# Patient Record
Sex: FEMALE | Race: BLACK OR AFRICAN AMERICAN | HISPANIC OR LATINO
[De-identification: names, ages, dates, MRNs, and addresses within clinical notes are randomized per-mention and may not be internally consistent; named-entity substitution may affect disease eponyms.]

---

## 2017-02-28 ENCOUNTER — APPOINTMENT (OUTPATIENT)
Dept: OBGYN | Facility: CLINIC | Age: 37
End: 2017-02-28

## 2017-02-28 ENCOUNTER — OUTPATIENT (OUTPATIENT)
Dept: OUTPATIENT SERVICES | Facility: HOSPITAL | Age: 37
LOS: 1 days | Discharge: HOME | End: 2017-02-28

## 2017-02-28 VITALS
BODY MASS INDEX: 40.44 KG/M2 | SYSTOLIC BLOOD PRESSURE: 120 MMHG | HEIGHT: 60 IN | DIASTOLIC BLOOD PRESSURE: 80 MMHG | WEIGHT: 206 LBS

## 2017-02-28 DIAGNOSIS — Z00.00 ENCOUNTER FOR GENERAL ADULT MEDICAL EXAMINATION W/OUT ABNORMAL FINDINGS: ICD-10-CM

## 2017-03-10 ENCOUNTER — RECORD ABSTRACTING (OUTPATIENT)
Age: 37
End: 2017-03-10

## 2017-06-27 DIAGNOSIS — Z01.419 ENCOUNTER FOR GYNECOLOGICAL EXAMINATION (GENERAL) (ROUTINE) WITHOUT ABNORMAL FINDINGS: ICD-10-CM

## 2017-08-11 ENCOUNTER — OTHER (OUTPATIENT)
Age: 37
End: 2017-08-11

## 2017-08-11 ENCOUNTER — APPOINTMENT (OUTPATIENT)
Dept: OBGYN | Facility: CLINIC | Age: 37
End: 2017-08-11

## 2017-08-25 ENCOUNTER — OUTPATIENT (OUTPATIENT)
Dept: OUTPATIENT SERVICES | Facility: HOSPITAL | Age: 37
LOS: 1 days | Discharge: HOME | End: 2017-08-25

## 2017-08-25 ENCOUNTER — APPOINTMENT (OUTPATIENT)
Dept: OBGYN | Facility: CLINIC | Age: 37
End: 2017-08-25

## 2017-08-25 VITALS — WEIGHT: 199 LBS | BODY MASS INDEX: 38.86 KG/M2 | SYSTOLIC BLOOD PRESSURE: 102 MMHG | DIASTOLIC BLOOD PRESSURE: 80 MMHG

## 2017-08-27 ENCOUNTER — EMERGENCY (EMERGENCY)
Facility: HOSPITAL | Age: 37
LOS: 0 days | Discharge: HOME | End: 2017-08-28

## 2017-08-27 DIAGNOSIS — Z98.890 OTHER SPECIFIED POSTPROCEDURAL STATES: ICD-10-CM

## 2017-08-27 DIAGNOSIS — N83.201 UNSPECIFIED OVARIAN CYST, RIGHT SIDE: ICD-10-CM

## 2017-08-27 DIAGNOSIS — N89.8 OTHER SPECIFIED NONINFLAMMATORY DISORDERS OF VAGINA: ICD-10-CM

## 2017-08-27 DIAGNOSIS — R10.2 PELVIC AND PERINEAL PAIN: ICD-10-CM

## 2017-08-27 DIAGNOSIS — Z91.013 ALLERGY TO SEAFOOD: ICD-10-CM

## 2017-08-28 ENCOUNTER — RX RENEWAL (OUTPATIENT)
Age: 37
End: 2017-08-28

## 2017-08-30 ENCOUNTER — RESULT REVIEW (OUTPATIENT)
Age: 37
End: 2017-08-30

## 2017-09-01 ENCOUNTER — APPOINTMENT (OUTPATIENT)
Dept: OBGYN | Facility: CLINIC | Age: 37
End: 2017-09-01

## 2017-09-01 ENCOUNTER — OUTPATIENT (OUTPATIENT)
Dept: OUTPATIENT SERVICES | Facility: HOSPITAL | Age: 37
LOS: 1 days | Discharge: HOME | End: 2017-09-01

## 2017-09-01 VITALS
SYSTOLIC BLOOD PRESSURE: 114 MMHG | BODY MASS INDEX: 39.32 KG/M2 | DIASTOLIC BLOOD PRESSURE: 74 MMHG | WEIGHT: 200.3 LBS | HEIGHT: 60 IN

## 2017-09-02 LAB — HPV I/H RISK 1 DNA CVX QL PROBE+SIG AMP: NOT DETECTED

## 2017-09-05 DIAGNOSIS — N92.0 EXCESSIVE AND FREQUENT MENSTRUATION WITH REGULAR CYCLE: ICD-10-CM

## 2017-09-07 ENCOUNTER — OUTPATIENT (OUTPATIENT)
Dept: OUTPATIENT SERVICES | Facility: HOSPITAL | Age: 37
LOS: 1 days | Discharge: HOME | End: 2017-09-07

## 2017-09-07 DIAGNOSIS — F41.0 PANIC DISORDER [EPISODIC PAROXYSMAL ANXIETY]: ICD-10-CM

## 2017-09-07 DIAGNOSIS — F41.1 GENERALIZED ANXIETY DISORDER: ICD-10-CM

## 2017-09-07 DIAGNOSIS — F32.1 MAJOR DEPRESSIVE DISORDER, SINGLE EPISODE, MODERATE: ICD-10-CM

## 2018-01-18 ENCOUNTER — TRANSCRIPTION ENCOUNTER (OUTPATIENT)
Age: 38
End: 2018-01-18

## 2018-01-29 ENCOUNTER — EMERGENCY (EMERGENCY)
Facility: HOSPITAL | Age: 38
LOS: 0 days | Discharge: LEFT BEFORE DISCHARGE | End: 2018-01-29

## 2018-01-29 DIAGNOSIS — Z91.013 ALLERGY TO SEAFOOD: ICD-10-CM

## 2018-01-29 DIAGNOSIS — R10.9 UNSPECIFIED ABDOMINAL PAIN: ICD-10-CM

## 2018-01-29 DIAGNOSIS — Z98.890 OTHER SPECIFIED POSTPROCEDURAL STATES: ICD-10-CM

## 2018-02-23 ENCOUNTER — APPOINTMENT (OUTPATIENT)
Dept: OBGYN | Facility: CLINIC | Age: 38
End: 2018-02-23

## 2018-03-08 ENCOUNTER — OTHER (OUTPATIENT)
Age: 38
End: 2018-03-08

## 2018-03-09 ENCOUNTER — RX RENEWAL (OUTPATIENT)
Age: 38
End: 2018-03-09

## 2018-03-30 ENCOUNTER — APPOINTMENT (OUTPATIENT)
Dept: OBGYN | Facility: CLINIC | Age: 38
End: 2018-03-30

## 2018-04-06 ENCOUNTER — OUTPATIENT (OUTPATIENT)
Dept: OUTPATIENT SERVICES | Facility: HOSPITAL | Age: 38
LOS: 1 days | Discharge: HOME | End: 2018-04-06

## 2018-04-06 ENCOUNTER — APPOINTMENT (OUTPATIENT)
Dept: OBGYN | Facility: CLINIC | Age: 38
End: 2018-04-06

## 2018-04-06 VITALS
DIASTOLIC BLOOD PRESSURE: 78 MMHG | HEIGHT: 60 IN | WEIGHT: 195 LBS | BODY MASS INDEX: 38.28 KG/M2 | SYSTOLIC BLOOD PRESSURE: 120 MMHG

## 2018-04-06 DIAGNOSIS — F32.1 MAJOR DEPRESSIVE DISORDER, SINGLE EPISODE, MODERATE: ICD-10-CM

## 2018-04-06 DIAGNOSIS — Z80.0 FAMILY HISTORY OF MALIGNANT NEOPLASM OF DIGESTIVE ORGANS: ICD-10-CM

## 2018-04-06 DIAGNOSIS — Z12.4 ENCOUNTER FOR SCREENING FOR MALIGNANT NEOPLASM OF CERVIX: ICD-10-CM

## 2018-04-06 DIAGNOSIS — B37.3 CANDIDIASIS OF VULVA AND VAGINA: ICD-10-CM

## 2018-04-06 DIAGNOSIS — Z83.49 FAMILY HISTORY OF OTHER ENDOCRINE, NUTRITIONAL AND METABOLIC DISEASES: ICD-10-CM

## 2018-04-06 DIAGNOSIS — N92.6 IRREGULAR MENSTRUATION, UNSPECIFIED: ICD-10-CM

## 2018-04-06 DIAGNOSIS — N76.0 ACUTE VAGINITIS: ICD-10-CM

## 2018-04-06 DIAGNOSIS — Z80.3 FAMILY HISTORY OF MALIGNANT NEOPLASM OF BREAST: ICD-10-CM

## 2018-04-06 DIAGNOSIS — Z86.018 PERSONAL HISTORY OF OTHER BENIGN NEOPLASM: ICD-10-CM

## 2018-04-06 DIAGNOSIS — Z83.2 FAMILY HISTORY OF DISEASES OF THE BLOOD AND BLOOD-FORMING ORGANS AND CERTAIN DISORDERS INVOLVING THE IMMUNE MECHANISM: ICD-10-CM

## 2018-04-06 DIAGNOSIS — B96.89 ACUTE VAGINITIS: ICD-10-CM

## 2018-04-06 RX ORDER — SERTRALINE HYDROCHLORIDE 50 MG/1
50 TABLET, FILM COATED ORAL
Qty: 30 | Refills: 0 | Status: DISCONTINUED | COMMUNITY
Start: 2017-09-07 | End: 2018-04-06

## 2018-04-06 RX ORDER — NORGESTIMATE AND ETHINYL ESTRADIOL 7DAYSX3 LO
0.18/0.215/0.25 KIT ORAL DAILY
Qty: 1 | Refills: 11 | Status: DISCONTINUED | COMMUNITY
Start: 2017-08-25 | End: 2018-04-06

## 2018-04-06 RX ORDER — FLUCONAZOLE 150 MG/1
150 TABLET ORAL DAILY
Qty: 1 | Refills: 0 | Status: COMPLETED | COMMUNITY
Start: 2018-03-09 | End: 2018-03-09

## 2018-04-09 DIAGNOSIS — N76.0 ACUTE VAGINITIS: ICD-10-CM

## 2018-04-13 LAB
A VAGINAE DNA VAG QL NAA+PROBE: ABNORMAL
BVAB2 DNA VAG QL NAA+PROBE: NORMAL
C KRUSEI DNA VAG QL NAA+PROBE: NEGATIVE
C KRUSEI DNA VAG QL NAA+PROBE: POSITIVE
C TRACH RRNA SPEC QL NAA+PROBE: NEGATIVE
MEGA1 DNA VAG QL NAA+PROBE: ABNORMAL
N GONORRHOEA RRNA SPEC QL NAA+PROBE: NEGATIVE
T VAGINALIS RRNA SPEC QL NAA+PROBE: NEGATIVE

## 2018-05-01 ENCOUNTER — APPOINTMENT (OUTPATIENT)
Dept: ANTEPARTUM | Facility: CLINIC | Age: 38
End: 2018-05-01

## 2018-08-22 ENCOUNTER — EMERGENCY (EMERGENCY)
Facility: HOSPITAL | Age: 38
LOS: 0 days | Discharge: HOME | End: 2018-08-22
Admitting: PHYSICIAN ASSISTANT

## 2018-08-22 VITALS
OXYGEN SATURATION: 100 % | HEART RATE: 78 BPM | RESPIRATION RATE: 18 BRPM | SYSTOLIC BLOOD PRESSURE: 126 MMHG | DIASTOLIC BLOOD PRESSURE: 84 MMHG | TEMPERATURE: 98 F

## 2018-08-22 DIAGNOSIS — N76.0 ACUTE VAGINITIS: ICD-10-CM

## 2018-08-22 RX ORDER — CEPHALEXIN 500 MG
1 CAPSULE ORAL
Qty: 10 | Refills: 0 | OUTPATIENT
Start: 2018-08-22 | End: 2018-08-26

## 2018-08-22 NOTE — ED PROVIDER NOTE - PROGRESS NOTE DETAILS
I&D without much success, patient instructed on proper wound care, use of Abx and f/u with PMD, return if symptoms worsen or persist. patient agrees and understands plan of care, comfortable with d/c.

## 2018-08-22 NOTE — ED PROVIDER NOTE - PHYSICAL EXAMINATION
CONST: Well appearing in NAD  CARD: Normal S1 S2; Normal rate and rhythm  RESP: Equal BS B/L, No wheezes, rhonchi or rales. No distress  GI: Soft, non-tender, non-distended.  MS: Normal ROM in all extremities. No midline spinal tenderness.  SKIN: (+) abscess to lower left labia majora, with small fluctuance and overlying erythema  NEURO: A&Ox3, No focal deficits. Strength 5/5 with no sensory deficits. Steady gait

## 2018-08-22 NOTE — ED PROVIDER NOTE - CARE PLAN
Principal Discharge DX:	Abscess, vagina  Assessment and plan of treatment:	warm soaks, Abx, f/u with PMD

## 2018-08-22 NOTE — ED PROVIDER NOTE - NS ED ROS FT
CONST: No fever, chills or bodyaches  CARD: No chest pain, palpitations  RESP: No SOB, cough, hemoptysis  GI: No abdominal pain, N/V/D  MS: No joint pain, back pain or extremity pain/injury  SKIN: vaginal abscess  NEURO: No headache, dizziness, paresthesias or LOC

## 2018-08-22 NOTE — ED PROVIDER NOTE - OBJECTIVE STATEMENT
Patient is a 37 yo female with no significant PM hx presents with c/o abscess to outside of vagina. Patient states she noticed it x days ago, soaked it in warm water last night which made it smaller. Patient denies fever, without any other complaints

## 2018-08-31 ENCOUNTER — OUTPATIENT (OUTPATIENT)
Dept: OUTPATIENT SERVICES | Facility: HOSPITAL | Age: 38
LOS: 1 days | Discharge: HOME | End: 2018-08-31

## 2018-08-31 ENCOUNTER — RESULT CHARGE (OUTPATIENT)
Age: 38
End: 2018-08-31

## 2018-08-31 ENCOUNTER — APPOINTMENT (OUTPATIENT)
Dept: OBGYN | Facility: CLINIC | Age: 38
End: 2018-08-31

## 2018-08-31 VITALS
WEIGHT: 203.5 LBS | HEIGHT: 66 IN | DIASTOLIC BLOOD PRESSURE: 78 MMHG | SYSTOLIC BLOOD PRESSURE: 110 MMHG | BODY MASS INDEX: 32.71 KG/M2

## 2018-08-31 DIAGNOSIS — Z65.8 OTHER SPECIFIED PROBLEMS RELATED TO PSYCHOSOCIAL CIRCUMSTANCES: ICD-10-CM

## 2018-08-31 DIAGNOSIS — Z01.419 ENCOUNTER FOR GYNECOLOGICAL EXAMINATION (GENERAL) (ROUTINE) WITHOUT ABNORMAL FINDINGS: ICD-10-CM

## 2018-08-31 DIAGNOSIS — F41.1 GENERALIZED ANXIETY DISORDER: ICD-10-CM

## 2018-08-31 LAB
HCG UR QL: NEGATIVE
QUALITY CONTROL: YES

## 2018-08-31 RX ORDER — METRONIDAZOLE 500 MG/1
500 TABLET ORAL TWICE DAILY
Qty: 14 | Refills: 0 | Status: COMPLETED | COMMUNITY
Start: 2018-04-06 | End: 2018-08-31

## 2018-08-31 RX ORDER — NORGESTIMATE AND ETHINYL ESTRADIOL 7DAYSX3 LO
0.18/0.215/0.25 KIT ORAL DAILY
Qty: 28 | Refills: 5 | Status: DISCONTINUED | COMMUNITY
Start: 2018-03-09 | End: 2018-08-31

## 2018-08-31 RX ORDER — IBUPROFEN 600 MG/1
600 TABLET, FILM COATED ORAL EVERY 6 HOURS
Qty: 30 | Refills: 0 | Status: COMPLETED | COMMUNITY
Start: 2017-09-01 | End: 2018-08-31

## 2018-08-31 RX ORDER — MICONAZOLE NITRATE 40 MG/G
4 CREAM VAGINAL
Qty: 3 | Refills: 0 | Status: COMPLETED | COMMUNITY
Start: 2018-04-13 | End: 2018-08-31

## 2018-09-14 LAB
A VAGINAE DNA VAG QL NAA+PROBE: NORMAL
BVAB2 DNA VAG QL NAA+PROBE: NORMAL
C KRUSEI DNA VAG QL NAA+PROBE: NEGATIVE
C TRACH RRNA SPEC QL NAA+PROBE: NEGATIVE
MEGA1 DNA VAG QL NAA+PROBE: ABNORMAL
N GONORRHOEA RRNA SPEC QL NAA+PROBE: NEGATIVE
T VAGINALIS RRNA SPEC QL NAA+PROBE: NEGATIVE

## 2018-11-01 ENCOUNTER — OUTPATIENT (OUTPATIENT)
Dept: OUTPATIENT SERVICES | Facility: HOSPITAL | Age: 38
LOS: 1 days | End: 2018-11-01
Payer: MEDICAID

## 2018-11-01 PROCEDURE — G9001: CPT

## 2018-11-12 ENCOUNTER — EMERGENCY (EMERGENCY)
Facility: HOSPITAL | Age: 38
LOS: 0 days | Discharge: HOME | End: 2018-11-12
Attending: EMERGENCY MEDICINE | Admitting: EMERGENCY MEDICINE

## 2018-11-12 VITALS
OXYGEN SATURATION: 98 % | DIASTOLIC BLOOD PRESSURE: 73 MMHG | TEMPERATURE: 99 F | SYSTOLIC BLOOD PRESSURE: 137 MMHG | RESPIRATION RATE: 20 BRPM | HEART RATE: 68 BPM

## 2018-11-12 DIAGNOSIS — Z79.2 LONG TERM (CURRENT) USE OF ANTIBIOTICS: ICD-10-CM

## 2018-11-12 DIAGNOSIS — D25.9 LEIOMYOMA OF UTERUS, UNSPECIFIED: Chronic | ICD-10-CM

## 2018-11-12 DIAGNOSIS — R10.9 UNSPECIFIED ABDOMINAL PAIN: ICD-10-CM

## 2018-11-12 DIAGNOSIS — R10.2 PELVIC AND PERINEAL PAIN: ICD-10-CM

## 2018-11-12 DIAGNOSIS — O34.219 MATERNAL CARE FOR UNSPECIFIED TYPE SCAR FROM PREVIOUS CESAREAN DELIVERY: Chronic | ICD-10-CM

## 2018-11-12 DIAGNOSIS — R11.2 NAUSEA WITH VOMITING, UNSPECIFIED: ICD-10-CM

## 2018-11-12 DIAGNOSIS — Z98.890 OTHER SPECIFIED POSTPROCEDURAL STATES: ICD-10-CM

## 2018-11-12 LAB
ALBUMIN SERPL ELPH-MCNC: 4.3 G/DL — SIGNIFICANT CHANGE UP (ref 3.5–5.2)
ALP SERPL-CCNC: 72 U/L — SIGNIFICANT CHANGE UP (ref 30–115)
ALT FLD-CCNC: 13 U/L — SIGNIFICANT CHANGE UP (ref 0–41)
ANION GAP SERPL CALC-SCNC: 17 MMOL/L — HIGH (ref 7–14)
APPEARANCE UR: ABNORMAL
AST SERPL-CCNC: 13 U/L — SIGNIFICANT CHANGE UP (ref 0–41)
BASOPHILS # BLD AUTO: 0.02 K/UL — SIGNIFICANT CHANGE UP (ref 0–0.2)
BASOPHILS NFR BLD AUTO: 0.3 % — SIGNIFICANT CHANGE UP (ref 0–1)
BILIRUB SERPL-MCNC: 0.3 MG/DL — SIGNIFICANT CHANGE UP (ref 0.2–1.2)
BILIRUB UR-MCNC: NEGATIVE — SIGNIFICANT CHANGE UP
BUN SERPL-MCNC: 7 MG/DL — LOW (ref 10–20)
CALCIUM SERPL-MCNC: 9.6 MG/DL — SIGNIFICANT CHANGE UP (ref 8.5–10.1)
CHLORIDE SERPL-SCNC: 96 MMOL/L — LOW (ref 98–110)
CO2 SERPL-SCNC: 25 MMOL/L — SIGNIFICANT CHANGE UP (ref 17–32)
COLOR SPEC: YELLOW — SIGNIFICANT CHANGE UP
CREAT SERPL-MCNC: 0.7 MG/DL — SIGNIFICANT CHANGE UP (ref 0.7–1.5)
DIFF PNL FLD: NEGATIVE — SIGNIFICANT CHANGE UP
EOSINOPHIL # BLD AUTO: 0.15 K/UL — SIGNIFICANT CHANGE UP (ref 0–0.7)
EOSINOPHIL NFR BLD AUTO: 2 % — SIGNIFICANT CHANGE UP (ref 0–8)
GLUCOSE SERPL-MCNC: 99 MG/DL — SIGNIFICANT CHANGE UP (ref 70–99)
GLUCOSE UR QL: NEGATIVE MG/DL — SIGNIFICANT CHANGE UP
HCT VFR BLD CALC: 39.4 % — SIGNIFICANT CHANGE UP (ref 37–47)
HGB BLD-MCNC: 13.2 G/DL — SIGNIFICANT CHANGE UP (ref 12–16)
IMM GRANULOCYTES NFR BLD AUTO: 0.1 % — SIGNIFICANT CHANGE UP (ref 0.1–0.3)
KETONES UR-MCNC: 15
LEUKOCYTE ESTERASE UR-ACNC: ABNORMAL
LIDOCAIN IGE QN: 67 U/L — HIGH (ref 7–60)
LYMPHOCYTES # BLD AUTO: 2.85 K/UL — SIGNIFICANT CHANGE UP (ref 1.2–3.4)
LYMPHOCYTES # BLD AUTO: 37.5 % — SIGNIFICANT CHANGE UP (ref 20.5–51.1)
MCHC RBC-ENTMCNC: 27.9 PG — SIGNIFICANT CHANGE UP (ref 27–31)
MCHC RBC-ENTMCNC: 33.5 G/DL — SIGNIFICANT CHANGE UP (ref 32–37)
MCV RBC AUTO: 83.3 FL — SIGNIFICANT CHANGE UP (ref 81–99)
MONOCYTES # BLD AUTO: 0.64 K/UL — HIGH (ref 0.1–0.6)
MONOCYTES NFR BLD AUTO: 8.4 % — SIGNIFICANT CHANGE UP (ref 1.7–9.3)
NEUTROPHILS # BLD AUTO: 3.94 K/UL — SIGNIFICANT CHANGE UP (ref 1.4–6.5)
NEUTROPHILS NFR BLD AUTO: 51.7 % — SIGNIFICANT CHANGE UP (ref 42.2–75.2)
NITRITE UR-MCNC: NEGATIVE — SIGNIFICANT CHANGE UP
PH UR: 6.5 — SIGNIFICANT CHANGE UP (ref 5–8)
PLATELET # BLD AUTO: 347 K/UL — SIGNIFICANT CHANGE UP (ref 130–400)
POTASSIUM SERPL-MCNC: 3.7 MMOL/L — SIGNIFICANT CHANGE UP (ref 3.5–5)
POTASSIUM SERPL-SCNC: 3.7 MMOL/L — SIGNIFICANT CHANGE UP (ref 3.5–5)
PROT SERPL-MCNC: 7.5 G/DL — SIGNIFICANT CHANGE UP (ref 6–8)
PROT UR-MCNC: ABNORMAL MG/DL
RBC # BLD: 4.73 M/UL — SIGNIFICANT CHANGE UP (ref 4.2–5.4)
RBC # FLD: 13.2 % — SIGNIFICANT CHANGE UP (ref 11.5–14.5)
SODIUM SERPL-SCNC: 138 MMOL/L — SIGNIFICANT CHANGE UP (ref 135–146)
SP GR SPEC: 1.02 — SIGNIFICANT CHANGE UP (ref 1.01–1.03)
UROBILINOGEN FLD QL: 0.2 MG/DL — SIGNIFICANT CHANGE UP (ref 0.2–0.2)
WBC # BLD: 7.61 K/UL — SIGNIFICANT CHANGE UP (ref 4.8–10.8)
WBC # FLD AUTO: 7.61 K/UL — SIGNIFICANT CHANGE UP (ref 4.8–10.8)

## 2018-11-12 RX ORDER — MORPHINE SULFATE 50 MG/1
4 CAPSULE, EXTENDED RELEASE ORAL ONCE
Qty: 0 | Refills: 0 | Status: DISCONTINUED | OUTPATIENT
Start: 2018-11-12 | End: 2018-11-12

## 2018-11-12 RX ORDER — SUCRALFATE 1 G
1 TABLET ORAL ONCE
Qty: 0 | Refills: 0 | Status: COMPLETED | OUTPATIENT
Start: 2018-11-12 | End: 2018-11-12

## 2018-11-12 RX ORDER — SODIUM CHLORIDE 9 MG/ML
3 INJECTION INTRAMUSCULAR; INTRAVENOUS; SUBCUTANEOUS ONCE
Qty: 0 | Refills: 0 | Status: COMPLETED | OUTPATIENT
Start: 2018-11-12 | End: 2018-11-12

## 2018-11-12 RX ORDER — SODIUM CHLORIDE 9 MG/ML
2000 INJECTION, SOLUTION INTRAVENOUS ONCE
Qty: 0 | Refills: 0 | Status: COMPLETED | OUTPATIENT
Start: 2018-11-12 | End: 2018-11-12

## 2018-11-12 RX ORDER — FAMOTIDINE 10 MG/ML
20 INJECTION INTRAVENOUS ONCE
Qty: 0 | Refills: 0 | Status: COMPLETED | OUTPATIENT
Start: 2018-11-12 | End: 2018-11-12

## 2018-11-12 RX ORDER — ONDANSETRON 8 MG/1
4 TABLET, FILM COATED ORAL ONCE
Qty: 0 | Refills: 0 | Status: COMPLETED | OUTPATIENT
Start: 2018-11-12 | End: 2018-11-12

## 2018-11-12 RX ORDER — IOHEXOL 300 MG/ML
30 INJECTION, SOLUTION INTRAVENOUS ONCE
Qty: 0 | Refills: 0 | Status: COMPLETED | OUTPATIENT
Start: 2018-11-12 | End: 2018-11-12

## 2018-11-12 RX ADMIN — MORPHINE SULFATE 4 MILLIGRAM(S): 50 CAPSULE, EXTENDED RELEASE ORAL at 17:00

## 2018-11-12 RX ADMIN — SODIUM CHLORIDE 3 MILLILITER(S): 9 INJECTION INTRAMUSCULAR; INTRAVENOUS; SUBCUTANEOUS at 12:30

## 2018-11-12 RX ADMIN — Medication 30 MILLILITER(S): at 20:39

## 2018-11-12 RX ADMIN — MORPHINE SULFATE 4 MILLIGRAM(S): 50 CAPSULE, EXTENDED RELEASE ORAL at 12:22

## 2018-11-12 RX ADMIN — IOHEXOL 30 MILLILITER(S): 300 INJECTION, SOLUTION INTRAVENOUS at 16:15

## 2018-11-12 RX ADMIN — ONDANSETRON 4 MILLIGRAM(S): 8 TABLET, FILM COATED ORAL at 12:22

## 2018-11-12 RX ADMIN — Medication 1 GRAM(S): at 20:39

## 2018-11-12 RX ADMIN — SODIUM CHLORIDE 2000 MILLILITER(S): 9 INJECTION, SOLUTION INTRAVENOUS at 12:22

## 2018-11-12 RX ADMIN — FAMOTIDINE 20 MILLIGRAM(S): 10 INJECTION INTRAVENOUS at 13:00

## 2018-11-12 NOTE — CONSULT NOTE ADULT - ATTENDING COMMENTS
37 yo female patient.  epigastric pain and lower abdominal pina for more than a week.  nausea and vomiting and epigastric pain after eating.  CT with thickened bed of the appendix with no stranding and air in the lumen of the appendix.  Normal WBC.  abdomen with more tenderness toward the epigastric area and RUQ.    a/p:  Gastritis / gastric ulcer symptoms.  PPIs. Carafate.  No evidence of acute appendicitis.  Return if symptoms worsen.

## 2018-11-12 NOTE — ED PROVIDER NOTE - PROGRESS NOTE DETAILS
Attending Note: I personally evaluated the patient. I reviewed the Resident’s note (as assigned above), and agree with the findings and plan except as documented in my note.  37 y/o F c/o abdominal pain that started yesterday after she took her Amoxicillin and Ibuprofen medications. Pt states she has had decreased PO intake. PE: pt is a well appearing, non-toxic F, pink conjunctiva, abd soft, ND (+) epigastric tenderness, no parietal signs, no focal neuro deficits, no edema, no respiratory distress. pending results of CT ct results reviewed. will get surgery dw surgery, will eval s/o to dr vieira - f/u surgery and dispo sp surgery eval- per Dr. Reno, unlikely appendicitis, more likely gastritis, recs karafate, PPI, fu outpatient Pt and family aware of all results, given a copy of all results, comfortable with d/c and f/u outpatient, return precautions given, no further questions or concerns at this time ADDENDUM by Mariaa Coto M.D.: I received sign-off from Dr. NEGRITA Vargas. 38yoF with epigastric/suprapubic pain after eating, increased ibuprofen recently, abd exam benign, U/S negative, CT equivocal for appe, I was to f/u surg eval. Surgery states low suspicion and recommend discharge. On my exam, pt NAD. Confirms symptoms including worsening with eating and epigastric and suprapubic similar pain. On exam, diffuse abdominal TTP, no apparent TTP with distraction. Seen by surgery again, discharged. Pt comfortable with discharge at this point as well. History as well is more suspicious for ulcer. Discharged with strict return precautions, GI f/u, further symptomatic care.

## 2018-11-12 NOTE — ED PROVIDER NOTE - OBJECTIVE STATEMENT
37yo F hx vaginal abscess, CSection, fibroid surgery for reduction in Feb at fibroid clinic, pw abdominal pain x1wk- per pt, initially started out upper abdominal, sharp, non radiating, for pat 2d became lower, suprapubic, R pelvic- +nausea, vomiting getting progressively worse- NBNB, hasn't been able to tolerate PO since last night- no f/c/d, chest pain, shortness of breath, other abdominal surgeries, dysuria/hematuria, back pain, vaginal bleeding/discharge. +sexually active, uses barrier contraception

## 2018-11-12 NOTE — ED PROVIDER NOTE - NS ED ROS FT
Constitutional:  See HPI.   Eyes:  No visual changes, eye pain or discharge.  ENMT:  No hearing changes, pain, discharge or infections. No neck pain or stiffness.  Cardiac:  No chest pain, SOB or edema. No chest pain with exertion.  Respiratory:  No cough or respiratory distress. No hemoptysis.  :  No dysuria, frequency, hematuria  MS:  No joint pain or back pain.  Neuro:  No LOC. No headache or weakness.    Skin:  No skin rash.  Except as in HPI, all other review of systems is negative

## 2018-11-12 NOTE — ED PROVIDER NOTE - PHYSICAL EXAMINATION
Con: Well appearing NAD non toxic.   HEENT: NCAT EOMI conjunctiva nml. No nasal discharge. dry MM Neck supple, non tender, full ROM.   CV: RRR no MRG +S1S2.   Pulm: CTA b/l.   Abd: s +suprapubic and R pelvic ttp no rebound/guarding ND +BS.   Pelvic exam performed by myself and chaperoned by DIANE Lubin: No lesions, no discharge, no bleeding, os closed, no CMT, +R adnexal tenderness, no masses  Ext: WWP x4, moving all extremities, no edema. 2+ equal pulses throughout.   Psy: Cooperative, appropriate.   Skin: Warm, dry, no rash

## 2018-11-12 NOTE — ED ADULT TRIAGE NOTE - HEART RATE (BEATS/MIN)
68
Pt axox3m c/o abdominal pain starting x 1 week and states it feels like " burning" pt also c/o n/v, denies any dysuria. Abdomen tender in epigastric area.

## 2018-11-12 NOTE — ED PROVIDER NOTE - CARE PROVIDER_API CALL
Bony Cason), Gastroenterology; Internal Medicine  [address] [place] [address]  Phone: [phone]  Fax: [phone] Bony Cason), Gastroenterology; Internal Medicine  [address] [place] [address]  Phone: [phone]  Fax: [phone]    Ernesto Reno), Surgery  [address] [place] [address]  [address] [place] [address]  Phone: [phone]  Fax: [phone]

## 2018-11-12 NOTE — ED PROVIDER NOTE - CARE PROVIDERS DIRECT ADDRESSES
[email or web address]

## 2018-11-12 NOTE — CONSULT NOTE ADULT - SUBJECTIVE AND OBJECTIVE BOX
MU OLVERA [id number]  38y Female with PMH/PSH below presents with abdominal pain. Patient states she has had a flu like illness for the past 2 weeks. Also had recent onset of dental pain (PMH of TMJ_ went to dentist who prescribed augmentin and motrin 600mg TID. Patient had sudden onset of abdominal pain 2 days ago that has been worsening since onset, it is sharp, epigastric and hypogastric. Associated with nausea and vomiting but no change in bowel habits.     PAST MEDICAL & SURGICAL HISTORY:  Abscess: vaginal  Fibroid - embilization  Delivery with history of   liposuction  TMJ  hyperthyroidism  GERD - gastritis        MEDICATIONS  (STANDING):    MEDICATIONS  (PRN):      Allergies    No Known Allergies    Intolerances        REVIEW OF SYSTEMS    [X] A ten-point review of systems was otherwise negative except as noted.  [ ] Due to altered mental status/intubation, subjective information were not able to be obtained from the patient. History was obtained, to the extent possible, from review of the chart and collateral sources of information.      Vital Signs Last 24 Hrs  T(C): 37.1 (2018 10:34), Max: 37.1 (2018 10:34)  T(F): 98.7 (2018 10:34), Max: 98.7 (2018 10:34)  HR: 68 (2018 10:34) (68 - 68)  BP: 137/73 (2018 10:34) (137/73 - 137/73)  BP(mean): --  RR: 20 (2018 10:34) (20 - 20)  SpO2: 98% (2018 10:34) (98% - 98%)    PHYSICAL EXAM:  GENERAL: NAD, well-appearing  CHEST/LUNG: Clear to auscultation bilaterally  HEART: Regular rate and rhythm  ABDOMEN: Soft, Nondistended epigastric and hypogastric/suprapubic tenderness   EXTREMITIES:  No clubbing, cyanosis, or edema      LABS:  Labs:  CAPILLARY BLOOD GLUCOSE                              13.2   7.61  )-----------( 347      ( 2018 11:54 )             39.4       Auto Neutrophil %: 51.7 % (18 @ 11:54)  Auto Immature Granulocyte %: 0.1 % (18 @ 11:54)        138  |  96<L>  |  7<L>  ----------------------------<  99  3.7   |  25  |  0.7      Calcium, Total Serum: 9.6 mg/dL (18 @ 11:54)      LFTs:             7.5  | 0.3  | 13       ------------------[72      ( 2018 11:54 )  4.3  | x    | 13          Lipase:67     Amylase:x            Urinalysis Basic - ( 2018 11:54 )    Color: Yellow / Appearance: Cloudy / S.020 / pH: x  Gluc: x / Ketone: 15  / Bili: Negative / Urobili: 0.2 mg/dL   Blood: x / Protein: Trace mg/dL / Nitrite: Negative   Leuk Esterase: Trace / RBC: x / WBC 3-5 /HPF   Sq Epi: x / Non Sq Epi: Few /HPF / Bacteria: Few /HPF      RADIOLOGY & ADDITIONAL STUDIES:  < from: CT Abdomen and Pelvis w/ IV Cont (18 @ 14:25) >  1.  No definite evidence of an acute abdominopelvic pathology.    2.  Nonspecific dilated, thick-walled proximal appendix measuring up to   1.4 cm containing fecal material and without surrounding inflammatory   changes. Findings are unlikely to represent acute appendicitis. However,   if there is persistent clinical concern, a short-term follow-up CT can be   obtained (with IV and oral contrast).     < end of copied text >

## 2018-11-13 DIAGNOSIS — Z71.89 OTHER SPECIFIED COUNSELING: ICD-10-CM

## 2018-11-13 LAB
CULTURE RESULTS: SIGNIFICANT CHANGE UP
SPECIMEN SOURCE: SIGNIFICANT CHANGE UP

## 2018-11-30 ENCOUNTER — APPOINTMENT (OUTPATIENT)
Dept: OBGYN | Facility: CLINIC | Age: 38
End: 2018-11-30

## 2018-12-05 ENCOUNTER — APPOINTMENT (OUTPATIENT)
Dept: SURGERY | Facility: CLINIC | Age: 38
End: 2018-12-05

## 2018-12-05 PROBLEM — L02.91 CUTANEOUS ABSCESS, UNSPECIFIED: Chronic | Status: ACTIVE | Noted: 2018-11-12

## 2018-12-10 ENCOUNTER — APPOINTMENT (OUTPATIENT)
Dept: SURGERY | Facility: CLINIC | Age: 38
End: 2018-12-10
Payer: MEDICAID

## 2018-12-10 VITALS — HEART RATE: 109 BPM | SYSTOLIC BLOOD PRESSURE: 132 MMHG | HEIGHT: 60 IN | DIASTOLIC BLOOD PRESSURE: 84 MMHG

## 2018-12-10 PROCEDURE — 99213 OFFICE O/P EST LOW 20 MIN: CPT

## 2018-12-28 ENCOUNTER — APPOINTMENT (OUTPATIENT)
Dept: OBGYN | Facility: CLINIC | Age: 38
End: 2018-12-28

## 2019-01-07 ENCOUNTER — TRANSCRIPTION ENCOUNTER (OUTPATIENT)
Age: 39
End: 2019-01-07

## 2019-01-07 ENCOUNTER — OUTPATIENT (OUTPATIENT)
Dept: OUTPATIENT SERVICES | Facility: HOSPITAL | Age: 39
LOS: 1 days | Discharge: HOME | End: 2019-01-07

## 2019-01-07 ENCOUNTER — RESULT REVIEW (OUTPATIENT)
Age: 39
End: 2019-01-07

## 2019-01-07 VITALS
RESPIRATION RATE: 17 BRPM | OXYGEN SATURATION: 98 % | HEIGHT: 60 IN | WEIGHT: 197.98 LBS | SYSTOLIC BLOOD PRESSURE: 121 MMHG | HEART RATE: 70 BPM | TEMPERATURE: 98 F | DIASTOLIC BLOOD PRESSURE: 59 MMHG

## 2019-01-07 VITALS — RESPIRATION RATE: 17 BRPM | HEART RATE: 62 BPM | DIASTOLIC BLOOD PRESSURE: 68 MMHG | SYSTOLIC BLOOD PRESSURE: 125 MMHG

## 2019-01-07 DIAGNOSIS — D25.9 LEIOMYOMA OF UTERUS, UNSPECIFIED: Chronic | ICD-10-CM

## 2019-01-07 DIAGNOSIS — O34.219 MATERNAL CARE FOR UNSPECIFIED TYPE SCAR FROM PREVIOUS CESAREAN DELIVERY: Chronic | ICD-10-CM

## 2019-01-07 NOTE — ASU DISCHARGE PLAN (ADULT/PEDIATRIC). - NOTIFY
Unable to Urinate/Swelling that continues/Bleeding that does not stop/Numbness, color, or temperature change to extremity/Persistent Nausea and Vomiting

## 2019-01-07 NOTE — PRE-ANESTHESIA EVALUATION ADULT - NSANTHADDINFOFT_GEN_ALL_CORE
39 y/o BF evaluated for EGD / colonoscopy.  ASA 2.  Plan IV sedation / GA backup.  Plan, benefits, foreseeable risks, viable alternatives discussed with patient and all her pertinent questions answered and she understands and elects to proceed.

## 2019-01-09 LAB — SURGICAL PATHOLOGY STUDY: SIGNIFICANT CHANGE UP

## 2019-01-10 DIAGNOSIS — K22.10 ULCER OF ESOPHAGUS WITHOUT BLEEDING: ICD-10-CM

## 2019-01-10 DIAGNOSIS — R15.0 INCOMPLETE DEFECATION: ICD-10-CM

## 2019-01-10 DIAGNOSIS — K29.40 CHRONIC ATROPHIC GASTRITIS WITHOUT BLEEDING: ICD-10-CM

## 2019-01-10 DIAGNOSIS — R11.2 NAUSEA WITH VOMITING, UNSPECIFIED: ICD-10-CM

## 2019-01-10 DIAGNOSIS — Z91.013 ALLERGY TO SEAFOOD: ICD-10-CM

## 2019-01-10 DIAGNOSIS — K20.0 EOSINOPHILIC ESOPHAGITIS: ICD-10-CM

## 2019-01-14 ENCOUNTER — APPOINTMENT (OUTPATIENT)
Dept: SURGERY | Facility: CLINIC | Age: 39
End: 2019-01-14

## 2019-01-23 ENCOUNTER — EMERGENCY (EMERGENCY)
Facility: HOSPITAL | Age: 39
LOS: 0 days | Discharge: HOME | End: 2019-01-23
Attending: EMERGENCY MEDICINE | Admitting: EMERGENCY MEDICINE

## 2019-01-23 VITALS
SYSTOLIC BLOOD PRESSURE: 178 MMHG | OXYGEN SATURATION: 99 % | DIASTOLIC BLOOD PRESSURE: 77 MMHG | TEMPERATURE: 97 F | HEART RATE: 105 BPM | RESPIRATION RATE: 18 BRPM

## 2019-01-23 VITALS
RESPIRATION RATE: 18 BRPM | TEMPERATURE: 98 F | SYSTOLIC BLOOD PRESSURE: 116 MMHG | DIASTOLIC BLOOD PRESSURE: 64 MMHG | HEART RATE: 63 BPM | OXYGEN SATURATION: 99 %

## 2019-01-23 DIAGNOSIS — R19.7 DIARRHEA, UNSPECIFIED: ICD-10-CM

## 2019-01-23 DIAGNOSIS — D25.9 LEIOMYOMA OF UTERUS, UNSPECIFIED: Chronic | ICD-10-CM

## 2019-01-23 DIAGNOSIS — Z91.013 ALLERGY TO SEAFOOD: ICD-10-CM

## 2019-01-23 DIAGNOSIS — Z98.890 OTHER SPECIFIED POSTPROCEDURAL STATES: ICD-10-CM

## 2019-01-23 DIAGNOSIS — R35.0 FREQUENCY OF MICTURITION: ICD-10-CM

## 2019-01-23 DIAGNOSIS — R11.2 NAUSEA WITH VOMITING, UNSPECIFIED: ICD-10-CM

## 2019-01-23 DIAGNOSIS — Z79.899 OTHER LONG TERM (CURRENT) DRUG THERAPY: ICD-10-CM

## 2019-01-23 DIAGNOSIS — R10.31 RIGHT LOWER QUADRANT PAIN: ICD-10-CM

## 2019-01-23 DIAGNOSIS — R10.9 UNSPECIFIED ABDOMINAL PAIN: ICD-10-CM

## 2019-01-23 DIAGNOSIS — O34.219 MATERNAL CARE FOR UNSPECIFIED TYPE SCAR FROM PREVIOUS CESAREAN DELIVERY: Chronic | ICD-10-CM

## 2019-01-23 DIAGNOSIS — R10.32 LEFT LOWER QUADRANT PAIN: ICD-10-CM

## 2019-01-23 LAB
ALBUMIN SERPL ELPH-MCNC: 4.1 G/DL — SIGNIFICANT CHANGE UP (ref 3.5–5.2)
ALP SERPL-CCNC: 69 U/L — SIGNIFICANT CHANGE UP (ref 30–115)
ALT FLD-CCNC: 18 U/L — SIGNIFICANT CHANGE UP (ref 0–41)
ANION GAP SERPL CALC-SCNC: 16 MMOL/L — HIGH (ref 7–14)
APPEARANCE UR: CLEAR — SIGNIFICANT CHANGE UP
AST SERPL-CCNC: 14 U/L — SIGNIFICANT CHANGE UP (ref 0–41)
BASE EXCESS BLDV CALC-SCNC: 1.5 MMOL/L — SIGNIFICANT CHANGE UP (ref -2–2)
BASOPHILS # BLD AUTO: 0.02 K/UL — SIGNIFICANT CHANGE UP (ref 0–0.2)
BASOPHILS NFR BLD AUTO: 0.3 % — SIGNIFICANT CHANGE UP (ref 0–1)
BILIRUB SERPL-MCNC: 0.2 MG/DL — SIGNIFICANT CHANGE UP (ref 0.2–1.2)
BILIRUB UR-MCNC: NEGATIVE — SIGNIFICANT CHANGE UP
BUN SERPL-MCNC: 9 MG/DL — LOW (ref 10–20)
CA-I SERPL-SCNC: 1.24 MMOL/L — SIGNIFICANT CHANGE UP (ref 1.12–1.3)
CALCIUM SERPL-MCNC: 9.5 MG/DL — SIGNIFICANT CHANGE UP (ref 8.5–10.1)
CHLORIDE SERPL-SCNC: 96 MMOL/L — LOW (ref 98–110)
CO2 SERPL-SCNC: 24 MMOL/L — SIGNIFICANT CHANGE UP (ref 17–32)
COLOR SPEC: YELLOW — SIGNIFICANT CHANGE UP
CREAT SERPL-MCNC: 0.6 MG/DL — LOW (ref 0.7–1.5)
DIFF PNL FLD: NEGATIVE — SIGNIFICANT CHANGE UP
EOSINOPHIL # BLD AUTO: 0.17 K/UL — SIGNIFICANT CHANGE UP (ref 0–0.7)
EOSINOPHIL NFR BLD AUTO: 2.3 % — SIGNIFICANT CHANGE UP (ref 0–8)
GAS PNL BLDV: 137 MMOL/L — SIGNIFICANT CHANGE UP (ref 136–145)
GAS PNL BLDV: SIGNIFICANT CHANGE UP
GLUCOSE SERPL-MCNC: 100 MG/DL — HIGH (ref 70–99)
GLUCOSE UR QL: NEGATIVE MG/DL — SIGNIFICANT CHANGE UP
HCG SERPL QL: NEGATIVE — SIGNIFICANT CHANGE UP
HCO3 BLDV-SCNC: 28 MMOL/L — SIGNIFICANT CHANGE UP (ref 22–29)
HCT VFR BLD CALC: 39.3 % — SIGNIFICANT CHANGE UP (ref 37–47)
HCT VFR BLDA CALC: 41.1 % — SIGNIFICANT CHANGE UP (ref 34–44)
HGB BLD CALC-MCNC: 13.4 G/DL — LOW (ref 14–18)
HGB BLD-MCNC: 13.3 G/DL — SIGNIFICANT CHANGE UP (ref 12–16)
IMM GRANULOCYTES NFR BLD AUTO: 0.3 % — SIGNIFICANT CHANGE UP (ref 0.1–0.3)
KETONES UR-MCNC: NEGATIVE — SIGNIFICANT CHANGE UP
LACTATE BLDV-MCNC: 0.7 MMOL/L — SIGNIFICANT CHANGE UP (ref 0.5–1.6)
LEUKOCYTE ESTERASE UR-ACNC: NEGATIVE — SIGNIFICANT CHANGE UP
LIDOCAIN IGE QN: 138 U/L — HIGH (ref 7–60)
LYMPHOCYTES # BLD AUTO: 2.13 K/UL — SIGNIFICANT CHANGE UP (ref 1.2–3.4)
LYMPHOCYTES # BLD AUTO: 28.9 % — SIGNIFICANT CHANGE UP (ref 20.5–51.1)
MCHC RBC-ENTMCNC: 28.6 PG — SIGNIFICANT CHANGE UP (ref 27–31)
MCHC RBC-ENTMCNC: 33.8 G/DL — SIGNIFICANT CHANGE UP (ref 32–37)
MCV RBC AUTO: 84.5 FL — SIGNIFICANT CHANGE UP (ref 81–99)
MONOCYTES # BLD AUTO: 0.71 K/UL — HIGH (ref 0.1–0.6)
MONOCYTES NFR BLD AUTO: 9.6 % — HIGH (ref 1.7–9.3)
NEUTROPHILS # BLD AUTO: 4.33 K/UL — SIGNIFICANT CHANGE UP (ref 1.4–6.5)
NEUTROPHILS NFR BLD AUTO: 58.6 % — SIGNIFICANT CHANGE UP (ref 42.2–75.2)
NITRITE UR-MCNC: NEGATIVE — SIGNIFICANT CHANGE UP
NRBC # BLD: 0 /100 WBCS — SIGNIFICANT CHANGE UP (ref 0–0)
PCO2 BLDV: 50 MMHG — SIGNIFICANT CHANGE UP (ref 41–51)
PH BLDV: 7.36 — SIGNIFICANT CHANGE UP (ref 7.26–7.43)
PH UR: 6.5 — SIGNIFICANT CHANGE UP (ref 5–8)
PLATELET # BLD AUTO: 311 K/UL — SIGNIFICANT CHANGE UP (ref 130–400)
PO2 BLDV: 26 MMHG — SIGNIFICANT CHANGE UP (ref 20–40)
POTASSIUM BLDV-SCNC: 3.8 MMOL/L — SIGNIFICANT CHANGE UP (ref 3.3–5.6)
POTASSIUM SERPL-MCNC: 4.1 MMOL/L — SIGNIFICANT CHANGE UP (ref 3.5–5)
POTASSIUM SERPL-SCNC: 4.1 MMOL/L — SIGNIFICANT CHANGE UP (ref 3.5–5)
PROT SERPL-MCNC: 7.3 G/DL — SIGNIFICANT CHANGE UP (ref 6–8)
PROT UR-MCNC: NEGATIVE MG/DL — SIGNIFICANT CHANGE UP
RBC # BLD: 4.65 M/UL — SIGNIFICANT CHANGE UP (ref 4.2–5.4)
RBC # FLD: 12.8 % — SIGNIFICANT CHANGE UP (ref 11.5–14.5)
SAO2 % BLDV: 45 % — SIGNIFICANT CHANGE UP
SODIUM SERPL-SCNC: 136 MMOL/L — SIGNIFICANT CHANGE UP (ref 135–146)
SP GR SPEC: <=1.005 — SIGNIFICANT CHANGE UP (ref 1.01–1.03)
UROBILINOGEN FLD QL: 0.2 MG/DL — SIGNIFICANT CHANGE UP (ref 0.2–0.2)
WBC # BLD: 7.38 K/UL — SIGNIFICANT CHANGE UP (ref 4.8–10.8)
WBC # FLD AUTO: 7.38 K/UL — SIGNIFICANT CHANGE UP (ref 4.8–10.8)

## 2019-01-23 RX ORDER — DIPHENHYDRAMINE HYDROCHLORIDE AND LIDOCAINE HYDROCHLORIDE AND ALUMINUM HYDROXIDE AND MAGNESIUM HYDRO
30 KIT ONCE
Qty: 0 | Refills: 0 | Status: COMPLETED | OUTPATIENT
Start: 2019-01-23 | End: 2019-01-23

## 2019-01-23 RX ORDER — ONDANSETRON 8 MG/1
4 TABLET, FILM COATED ORAL ONCE
Qty: 0 | Refills: 0 | Status: COMPLETED | OUTPATIENT
Start: 2019-01-23 | End: 2019-01-23

## 2019-01-23 RX ORDER — SODIUM CHLORIDE 9 MG/ML
2000 INJECTION, SOLUTION INTRAVENOUS ONCE
Qty: 0 | Refills: 0 | Status: COMPLETED | OUTPATIENT
Start: 2019-01-23 | End: 2019-01-23

## 2019-01-23 RX ORDER — MORPHINE SULFATE 50 MG/1
4 CAPSULE, EXTENDED RELEASE ORAL ONCE
Qty: 0 | Refills: 0 | Status: DISCONTINUED | OUTPATIENT
Start: 2019-01-23 | End: 2019-01-23

## 2019-01-23 RX ORDER — DIPHENHYDRAMINE HCL 50 MG
50 CAPSULE ORAL ONCE
Qty: 0 | Refills: 0 | Status: COMPLETED | OUTPATIENT
Start: 2019-01-23 | End: 2019-01-23

## 2019-01-23 RX ORDER — FAMOTIDINE 10 MG/ML
20 INJECTION INTRAVENOUS ONCE
Qty: 0 | Refills: 0 | Status: COMPLETED | OUTPATIENT
Start: 2019-01-23 | End: 2019-01-23

## 2019-01-23 RX ORDER — IOHEXOL 300 MG/ML
30 INJECTION, SOLUTION INTRAVENOUS ONCE
Qty: 0 | Refills: 0 | Status: COMPLETED | OUTPATIENT
Start: 2019-01-23 | End: 2019-01-23

## 2019-01-23 RX ORDER — SODIUM CHLORIDE 9 MG/ML
3 INJECTION INTRAMUSCULAR; INTRAVENOUS; SUBCUTANEOUS ONCE
Qty: 0 | Refills: 0 | Status: COMPLETED | OUTPATIENT
Start: 2019-01-23 | End: 2019-01-23

## 2019-01-23 RX ADMIN — SODIUM CHLORIDE 3 MILLILITER(S): 9 INJECTION INTRAMUSCULAR; INTRAVENOUS; SUBCUTANEOUS at 11:08

## 2019-01-23 RX ADMIN — DIPHENHYDRAMINE HYDROCHLORIDE AND LIDOCAINE HYDROCHLORIDE AND ALUMINUM HYDROXIDE AND MAGNESIUM HYDRO 30 MILLILITER(S): KIT at 11:54

## 2019-01-23 RX ADMIN — MORPHINE SULFATE 4 MILLIGRAM(S): 50 CAPSULE, EXTENDED RELEASE ORAL at 11:54

## 2019-01-23 RX ADMIN — FAMOTIDINE 104 MILLIGRAM(S): 10 INJECTION INTRAVENOUS at 10:29

## 2019-01-23 RX ADMIN — ONDANSETRON 4 MILLIGRAM(S): 8 TABLET, FILM COATED ORAL at 10:40

## 2019-01-23 RX ADMIN — Medication 50 MILLIGRAM(S): at 12:33

## 2019-01-23 RX ADMIN — IOHEXOL 30 MILLILITER(S): 300 INJECTION, SOLUTION INTRAVENOUS at 10:29

## 2019-01-23 RX ADMIN — SODIUM CHLORIDE 2000 MILLILITER(S): 9 INJECTION, SOLUTION INTRAVENOUS at 11:30

## 2019-01-23 NOTE — SBIRT NOTE. - NSSBIRTSERVICES_GEN_A_ED_FT
Provided SBIRT services:  Full Screen Positive. Brief Intervention Performed.  Screening results were reviewed with the patient and patient was provided information about healthy guidelines and potential negative consequences associated with level of risk. Motivation and readiness to reduce or stop use was discussed and goals and activities to make changes were suggested/offered.    AUDIT Score: 7  DAST-10 Score: 0  Duration = # 10 Minutes

## 2019-01-23 NOTE — ED PROVIDER NOTE - PROGRESS NOTE DETAILS
pt tolerating PO in the ED.  NO abd pain on re-exam. Asx at this time. Discussed results with pt.  All questions were answered and return precautions discussed.  Pt is asx and comfortable at this time.  Unremarkable re-exam.  No further concerns at this time from pt.  Will follow up with PMD and GI.  Has appt scheduled with GI next week.  Pt understands and agrees with tx plan.

## 2019-01-23 NOTE — ED PROVIDER NOTE - ATTENDING CONTRIBUTION TO CARE
38f w hx of hypothyroid & PUD (on recent EGD) reporting 5 days of lower abdominal pain. Pain is sharp, moderate, constant, no radiating, no exacerbating/alleviating. Pt also reporting vomiting x5 (no blood/bile) and loose stools. No black/bloody stools. Fever a few days ago w Tmax 102.     Review of Systems  Constitutional: +fever  Eyes:  Negative.   ENMT:  No nasal congestion, discharge, or throat pain.   Cardiac:  No chest pain, syncope, or edema.  Respiratory:  No dyspnea, wheezing, or cough. No hemoptysis.  GI:  See HPI  :  No dysuria or hematuria. No vaginal bleeding/discharge.  Musculoskeletal:  No joint swelling, joint pain, or back pain.  Skin:  No skin rash, jaundice, or lesions.  Neuro:  No headache, loss of sensation, or focal weakness.  No change in mental status.     Physical Exam  General: Awake, alert, NAD, WDWN, non-toxic appearing, NCAT  Eyes: PERRL, EOMI, no icterus, lids and conjunctivae are normal  ENT: External inspection normal, pink/moist membranes, pharynx normal  CV: S1S2, regular rate and rhythm, no murmur/gallops/rubs, no JVD, 2+ pulses b/l, no edema/cords/homans, warm/well-perfused  Respiratory: Normal respiratory rate/effort, no respiratory distress, normal voice, speaking full sentences, lungs clear to auscultation b/l, no wheezing/rales/rhonchi, no retractions, no stridor  Abdomen: Soft abdomen, R lower abd tender, no distended/guarding/rebound, no CVA tender  Musculoskeletal: FROM all 4 extremities, N/V intact, stable gait  Neck: FROM neck, supple, no meningismus, trachea midline, no JVD  Integumentary: Color normal for race, warm and dry, no rash  Neuro: Oriented x3, CN 2-12 grossly intact, normal motor, normal sensory  Psych: Oriented x3, mood normal, affect normal     38f w abd pain and RLQ tender. nontoxic appearing, n/v intact. --CBC, CMP, lipase, lactate, HCG, UA, CT abd/pelvis. --IV fluids, analgesia/antiemetics as needed, observe/re-assess 38f w hx of hypothyroid & PUD (on EGD this month) reporting 5 days of lower abdominal pain. Pain is sharp, moderate, constant, no radiating, no exacerbating/alleviating. Pt also reporting vomiting x5 (no blood/bile) and loose stools. No black/bloody stools. Fever a few days ago w Tmax 102.     Review of Systems  Constitutional: +fever  Eyes:  Negative.   ENMT:  No nasal congestion, discharge, or throat pain.   Cardiac:  No chest pain, syncope, or edema.  Respiratory:  No dyspnea, wheezing, or cough. No hemoptysis.  GI:  See HPI  :  No dysuria or hematuria. No vaginal bleeding/discharge.  Musculoskeletal:  No joint swelling, joint pain, or back pain.  Skin:  No skin rash, jaundice, or lesions.  Neuro:  No headache, loss of sensation, or focal weakness.  No change in mental status.     Physical Exam  General: Awake, alert, NAD, WDWN, non-toxic appearing, NCAT  Eyes: PERRL, EOMI, no icterus, lids and conjunctivae are normal  ENT: External inspection normal, pink/moist membranes, pharynx normal  CV: S1S2, regular rate and rhythm, no murmur/gallops/rubs, no JVD, 2+ pulses b/l, no edema/cords/homans, warm/well-perfused  Respiratory: Normal respiratory rate/effort, no respiratory distress, normal voice, speaking full sentences, lungs clear to auscultation b/l, no wheezing/rales/rhonchi, no retractions, no stridor  Abdomen: Soft abdomen, R lower abd tender, no distended/guarding/rebound, no CVA tender  Musculoskeletal: FROM all 4 extremities, N/V intact, stable gait  Neck: FROM neck, supple, no meningismus, trachea midline, no JVD  Integumentary: Color normal for race, warm and dry, no rash  Neuro: Oriented x3, CN 2-12 grossly intact, normal motor, normal sensory  Psych: Oriented x3, mood normal, affect normal     38f w abd pain and RLQ tender. nontoxic appearing, n/v intact. --CBC, CMP, lipase, lactate, HCG, UA, CT abd/pelvis. --IV fluids, analgesia/antiemetics as needed, observe/re-assess

## 2019-01-23 NOTE — ED PROVIDER NOTE - CARE PROVIDER_API CALL
Bony Cason), Gastroenterology; Internal Medicine  [address] [place] [address]  Phone: [phone]  Fax: [phone]

## 2019-01-23 NOTE — ED PROVIDER NOTE - PHYSICAL EXAMINATION
CONST: Well appearing in NAD  EYES: Sclera and conjunctiva clear.  ENT: No nasal discharge. Oropharynx normal appearing, no erythema or exudates. Uvula midline.  NECK: Non-tender, no meningeal signs, supple  CARD: Normal S1 S2; Normal rate and rhythm  RESP: Equal BS B/L, No wheezes, rhonchi or rales. No distress  GI: + diffuse abdominal pain, + TTP of LLQ/RLQ, no rebound or gurading, Soft, non-distended, no CVA tenderness  MS: Normal ROM in all extremities. No edema of lower extremities, no calf pain, radial pulses 2+ bilaterally  SKIN: Warm, dry, no acute rashes. Good turgor

## 2019-01-23 NOTE — ED ADULT NURSE NOTE - OBJECTIVE STATEMENT
Pt presents to ED c/o generalized abdominal pain & cramping for several days, worsening this morning. Pt also reports nausea, vomiting, & diarrhea. Denies blood in urine or stool.

## 2019-01-23 NOTE — ED PROVIDER NOTE - CONDUCTED A DETAILED DISCUSSION WITH PATIENT AND/OR GUARDIAN REGARDING, MDM
lab results need for outpatient follow-up/radiology results/return to ED if symptoms worsen, persist or questions arise/lab results

## 2019-01-23 NOTE — ED ADULT NURSE NOTE - NSIMPLEMENTINTERV_GEN_ALL_ED
Implemented All Universal Safety Interventions:  [place] to call system. Call bell, personal items and telephone within reach. Instruct patient to call for assistance. Room bathroom lighting operational. Non-slip footwear when patient is off stretcher. Physically safe environment: no spills, clutter or unnecessary equipment. Stretcher in lowest position, wheels locked, appropriate side rails in place.

## 2019-01-23 NOTE — ED PROVIDER NOTE - CARE PLAN
Principal Discharge DX:	Abdominal pain Principal Discharge DX:	Abdominal pain  Secondary Diagnosis:	Vomiting and diarrhea

## 2019-01-23 NOTE — ED PROVIDER NOTE - NSFOLLOWUPINSTRUCTIONS_ED_ALL_ED_FT
Abdominal Pain    Many things can cause abdominal pain. Many times, abdominal pain is not caused by a disease and will improve without treatment. Your health care provider will do a physical exam to determine if there is a dangerous cause of your pain; blood tests and imaging may help determine the cause of your pain. However, in many cases, no cause may be found and you may need further testing as an outpatient. Monitor your abdominal pain for any changes.     SEEK IMMEDIATE MEDICAL CARE IF YOU HAVE ANY OF THE FOLLOWING SYMPTOMS: worsening abdominal pain, uncontrollable vomiting, profuse diarrhea, inability to have bowel movements or pass gas, black or bloody stools, fever accompanying chest pain or back pain, or fainting. These symptoms may represent a serious problem that is an emergency. Do not wait to see if the symptoms will go away. Get medical help right away. Call 911 and do not drive yourself to the hospital.    Follow up with your primary medical doctor in 1-2 days  Follow up with GI next week.

## 2019-01-23 NOTE — ED PROVIDER NOTE - NS ED ROS FT
Constitutional: + tactile fever. See HPI.  Eyes: No visual changes, eye pain or discharge.   ENMT: No hearing changes, pain, discharge or infections.   Cardiac: No SOB or edema. No chest pain with exertion.  Respiratory: No cough or respiratory distress.   GI: + nausea, + vomiting, + diarrhea, + abdominal pain.  : + frequency. No dysuria, or burning. No Discharge  MS: No myalgia, muscle weakness, joint pain or back pain.  Neuro: No headache or weakness. No LOC.  Skin: No skin rash.  Except as documented in the HPI, all other systems are negative.

## 2019-01-23 NOTE — ED PROVIDER NOTE - MEDICAL DECISION MAKING DETAILS
38f w abd pain and RLQ tender. nontoxic appearing, n/v intact. Labs & imaging reviewed. Analgesia, antiemetics, & IV fluids given w improvement in symptoms. Pt advised regarding symptomatic/supportive care, importance of PMD/GI f/u, and symptoms to prompt ED return. Copy of results given to patient.

## 2019-01-23 NOTE — ED PROVIDER NOTE - OBJECTIVE STATEMENT
38 y.o male with hx vaginal abscess, , fibroid surgery presents to the ED for evaluation of abdominal pain x 5 days.  Pt states that she developed dull, achy abdominal pain 5 days ago associated with N/V/D.  Fever Sat/SUn/Mon w/ tmax of 102.  This am woke up with lower abdominal pain which is sharp/stabbing associated with 5 episodes of NBNB emesis and multiple episode of loose stool.  Admits to tactile fever.  Had CT in Nov which showed thickening of appendix wall.  Followed w/ Dr. Cason who did EGD  which showed ulceration of esophagus and was started of carafate and PPI.  Was supposed to have colonoscopy but was cancelled because pt did not prep.  Has colonoscopy scheduled for next week. 38 y.o female with hx vaginal abscess, , fibroid surgery presents to the ED for evaluation of abdominal pain x 5 days.  Pt states that she developed dull, achy abdominal pain 5 days ago associated with N/V/D.  Fever Sat/SUn/Mon w/ tmax of 102.  This am woke up with lower abdominal pain which is sharp/stabbing associated with 5 episodes of NBNB emesis and multiple episode of loose stool.  Admits to tactile fever.  Had CT in Nov which showed thickening of appendix wall.  Followed w/ Dr. Cason who did EGD  which showed ulceration of esophagus and was started of carafate and PPI.  Was supposed to have colonoscopy but was cancelled because pt did not prep.  Has colonoscopy scheduled for next week.

## 2019-02-01 ENCOUNTER — APPOINTMENT (OUTPATIENT)
Dept: OBGYN | Facility: CLINIC | Age: 39
End: 2019-02-01

## 2019-02-09 PROBLEM — D25.9 LEIOMYOMA OF UTERUS, UNSPECIFIED: Chronic | Status: ACTIVE | Noted: 2019-01-23

## 2019-02-09 PROBLEM — K27.9 PEPTIC ULCER, SITE UNSPECIFIED, UNSPECIFIED AS ACUTE OR CHRONIC, WITHOUT HEMORRHAGE OR PERFORATION: Chronic | Status: ACTIVE | Noted: 2019-01-23

## 2019-02-22 ENCOUNTER — RESULT CHARGE (OUTPATIENT)
Age: 39
End: 2019-02-22

## 2019-02-22 ENCOUNTER — APPOINTMENT (OUTPATIENT)
Dept: OBGYN | Facility: CLINIC | Age: 39
End: 2019-02-22

## 2019-02-22 ENCOUNTER — OUTPATIENT (OUTPATIENT)
Dept: OUTPATIENT SERVICES | Facility: HOSPITAL | Age: 39
LOS: 1 days | Discharge: HOME | End: 2019-02-22

## 2019-02-22 VITALS — BODY MASS INDEX: 39.65 KG/M2 | DIASTOLIC BLOOD PRESSURE: 86 MMHG | WEIGHT: 203 LBS | SYSTOLIC BLOOD PRESSURE: 128 MMHG

## 2019-02-22 DIAGNOSIS — O34.219 MATERNAL CARE FOR UNSPECIFIED TYPE SCAR FROM PREVIOUS CESAREAN DELIVERY: Chronic | ICD-10-CM

## 2019-02-22 DIAGNOSIS — D25.9 LEIOMYOMA OF UTERUS, UNSPECIFIED: Chronic | ICD-10-CM

## 2019-02-22 RX ORDER — TERCONAZOLE 8 MG/G
0.8 CREAM VAGINAL
Qty: 1 | Refills: 0 | Status: COMPLETED | COMMUNITY
Start: 2018-08-31 | End: 2019-02-22

## 2019-02-22 NOTE — PHYSICAL EXAM
[Normal] : uterus [No Bleeding] : there was no active vaginal bleeding [Discharge] : had a ~M discharge [Heavy] : heavy [White] : white [Thick] : thick [Uterine Adnexae] : were not tender and not enlarged

## 2019-02-23 DIAGNOSIS — N89.8 OTHER SPECIFIED NONINFLAMMATORY DISORDERS OF VAGINA: ICD-10-CM

## 2019-02-25 LAB
HCG UR QL: NEGATIVE
QUALITY CONTROL: YES

## 2019-02-25 NOTE — REVIEW OF SYSTEMS
[Chills] : no chills [Feeling Tired] : not feeling tired [Chest Pain] : no chest pain [Dyspnea] : no shortness of breath [Cough] : no cough [Abdominal Pain] : no abdominal pain [Vomiting] : no vomiting [Constipation] : no constipation [Dysuria] : no dysuria [Pelvic Pain] : no pelvic pain [Frequency] : no frequency [Urgency] : no urgency

## 2019-02-25 NOTE — END OF VISIT
[] : Resident [FreeTextEntry3] : 37 yo P1 for f/u. was seen in aug for annual and switched ocps. now she says she stopped them in dec due to nausea, on condoms since. complains of vaginal pruritus and discharge, will treat for yeast and sent vaginitis panel. eports no menses since dec. upreg neg today.\par ADDENDUM: review of records, h/o AUB in 2014 (irregular, skipped months). rtc in 2 wks, will send for pcos labs and amenorrhea labs, if normal, progesterone chanllenge. also tvus. menstrual calendar

## 2019-02-26 DIAGNOSIS — N93.9 ABNORMAL UTERINE AND VAGINAL BLEEDING, UNSPECIFIED: ICD-10-CM

## 2019-02-26 DIAGNOSIS — N76.0 ACUTE VAGINITIS: ICD-10-CM

## 2019-03-06 LAB
A VAGINAE DNA VAG QL NAA+PROBE: ABNORMAL
BVAB2 DNA VAG QL NAA+PROBE: ABNORMAL
C KRUSEI DNA VAG QL NAA+PROBE: NEGATIVE
C TRACH RRNA SPEC QL NAA+PROBE: NEGATIVE
MEGA1 DNA VAG QL NAA+PROBE: ABNORMAL
N GONORRHOEA RRNA SPEC QL NAA+PROBE: NEGATIVE
T VAGINALIS RRNA SPEC QL NAA+PROBE: NEGATIVE

## 2019-03-08 ENCOUNTER — APPOINTMENT (OUTPATIENT)
Dept: OBGYN | Facility: CLINIC | Age: 39
End: 2019-03-08

## 2019-03-18 ENCOUNTER — OUTPATIENT (OUTPATIENT)
Dept: OUTPATIENT SERVICES | Facility: HOSPITAL | Age: 39
LOS: 1 days | Discharge: HOME | End: 2019-03-18

## 2019-03-18 ENCOUNTER — RESULT REVIEW (OUTPATIENT)
Age: 39
End: 2019-03-18

## 2019-03-18 ENCOUNTER — TRANSCRIPTION ENCOUNTER (OUTPATIENT)
Age: 39
End: 2019-03-18

## 2019-03-18 VITALS — DIASTOLIC BLOOD PRESSURE: 86 MMHG | RESPIRATION RATE: 17 BRPM | SYSTOLIC BLOOD PRESSURE: 129 MMHG | HEART RATE: 83 BPM

## 2019-03-18 VITALS
WEIGHT: 195.11 LBS | HEIGHT: 60 IN | RESPIRATION RATE: 20 BRPM | TEMPERATURE: 98 F | OXYGEN SATURATION: 100 % | SYSTOLIC BLOOD PRESSURE: 114 MMHG | DIASTOLIC BLOOD PRESSURE: 56 MMHG | HEART RATE: 71 BPM

## 2019-03-18 DIAGNOSIS — D25.9 LEIOMYOMA OF UTERUS, UNSPECIFIED: Chronic | ICD-10-CM

## 2019-03-18 DIAGNOSIS — O34.219 MATERNAL CARE FOR UNSPECIFIED TYPE SCAR FROM PREVIOUS CESAREAN DELIVERY: Chronic | ICD-10-CM

## 2019-03-18 RX ORDER — ONDANSETRON 8 MG/1
4 TABLET, FILM COATED ORAL ONCE
Qty: 0 | Refills: 0 | Status: DISCONTINUED | OUTPATIENT
Start: 2019-03-18 | End: 2019-04-02

## 2019-03-18 NOTE — ASU DISCHARGE PLAN (ADULT/PEDIATRIC) - CALL YOUR DOCTOR IF YOU HAVE ANY OF THE FOLLOWING:
Fever greater than (need to indicate Fahrenheit or Celsius)/Bleeding that does not stop/Inability to tolerate liquids or foods/Nausea and vomiting that does not stop/Unable to urinate

## 2019-03-18 NOTE — ASU DISCHARGE PLAN (ADULT/PEDIATRIC) - CARE PROVIDER_API CALL
Bony Cason)  Gastroenterology; Internal Medicine  [address] [place] [address]  Phone: [phone]  Fax: [phone]  Follow Up Time:

## 2019-03-19 LAB — SURGICAL PATHOLOGY STUDY: SIGNIFICANT CHANGE UP

## 2019-03-21 DIAGNOSIS — K62.1 RECTAL POLYP: ICD-10-CM

## 2019-03-21 DIAGNOSIS — K62.89 OTHER SPECIFIED DISEASES OF ANUS AND RECTUM: ICD-10-CM

## 2019-03-21 DIAGNOSIS — R19.7 DIARRHEA, UNSPECIFIED: ICD-10-CM

## 2019-03-21 DIAGNOSIS — K52.9 NONINFECTIVE GASTROENTERITIS AND COLITIS, UNSPECIFIED: ICD-10-CM

## 2019-04-17 ENCOUNTER — APPOINTMENT (OUTPATIENT)
Dept: SURGERY | Facility: CLINIC | Age: 39
End: 2019-04-17

## 2019-05-01 ENCOUNTER — OUTPATIENT (OUTPATIENT)
Dept: OUTPATIENT SERVICES | Facility: HOSPITAL | Age: 39
LOS: 1 days | End: 2019-05-01

## 2019-05-01 DIAGNOSIS — D25.9 LEIOMYOMA OF UTERUS, UNSPECIFIED: Chronic | ICD-10-CM

## 2019-05-01 DIAGNOSIS — O34.219 MATERNAL CARE FOR UNSPECIFIED TYPE SCAR FROM PREVIOUS CESAREAN DELIVERY: Chronic | ICD-10-CM

## 2019-05-04 ENCOUNTER — EMERGENCY (EMERGENCY)
Facility: HOSPITAL | Age: 39
LOS: 0 days | Discharge: HOME | End: 2019-05-04
Attending: EMERGENCY MEDICINE | Admitting: EMERGENCY MEDICINE
Payer: MEDICAID

## 2019-05-04 VITALS
HEART RATE: 94 BPM | OXYGEN SATURATION: 100 % | DIASTOLIC BLOOD PRESSURE: 62 MMHG | WEIGHT: 190.04 LBS | TEMPERATURE: 96 F | RESPIRATION RATE: 20 BRPM | SYSTOLIC BLOOD PRESSURE: 132 MMHG | HEIGHT: 60 IN

## 2019-05-04 VITALS
SYSTOLIC BLOOD PRESSURE: 124 MMHG | HEART RATE: 72 BPM | OXYGEN SATURATION: 98 % | RESPIRATION RATE: 18 BRPM | TEMPERATURE: 98 F | DIASTOLIC BLOOD PRESSURE: 67 MMHG

## 2019-05-04 DIAGNOSIS — R10.84 GENERALIZED ABDOMINAL PAIN: ICD-10-CM

## 2019-05-04 DIAGNOSIS — Z88.8 ALLERGY STATUS TO OTHER DRUGS, MEDICAMENTS AND BIOLOGICAL SUBSTANCES: ICD-10-CM

## 2019-05-04 DIAGNOSIS — N83.202 UNSPECIFIED OVARIAN CYST, LEFT SIDE: ICD-10-CM

## 2019-05-04 DIAGNOSIS — D25.9 LEIOMYOMA OF UTERUS, UNSPECIFIED: Chronic | ICD-10-CM

## 2019-05-04 DIAGNOSIS — R11.2 NAUSEA WITH VOMITING, UNSPECIFIED: ICD-10-CM

## 2019-05-04 DIAGNOSIS — Z91.013 ALLERGY TO SEAFOOD: ICD-10-CM

## 2019-05-04 DIAGNOSIS — Z79.899 OTHER LONG TERM (CURRENT) DRUG THERAPY: ICD-10-CM

## 2019-05-04 DIAGNOSIS — Z98.890 OTHER SPECIFIED POSTPROCEDURAL STATES: ICD-10-CM

## 2019-05-04 DIAGNOSIS — O34.219 MATERNAL CARE FOR UNSPECIFIED TYPE SCAR FROM PREVIOUS CESAREAN DELIVERY: Chronic | ICD-10-CM

## 2019-05-04 LAB
ALBUMIN SERPL ELPH-MCNC: 4.2 G/DL — SIGNIFICANT CHANGE UP (ref 3.5–5.2)
ALP SERPL-CCNC: 79 U/L — SIGNIFICANT CHANGE UP (ref 30–115)
ALT FLD-CCNC: 26 U/L — SIGNIFICANT CHANGE UP (ref 0–41)
ANION GAP SERPL CALC-SCNC: 19 MMOL/L — HIGH (ref 7–14)
APTT BLD: 28.2 SEC — SIGNIFICANT CHANGE UP (ref 27–39.2)
AST SERPL-CCNC: 17 U/L — SIGNIFICANT CHANGE UP (ref 0–41)
BASE EXCESS BLDV CALC-SCNC: 1.6 MMOL/L — SIGNIFICANT CHANGE UP (ref -2–2)
BASOPHILS # BLD AUTO: 0.03 K/UL — SIGNIFICANT CHANGE UP (ref 0–0.2)
BASOPHILS NFR BLD AUTO: 0.3 % — SIGNIFICANT CHANGE UP (ref 0–1)
BILIRUB SERPL-MCNC: 0.2 MG/DL — SIGNIFICANT CHANGE UP (ref 0.2–1.2)
BLD GP AB SCN SERPL QL: SIGNIFICANT CHANGE UP
BUN SERPL-MCNC: 10 MG/DL — SIGNIFICANT CHANGE UP (ref 10–20)
CA-I SERPL-SCNC: 1.21 MMOL/L — SIGNIFICANT CHANGE UP (ref 1.12–1.3)
CALCIUM SERPL-MCNC: 9.5 MG/DL — SIGNIFICANT CHANGE UP (ref 8.5–10.1)
CHLORIDE SERPL-SCNC: 103 MMOL/L — SIGNIFICANT CHANGE UP (ref 98–110)
CO2 SERPL-SCNC: 18 MMOL/L — SIGNIFICANT CHANGE UP (ref 17–32)
CREAT SERPL-MCNC: 0.5 MG/DL — LOW (ref 0.7–1.5)
EOSINOPHIL # BLD AUTO: 0.36 K/UL — SIGNIFICANT CHANGE UP (ref 0–0.7)
EOSINOPHIL NFR BLD AUTO: 3.6 % — SIGNIFICANT CHANGE UP (ref 0–8)
GAS PNL BLDV: 139 MMOL/L — SIGNIFICANT CHANGE UP (ref 136–145)
GAS PNL BLDV: SIGNIFICANT CHANGE UP
GLUCOSE SERPL-MCNC: 107 MG/DL — HIGH (ref 70–99)
HCG SERPL QL: NEGATIVE — SIGNIFICANT CHANGE UP
HCO3 BLDV-SCNC: 27 MMOL/L — SIGNIFICANT CHANGE UP (ref 22–29)
HCT VFR BLD CALC: 37.7 % — SIGNIFICANT CHANGE UP (ref 37–47)
HCT VFR BLDA CALC: 46 % — HIGH (ref 34–44)
HGB BLD CALC-MCNC: 15 G/DL — SIGNIFICANT CHANGE UP (ref 14–18)
HGB BLD-MCNC: 12.5 G/DL — SIGNIFICANT CHANGE UP (ref 12–16)
IMM GRANULOCYTES NFR BLD AUTO: 0.3 % — SIGNIFICANT CHANGE UP (ref 0.1–0.3)
INR BLD: 1.13 RATIO — SIGNIFICANT CHANGE UP (ref 0.65–1.3)
LACTATE BLDV-MCNC: 0.9 MMOL/L — SIGNIFICANT CHANGE UP (ref 0.5–1.6)
LACTATE SERPL-SCNC: 3.7 MMOL/L — HIGH (ref 0.5–2.2)
LIDOCAIN IGE QN: 267 U/L — HIGH (ref 7–60)
LYMPHOCYTES # BLD AUTO: 4.11 K/UL — HIGH (ref 1.2–3.4)
LYMPHOCYTES # BLD AUTO: 40.5 % — SIGNIFICANT CHANGE UP (ref 20.5–51.1)
MCHC RBC-ENTMCNC: 27.3 PG — SIGNIFICANT CHANGE UP (ref 27–31)
MCHC RBC-ENTMCNC: 33.2 G/DL — SIGNIFICANT CHANGE UP (ref 32–37)
MCV RBC AUTO: 82.3 FL — SIGNIFICANT CHANGE UP (ref 81–99)
MONOCYTES # BLD AUTO: 1.1 K/UL — HIGH (ref 0.1–0.6)
MONOCYTES NFR BLD AUTO: 10.8 % — HIGH (ref 1.7–9.3)
NEUTROPHILS # BLD AUTO: 4.51 K/UL — SIGNIFICANT CHANGE UP (ref 1.4–6.5)
NEUTROPHILS NFR BLD AUTO: 44.5 % — SIGNIFICANT CHANGE UP (ref 42.2–75.2)
NRBC # BLD: 0 /100 WBCS — SIGNIFICANT CHANGE UP (ref 0–0)
PCO2 BLDV: 46 MMHG — SIGNIFICANT CHANGE UP (ref 41–51)
PH BLDV: 7.38 — SIGNIFICANT CHANGE UP (ref 7.26–7.43)
PLATELET # BLD AUTO: 384 K/UL — SIGNIFICANT CHANGE UP (ref 130–400)
PO2 BLDV: 43 MMHG — HIGH (ref 20–40)
POTASSIUM BLDV-SCNC: 3.9 MMOL/L — SIGNIFICANT CHANGE UP (ref 3.3–5.6)
POTASSIUM SERPL-MCNC: 3.5 MMOL/L — SIGNIFICANT CHANGE UP (ref 3.5–5)
POTASSIUM SERPL-SCNC: 3.5 MMOL/L — SIGNIFICANT CHANGE UP (ref 3.5–5)
PROT SERPL-MCNC: 6.9 G/DL — SIGNIFICANT CHANGE UP (ref 6–8)
PROTHROM AB SERPL-ACNC: 13 SEC — HIGH (ref 9.95–12.87)
RBC # BLD: 4.58 M/UL — SIGNIFICANT CHANGE UP (ref 4.2–5.4)
RBC # FLD: 12.7 % — SIGNIFICANT CHANGE UP (ref 11.5–14.5)
SAO2 % BLDV: 75 % — SIGNIFICANT CHANGE UP
SODIUM SERPL-SCNC: 140 MMOL/L — SIGNIFICANT CHANGE UP (ref 135–146)
TYPE + AB SCN PNL BLD: SIGNIFICANT CHANGE UP
WBC # BLD: 10.14 K/UL — SIGNIFICANT CHANGE UP (ref 4.8–10.8)
WBC # FLD AUTO: 10.14 K/UL — SIGNIFICANT CHANGE UP (ref 4.8–10.8)

## 2019-05-04 PROCEDURE — 93010 ELECTROCARDIOGRAM REPORT: CPT

## 2019-05-04 PROCEDURE — 74177 CT ABD & PELVIS W/CONTRAST: CPT | Mod: 26

## 2019-05-04 PROCEDURE — 71045 X-RAY EXAM CHEST 1 VIEW: CPT | Mod: 26

## 2019-05-04 PROCEDURE — 76705 ECHO EXAM OF ABDOMEN: CPT | Mod: 26

## 2019-05-04 PROCEDURE — 99285 EMERGENCY DEPT VISIT HI MDM: CPT | Mod: 25

## 2019-05-04 RX ORDER — SODIUM CHLORIDE 9 MG/ML
1000 INJECTION, SOLUTION INTRAVENOUS ONCE
Qty: 0 | Refills: 0 | Status: COMPLETED | OUTPATIENT
Start: 2019-05-04 | End: 2019-05-04

## 2019-05-04 RX ORDER — DIPHENHYDRAMINE HCL 50 MG
50 CAPSULE ORAL ONCE
Qty: 0 | Refills: 0 | Status: COMPLETED | OUTPATIENT
Start: 2019-05-04 | End: 2019-05-04

## 2019-05-04 RX ORDER — ONDANSETRON 8 MG/1
4 TABLET, FILM COATED ORAL ONCE
Qty: 0 | Refills: 0 | Status: COMPLETED | OUTPATIENT
Start: 2019-05-04 | End: 2019-05-04

## 2019-05-04 RX ORDER — MORPHINE SULFATE 50 MG/1
4 CAPSULE, EXTENDED RELEASE ORAL ONCE
Qty: 0 | Refills: 0 | Status: DISCONTINUED | OUTPATIENT
Start: 2019-05-04 | End: 2019-05-04

## 2019-05-04 RX ORDER — SODIUM CHLORIDE 9 MG/ML
1000 INJECTION, SOLUTION INTRAVENOUS
Qty: 0 | Refills: 0 | Status: DISCONTINUED | OUTPATIENT
Start: 2019-05-04 | End: 2019-05-04

## 2019-05-04 RX ORDER — FAMOTIDINE 10 MG/ML
20 INJECTION INTRAVENOUS ONCE
Qty: 0 | Refills: 0 | Status: COMPLETED | OUTPATIENT
Start: 2019-05-04 | End: 2019-05-04

## 2019-05-04 RX ADMIN — ONDANSETRON 4 MILLIGRAM(S): 8 TABLET, FILM COATED ORAL at 12:40

## 2019-05-04 RX ADMIN — SODIUM CHLORIDE 1000 MILLILITER(S): 9 INJECTION, SOLUTION INTRAVENOUS at 05:52

## 2019-05-04 RX ADMIN — Medication 50 MILLIGRAM(S): at 07:15

## 2019-05-04 RX ADMIN — ONDANSETRON 4 MILLIGRAM(S): 8 TABLET, FILM COATED ORAL at 05:51

## 2019-05-04 RX ADMIN — FAMOTIDINE 20 MILLIGRAM(S): 10 INJECTION INTRAVENOUS at 05:51

## 2019-05-04 RX ADMIN — MORPHINE SULFATE 4 MILLIGRAM(S): 50 CAPSULE, EXTENDED RELEASE ORAL at 05:51

## 2019-05-04 RX ADMIN — Medication 125 MILLIGRAM(S): at 07:15

## 2019-05-04 NOTE — ED PROVIDER NOTE - OBJECTIVE STATEMENT
39 yo F pmh of esophageal ulcer and stomach problems in the process of getting worked up by Dr. madison presents with abdominal pain. States that all week she has been having abdominal pain and vomiting that worsened tonight. The pain is sharp, 10/10, diffuse, intermittent, non radiating. States that a few months ago she had an endoscopy and colonoscopy that showed a benign mass that was removed. no diarrhea, no fevers, no cp, no sob. 37 yo F pmh of esophageal ulcer and stomach problems in the process of getting worked up by Dr. Cason presents with abdominal pain. States that all week she has been having abdominal pain and vomiting that worsened tonight. The pain is sharp, 10/10, diffuse, intermittent, non radiating. States that a few months ago she had an endoscopy and colonoscopy that showed a benign mass that was removed. no diarrhea, no fevers, no cp, no sob.

## 2019-05-04 NOTE — ED PROVIDER NOTE - NS ED ROS FT
Eyes:  No visual changes, eye pain or discharge.  ENMT:  no sore throat or runny nose, no difficulty swallowing  Cardiac:  No chest pain, SOB   Respiratory:  No cough or respiratory distress.    GI:  + N/V, no diarrhea, + 10/10 sharp abdominal pain, intermittent, diffuse, non radiating.   :  No dysuria, frequency or burning.  MS:  No myalgia, muscle weakness, joint pain or back pain.  Neuro:  No headache or weakness.  No LOC.  Skin:  No skin rash.   Endocrine: No history of thyroid disease or diabetes.

## 2019-05-04 NOTE — ED ADULT NURSE NOTE - OBJECTIVE STATEMENT
pt aox4 complaining of abdominal pt aox4 complaining of abdominal pain n/v. pt had colonoscopy + removal of benign tumor. abdomen soft nontender, bowel sounds present. pending ct scan. iv in place, labs sent. Will continue to monitor/ assess pt aox4 complaining of abdominal pain n/v x1 day.  pt has hx esophogeal ca.  colonoscopy and endoscopy + removal of benign tumor. abdomen soft nontender, bowel sounds present. pending ct scan. iv in place, labs sent. Will continue to monitor/ assess

## 2019-05-04 NOTE — ED PROVIDER NOTE - PHYSICAL EXAMINATION
CONSTITUTIONAL: Well-developed; well-nourished; visibly uncomfortable   SKIN: warm, dry  HEAD: Normocephalic; atraumatic.  EYES: PERRL, no conjunctival erythema  ENT: No nasal discharge; airway clear.  NECK: Supple; non tender.  CARD: S1, S2 normal;  Regular rate and rhythm.   RESP: No wheezes, rales or rhonchi.  ABD: soft + diffuse tenderness, non distended, no rebound or guarding  EXT: Normal ROM.    LYMPH: No acute cervical adenopathy.  NEURO: Alert, oriented, grossly unremarkable.

## 2019-05-04 NOTE — ED PROVIDER NOTE - CLINICAL SUMMARY MEDICAL DECISION MAKING FREE TEXT BOX
Received pt from Dr Catalan at 0700 awaiting CT scan; initial read was appendicitis however attending overread states no appendicitis, only L ovarian cyst, small, no evidence of torsion (and clinical picture does not suggest torsion). Pt seen by surgery and cleared, felt better after pain meds and IVF; will d/c home with prompt PMD f/u, return to the ED for worsening sx.

## 2019-05-04 NOTE — ED PROVIDER NOTE - PROGRESS NOTE DETAILS
Pt signed out to me by Dr. Li. 37 yo F with hx of esophageal ulcer with diffuse abd pain x 1 wk. Labs remarkable for elevated lipase 267 but no leukocytosis or abnormal LFTs. Pending urine and CT abd. Received pt from Dr Catalan at 0700 awaiting CT scan for abd pain with constipation, vomiting. No documented fever. On my eval, pt is nontoxic but uncomfortable, abd diffusely tender without peritoneal signs. Will f/u CT, reassess. Acute appendicitis on CT. Spoke with Dr. Blood (surgery) who will eval pt. Attempted to contact Wade Edwards (emergency contact) as per pt's request. Called twice, no answer. Will attempt again later. Spoke w Dr Duke of radiology, does not feel CT suggests appendicitis. Given elevated lactate and lipase will go ahead with surgical consult and reassess. Spoke with surgery regarding attending read of CT, aware. Spoke with surgery regarding attending read of CT, aware. Lactate normalized. RUQ US negative. Reassessed pt, states that she feels nauseous after the US manipulation. Will give another zofran. Pt tolerating ice chips and water. Updated on findings including L ovarian cyst. Instructed to f/u with GI. Given printed reports of results. Pt verbalized understanding and was agreeable with plan to d/c home. RUQ US negative. Spoke with surgery regarding US findings, cleared from surgery standpoint. Reassessed pt, states that she feels nauseous after the US manipulation. Will give another zofran. Pt tolerating ice chips and water. Updated on findings including L ovarian cyst. Instructed to f/u with GI. Given printed reports of results. Pt verbalized understanding and was agreeable with plan to d/c home.

## 2019-05-04 NOTE — ED PROVIDER NOTE - ATTENDING CONTRIBUTION TO CARE
39 yo f with pmh of pud, sees dr. alvarez, presents with worsening abd pain.  pt says started a few days ago, made appt with dr. alvarez but not till next week.  mild nausea, no vomiting, no diarrhea.  no urinary sx.  no fever, no chills.  exam: nad, ncat, perrl, eomi, mmm, rrr, ctab, abd soft, ttp ruq and rlq, no rebound no guarding, no cvat imp; pt with right sided abd pain, will check labs, and ct a/p

## 2019-05-04 NOTE — CONSULT NOTE ADULT - SUBJECTIVE AND OBJECTIVE BOX
[facility] Consultation Note  =====================================================  HPI: 38y Female  HPI: 38 year old F pmh of esophageal ulcer and colonic mass s/p colonoscopic rsxn, followed by Dr. Cruz presents with generalized abdominal pain. Patient reports that she has been having abdominal pain worse in the periumbilical region RLQ accompanied by vomiting that worsened tonight. The pain is sharp, 10/10, diffuse, intermittent, non radiating. States that a few months ago she had an endoscopy and colonoscopy that showed a benign mass that was removed. No diarrhea, no fevers, no cp, no sob.    PAST MEDICAL & SURGICAL HISTORY:  Peptic ulcer  Fibroid, uterine  Abscess: vaginal  Fibroid: uterine surgery  Delivery with history of     Home Meds: Home Medications:  Protonix 20 mg oral delayed release tablet: 1 tab(s) orally once a day (2019 12:19)    Allergies: Allergies    iodine containing compounds (Unknown)  shellfish (Anaphylaxis)    Intolerances      Soc:   Advanced Directives: Presumed Full Code     ROS:    REVIEW OF SYSTEMS    [x] A ten-point review of systems was otherwise negative except as noted.  [ ] Due to altered mental status/intubation, subjective information were not able to be obtained from the patient. History was obtained, to the extent possible, from review of the chart and collateral sources of information.      CURRENT MEDICATIONS:   --------------------------------------------------------------------------------------  Neurologic Medications    Respiratory Medications    Cardiovascular Medications    Gastrointestinal Medications  lactated ringers. 1000 milliLiter(s) IV Continuous <Continuous>    Genitourinary Medications    Hematologic/Oncologic Medications    Antimicrobial/Immunologic Medications    Endocrine/Metabolic Medications    Topical/Other Medications    --------------------------------------------------------------------------------------    VITAL SIGNS, INS/OUTS (last 24 hours):  --------------------------------------------------------------------------------------  ICU Vital Signs Last 24 Hrs  T(C): 35.7 (04 May 2019 05:15), Max: 35.7 (04 May 2019 05:15)  T(F): 96.2 (04 May 2019 05:15), Max: 96.2 (04 May 2019 05:15)  HR: 94 (04 May 2019 05:15) (94 - 94)  BP: 132/62 (04 May 2019 05:15) (132/62 - 132/62)  BP(mean): --  ABP: --  ABP(mean): --  RR: 20 (04 May 2019 05:15) (20 - 20)  SpO2: 100% (04 May 2019 05:15) (100% - 100%)    I&O's Summary    --------------------------------------------------------------------------------------    EXAM:  General/Neuro  RASS: 0  GCS: 15  Exam: Normal, NAD, alert, oriented x 3, no focal deficits. PERRLA      Respiratory  Exam: Lungs clear to auscultation, Normal expansion/effort.    [] Tracheostomy   [] Intubated  Mechanical Ventilation:     Cardiovascular  Exam: S1, S2.  Regular rate and rhythm.  Peripheral edema    Cardiac Rhythm: Normal Sinus Rhythm  ECHO:     GI  Exam: Abdomen soft, diffusely tender, worse in RLQ, Non-distended. No guarding or rigidity.   Current Diet:  NPO      Tubes/Lines/Drains   [x] Peripheral IV  [] Central Venous Line     	[] R	[] L	[] IJ	[] Fem	[] SC        Type:	    Date Placed:   [] Arterial Line		[] R	[] L	[] Fem	[] Rad	[] Ax	Date Placed:   [] PICC:         	[] Midline		[] Mediport           [] Urinary Catheter		Date Placed:     Extremities  Exam: Extremities warm, pink, well-perfused.        Derm:  Exam: Good skin turgor, no skin breakdown.        LABS  --------------------------------------------------------------------------------------  Labs:  CAPILLARY BLOOD GLUCOSE                      12.5   10.14 )-----------( 384      ( 04 May 2019 05:39 )             37.7       Auto Neutrophil %: 44.5 % (19 @ 05:39)  Auto Immature Granulocyte %: 0.3 % (19 @ 05:39)        140  |  103  |  10  ----------------------------<  107<H>  3.5   |  18  |  0.5<L>      Calcium, Total Serum: 9.5 mg/dL (19 @ 05:39)      LFTs:             6.9  | 0.2  | 17       ------------------[79      ( 04 May 2019 05:39 )  4.2  | x    | 26          Lipase:267    Amylase:x         Lactate, Blood: 3.7 mmol/L (19 @ 05:39)      Coags:     13.00  ----< 1.13    ( 04 May 2019 08:00 )     28.2     --------------------------------------------------------------------------------------    OTHER LABS    IMAGING RESULTS      ASSESSMENT:  38y Female with acute appendicitis and elevated lipase     PLAN:   - RUQ sonogram  - Type and screen   - PTT/INR  - Zofran  - NPO/ IVF  - Pain control  - PPI  - Added on to OR   - Awaiting sono for admission [facility] Consultation Note  =====================================================  HPI: 38y Female  HPI: 38 year old F pmh of esophageal ulcer and colonic mass s/p colonoscopic rsxn, followed by Dr. Treadwell presents with generalized abdominal pain. Patient reports that she has been having abdominal pain worse in the periumbilical region RLQ accompanied by vomiting that worsened tonight. The pain is sharp, 10/10, diffuse, intermittent, non radiating. States that a few months ago she had an endoscopy and colonoscopy that showed a benign mass that was removed. No diarrhea, no fevers, no cp, no sob.    PAST MEDICAL & SURGICAL HISTORY:  Peptic ulcer  Fibroid, uterine  Abscess: vaginal  Fibroid: uterine surgery  Delivery with history of  10 years ago    Home Meds: Home Medications:  Protonix 20 mg oral delayed release tablet: 1 tab(s) orally once a day (2019 12:19)    Allergies: Allergies    iodine containing compounds (Unknown)  shellfish (Anaphylaxis)    Intolerances      Soc:   Advanced Directives: Presumed Full Code     ROS:    REVIEW OF SYSTEMS    [x] A ten-point review of systems was otherwise negative except as noted.  [ ] Due to altered mental status/intubation, subjective information were not able to be obtained from the patient. History was obtained, to the extent possible, from review of the chart and collateral sources of information.      CURRENT MEDICATIONS:   --------------------------------------------------------------------------------lactated ringers. 1000 milliLiter(s) IV Continuous <Continuous>    VITAL SIGNS, INS/OUTS (last 24 hours):  --------------------------------------------------------------------------------------  Vital Signs Last 24 Hrs  T(C): 35.7 (04 May 2019 05:15), Max: 35.7 (04 May 2019 05:15)  T(F): 96.2 (04 May 2019 05:15), Max: 96.2 (04 May 2019 05:15)  HR: 94 (04 May 2019 05:15) (94 - 94)  BP: 132/62 (04 May 2019 05:15) (132/62 - 132/62)  RR: 20 (04 May 2019 05:15) (20 - 20)  SpO2: 100% (04 May 2019 05:15) (100% - 100%)      EXAM:  General/Neuro  GCS: 15  Exam: Normal, NAD, alert, oriented x 3, no focal deficits. PERRLA      Respiratory  Exam: Lungs clear to auscultation, Normal expansion/effort.      Cardiovascular  Exam: S1, S2.  Regular rate and rhythm.  Peripheral edema    Cardiac Rhythm: Normal Sinus Rhythm  ECHO:     GI  Exam: Abdomen soft, diffusely tender, worse in RLQ and epigastric area, Non-distended. No guarding or rigidity.   Current Diet:  NPO      Tubes/Lines/Drains   [x] Peripheral IV    Extremities  Exam: Extremities warm, pink, well-perfused.        Derm:  Exam: Good skin turgor, no skin breakdown.        LABS  --------------------------------------------------------------------------------------  Labs:             12.5   10.14 )-----------( 384      ( 04 May 2019 05:39 )             37.7       Auto Neutrophil %: 44.5 % (19 @ 05:39)  Auto Immature Granulocyte %: 0.3 % (19 @ 05:39)    04    140  |  103  |  10  ----------------------------<  107<H>  3.5   |  18  |  0.5<L>      Calcium, Total Serum: 9.5 mg/dL (19 @ 05:39)      LFTs:             6.9  | 0.2  | 17       ------------------[79      ( 04 May 2019 05:39 )  4.2  | x    | 26          Lipase:267    Amylase:x         Lactate, Blood: 3.7 mmol/L (19 @ 05:39)      Coags:     13.00  ----< 1.13    ( 04 May 2019 08:00 )     28.2     --------------------------------------------------------------------------------------    OTHER LABS    IMAGING RESULTS      ASSESSMENT:  38y Female with acute appendicitis and elevated lipase     PLAN:   - RUQ sonogram  - PTT/INR  - Zofran  - NPO/ IVF  - Pain control  - PPI  - Added on to OR   - Awaiting sono for admission [facility] Consultation Note  =====================================================  HPI: 38y Female  HPI: 38 year old F pmh of esophageal ulcer and colonic mass s/p colonoscopic rsxn, followed by Dr. Treadwell presents with generalized abdominal pain. Patient reports that she has been having abdominal pain worse in the periumbilical region RLQ accompanied by vomiting that worsened tonight. The pain is sharp, 10/10, diffuse, intermittent, non radiating. States that a few months ago she had an endoscopy and colonoscopy that showed a benign mass that was removed. No diarrhea, no fevers, no cp, no sob.    PAST MEDICAL & SURGICAL HISTORY:  Peptic ulcer  Fibroid, uterine  Abscess: vaginal  Fibroid: uterine surgery  Delivery with history of  10 years ago    Home Meds: Home Medications:  Protonix 20 mg oral delayed release tablet: 1 tab(s) orally once a day (2019 12:19)    Allergies: Allergies    iodine containing compounds (Unknown)  shellfish (Anaphylaxis)    Intolerances      Soc:   Advanced Directives: Presumed Full Code     ROS:    REVIEW OF SYSTEMS    [x] A ten-point review of systems was otherwise negative except as noted.  [ ] Due to altered mental status/intubation, subjective information were not able to be obtained from the patient. History was obtained, to the extent possible, from review of the chart and collateral sources of information.      CURRENT MEDICATIONS:   --------------------------------------------------------------------------------lactated ringers. 1000 milliLiter(s) IV Continuous <Continuous>    VITAL SIGNS, INS/OUTS (last 24 hours):  --------------------------------------------------------------------------------------  Vital Signs Last 24 Hrs  T(C): 35.7 (04 May 2019 05:15), Max: 35.7 (04 May 2019 05:15)  T(F): 96.2 (04 May 2019 05:15), Max: 96.2 (04 May 2019 05:15)  HR: 94 (04 May 2019 05:15) (94 - 94)  BP: 132/62 (04 May 2019 05:15) (132/62 - 132/62)  RR: 20 (04 May 2019 05:15) (20 - 20)  SpO2: 100% (04 May 2019 05:15) (100% - 100%)      EXAM:  General/Neuro  GCS: 15  Exam: Normal, NAD, alert, oriented x 3, no focal deficits. PERRLA      Respiratory  Exam: Lungs clear to auscultation, Normal expansion/effort.      Cardiovascular  Exam: S1, S2.  Regular rate and rhythm.  Peripheral edema    Cardiac Rhythm: Normal Sinus Rhythm  ECHO:     GI  Exam: Abdomen soft, diffusely tender, worse in RLQ and epigastric area, Non-distended. No guarding or rigidity.   Current Diet:  NPO      Tubes/Lines/Drains   [x] Peripheral IV    Extremities  Exam: Extremities warm, pink, well-perfused.        Derm:  Exam: Good skin turgor, no skin breakdown.        LABS  --------------------------------------------------------------------------------------  Labs:             12.5   10.14 )-----------( 384      ( 04 May 2019 05:39 )             37.7       Auto Neutrophil %: 44.5 % (19 @ 05:39)  Auto Immature Granulocyte %: 0.3 % (19 @ 05:39)    04    140  |  103  |  10  ----------------------------<  107<H>  3.5   |  18  |  0.5<L>      Calcium, Total Serum: 9.5 mg/dL (19 @ 05:39)      LFTs:             6.9  | 0.2  | 17       ------------------[79      ( 04 May 2019 05:39 )  4.2  | x    | 26          Lipase:267    Amylase:x         Lactate, Blood: 3.7 mmol/L (19 @ 05:39)      Coags:     13.00  ----< 1.13    ( 04 May 2019 08:00 )     28.2     --------------------------------------------------------------------------------------    OTHER LABS    IMAGING RESULTS      ASSESSMENT:  38y Female with generalized abd pain and elevated lipase     PLAN:   - RUQ sonogram  - PTT/INR  - Zofran  - NPO/ IVF  - Pain control  - PPI  - Added on to OR   - Awaiting sono for admission [facility] Consultation Note  =====================================================  HPI: 38y Female  HPI: 38 year old F pmh of esophageal ulcer and colonic mass s/p colonoscopic rsxn, followed by Dr. Treadwell presents with generalized abdominal pain. Patient reports that she has been having abdominal pain worse in the periumbilical region RLQ accompanied by vomiting that worsened tonight. The pain is sharp, 10/10, diffuse, intermittent, non radiating. States that a few months ago she had an endoscopy and colonoscopy that showed a benign mass that was removed. No diarrhea, no fevers, no cp, no sob.    PAST MEDICAL & SURGICAL HISTORY:  Peptic ulcer  Fibroid, uterine  Abscess: vaginal  Fibroid: uterine surgery  Delivery with history of  10 years ago    Home Meds: Home Medications:  Protonix 20 mg oral delayed release tablet: 1 tab(s) orally once a day (2019 12:19)    Allergies: Allergies    iodine containing compounds (Unknown)  shellfish (Anaphylaxis)    Intolerances      Soc:   Advanced Directives: Presumed Full Code     ROS:    REVIEW OF SYSTEMS    [x] A ten-point review of systems was otherwise negative except as noted.  [ ] Due to altered mental status/intubation, subjective information were not able to be obtained from the patient. History was obtained, to the extent possible, from review of the chart and collateral sources of information.      CURRENT MEDICATIONS:   --------------------------------------------------------------------------------lactated ringers. 1000 milliLiter(s) IV Continuous <Continuous>    VITAL SIGNS, INS/OUTS (last 24 hours):  --------------------------------------------------------------------------------------  Vital Signs Last 24 Hrs  T(C): 35.7 (04 May 2019 05:15), Max: 35.7 (04 May 2019 05:15)  T(F): 96.2 (04 May 2019 05:15), Max: 96.2 (04 May 2019 05:15)  HR: 94 (04 May 2019 05:15) (94 - 94)  BP: 132/62 (04 May 2019 05:15) (132/62 - 132/62)  RR: 20 (04 May 2019 05:15) (20 - 20)  SpO2: 100% (04 May 2019 05:15) (100% - 100%)      EXAM:  General/Neuro  GCS: 15  Exam: Normal, NAD, alert, oriented x 3, no focal deficits. PERRLA      Respiratory  Exam: Lungs clear to auscultation, Normal expansion/effort.      Cardiovascular  Exam: S1, S2.  Regular rate and rhythm.  Peripheral edema    Cardiac Rhythm: Normal Sinus Rhythm  ECHO:     GI  Exam: Abdomen soft, diffusely tender, worse in RLQ and epigastric area, Non-distended. No guarding or rigidity.   Current Diet:  NPO      Tubes/Lines/Drains   [x] Peripheral IV    Extremities  Exam: Extremities warm, pink, well-perfused.        Derm:  Exam: Good skin turgor, no skin breakdown.        LABS  --------------------------------------------------------------------------------------  Labs:             12.5   10.14 )-----------( 384      ( 04 May 2019 05:39 )             37.7       Auto Neutrophil %: 44.5 % (19 @ 05:39)  Auto Immature Granulocyte %: 0.3 % (19 @ 05:39)    04    140  |  103  |  10  ----------------------------<  107<H>  3.5   |  18  |  0.5<L>      Calcium, Total Serum: 9.5 mg/dL (19 @ 05:39)      LFTs:             6.9  | 0.2  | 17       ------------------[79      ( 04 May 2019 05:39 )  4.2  | x    | 26          Lipase:267    Amylase:x         Lactate, Blood: 3.7 mmol/L (19 @ 05:39)      Coags:     13.00  ----< 1.13    ( 04 May 2019 08:00 )     28.2     --------------------------------------------------------------------------------------    OTHER LABS    IMAGING RESULTS      ASSESSMENT:  38y Female with generalized abd pain and elevated lipase     PLAN:   - RUQ sonogram  - PTT/INR  - Zofran  - NPO/ IVF  - Pain control  - PPI  - Added on to OR   - Awaiting sono for admission    12:09: RUQ sono negative  No indication for surgical intervention identified.

## 2019-05-04 NOTE — CONSULT NOTE ADULT - ATTENDING COMMENTS
Patient seen and examined with surgery PA in ED and discussed management plans with patient. Recurring right abdominal pain and in epigastric area mainly associated around her periods as per patient. denies any prior laparoscopy for gyn. CT reviewed no acute appendicitis no inflammation, same findings of dilated appendix on prior ct scans from 01/19 and in 2018. will get RUQ sono and follow up

## 2019-05-04 NOTE — ED PROVIDER NOTE - CARE PROVIDER_API CALL
Bony Cason)  Gastroenterology; Internal Medicine  [address] [place] [address]  Phone: [phone]  Fax: [phone]  Follow Up Time: 1-3 Days

## 2019-05-14 ENCOUNTER — INPATIENT (INPATIENT)
Facility: HOSPITAL | Age: 39
LOS: 4 days | Discharge: HOME | End: 2019-05-19
Attending: INTERNAL MEDICINE | Admitting: INTERNAL MEDICINE
Payer: MEDICAID

## 2019-05-14 VITALS
OXYGEN SATURATION: 98 % | TEMPERATURE: 98 F | HEART RATE: 93 BPM | RESPIRATION RATE: 18 BRPM | SYSTOLIC BLOOD PRESSURE: 141 MMHG | DIASTOLIC BLOOD PRESSURE: 73 MMHG

## 2019-05-14 DIAGNOSIS — O34.219 MATERNAL CARE FOR UNSPECIFIED TYPE SCAR FROM PREVIOUS CESAREAN DELIVERY: Chronic | ICD-10-CM

## 2019-05-14 DIAGNOSIS — D25.9 LEIOMYOMA OF UTERUS, UNSPECIFIED: Chronic | ICD-10-CM

## 2019-05-14 LAB
ALBUMIN SERPL ELPH-MCNC: 4.1 G/DL — SIGNIFICANT CHANGE UP (ref 3.5–5.2)
ALP SERPL-CCNC: 75 U/L — SIGNIFICANT CHANGE UP (ref 30–115)
ALT FLD-CCNC: 19 U/L — SIGNIFICANT CHANGE UP (ref 0–41)
ANION GAP SERPL CALC-SCNC: 13 MMOL/L — SIGNIFICANT CHANGE UP (ref 7–14)
APPEARANCE UR: ABNORMAL
AST SERPL-CCNC: 14 U/L — SIGNIFICANT CHANGE UP (ref 0–41)
BASE EXCESS BLDV CALC-SCNC: -0.4 MMOL/L — SIGNIFICANT CHANGE UP (ref -2–2)
BASOPHILS # BLD AUTO: 0.02 K/UL — SIGNIFICANT CHANGE UP (ref 0–0.2)
BASOPHILS NFR BLD AUTO: 0.3 % — SIGNIFICANT CHANGE UP (ref 0–1)
BILIRUB DIRECT SERPL-MCNC: <0.2 MG/DL — SIGNIFICANT CHANGE UP (ref 0–0.2)
BILIRUB INDIRECT FLD-MCNC: >0.3 MG/DL — SIGNIFICANT CHANGE UP (ref 0.2–1.2)
BILIRUB SERPL-MCNC: 0.5 MG/DL — SIGNIFICANT CHANGE UP (ref 0.2–1.2)
BILIRUB UR-MCNC: NEGATIVE — SIGNIFICANT CHANGE UP
BUN SERPL-MCNC: 7 MG/DL — LOW (ref 10–20)
CA-I SERPL-SCNC: 1.25 MMOL/L — SIGNIFICANT CHANGE UP (ref 1.12–1.3)
CALCIUM SERPL-MCNC: 9 MG/DL — SIGNIFICANT CHANGE UP (ref 8.5–10.1)
CHLORIDE SERPL-SCNC: 103 MMOL/L — SIGNIFICANT CHANGE UP (ref 98–110)
CO2 SERPL-SCNC: 21 MMOL/L — SIGNIFICANT CHANGE UP (ref 17–32)
COLOR SPEC: YELLOW — SIGNIFICANT CHANGE UP
CREAT SERPL-MCNC: 0.5 MG/DL — LOW (ref 0.7–1.5)
DIFF PNL FLD: NEGATIVE — SIGNIFICANT CHANGE UP
EOSINOPHIL # BLD AUTO: 0.19 K/UL — SIGNIFICANT CHANGE UP (ref 0–0.7)
EOSINOPHIL NFR BLD AUTO: 2.7 % — SIGNIFICANT CHANGE UP (ref 0–8)
EPI CELLS # UR: ABNORMAL /HPF
GAS PNL BLDV: 137 MMOL/L — SIGNIFICANT CHANGE UP (ref 136–145)
GAS PNL BLDV: SIGNIFICANT CHANGE UP
GLUCOSE SERPL-MCNC: 90 MG/DL — SIGNIFICANT CHANGE UP (ref 70–99)
GLUCOSE UR QL: NEGATIVE — SIGNIFICANT CHANGE UP
HCO3 BLDV-SCNC: 25 MMOL/L — SIGNIFICANT CHANGE UP (ref 22–29)
HCT VFR BLD CALC: 39.9 % — SIGNIFICANT CHANGE UP (ref 37–47)
HCT VFR BLDA CALC: 41.8 % — SIGNIFICANT CHANGE UP (ref 34–44)
HGB BLD CALC-MCNC: 13.6 G/DL — LOW (ref 14–18)
HGB BLD-MCNC: 12.9 G/DL — SIGNIFICANT CHANGE UP (ref 12–16)
IMM GRANULOCYTES NFR BLD AUTO: 0.1 % — SIGNIFICANT CHANGE UP (ref 0.1–0.3)
KETONES UR-MCNC: >=80
LACTATE BLDV-MCNC: 1 MMOL/L — SIGNIFICANT CHANGE UP (ref 0.5–1.6)
LACTATE SERPL-SCNC: 1.2 MMOL/L — SIGNIFICANT CHANGE UP (ref 0.5–2.2)
LEUKOCYTE ESTERASE UR-ACNC: NEGATIVE — SIGNIFICANT CHANGE UP
LIDOCAIN IGE QN: 483 U/L — HIGH (ref 7–60)
LYMPHOCYTES # BLD AUTO: 2.66 K/UL — SIGNIFICANT CHANGE UP (ref 1.2–3.4)
LYMPHOCYTES # BLD AUTO: 37.4 % — SIGNIFICANT CHANGE UP (ref 20.5–51.1)
MAGNESIUM SERPL-MCNC: 2 MG/DL — SIGNIFICANT CHANGE UP (ref 1.8–2.4)
MCHC RBC-ENTMCNC: 26.7 PG — LOW (ref 27–31)
MCHC RBC-ENTMCNC: 32.3 G/DL — SIGNIFICANT CHANGE UP (ref 32–37)
MCV RBC AUTO: 82.6 FL — SIGNIFICANT CHANGE UP (ref 81–99)
MONOCYTES # BLD AUTO: 0.72 K/UL — HIGH (ref 0.1–0.6)
MONOCYTES NFR BLD AUTO: 10.1 % — HIGH (ref 1.7–9.3)
NEUTROPHILS # BLD AUTO: 3.51 K/UL — SIGNIFICANT CHANGE UP (ref 1.4–6.5)
NEUTROPHILS NFR BLD AUTO: 49.4 % — SIGNIFICANT CHANGE UP (ref 42.2–75.2)
NITRITE UR-MCNC: NEGATIVE — SIGNIFICANT CHANGE UP
NRBC # BLD: 0 /100 WBCS — SIGNIFICANT CHANGE UP (ref 0–0)
PCO2 BLDV: 42 MMHG — SIGNIFICANT CHANGE UP (ref 41–51)
PH BLDV: 7.38 — SIGNIFICANT CHANGE UP (ref 7.26–7.43)
PH UR: 6 — SIGNIFICANT CHANGE UP (ref 5–8)
PLATELET # BLD AUTO: 337 K/UL — SIGNIFICANT CHANGE UP (ref 130–400)
PO2 BLDV: 34 MMHG — SIGNIFICANT CHANGE UP (ref 20–40)
POTASSIUM BLDV-SCNC: 3.8 MMOL/L — SIGNIFICANT CHANGE UP (ref 3.3–5.6)
POTASSIUM SERPL-MCNC: 4.2 MMOL/L — SIGNIFICANT CHANGE UP (ref 3.5–5)
POTASSIUM SERPL-SCNC: 4.2 MMOL/L — SIGNIFICANT CHANGE UP (ref 3.5–5)
PROT SERPL-MCNC: 6.7 G/DL — SIGNIFICANT CHANGE UP (ref 6–8)
PROT UR-MCNC: ABNORMAL
RBC # BLD: 4.83 M/UL — SIGNIFICANT CHANGE UP (ref 4.2–5.4)
RBC # FLD: 13.2 % — SIGNIFICANT CHANGE UP (ref 11.5–14.5)
SAO2 % BLDV: 63 % — SIGNIFICANT CHANGE UP
SODIUM SERPL-SCNC: 137 MMOL/L — SIGNIFICANT CHANGE UP (ref 135–146)
SP GR SPEC: >=1.03 — SIGNIFICANT CHANGE UP (ref 1.01–1.03)
UROBILINOGEN FLD QL: 0.2 — SIGNIFICANT CHANGE UP (ref 0.2–0.2)
WBC # BLD: 7.11 K/UL — SIGNIFICANT CHANGE UP (ref 4.8–10.8)
WBC # FLD AUTO: 7.11 K/UL — SIGNIFICANT CHANGE UP (ref 4.8–10.8)

## 2019-05-14 PROCEDURE — 76830 TRANSVAGINAL US NON-OB: CPT | Mod: 26

## 2019-05-14 PROCEDURE — 99285 EMERGENCY DEPT VISIT HI MDM: CPT

## 2019-05-14 RX ORDER — PANTOPRAZOLE SODIUM 20 MG/1
1 TABLET, DELAYED RELEASE ORAL
Qty: 0 | Refills: 0 | DISCHARGE

## 2019-05-14 RX ORDER — ENOXAPARIN SODIUM 100 MG/ML
40 INJECTION SUBCUTANEOUS AT BEDTIME
Refills: 0 | Status: DISCONTINUED | OUTPATIENT
Start: 2019-05-14 | End: 2019-05-19

## 2019-05-14 RX ORDER — ONDANSETRON 8 MG/1
4 TABLET, FILM COATED ORAL THREE TIMES A DAY
Refills: 0 | Status: DISCONTINUED | OUTPATIENT
Start: 2019-05-14 | End: 2019-05-19

## 2019-05-14 RX ORDER — PANTOPRAZOLE SODIUM 20 MG/1
40 TABLET, DELAYED RELEASE ORAL
Refills: 0 | Status: DISCONTINUED | OUTPATIENT
Start: 2019-05-14 | End: 2019-05-19

## 2019-05-14 RX ORDER — MORPHINE SULFATE 50 MG/1
4 CAPSULE, EXTENDED RELEASE ORAL ONCE
Refills: 0 | Status: DISCONTINUED | OUTPATIENT
Start: 2019-05-14 | End: 2019-05-14

## 2019-05-14 RX ORDER — METOCLOPRAMIDE HCL 10 MG
10 TABLET ORAL ONCE
Refills: 0 | Status: COMPLETED | OUTPATIENT
Start: 2019-05-14 | End: 2019-05-14

## 2019-05-14 RX ORDER — SODIUM CHLORIDE 9 MG/ML
1000 INJECTION, SOLUTION INTRAVENOUS
Refills: 0 | Status: DISCONTINUED | OUTPATIENT
Start: 2019-05-14 | End: 2019-05-19

## 2019-05-14 RX ORDER — SODIUM CHLORIDE 9 MG/ML
1000 INJECTION, SOLUTION INTRAVENOUS ONCE
Refills: 0 | Status: COMPLETED | OUTPATIENT
Start: 2019-05-14 | End: 2019-05-14

## 2019-05-14 RX ORDER — SUCRALFATE 1 G
1 TABLET ORAL
Refills: 0 | Status: DISCONTINUED | OUTPATIENT
Start: 2019-05-14 | End: 2019-05-19

## 2019-05-14 RX ORDER — ONDANSETRON 8 MG/1
4 TABLET, FILM COATED ORAL ONCE
Refills: 0 | Status: COMPLETED | OUTPATIENT
Start: 2019-05-14 | End: 2019-05-14

## 2019-05-14 RX ORDER — CHLORHEXIDINE GLUCONATE 213 G/1000ML
1 SOLUTION TOPICAL
Refills: 0 | Status: DISCONTINUED | OUTPATIENT
Start: 2019-05-14 | End: 2019-05-19

## 2019-05-14 RX ORDER — MORPHINE SULFATE 50 MG/1
2 CAPSULE, EXTENDED RELEASE ORAL EVERY 4 HOURS
Refills: 0 | Status: DISCONTINUED | OUTPATIENT
Start: 2019-05-14 | End: 2019-05-16

## 2019-05-14 RX ADMIN — SODIUM CHLORIDE 1000 MILLILITER(S): 9 INJECTION, SOLUTION INTRAVENOUS at 14:39

## 2019-05-14 RX ADMIN — Medication 1 GRAM(S): at 23:08

## 2019-05-14 RX ADMIN — PANTOPRAZOLE SODIUM 40 MILLIGRAM(S): 20 TABLET, DELAYED RELEASE ORAL at 18:26

## 2019-05-14 RX ADMIN — SODIUM CHLORIDE 150 MILLILITER(S): 9 INJECTION, SOLUTION INTRAVENOUS at 16:21

## 2019-05-14 RX ADMIN — ONDANSETRON 4 MILLIGRAM(S): 8 TABLET, FILM COATED ORAL at 17:51

## 2019-05-14 RX ADMIN — MORPHINE SULFATE 2 MILLIGRAM(S): 50 CAPSULE, EXTENDED RELEASE ORAL at 17:51

## 2019-05-14 RX ADMIN — MORPHINE SULFATE 4 MILLIGRAM(S): 50 CAPSULE, EXTENDED RELEASE ORAL at 14:39

## 2019-05-14 RX ADMIN — ENOXAPARIN SODIUM 40 MILLIGRAM(S): 100 INJECTION SUBCUTANEOUS at 23:08

## 2019-05-14 RX ADMIN — SODIUM CHLORIDE 1000 MILLILITER(S): 9 INJECTION, SOLUTION INTRAVENOUS at 15:34

## 2019-05-14 RX ADMIN — SODIUM CHLORIDE 150 MILLILITER(S): 9 INJECTION, SOLUTION INTRAVENOUS at 17:41

## 2019-05-14 RX ADMIN — SODIUM CHLORIDE 150 MILLILITER(S): 9 INJECTION, SOLUTION INTRAVENOUS at 23:07

## 2019-05-14 RX ADMIN — Medication 1 GRAM(S): at 18:27

## 2019-05-14 RX ADMIN — Medication 10 MILLIGRAM(S): at 23:08

## 2019-05-14 RX ADMIN — ONDANSETRON 4 MILLIGRAM(S): 8 TABLET, FILM COATED ORAL at 14:39

## 2019-05-14 NOTE — H&P ADULT - ATTENDING COMMENTS
38F with PMH: PUD on Sucralfate, Hemorrhagic Ovarian Cyst without torsion s/p GYN Evaluation s/p Transvaginal Sonogram presents with bilateral lower abdominal pain 8/10 associated with N/V/NBDiarrhea. Denies fevers, chills, cp or dyspnea. Overall pt with Gastroenteritis. Clinically not congruent with pancreatitis presentation due to location of pain and suboptimal lipase levels. In addition mild lipase elevation is non specific and likely is due to Gastroenteritis. Doubt PUD Exarcerbation again due to pain location. Doubt Ovarian etiology as pt stated when ovarian cyst pain in the past had extreme pain over 10/10 and current abdominal pain is different.     In addition pt s/p EGD and Colonoscopy s/p polypectomy - benign in March.     Dx: Gastroenteritis  Plan: IVF, Stool studies including C.Diff, Pain control - Tylenol or IV Morphine, Zofran, NPO and if can tolerate give clear liquid diet. Resume all other home meds. Sucralfate / Pepcid.   GI/DVT prophylaxis    Vital Signs Last 24 Hrs  T(C): 36.5 (15 May 2019 13:52), Max: 36.9 (15 May 2019 05:16)  T(F): 97.7 (15 May 2019 13:52), Max: 98.4 (15 May 2019 05:16)  HR: 69 (15 May 2019 13:52) (62 - 72)  BP: 109/66 (15 May 2019 13:52) (109/66 - 129/71)  RR: 18 (15 May 2019 13:52) (18 - 18)  SpO2: 100% (14 May 2019 16:01) (100% - 100%)    PHYSICAL EXAM:  GENERAL: NAD, well-developed  HEAD:  Atraumatic, Normocephalic  EYES: EOMI, PERRLA, conjunctiva and sclera clear  NECK: Supple, No JVD  CHEST/LUNG: Clear to auscultation bilaterally; No wheeze  HEART: Regular rate and rhythm; No murmurs, rubs, or gallops  ABDOMEN: Soft, + lower abdominal tenderness bilateral, no rebound, Nondistended; Bowel sounds present, + diarrhea   EXTREMITIES:  2+ Peripheral Pulses, No clubbing, cyanosis, or edema  PSYCH: AAOx3  NEUROLOGY: non-focal  SKIN: No rashes or lesions                          11.7   7.04  )-----------( 298      ( 15 May 2019 06:25 )             35.9     05-15    138  |  101  |  4<L>  ----------------------------<  74  4.2   |  24  |  0.6<L>    Ca    9.0      15 May 2019 06:25  Mg     2.0     05-14    TPro  6.0  /  Alb  3.6  /  TBili  0.5  /  DBili  x   /  AST  11  /  ALT  16  /  AlkPhos  66  05-15

## 2019-05-14 NOTE — ED PROVIDER NOTE - PHYSICAL EXAMINATION
VITAL SIGNS: I have reviewed nursing notes and confirm.  CONSTITUTIONAL: Well-developed; well-nourished; uncomfortable and tearful on exam due to pain.  SKIN: Skin exam is warm and dry, <2 sec cap refill  HEAD: Normocephalic; atraumatic.  EYES: PERRL, EOM intact; normal conjunctiva.  ENT: MMM; airway clear.   NECK: Supple; non tender.  CARD: RRR, 2+ dp pulses  RESP: No wheezes, rales or rhonchi, speaking in full sentences  ABD: soft, diffusely tender, worse to LLQ. no cva tenderness.   EXT: Normal ROM. No edema.  NEURO: Alert, oriented. Grossly unremarkable. No focal deficits.

## 2019-05-14 NOTE — H&P ADULT - NSHPPHYSICALEXAM_GEN_ALL_CORE
T(F): 98  HR: 72  BP: 114/73  RR: 18  SpO2: 100%      PHYSICAL EXAM:  GENERAL: lying on bed , c/o pain ,   HEAD:  Atraumatic, Normocephalic  EYES: EOMI, PERRLA, conjunctiva and sclera clear  NECK: Supple, No JVD  CHEST/LUNG: Clear to auscultation bilaterally; No wheeze  HEART: Regular rate and rhythm; No murmurs, rubs, or gallops  ABDOMEN: diffuse tenderness to palpation , no guarding or rigidity  EXTREMITIES:  2+ Peripheral Pulses, No clubbing, cyanosis, or edema  PSYCH: AAOx3  NEUROLOGY: non-focal  SKIN: No rashes or lesions T(F): 98  HR: 72  BP: 114/73  RR: 18  SpO2: 100%    PHYSICAL EXAM:  GENERAL: lying on bed , c/o pain ,   HEAD:  Atraumatic, Normocephalic  EYES: EOMI, PERRLA, conjunctiva and sclera clear  NECK: Supple, No JVD  CHEST/LUNG: Clear to auscultation bilaterally; No wheeze  HEART: Regular rate and rhythm; No murmurs, rubs, or gallops  ABDOMEN: diffuse tenderness to palpation , no guarding or rigidity  EXTREMITIES:  2+ Peripheral Pulses, No clubbing, cyanosis, or edema  PSYCH: AAOx3  NEUROLOGY: non-focal  SKIN: No rashes or lesions

## 2019-05-14 NOTE — ED PROVIDER NOTE - OBJECTIVE STATEMENT
39 y/o F, h/o esophageal ulcers (on sucralfate, followed by Dr. Kay), benign mass found on endoscopy/colonoscopy a few months ago, presents with diffuse abd pain that is worse on LLQ associated with n/v onset about a week ago. pt was seen here for similar symptoms and was d/c home with outpt follow up. pt states she has an appointment with Dr. kay next week but is unable to wait till the appointment due to pain and inability to tolerate po. pt states her  symptoms are exacerbated with palpation. no relief with sucralfate and tylenol. denies fever, rectal bleeding, urinary symptoms, headache, vaginal bleeding, vaginal discharge.

## 2019-05-14 NOTE — H&P ADULT - ASSESSMENT
38 y o F with PMH of gastric ulcer on sucralfate and ovarian cysts presented to ER with c/o diffuse abdominal pain for 2 wks duration now. Pt reports diffuse abdominal pain band like , non radiating , sharp , 10/10 , associated with nausea and vomiting. Pt does not report any fevers but does report chills.     # ABDOMINAL PAIN / NAUSEA VOMITING / ELEVATED LIPASE ( though not 10 times the upper limits of normal ) / H/O GASTRIC ULCER / ELIZABETH CRITERIA 0 / MILD PANCREATITIS? VRS  PEPTIC ULCER DISEASE     - admit to medicine.  - iv hydration .  - zofran as needed for nausea   - pain control.  - us abd done did not show any CBD dilatation or cholelithiasis .  - GI eval .  - advance diet as tolerated.  - not suspecting any clear etiology as mesenteric ischemia as no blood in stools , no risk factors , normal lactate.  - protonix bid and sucralfate.    # HEMORRHAGIC OVARIAN CYST:    - out pt f/u with GYN .    # DVT :    - ppx with lovenox.    # DISPO:    - from home ; full code . 38 y o F with PMH of gastric ulcer on sucralfate and ovarian cysts presented to ER with c/o diffuse abdominal pain for 2 wks duration now. Pt reports diffuse abdominal pain band like , non radiating , sharp , 10/10 , associated with nausea and vomiting. Pt does not report any fevers but does report chills.     # ABDOMINAL PAIN / NAUSEA VOMITING / ELEVATED LIPASE ( though not 10 times the upper limits of normal ) / H/O GASTRIC ULCER / ELIZABETH CRITERIA 0 / MILD PANCREATITIS? VRS  PEPTIC ULCER DISEASE     - admit to medicine.  - iv hydration .  - zofran as needed for nausea   - pain control.  - us abd done did not show any CBD dilatation or cholelithiasis .  - GI eval .  - advance diet as tolerated.  - not suspecting any clear etiology as mesenteric ischemia as no blood in stools , no risk factors , normal lactate.  - protonix bid and sucralfate.    # HEMORRHAGIC OVARIAN CYST:    - transvaginal us showed large hemorrhagic ovarian cyst without torsion.  - gyn eval.    # DVT :    - ppx with lovenox.    # DISPO:    - from home ; full code .

## 2019-05-14 NOTE — ED PROVIDER NOTE - CLINICAL SUMMARY MEDICAL DECISION MAKING FREE TEXT BOX
pt presented with diffuse abdominal pain, required IV meds, pelvic US shows hemorrhagic cyst, labs confirm suspected pancreatitis. Pt admitted for further care pancreatitis with inability to tolerate PO and GI evaluation.

## 2019-05-14 NOTE — ED PROVIDER NOTE - NS ED ROS FT
Review of Systems:  CONSTITUTIONAL: no fever  EYES: no photophobia, no blurred vision  ENT: no ear pain, no nasal discharge  RESPIRATORY: no shortness of breath, no cough  CARDIAC: no chest pain, no palpitations  GI: +abd pain, + nausea, +vomiting, no diarrhea, no constipation,   : no dysuria; no hematuria,   MUSCULOSKELETAL: no joint paint  NEUROLOGIC: no headache,   PSYCH: no anxiety, non suicidal, non homicidal, no hallucination, no depression

## 2019-05-14 NOTE — ED ADULT TRIAGE NOTE - CHIEF COMPLAINT QUOTE
"im having pain in the my lower left side of my abdomen. yesterday I couldn't stop vomiting. im having sharp pain that's coming and going"

## 2019-05-14 NOTE — H&P ADULT - NSHPLABSRESULTS_GEN_ALL_CORE
12.9   7.11  )-----------( 337      ( 14 May 2019 13:55 )             39.9       05-14    137  |  103  |  7<L>  ----------------------------<  90  4.2   |  21  |  0.5<L>    Ca    9.0      14 May 2019 13:55  Mg     2.0     05-14    TPro  6.7  /  Alb  4.1  /  TBili  0.5  /  DBili  <0.2  /  AST  14  /  ALT  19  /  AlkPhos  75  05-14              < from: CT Abdomen and Pelvis w/ IV Cont (05.04.19 @ 07:37) >    IMPRESSION:     1.  Dilated appendix without any acute inflammation, nonspecific finding.  2.  No acute bowel findings.  3.  Left ovary cyst, 3 cm.      < end of copied text >            Lactate Trend  05-14 @ 13:55 Lactate:1.2             CAPILLARY BLOOD GLUCOSE 12.9   7.11  )-----------( 337      ( 14 May 2019 13:55 )             39.9       05-14    137  |  103  |  7<L>  ----------------------------<  90  4.2   |  21  |  0.5<L>    Ca    9.0      14 May 2019 13:55  Mg     2.0     05-14    TPro  6.7  /  Alb  4.1  /  TBili  0.5  /  DBili  <0.2  /  AST  14  /  ALT  19  /  AlkPhos  75  05-14              < from: CT Abdomen and Pelvis w/ IV Cont (05.04.19 @ 07:37) >    IMPRESSION:     1.  Dilated appendix without any acute inflammation, nonspecific finding.  2.  No acute bowel findings.  3.  Left ovary cyst, 3 cm.      < end of copied text >            Lactate Trend  05-14 @ 13:55 Lactate:1.2         < from: Colonoscopy (03.18.19 @ 11:45) >    Impressions:    Polyp (5 mm) in the rectum. (Biopsy).    Otherwise normal colon and terminal ileum. Biopsies taken of Ascending,  transverse, sigmoid colon to rule out microscopic colitis. (Biopsy).     < end of copied text >        CAPILLARY BLOOD GLUCOSE 12.9   7.11  )-----------( 337      ( 14 May 2019 13:55 )             39.9       05-14    137  |  103  |  7<L>  ----------------------------<  90  4.2   |  21  |  0.5<L>    Ca    9.0      14 May 2019 13:55  Mg     2.0     05-14    TPro  6.7  /  Alb  4.1  /  TBili  0.5  /  DBili  <0.2  /  AST  14  /  ALT  19  /  AlkPhos  75  05-14    < from: CT Abdomen and Pelvis w/ IV Cont (05.04.19 @ 07:37) >    IMPRESSION:     1.  Dilated appendix without any acute inflammation, nonspecific finding.  2.  No acute bowel findings.  3.  Left ovary cyst, 3 cm.    < end of copied text >    Lactate Trend  05-14 @ 13:55 Lactate:1.2     < from: Colonoscopy (03.18.19 @ 11:45) >    Impressions:    Polyp (5 mm) in the rectum. (Biopsy).    Otherwise normal colon and terminal ileum. Biopsies taken of Ascending,  transverse, sigmoid colon to rule out microscopic colitis. (Biopsy).     < end of copied text >    CAPILLARY BLOOD GLUCOSE

## 2019-05-14 NOTE — ED ADULT NURSE NOTE - NSIMPLEMENTINTERV_GEN_ALL_ED
Implemented All Universal Safety Interventions:  [place] to call system. Call bell, personal items and telephone within reach. Instruct patient to call for assistance. Room bathroom lighting operational. Non-slip footwear when patient is off stretcher. Physically safe environment: no spills, clutter or unnecessary equipment. Stretcher in lowest position, wheels locked, appropriate side rails in place.

## 2019-05-14 NOTE — H&P ADULT - HISTORY OF PRESENT ILLNESS
38 y o F with PMH of gastric ulcer on sucralfate and ovarian cysts presented to ER with c/o diffuse abdominal pain for 2 wks duration now. Pt reports diffuse abdominal pain band like , non radiating , sharp , 10/10 , associated with nausea and vomiting. Pt does not report any fevers but does report chills. Pt had no dirrhea but has h/o constipation , reports that last bowel movement was yesterday , it was hard stools, no blood or mucus in stools. Her appetite is very poor sec to fear of eating as it exacerbates the pain .     Pt reports it all started earlier this month , pt had an ER visit on 5/4 with similar symptoms, had CT abd and us that was unremarkable.  Pt was recommended to f/u with GI as out pt for further recommendations. 38 y o F with PMH of gastric ulcer on sucralfate and ovarian cysts presented to ER with c/o diffuse abdominal pain for 2 wks duration now. Pt reports diffuse abdominal pain band like , non radiating , sharp , 10/10 , associated with nausea and vomiting. Pt does not report any fevers but does report chills. Pt had no dirrhea but has h/o constipation , reports that last bowel movement was yesterday , it was hard stools, no blood or mucus in stools. Her appetite is very poor sec to fear of eating as it exacerbates the pain .     Pt reports it all started earlier this month , pt had an ER visit on 5/4 with similar symptoms, had CT abd and us that was unremarkable.  Pt was recommended to f/u with GI as out pt for further recommendations.  Ct abd done last year showed thickened base of appendix was seen by  surgery and , was recommended to f/u with GI and return to clinic. Pt had colonoscopy in March and was found to have a polyp and biopsies were done. Biopsies did not show any evidence of inflammation.

## 2019-05-14 NOTE — ED PROVIDER NOTE - ATTENDING CONTRIBUTION TO CARE
39 yo female with PMH PUD, fibroids nausea and abdominal pain. Pt seen reported pain was worse in LLQ. Pt was seen here for similar symptoms and was d/c home with outpt follow up. Pt with negative CT AP and abd US at that time.  No urinary complaints, vaginal d/c, fevers or chills. Sx not relieved sith sucralfate. Reported she had not been able to tolerate PO for several days and could not wait until scheduled visit with Dr. Cason as sx severe. Denies any melena or hematochezia.     VITAL SIGNS: noted  CONSTITUTIONAL: Well-developed; well-nourished; in no acute distress  HEAD: Normocephalic; atraumatic  EYES: PERRL, EOM intact; conjunctiva and sclera clear  ENT: No nasal discharge; airway clear. MMM  NECK: Supple; non tender. No anterior cervical lymphadenopathy noted  CARD: S1, S2 normal; no murmurs, gallops, or rubs. Regular rate and rhythm  RESP: CTAB/L, no wheezes, rales or rhonchi  ABD: Normal bowel sounds; soft; non-distended; + diffuse tenderness, worse in LLQ; no hepatosplenomegaly. No CVA tenderness  EXT: Normal ROM. No calf tenderness or edema. Distal pulses intact  NEURO: Alert, oriented. Grossly unremarkable. No focal deficits  SKIN: Skin exam is warm and dry

## 2019-05-14 NOTE — ED ADULT NURSE NOTE - OBJECTIVE STATEMENT
Pt c/o left lower quadrant abdominal pain, nausea, and vomiting since yesterday. Denies fevers, chills, chest pain, shortness of breath, diarrhea, back pain, urinary symptoms, vaginal bleeding.

## 2019-05-15 DIAGNOSIS — Z71.89 OTHER SPECIFIED COUNSELING: ICD-10-CM

## 2019-05-15 LAB
ALBUMIN SERPL ELPH-MCNC: 3.6 G/DL — SIGNIFICANT CHANGE UP (ref 3.5–5.2)
ALP SERPL-CCNC: 66 U/L — SIGNIFICANT CHANGE UP (ref 30–115)
ALT FLD-CCNC: 16 U/L — SIGNIFICANT CHANGE UP (ref 0–41)
ANION GAP SERPL CALC-SCNC: 13 MMOL/L — SIGNIFICANT CHANGE UP (ref 7–14)
AST SERPL-CCNC: 11 U/L — SIGNIFICANT CHANGE UP (ref 0–41)
BASOPHILS # BLD AUTO: 0.02 K/UL — SIGNIFICANT CHANGE UP (ref 0–0.2)
BASOPHILS NFR BLD AUTO: 0.3 % — SIGNIFICANT CHANGE UP (ref 0–1)
BILIRUB SERPL-MCNC: 0.5 MG/DL — SIGNIFICANT CHANGE UP (ref 0.2–1.2)
BUN SERPL-MCNC: 4 MG/DL — LOW (ref 10–20)
C DIFF BY PCR RESULT: NEGATIVE — SIGNIFICANT CHANGE UP
C DIFF TOX GENS STL QL NAA+PROBE: SIGNIFICANT CHANGE UP
CALCIUM SERPL-MCNC: 9 MG/DL — SIGNIFICANT CHANGE UP (ref 8.5–10.1)
CHLORIDE SERPL-SCNC: 101 MMOL/L — SIGNIFICANT CHANGE UP (ref 98–110)
CHOLEST SERPL-MCNC: 135 MG/DL — SIGNIFICANT CHANGE UP (ref 100–200)
CO2 SERPL-SCNC: 24 MMOL/L — SIGNIFICANT CHANGE UP (ref 17–32)
CREAT SERPL-MCNC: 0.6 MG/DL — LOW (ref 0.7–1.5)
CULTURE RESULTS: SIGNIFICANT CHANGE UP
EOSINOPHIL # BLD AUTO: 0.22 K/UL — SIGNIFICANT CHANGE UP (ref 0–0.7)
EOSINOPHIL NFR BLD AUTO: 3.1 % — SIGNIFICANT CHANGE UP (ref 0–8)
GLUCOSE SERPL-MCNC: 74 MG/DL — SIGNIFICANT CHANGE UP (ref 70–99)
HCT VFR BLD CALC: 35.9 % — LOW (ref 37–47)
HDLC SERPL-MCNC: 53 MG/DL — SIGNIFICANT CHANGE UP
HGB BLD-MCNC: 11.7 G/DL — LOW (ref 12–16)
IMM GRANULOCYTES NFR BLD AUTO: 0.3 % — SIGNIFICANT CHANGE UP (ref 0.1–0.3)
LACTATE SERPL-SCNC: 0.8 MMOL/L — SIGNIFICANT CHANGE UP (ref 0.5–2.2)
LIPID PNL WITH DIRECT LDL SERPL: 68 MG/DL — SIGNIFICANT CHANGE UP (ref 4–129)
LYMPHOCYTES # BLD AUTO: 3.4 K/UL — SIGNIFICANT CHANGE UP (ref 1.2–3.4)
LYMPHOCYTES # BLD AUTO: 48.3 % — SIGNIFICANT CHANGE UP (ref 20.5–51.1)
MCHC RBC-ENTMCNC: 27 PG — SIGNIFICANT CHANGE UP (ref 27–31)
MCHC RBC-ENTMCNC: 32.6 G/DL — SIGNIFICANT CHANGE UP (ref 32–37)
MCV RBC AUTO: 82.7 FL — SIGNIFICANT CHANGE UP (ref 81–99)
MONOCYTES # BLD AUTO: 0.75 K/UL — HIGH (ref 0.1–0.6)
MONOCYTES NFR BLD AUTO: 10.7 % — HIGH (ref 1.7–9.3)
NEUTROPHILS # BLD AUTO: 2.63 K/UL — SIGNIFICANT CHANGE UP (ref 1.4–6.5)
NEUTROPHILS NFR BLD AUTO: 37.3 % — LOW (ref 42.2–75.2)
NRBC # BLD: 0 /100 WBCS — SIGNIFICANT CHANGE UP (ref 0–0)
PLATELET # BLD AUTO: 298 K/UL — SIGNIFICANT CHANGE UP (ref 130–400)
POTASSIUM SERPL-MCNC: 4.2 MMOL/L — SIGNIFICANT CHANGE UP (ref 3.5–5)
POTASSIUM SERPL-SCNC: 4.2 MMOL/L — SIGNIFICANT CHANGE UP (ref 3.5–5)
PROT SERPL-MCNC: 6 G/DL — SIGNIFICANT CHANGE UP (ref 6–8)
RBC # BLD: 4.34 M/UL — SIGNIFICANT CHANGE UP (ref 4.2–5.4)
RBC # FLD: 12.8 % — SIGNIFICANT CHANGE UP (ref 11.5–14.5)
SODIUM SERPL-SCNC: 138 MMOL/L — SIGNIFICANT CHANGE UP (ref 135–146)
SPECIMEN SOURCE: SIGNIFICANT CHANGE UP
TOTAL CHOLESTEROL/HDL RATIO MEASUREMENT: 2.5 RATIO — LOW (ref 4–5.5)
TRIGL SERPL-MCNC: 88 MG/DL — SIGNIFICANT CHANGE UP (ref 10–149)
WBC # BLD: 7.04 K/UL — SIGNIFICANT CHANGE UP (ref 4.8–10.8)
WBC # FLD AUTO: 7.04 K/UL — SIGNIFICANT CHANGE UP (ref 4.8–10.8)

## 2019-05-15 PROCEDURE — 93010 ELECTROCARDIOGRAM REPORT: CPT

## 2019-05-15 PROCEDURE — 76705 ECHO EXAM OF ABDOMEN: CPT | Mod: 26

## 2019-05-15 RX ADMIN — Medication 1 GRAM(S): at 11:15

## 2019-05-15 RX ADMIN — SODIUM CHLORIDE 150 MILLILITER(S): 9 INJECTION, SOLUTION INTRAVENOUS at 05:19

## 2019-05-15 RX ADMIN — Medication 1 GRAM(S): at 23:43

## 2019-05-15 RX ADMIN — Medication 1 GRAM(S): at 17:07

## 2019-05-15 RX ADMIN — SODIUM CHLORIDE 150 MILLILITER(S): 9 INJECTION, SOLUTION INTRAVENOUS at 13:56

## 2019-05-15 RX ADMIN — PANTOPRAZOLE SODIUM 40 MILLIGRAM(S): 20 TABLET, DELAYED RELEASE ORAL at 17:07

## 2019-05-15 RX ADMIN — ENOXAPARIN SODIUM 40 MILLIGRAM(S): 100 INJECTION SUBCUTANEOUS at 23:43

## 2019-05-15 RX ADMIN — ONDANSETRON 4 MILLIGRAM(S): 8 TABLET, FILM COATED ORAL at 06:50

## 2019-05-15 RX ADMIN — PANTOPRAZOLE SODIUM 40 MILLIGRAM(S): 20 TABLET, DELAYED RELEASE ORAL at 05:20

## 2019-05-15 RX ADMIN — Medication 1 GRAM(S): at 05:20

## 2019-05-15 NOTE — CONSULT NOTE ADULT - SUBJECTIVE AND OBJECTIVE BOX
Gastroenterology/Hepatology Consultation    38y Female with   Patient is a 38y old  Female who presents with a chief complaint of Abdominal pain , chills (14 May 2019 15:57)    HPI:  38 y o F with PMH of gastric ulcer on sucralfate and ovarian cysts presented to ER with c/o diffuse abdominal pain for 2 wks duration now. Pt reports diffuse abdominal pain band like , non radiating , sharp , 10/10 , associated with nausea and vomiting. Pt does not report any fevers but does report chills. Pt had no dirrhea but has h/o constipation , reports that last bowel movement was yesterday , it was hard stools, no blood or mucus in stools. Her appetite is very poor sec to fear of eating as it exacerbates the pain .     Pt reports it all started earlier this month , pt had an ER visit on  with similar symptoms, had CT abd and us that was unremarkable.  Pt was recommended to f/u with GI as out pt for further recommendations.  Ct abd done last year showed thickened base of appendix was seen by  surgery and , was recommended to f/u with GI and return to clinic. Pt had colonoscopy in March and was found to have a polyp and biopsies were done. Biopsies did not show any evidence of inflammation. (14 May 2019 15:57)      GI Hx:    Previous colonoscopy(ies):     Colonoscopy Report Date: 3/18/2019 11:45 AM   Attending:   Bony Cason MD   Findings:   Protruding lesions A single 5 mm polyp of benign appearance was found in the  rectum. Multiple cold forceps biopsies were performed for histology.   Additional findings Otherwise normal colon and terminal ileum. Biopsies taken of  Ascending, transverse, sigmoid colon to rule out microscopic colitis.. Multiple  cold forceps biopsies were performed ..     Impressions:    Polyp (5 mm) in the rectum. (Biopsy).    Otherwise normal colon and terminal ileum. Biopsies taken of Ascending,  transverse, sigmoid colon to rule out microscopic colitis. (Biopsy).     Plan: Follow-up visit in 2-4 weeks in my private office  Await pathology results  ---------------------------------------------------------------------------------------------  Colonoscopy Report Date: 2019 12:30 PM   Attending:   Bony Cason MD   Limitations/Complications: There were no apparent limitations or complications  Findings:   Contents Stool was seen in the anus, rectum and sigmoid colon. Procedure  terminated due to hard stool, poor visualization, cannot rule out lesions due to  hard stool throughout.     Impressions:    Stool in the anus, rectum and sigmoid colon.     Plan: Follow-up office visit in 2-3 weeks  Colonoscopy in 2 months      Previous EGD(s):  Findings:   Esophagus Excavated lesions A singlenon-bleeding 2 cm ulcer was found in the  gastroesophageal junction. The ulcer had raised edges, and extended from a ring  at the GE junction, which was also biopsied. Multiple cold forceps biopsies were  performed for histology.   Stomach Mucosa Patchy discontinuous erythema of the mucosa with no bleeding was  noted in the stomach. These findings are compatible with non-erosive gastritis.  Multiple cold forceps biopsies were performed for histology.   Duodenum Mucosa Normal mucosa was noted in the duodenal bulb and second part of  the duodenum. Multiple cold forceps biopsies were performed for histology.     Impressions:    Ulcer in the gastroesophageal junction. (Biopsy).    Erythema in the stomach compatible with non-erosive gastritis. (Biopsy).    Normal mucosa in the duodenal bulb and second part of the duodenum. (Biopsy).     Plan: Protonix 40 mg po bid  Carafate Suspension 1g po qid  Follow-up office visit in 2-3 weeks    negative for H pylori     Bony Cason MD     < end of copied text >    FH: colon cancer      Review of system  General:  (-) weight loss, (-) fevers  Eyes:  (-) visual changes  CV:  (-) chest pain  Resp: (-) SOB, (-) wheezing  GI: (-) abdominal pain,  (-) nausea, (-) vomiting, (-) dysphagia, (-) diarrhea, (-) constipation, (-) rectal bleeding, (-) melena, (-) hematemesis.  Neuro: (-) confusion, (-) weakness  Psych:  (-) Hallucinations  Heme:  (-) easy bruisability    Past medical/surgical Hx:  PAST MEDICAL & SURGICAL HISTORY:  Peptic ulcer  Fibroid, uterine  Abscess: vaginal  Fibroid: uterine surgery  Delivery with history of     Home Medications:  sucralfate 1 g oral tablet: 1 tab(s) orally 4 times a day (before meals and at bedtime)      Allergies: iodine containing compounds (Unknown)  shellfish (Anaphylaxis)      Current Medications:   aluminum hydroxide/magnesium hydroxide/simethicone Suspension 30 milliLiter(s) Oral every 4 hours PRN  chlorhexidine 4% Liquid 1 Application(s) Topical <User Schedule>  enoxaparin Injectable 40 milliGRAM(s) SubCutaneous at bedtime  lactated ringers. 1000 milliLiter(s) IV Continuous <Continuous>  morphine  - Injectable 2 milliGRAM(s) IV Push every 4 hours PRN  ondansetron Injectable 4 milliGRAM(s) IV Push three times a day PRN  pantoprazole    Tablet 40 milliGRAM(s) Oral two times a day  sucralfate 1 Gram(s) Oral four times a day      Physical exam:  T(C): 36.9 (05-15-19 @ 05:16), Max: 36.9 (19 @ 13:03)  HR: 62 (05-15-19 @ 05:16) (62 - 93)  BP: 112/67 (05-15-19 @ 05:16) (112/67 - 141/73)  RR: 18 (05-15-19 @ 05:16) (18 - 18)  SpO2: 100% (19 @ 16:01) (98% - 100%)  GENERAL: NAD  HEAD:  Atraumatic, Normocephalic  EYES: Sclera:NL  NECK: Supple, no JVD or thyromegaly  CHEST/LUNG: Good bilateral air entry  HEART: normal S1, S2. Regular  ABDOMEN: (-) distended, (-) tender, (-) rebound, (+) BS, (-)HSM  EXTREMITIES: (-) edema  NEUROLOGY: (-) asterixis  SKIN: (-) jaundice  CHEYENNE: (-) melena (-) brbpr    Data:                        11.7   7.04  )-----------( 298      ( 15 May 2019 06:25 )             35.9     MCV 82.7 (05-15-19)  82.6 (19)    RDW 12.8 (05-15-19)  13.2 (19)    HGB trend:  11.7  05-15-19 @ 06:25  12.9  19 @ 13:55            137  |  103  |  7<L>  ----------------------------<  90  4.2   |  21  |  0.5<L>    Ca    9.0      14 May 2019 13:55  Mg     2.0         TPro  6.7  /  Alb  4.1  /  TBili  0.5  /  DBili  <0.2  /  AST  14  /  ALT  19  /  AlkPhos  75      Liver panel trend:  TBili 0.5   /   AST 14   /   ALT 19   /   AlkP 75   /   Tptn 6.7   /   Alb 4.1    /   DBili <0.2          lipase:Lipase, Serum: 483 U/L (19 @ 13:55)    Lipase, Serum: 267 U/L (19 @ 05:39)      < from: CT Abdomen and Pelvis w/ IV Cont (19 @ 07:37) >  INTERPRETATION:  CLINICAL STATEMENT: Right lower quadrant pain      TECHNIQUE: Contiguous axial CT images were obtained from the lower chest   to the pubic symphysis following administration of 100cc Optiray 320   intravenous contrast.  Oral contrast was not administered.  Reformatted   images in the coronal and sagittal planes were acquired.    COMPARISON CT: CT abdomen and pelvis 2019.      FINDINGS:    LOWER CHEST: Unremarkable.    HEPATOBILIARY: No acute findings.    SPLEEN: Unremarkable.    PANCREAS: Unremarkable.    ADRENAL GLANDS: Unremarkable.    KIDNEYS: Symmetric enhancement without hydronephrosis bilaterally.    ABDOMINOPELVIC NODES: Unremarkable.    PELVIC ORGANS: Left adnexal 3 cm cyst.    PERITONEUM/MESENTERY/BOWEL: Appendix is dilated and demonstrates feces   and air without any evidence of acute appendiceal inflammation or   appendicolith. No evidence of acute bowel findings.    BONES/SOFT TISSUES: Unremarkable.      IMPRESSION:     1.  Dilated appendix without any acute inflammation, nonspecific finding.  2.  No acute bowel findings.  3.  Left ovary cyst, 3 cm.    < end of copied text >    < from: US Abdomen Limited (19 @ 11:55) >  FINDINGS:    LIVER:  Normal in contour and echogenicity measuring 14.8 cm in length.   No focal mass.    GALLBLADDER/BILIARY TREE:  No evidence of cholelithiasis. No wall   thickening or pericholecystic fluid.  Negative sonographic Vidales's sign.   No intrahepatic biliary ductal dilatation. The common bile duct measures   4 mm, which is normal.     PANCREAS:  Visualized head and body are unremarkable. Remainder obscured   by overlying bowel gas.    KIDNEY:  Right kidney measures 10.7 cm in length. No hydronephrosis,   calculi or solid mass.    AORTA/IVC:  Visualized proximal portions unremarkable.    ASCITES:  None.    IMPRESSION:    Unremarkable right upper quadrant ultrasound.    < end of copied text > Gastroenterology/Hepatology Consultation    38y Female with   Patient is a 38y old  Female who presents with a chief complaint of Abdominal pain , chills (14 May 2019 15:57)    HPI:  38 y o F with PMH of gastric ulcer on sucralfate and ovarian cysts presented to ER with c/o diffuse abdominal pain for 2 wks duration now. Pt reports diffuse abdominal pain band like , non radiating , sharp , 10/10 , associated with nausea and vomiting. Pt does not report any fevers but does report chills. Pt had no dirrhea but has h/o constipation , reports that last bowel movement was yesterday , it was hard stools, no blood or mucus in stools. Her appetite is very poor sec to fear of eating as it exacerbates the pain .     Pt reports it all started earlier this month , pt had an ER visit on  with similar symptoms, had CT abd and us that was unremarkable.  Pt was recommended to f/u with GI as out pt for further recommendations.  Ct abd done last year showed thickened base of appendix was seen by  surgery and , was recommended to f/u with GI and return to clinic. Pt had colonoscopy in March and was found to have a polyp and biopsies were done. Biopsies did not show any evidence of inflammation. (14 May 2019 15:57)      GI Hx:    Previous colonoscopy(ies):     Colonoscopy Report Date: 3/18/2019 11:45 AM   Attending:   Bony Cason MD   Findings:   Protruding lesions A single 5 mm polyp of benign appearance was found in the  rectum. Multiple cold forceps biopsies were performed for histology.   Additional findings Otherwise normal colon and terminal ileum. Biopsies taken of  Ascending, transverse, sigmoid colon to rule out microscopic colitis.. Multiple  cold forceps biopsies were performed ..     Impressions:    Polyp (5 mm) in the rectum. (Biopsy).    Otherwise normal colon and terminal ileum. Biopsies taken of Ascending,  transverse, sigmoid colon to rule out microscopic colitis. (Biopsy).     Plan: Follow-up visit in 2-4 weeks in my private office  Await pathology results  ---------------------------------------------------------------------------------------------  Colonoscopy Report Date: 2019 12:30 PM   Attending:   Bony Cason MD   Limitations/Complications: There were no apparent limitations or complications  Findings:   Contents Stool was seen in the anus, rectum and sigmoid colon. Procedure  terminated due to hard stool, poor visualization, cannot rule out lesions due to  hard stool throughout.     Impressions:    Stool in the anus, rectum and sigmoid colon.     Plan: Follow-up office visit in 2-3 weeks  Colonoscopy in 2 months      Previous EGD(s):  Findings:   Esophagus Excavated lesions A singlenon-bleeding 2 cm ulcer was found in the  gastroesophageal junction. The ulcer had raised edges, and extended from a ring  at the GE junction, which was also biopsied. Multiple cold forceps biopsies were  performed for histology.   Stomach Mucosa Patchy discontinuous erythema of the mucosa with no bleeding was  noted in the stomach. These findings are compatible with non-erosive gastritis.  Multiple cold forceps biopsies were performed for histology.   Duodenum Mucosa Normal mucosa was noted in the duodenal bulb and second part of  the duodenum. Multiple cold forceps biopsies were performed for histology.     Impressions:    Ulcer in the gastroesophageal junction. (Biopsy).    Erythema in the stomach compatible with non-erosive gastritis. (Biopsy).    Normal mucosa in the duodenal bulb and second part of the duodenum. (Biopsy).     Plan: Protonix 40 mg po bid  Carafate Suspension 1g po qid  Follow-up office visit in 2-3 weeks    negative for H pylori     Bony Cason MD     < end of copied text >    FH: colon cancer      Review of system  General:  (-) weight loss, (-) fevers  Eyes:  (-) visual changes  CV:  (-) chest pain  Resp: (-) SOB, (-) wheezing  GI: (-) abdominal pain,  (-) nausea, (-) vomiting, (-) dysphagia, (-) diarrhea, (-) constipation, (-) rectal bleeding, (-) melena, (-) hematemesis.  Neuro: (-) confusion, (-) weakness  Psych:  (-) Hallucinations  Heme:  (-) easy bruisability    Past medical/surgical Hx:  PAST MEDICAL & SURGICAL HISTORY:  Peptic ulcer  Fibroid, uterine  Abscess: vaginal  Fibroid: uterine surgery  Delivery with history of     Home Medications:  sucralfate 1 g oral tablet: 1 tab(s) orally 4 times a day (before meals and at bedtime)      Allergies: iodine containing compounds (Unknown)  shellfish (Anaphylaxis)      Current Medications:   aluminum hydroxide/magnesium hydroxide/simethicone Suspension 30 milliLiter(s) Oral every 4 hours PRN  chlorhexidine 4% Liquid 1 Application(s) Topical <User Schedule>  enoxaparin Injectable 40 milliGRAM(s) SubCutaneous at bedtime  lactated ringers. 1000 milliLiter(s) IV Continuous <Continuous>  morphine  - Injectable 2 milliGRAM(s) IV Push every 4 hours PRN  ondansetron Injectable 4 milliGRAM(s) IV Push three times a day PRN  pantoprazole    Tablet 40 milliGRAM(s) Oral two times a day  sucralfate 1 Gram(s) Oral four times a day      Physical exam:  T(C): 36.9 (05-15-19 @ 05:16), Max: 36.9 (19 @ 13:03)  HR: 62 (05-15-19 @ 05:16) (62 - 93)  BP: 112/67 (05-15-19 @ 05:16) (112/67 - 141/73)  RR: 18 (05-15-19 @ 05:16) (18 - 18)  SpO2: 100% (19 @ 16:01) (98% - 100%)  GENERAL: NAD  HEAD:  Atraumatic, Normocephalic  EYES: Sclera:NL  NECK: Supple, no JVD or thyromegaly  CHEST/LUNG: Good bilateral air entry  HEART: normal S1, S2. Regular  ABDOMEN: (-) distended, (-) tender, (-) rebound, (+) BS, (-)HSM  EXTREMITIES: (-) edema  NEUROLOGY: (-) asterixis  SKIN: (-) jaundice  CHEYENNE: (-) melena (-) brbpr    Data:                        11.7   7.04  )-----------( 298      ( 15 May 2019 06:25 )             35.9     MCV 82.7 (05-15-19)  82.6 (19)    RDW 12.8 (05-15-19)  13.2 (19)    HGB trend:  11.7  05-15-19 @ 06:25  12.9  19 @ 13:55            137  |  103  |  7<L>  ----------------------------<  90  4.2   |  21  |  0.5<L>    Ca    9.0      14 May 2019 13:55  Mg     2.0         TPro  6.7  /  Alb  4.1  /  TBili  0.5  /  DBili  <0.2  /  AST  14  /  ALT  19  /  AlkPhos  75      Liver panel trend:  TBili 0.5   /   AST 14   /   ALT 19   /   AlkP 75   /   Tptn 6.7   /   Alb 4.1    /   DBili <0.2          lipase:Lipase, Serum: 483 U/L (19 @ 13:55)    Lipase, Serum: 267 U/L (19 @ 05:39)      < from: CT Abdomen and Pelvis w/ IV Cont (19 @ 07:37) >  INTERPRETATION:  CLINICAL STATEMENT: Right lower quadrant pain      TECHNIQUE: Contiguous axial CT images were obtained from the lower chest   to the pubic symphysis following administration of 100cc Optiray 320   intravenous contrast.  Oral contrast was not administered.  Reformatted   images in the coronal and sagittal planes were acquired.    COMPARISON CT: CT abdomen and pelvis 2019.      FINDINGS:    LOWER CHEST: Unremarkable.    HEPATOBILIARY: No acute findings.    SPLEEN: Unremarkable.    PANCREAS: Unremarkable.    ADRENAL GLANDS: Unremarkable.    KIDNEYS: Symmetric enhancement without hydronephrosis bilaterally.    ABDOMINOPELVIC NODES: Unremarkable.    PELVIC ORGANS: Left adnexal 3 cm cyst.    PERITONEUM/MESENTERY/BOWEL: Appendix is dilated and demonstrates feces   and air without any evidence of acute appendiceal inflammation or   appendicolith. No evidence of acute bowel findings.    BONES/SOFT TISSUES: Unremarkable.      IMPRESSION:     1.  Dilated appendix without any acute inflammation, nonspecific finding.  2.  No acute bowel findings.  3.  Left ovary cyst, 3 cm.    < end of copied text >    < from: US Abdomen Limited (19 @ 11:55) >  FINDINGS:    LIVER:  Normal in contour and echogenicity measuring 14.8 cm in length.   No focal mass.    GALLBLADDER/BILIARY TREE:  No evidence of cholelithiasis. No wall   thickening or pericholecystic fluid.  Negative sonographic Vidales's sign.   No intrahepatic biliary ductal dilatation. The common bile duct measures   4 mm, which is normal.     PANCREAS:  Visualized head and body are unremarkable. Remainder obscured   by overlying bowel gas.    KIDNEY:  Right kidney measures 10.7 cm in length. No hydronephrosis,   calculi or solid mass.    AORTA/IVC:  Visualized proximal portions unremarkable.    ASCITES:  None.    IMPRESSION:    Unremarkable right upper quadrant ultrasound.    < end of copied text >        Triglycerides, Serum: 88 mg/dL (05.15.19 @ 06:25) Gastroenterology/Hepatology Consultation    38y Female with pancreatitis  Patient is a 38y old  Female who presents with a chief complaint of Abdominal pain , chills (14 May 2019 15:57)    HPI:  38 y o F with PMH of gastric ulcer on sucralfate and ovarian cysts presented to ER with c/o diffuse abdominal pain for 2 wks duration now. Pt reports diffuse abdominal pain band like , non radiating , sharp , 10/10 , associated with nausea and vomiting. Pt does not report any fevers but does report chills. Pt had no dirrhea but has h/o constipation , reports that last bowel movement was yesterday , it was hard stools, no blood or mucus in stools. Her appetite is very poor sec to fear of eating as it exacerbates the pain .     Pt reports it all started earlier this month , pt had an ER visit on  with similar symptoms, had CT abd and us that was unremarkable.  Pt was recommended to f/u with GI as out pt for further recommendations.  Ct abd done last year showed thickened base of appendix was seen by  surgery and , was recommended to f/u with GI and return to clinic. Pt had colonoscopy in March and was found to have a polyp and biopsies were done. Biopsies did not show any evidence of inflammation. (14 May 2019 15:57)      GI Hx:  39 yo F Hx of esophageal ulcer    Previous colonoscopy(ies):     Colonoscopy Report Date: 3/18/2019 11:45 AM   Attending:   Bony Cason MD   Findings:   Protruding lesions A single 5 mm polyp of benign appearance was found in the  rectum. Multiple cold forceps biopsies were performed for histology.   Additional findings Otherwise normal colon and terminal ileum. Biopsies taken of  Ascending, transverse, sigmoid colon to rule out microscopic colitis.. Multiple  cold forceps biopsies were performed ..     Impressions:    Polyp (5 mm) in the rectum. (Biopsy).    Otherwise normal colon and terminal ileum. Biopsies taken of Ascending,  transverse, sigmoid colon to rule out microscopic colitis. (Biopsy).     Plan: Follow-up visit in 2-4 weeks in my private office  Await pathology results  ---------------------------------------------------------------------------------------------  Colonoscopy Report Date: 2019 12:30 PM   Attending:   Bony Cason MD   Limitations/Complications: There were no apparent limitations or complications  Findings:   Contents Stool was seen in the anus, rectum and sigmoid colon. Procedure  terminated due to hard stool, poor visualization, cannot rule out lesions due to  hard stool throughout.     Impressions:    Stool in the anus, rectum and sigmoid colon.     Plan: Follow-up office visit in 2-3 weeks  Colonoscopy in 2 months      Previous EGD(s):  Findings:   Esophagus Excavated lesions A singlenon-bleeding 2 cm ulcer was found in the  gastroesophageal junction. The ulcer had raised edges, and extended from a ring  at the GE junction, which was also biopsied. Multiple cold forceps biopsies were  performed for histology.   Stomach Mucosa Patchy discontinuous erythema of the mucosa with no bleeding was  noted in the stomach. These findings are compatible with non-erosive gastritis.  Multiple cold forceps biopsies were performed for histology.   Duodenum Mucosa Normal mucosa was noted in the duodenal bulb and second part of  the duodenum. Multiple cold forceps biopsies were performed for histology.     Impressions:    Ulcer in the gastroesophageal junction. (Biopsy).    Erythema in the stomach compatible with non-erosive gastritis. (Biopsy).    Normal mucosa in the duodenal bulb and second part of the duodenum. (Biopsy).     Plan: Protonix 40 mg po bid  Carafate Suspension 1g po qid  Follow-up office visit in 2-3 weeks    negative for H pylori     Bony Cason MD     < end of copied text >    FH: colon cancer      Review of system  General:  (-) weight loss, (-) fevers  Eyes:  (-) visual changes  CV:  (-) chest pain  Resp: (-) SOB, (-) wheezing  GI: (-) abdominal pain,  (-) nausea, (-) vomiting, (-) dysphagia, (-) diarrhea, (-) constipation, (-) rectal bleeding, (-) melena, (-) hematemesis.  Neuro: (-) confusion, (-) weakness  Psych:  (-) Hallucinations  Heme:  (-) easy bruisability    Past medical/surgical Hx:  PAST MEDICAL & SURGICAL HISTORY:  Peptic ulcer  Fibroid, uterine  Abscess: vaginal  Fibroid: uterine surgery  Delivery with history of     Home Medications:  sucralfate 1 g oral tablet: 1 tab(s) orally 4 times a day (before meals and at bedtime)      Allergies: iodine containing compounds (Unknown)  shellfish (Anaphylaxis)      Current Medications:   aluminum hydroxide/magnesium hydroxide/simethicone Suspension 30 milliLiter(s) Oral every 4 hours PRN  chlorhexidine 4% Liquid 1 Application(s) Topical <User Schedule>  enoxaparin Injectable 40 milliGRAM(s) SubCutaneous at bedtime  lactated ringers. 1000 milliLiter(s) IV Continuous <Continuous>  morphine  - Injectable 2 milliGRAM(s) IV Push every 4 hours PRN  ondansetron Injectable 4 milliGRAM(s) IV Push three times a day PRN  pantoprazole    Tablet 40 milliGRAM(s) Oral two times a day  sucralfate 1 Gram(s) Oral four times a day      Physical exam:  T(C): 36.9 (05-15-19 @ 05:16), Max: 36.9 (19 @ 13:03)  HR: 62 (05-15-19 @ 05:16) (62 - 93)  BP: 112/67 (05-15-19 @ 05:16) (112/67 - 141/73)  RR: 18 (05-15-19 @ 05:16) (18 - 18)  SpO2: 100% (19 @ 16:01) (98% - 100%)  GENERAL: NAD  HEAD:  Atraumatic, Normocephalic  EYES: Sclera:NL  NECK: Supple, no JVD or thyromegaly  CHEST/LUNG: Good bilateral air entry  HEART: normal S1, S2. Regular  ABDOMEN: (-) distended, (-) tender, (-) rebound, (+) BS, (-)HSM  EXTREMITIES: (-) edema  NEUROLOGY: (-) asterixis  SKIN: (-) jaundice  CHEYENNE: (-) melena (-) brbpr    Data:                        11.7   7.04  )-----------( 298      ( 15 May 2019 06:25 )             35.9     MCV 82.7 (05-15-19)  82.6 (19)    RDW 12.8 (05-15-19)  13.2 (19)    HGB trend:  11.7  05-15-19 @ 06:25  12.9  19 @ 13:55            137  |  103  |  7<L>  ----------------------------<  90  4.2   |  21  |  0.5<L>    Ca    9.0      14 May 2019 13:55  Mg     2.0         TPro  6.7  /  Alb  4.1  /  TBili  0.5  /  DBili  <0.2  /  AST  14  /  ALT  19  /  AlkPhos  75      Liver panel trend:  TBili 0.5   /   AST 14   /   ALT 19   /   AlkP 75   /   Tptn 6.7   /   Alb 4.1    /   DBili <0.2          lipase:Lipase, Serum: 483 U/L (19 @ 13:55)    Lipase, Serum: 267 U/L (19 @ 05:39)      < from: CT Abdomen and Pelvis w/ IV Cont (19 @ 07:37) >  INTERPRETATION:  CLINICAL STATEMENT: Right lower quadrant pain      TECHNIQUE: Contiguous axial CT images were obtained from the lower chest   to the pubic symphysis following administration of 100cc Optiray 320   intravenous contrast.  Oral contrast was not administered.  Reformatted   images in the coronal and sagittal planes were acquired.    COMPARISON CT: CT abdomen and pelvis 2019.      FINDINGS:    LOWER CHEST: Unremarkable.    HEPATOBILIARY: No acute findings.    SPLEEN: Unremarkable.    PANCREAS: Unremarkable.    ADRENAL GLANDS: Unremarkable.    KIDNEYS: Symmetric enhancement without hydronephrosis bilaterally.    ABDOMINOPELVIC NODES: Unremarkable.    PELVIC ORGANS: Left adnexal 3 cm cyst.    PERITONEUM/MESENTERY/BOWEL: Appendix is dilated and demonstrates feces   and air without any evidence of acute appendiceal inflammation or   appendicolith. No evidence of acute bowel findings.    BONES/SOFT TISSUES: Unremarkable.      IMPRESSION:     1.  Dilated appendix without any acute inflammation, nonspecific finding.  2.  No acute bowel findings.  3.  Left ovary cyst, 3 cm.    < end of copied text >    < from: US Abdomen Limited (19 @ 11:55) >  FINDINGS:    LIVER:  Normal in contour and echogenicity measuring 14.8 cm in length.   No focal mass.    GALLBLADDER/BILIARY TREE:  No evidence of cholelithiasis. No wall   thickening or pericholecystic fluid.  Negative sonographic Vidales's sign.   No intrahepatic biliary ductal dilatation. The common bile duct measures   4 mm, which is normal.     PANCREAS:  Visualized head and body are unremarkable. Remainder obscured   by overlying bowel gas.    KIDNEY:  Right kidney measures 10.7 cm in length. No hydronephrosis,   calculi or solid mass.    AORTA/IVC:  Visualized proximal portions unremarkable.    ASCITES:  None.    IMPRESSION:    Unremarkable right upper quadrant ultrasound.    < end of copied text >        Triglycerides, Serum: 88 mg/dL (05.15.19 @ 06:25) Gastroenterology/Hepatology Consultation    38y Female with pancreatitis  Patient is a 38y old  Female who presents with a chief complaint of Abdominal pain , chills (14 May 2019 15:57)    HPI:  38 y o F with PMH of gastric ulcer on sucralfate and ovarian cysts presented to ER with c/o diffuse abdominal pain for 2 wks duration now. Pt reports diffuse abdominal pain band like , non radiating , sharp , 10/10 , associated with nausea and vomiting. Pt does not report any fevers but does report chills. Pt had no dirrhea but has h/o constipation , reports that last bowel movement was yesterday , it was hard stools, no blood or mucus in stools. Her appetite is very poor sec to fear of eating as it exacerbates the pain .     Pt reports it all started earlier this month , pt had an ER visit on  with similar symptoms, had CT abd and us that was unremarkable.  Pt was recommended to f/u with GI as out pt for further recommendations.  Ct abd done last year showed thickened base of appendix was seen by  surgery and , was recommended to f/u with GI and return to clinic. Pt had colonoscopy in March and was found to have a polyp and biopsies were done. Biopsies did not show any evidence of inflammation. (14 May 2019 15:57)      GI Hx:  39 yo AA F Hx of esophageal ulcer, obesity who ha been complaining of LUQ pain that radiates to the back, wakes her up from sleep deep stabbing in nature exacerbated with food associated with nausea and NBNB vomiting and ?2lbs weight loss since December, associated with diarrhea. She had an EGD and 2 colonoscopies January and was being worked up for Gb dyskinesia?.  She presented to the ED a few days PTP and was discharged. at the time her lipase was elevated but a CT did not show evidence suggestive of pancreatitis.    Previous colonoscopy(ies):     Colonoscopy Report Date: 3/18/2019 11:45 AM   Attending:   Bony Cason MD   Findings:   Protruding lesions A single 5 mm polyp of benign appearance was found in the  rectum. Multiple cold forceps biopsies were performed for histology.   Additional findings Otherwise normal colon and terminal ileum. Biopsies taken of  Ascending, transverse, sigmoid colon to rule out microscopic colitis.. Multiple  cold forceps biopsies were performed ..     Impressions:    Polyp (5 mm) in the rectum. (Biopsy).    Otherwise normal colon and terminal ileum. Biopsies taken of Ascending,  transverse, sigmoid colon to rule out microscopic colitis. (Biopsy).     Plan: Follow-up visit in 2-4 weeks in my private office  Await pathology results  ---------------------------------------------------------------------------------------------  Colonoscopy Report Date: 2019 12:30 PM   Attending:   Bony Cason MD   Limitations/Complications: There were no apparent limitations or complications  Findings:   Contents Stool was seen in the anus, rectum and sigmoid colon. Procedure  terminated due to hard stool, poor visualization, cannot rule out lesions due to  hard stool throughout.     Impressions:    Stool in the anus, rectum and sigmoid colon.     Plan: Follow-up office visit in 2-3 weeks  Colonoscopy in 2 months      Previous EGD(s):  Findings:   Esophagus Excavated lesions A singlenon-bleeding 2 cm ulcer was found in the  gastroesophageal junction. The ulcer had raised edges, and extended from a ring  at the GE junction, which was also biopsied. Multiple cold forceps biopsies were  performed for histology.   Stomach Mucosa Patchy discontinuous erythema of the mucosa with no bleeding was  noted in the stomach. These findings are compatible with non-erosive gastritis.  Multiple cold forceps biopsies were performed for histology.   Duodenum Mucosa Normal mucosa was noted in the duodenal bulb and second part of  the duodenum. Multiple cold forceps biopsies were performed for histology.     Impressions:    Ulcer in the gastroesophageal junction. (Biopsy).    Erythema in the stomach compatible with non-erosive gastritis. (Biopsy).    Normal mucosa in the duodenal bulb and second part of the duodenum. (Biopsy).     Plan: Protonix 40 mg po bid  Carafate Suspension 1g po qid  Follow-up office visit in 2-3 weeks    negative for H pylori     Bony Cason MD     < end of copied text >    FH: colon cancer      Review of system  General:  (-) weight loss, (-) fevers  Eyes:  (-) visual changes  CV:  (-) chest pain  Resp: (-) SOB, (-) wheezing  GI: (-) abdominal pain,  (-) nausea, (-) vomiting, (-) dysphagia, (-) diarrhea, (-) constipation, (-) rectal bleeding, (-) melena, (-) hematemesis.  Neuro: (-) confusion, (-) weakness  Psych:  (-) Hallucinations  Heme:  (-) easy bruisability    Past medical/surgical Hx:  PAST MEDICAL & SURGICAL HISTORY:  Peptic ulcer  Fibroid, uterine  Abscess: vaginal  Fibroid: uterine surgery  Delivery with history of     Home Medications:  sucralfate 1 g oral tablet: 1 tab(s) orally 4 times a day (before meals and at bedtime)      Allergies: iodine containing compounds (Unknown)  shellfish (Anaphylaxis)      Current Medications:   aluminum hydroxide/magnesium hydroxide/simethicone Suspension 30 milliLiter(s) Oral every 4 hours PRN  chlorhexidine 4% Liquid 1 Application(s) Topical <User Schedule>  enoxaparin Injectable 40 milliGRAM(s) SubCutaneous at bedtime  lactated ringers. 1000 milliLiter(s) IV Continuous <Continuous>  morphine  - Injectable 2 milliGRAM(s) IV Push every 4 hours PRN  ondansetron Injectable 4 milliGRAM(s) IV Push three times a day PRN  pantoprazole    Tablet 40 milliGRAM(s) Oral two times a day  sucralfate 1 Gram(s) Oral four times a day      Physical exam:  T(C): 36.9 (05-15-19 @ 05:16), Max: 36.9 (19 @ 13:03)  HR: 62 (05-15-19 @ 05:16) (62 - 93)  BP: 112/67 (05-15-19 @ 05:16) (112/67 - 141/73)  RR: 18 (05-15-19 @ 05:16) (18 - 18)  SpO2: 100% (19 @ 16:01) (98% - 100%)  GENERAL: NAD  HEAD:  Atraumatic, Normocephalic  EYES: Sclera:NL  NECK: Supple, no JVD or thyromegaly  CHEST/LUNG: Good bilateral air entry  HEART: normal S1, S2. Regular  ABDOMEN: (-) distended, (-) tender, (-) rebound, (+) BS, (-)HSM  EXTREMITIES: (-) edema  NEUROLOGY: (-) asterixis  SKIN: (-) jaundice  CHEYENNE: (-) melena (-) brbpr    Data:                        11.7   7.04  )-----------( 298      ( 15 May 2019 06:25 )             35.9     MCV 82.7 (05-15-19)  82.6 (19)    RDW 12.8 (05-15-19)  13.2 (19)    HGB trend:  11.7  05-15-19 @ 06:25  12.9  19 @ 13:55            137  |  103  |  7<L>  ----------------------------<  90  4.2   |  21  |  0.5<L>    Ca    9.0      14 May 2019 13:55  Mg     2.0         TPro  6.7  /  Alb  4.1  /  TBili  0.5  /  DBili  <0.2  /  AST  14  /  ALT  19  /  AlkPhos  75      Liver panel trend:  TBili 0.5   /   AST 14   /   ALT 19   /   AlkP 75   /   Tptn 6.7   /   Alb 4.1    /   DBili <0.2          lipase:Lipase, Serum: 483 U/L (19 @ 13:55)    Lipase, Serum: 267 U/L (19 @ 05:39)      < from: CT Abdomen and Pelvis w/ IV Cont (19 @ 07:37) >  INTERPRETATION:  CLINICAL STATEMENT: Right lower quadrant pain      TECHNIQUE: Contiguous axial CT images were obtained from the lower chest   to the pubic symphysis following administration of 100cc Optiray 320   intravenous contrast.  Oral contrast was not administered.  Reformatted   images in the coronal and sagittal planes were acquired.    COMPARISON CT: CT abdomen and pelvis 2019.      FINDINGS:    LOWER CHEST: Unremarkable.    HEPATOBILIARY: No acute findings.    SPLEEN: Unremarkable.    PANCREAS: Unremarkable.    ADRENAL GLANDS: Unremarkable.    KIDNEYS: Symmetric enhancement without hydronephrosis bilaterally.    ABDOMINOPELVIC NODES: Unremarkable.    PELVIC ORGANS: Left adnexal 3 cm cyst.    PERITONEUM/MESENTERY/BOWEL: Appendix is dilated and demonstrates feces   and air without any evidence of acute appendiceal inflammation or   appendicolith. No evidence of acute bowel findings.    BONES/SOFT TISSUES: Unremarkable.      IMPRESSION:     1.  Dilated appendix without any acute inflammation, nonspecific finding.  2.  No acute bowel findings.  3.  Left ovary cyst, 3 cm.    < end of copied text >    < from: US Abdomen Limited (19 @ 11:55) >  FINDINGS:    LIVER:  Normal in contour and echogenicity measuring 14.8 cm in length.   No focal mass.    GALLBLADDER/BILIARY TREE:  No evidence of cholelithiasis. No wall   thickening or pericholecystic fluid.  Negative sonographic Vidales's sign.   No intrahepatic biliary ductal dilatation. The common bile duct measures   4 mm, which is normal.     PANCREAS:  Visualized head and body are unremarkable. Remainder obscured   by overlying bowel gas.    KIDNEY:  Right kidney measures 10.7 cm in length. No hydronephrosis,   calculi or solid mass.    AORTA/IVC:  Visualized proximal portions unremarkable.    ASCITES:  None.    IMPRESSION:    Unremarkable right upper quadrant ultrasound.    < end of copied text >        Triglycerides, Serum: 88 mg/dL (05.15.19 @ 06:25)

## 2019-05-15 NOTE — PROGRESS NOTE ADULT - ASSESSMENT
ADMISSION SUMMARY  38 y o F with PMH of gastric ulcer on sucralfate and ovarian cysts presented to ER with c/o diffuse abdominal pain for 2 wks duration now. Pt reports diffuse abdominal pain band like, non radiating , sharp, 10/10, associated with nausea and vomiting. Pt does not report any fevers but does report chills.     ASSESSMENT & PLAN    # ABDOMINAL PAIN / NAUSEA VOMITING secondary to acute pancreatitis vs viral gastroenteritis   - US abdomen previously done without CBD dilation or cholelithiasis  - Diarrhea overnight - C.diff neg, GI PCR panel sent  - GI eval appreciated - likely acute pancreatitis, lipase 483, c/w IVF @ 200cc/hr, NPO, repeat RUQ sono (obese body habitus)  - CT abdomen/pelvis w/ IV contrast - repeat to evaluate for pancreatitis/colitis  - Pain control and Zofran for nausea    # HEMORRHAGIC OVARIAN CYST  - Transvaginal us showed large hemorrhagic ovarian cyst without torsion  - Gyn eval appreciated - repeat sono in 6-8 weeks, outpatient GYN f/u    # GI ppx  - Diet: NPO   - Protonix and Carafate    # DVT  - Lovenox    # Disposition  - Acute, pending CT  - Code status: FULL CODE

## 2019-05-15 NOTE — PROGRESS NOTE ADULT - SUBJECTIVE AND OBJECTIVE BOX
MU OLVERA 38y Female  MRN#: [id number]     SUBJECTIVE  Patient is a 38y old Female who presents with a chief complaint of Abdominal pain , chills (15 May 2019 10:06)  Currently admitted to medicine with the primary diagnosis of Pancreatitis     Today is hospital day 1d, and this morning she is feeling abdominal pain mostly LLQ, radiating to her RLQ, associated with nausea/vomiting (nonbloody, nonbilious) worsening with meals, chills. No fevers, chest pain, SOB. Last menstrual period first week of the month. Pain feels different from her usual ovarian cyst pain.    OBJECTIVE  PAST MEDICAL & SURGICAL HISTORY  Peptic ulcer  Fibroid, uterine  Abscess: vaginal  Fibroid: uterine surgery  Delivery with history of     ALLERGIES:  iodine containing compounds (Unknown)  shellfish (Anaphylaxis)    MEDICATIONS:  STANDING MEDICATIONS  chlorhexidine 4% Liquid 1 Application(s) Topical <User Schedule>  enoxaparin Injectable 40 milliGRAM(s) SubCutaneous at bedtime  lactated ringers. 1000 milliLiter(s) IV Continuous <Continuous>  pantoprazole    Tablet 40 milliGRAM(s) Oral two times a day  sucralfate 1 Gram(s) Oral four times a day    PRN MEDICATIONS  aluminum hydroxide/magnesium hydroxide/simethicone Suspension 30 milliLiter(s) Oral every 4 hours PRN  morphine  - Injectable 2 milliGRAM(s) IV Push every 4 hours PRN  ondansetron Injectable 4 milliGRAM(s) IV Push three times a day PRN      VITAL SIGNS: Last 24 Hours  T(C): 36.5 (15 May 2019 13:52), Max: 36.9 (15 May 2019 05:16)  T(F): 97.7 (15 May 2019 13:52), Max: 98.4 (15 May 2019 05:16)  HR: 69 (15 May 2019 13:52) (62 - 69)  BP: 109/66 (15 May 2019 13:52) (109/66 - 129/71)  BP(mean): --  RR: 18 (15 May 2019 13:52) (18 - 18)  SpO2: --    LABS:                        11.7   7.04  )-----------( 298      ( 15 May 2019 06:25 )             35.9     05-15    138  |  101  |  4<L>  ----------------------------<  74  4.2   |  24  |  0.6<L>    Ca    9.0      15 May 2019 06:25  Mg     2.0         TPro  6.0  /  Alb  3.6  /  TBili  0.5  /  DBili  x   /  AST  11  /  ALT  16  /  AlkPhos  66  -15      Urinalysis Basic - ( 14 May 2019 16:05 )    Color: Yellow / Appearance: Cloudy / SG: >=1.030 / pH: x  Gluc: x / Ketone: >=80  / Bili: Negative / Urobili: 0.2   Blood: x / Protein: Trace / Nitrite: Negative   Leuk Esterase: Negative / RBC: x / WBC x   Sq Epi: x / Non Sq Epi: Few /HPF / Bacteria: x        Lactate, Blood: 0.8 mmol/L (05-15-19 @ 06:25)          RADIOLOGY:      PHYSICAL EXAM:  GENERAL: NAD, obese, AAOx3  HEENT: Atraumatic, Normocephalic. EOMI   PULMONARY: Clear to auscultation bilaterally; decreased breath sounds at bases   CARDIOVASCULAR: Regular rate and rhythm; No murmurs   GASTROINTESTINAL: Soft, TTP LLQ, no rebound, no guarding, obese abdomen; Bowel sounds present  EXTREMITIES: 2+ Peripheral Pulses, No clubbing, cyanosis, or edema  NEUROLOGY: non-focal

## 2019-05-15 NOTE — CONSULT NOTE ADULT - ASSESSMENT
39yo P1 with LMP 5/1, with abdominal pain secondary to peptic ulcer disease vs pancreatitis less likely due to hemorrhagic cyst, clinically and hemodynamically stable, no acute GYN intervention necessary at this time    - 39yo P1 with LMP 5/1, with abdominal pain secondary to idiopathic acute pancreatitis  unlikely to be due to hemorrhagic cyst, clinically and hemodynamically stable, no signs of torsion, no acute GYN intervention necessary at this time      -Management per medicine team  -Follow up with GYN outpatient  -Repeat sonogram outpatient 4-6weeks  -Torsion precautions given    Dr. Rivers aware 37yo P1 with LMP 5/1, with abdominal pain secondary to idiopathic acute pancreatitis  unlikely to be due to hemorrhagic cyst, clinically and hemodynamically stable, no signs of torsion, no acute GYN intervention necessary at this time      -Management per medicine team  -Follow up with GYN outpatient  -Repeat sonogram outpatient 6-8weeks  -Torsion precautions given    Discussed with Dr. Bright and Dr. melendez

## 2019-05-15 NOTE — CONSULT NOTE ADULT - SUBJECTIVE AND OBJECTIVE BOX
Chief Complaint: abdominal pain    HPI: 39yo P1 with LMP 19 presents to the ED for abdominal pain and recorded fever 101F at home, gyn consulted for left hemorrhagic cyst finding on sonogram. Patient reports abdominal pain that started 2 months ago, intermittent in the left periumbilical area, intensity increased to 10/10 with radiation to general abdomen for the past few days, so patient presented to the ED. Reports pain is aggravated by movement, and eating, alleviated by sitting still; Sharp in description. Associated with nausea and intermittent vomiting, daily, around 1-2 times per day, which have lead to decreased appetite and PO intake. Vomitus is usually containing food particles and is billious, non-bloody. Reports isolated fever of 101 on , but has no recorded fevers since presentation. Denies vaginal bleeding, vaginal discharge, dysuria, increased urinary frequency, changes in bowel habits, chest pain, SOB, dizziness, headache.   Patient has history of ovarian cysts in the past and reports this episode of pain is different in nature. Patient was previously on OCPs self-stopped 3 weeks ago due to nausea.  Patient has hx of repeated presentations with abdominal pain, and is being followed by GI for peptic ulcer disease. Colonoscopy done 3/19 showed polyps that were resected and pathology is benign.       Ob/Gyn History:     sectionx1, full term, for arrest of dilation and non-reassuring fetal heart tracing, otherwise uncomplicated  LMP  irregular cycles, varying in length, last to last menstrual period was in February.     History of right ovarian cyst on past sonogram in  and history of fibroid 2cm in 2018  Denies abnormal pap smears or STIs                  Last Pap Smear -   Last Colonoscopy 3/19    Denies the following: constitutional symptoms, visual symptoms, cardiovascular symptoms, respiratory symptoms, GI symptoms, musculoskeletal symptoms, skin symptoms, neurologic symptoms, hematologic symptoms, allergic symptoms, psychiatric symptoms  Except any pertinent positives listed.     Home Medications:  sucralfate 1 g oral tablet: 1 tab(s) orally 4 times a day (before meals and at bedtime) (14 May 2019 16:05)      Allergies  iodine containing compounds (Unknown)  shellfish (Anaphylaxis)    PAST MEDICAL & SURGICAL HISTORY:  Peptic ulcer  Fibroid, uterine  Abscess: vaginal  Fibroid: uterine surgery  Delivery with history of       FAMILY HISTORY:  FH: colon cancer in great grandmother      SOCIAL HISTORY: Social alcohol use. Denies cigarette use, or illicit drug use    Vital Signs Last 24 Hrs  T(F): 98.4 (15 May 2019 05:16), Max: 98.4 (14 May 2019 13:03)  HR: 62 (15 May 2019 05:16) (62 - 93)  BP: 112/67 (15 May 2019 05:16) (112/67 - 141/73)  RR: 18 (15 May 2019 05:16) (18 - 18)    General Appearance - AAOx3, NAD  Abdomen - Soft, mildly tender on palpation in the periumbilical region, nondistended, no rebound, no rigidity, voluntary guarding noted.    GYN/Pelvis:  External genitalia appears normal  Speculum: cervix closed, no blood, no lesions seen.  Bimanual exam: cervix closed. No CMT, uterus 7week, anteverted uterus, non tender, no adnexal masses or tenderness noted      LABS:                        11.7   7.04  )-----------( 298      ( 15 May 2019 06:25 )             35.9         05-15    138  |  101  |  4<L>  ----------------------------<  74  4.2   |  24  |  0.6<L>    Ca    9.0      15 May 2019 06:25  Mg     2.0     05-14    TPro  6.0  /  Alb  3.6  /  TBili  0.5  /  DBili  x   /  AST  11  /  ALT  16  /  AlkPhos  66  05-15      Urinalysis Basic - ( 14 May 2019 16:05 )    Color: Yellow / Appearance: Cloudy / SG: >=1.030 / pH: x  Gluc: x / Ketone: >=80  / Bili: Negative / Urobili: 0.2   Blood: x / Protein: Trace / Nitrite: Negative   Leuk Esterase: Negative / RBC: x / WBC x   Sq Epi: x / Non Sq Epi: Few /HPF / Bacteria: x          RADIOLOGY & ADDITIONAL STUDIES:    EXAM:  US TRANSVAGINAL        PROCEDURE DATE:  2019    INTERPRETATION:  CLINICAL HISTORY: Left lower quadrant pain.  COMPARISON: CT scan of the abdomen dated May 4, 2019. Pelvic ultrasound   dated 2018.  PROCEDURE: Transabdominal and transvaginal ultrasound of the pelvis was   performed, including Doppler.    LMP: May 2, 2019  FINDINGS:  UTERUS: The uterus measures 10.5 cm x 6.2 cm x 4 cm. It contains a 2.8 cm   fibroid, without change. The endometrial echo measures 6 mm which is   normal.   RIGHT OVARY: The right ovary measures 2.5 cm x 2 cm x 2.2 cm and is   normal.   LEFT OVARY: Left ovary measures 6 cm x 6 cm x 5 cm and contains a 5 cm   hemorrhagic cyst. There is no torsion.   OTHER: No freefluid in the pelvis.    IMPRESSION:  Fibroid uterus without change.  Left ovarian hemorrhagic cyst without torsion. Chief Complaint: abdominal pain    HPI: 39yo P1 with LMP 19 presents to the ED on   for abdominal pain and recorded fever 101F at home, admitted to medicine for work up for pancreatitis vs peptic ulcer disease, gyn consulted for left hemorrhagic cyst finding on sonogram. Patient reports abdominal pain that started 2 months ago, intermittent in the periumbilical area, intensity increased to 10/10 with radiation to general abdomen for the past few days, so patient presented to the ED. Reports pain is aggravated by movement, and eating, alleviated by sitting still; Sharp in description. Associated with nausea and intermittent vomiting, daily, around 1-2 times per day, which have lead to decreased appetite and PO intake. Vomitus is usually containing food particles and is billious, non-bloody. Reports isolated fever of 101 on , but has no recorded fevers since presentation. Denies vaginal bleeding, vaginal discharge, dysuria, increased urinary frequency, changes in bowel habits, chest pain, SOB, dizziness, headache.   Patient has history of ovarian cysts in the past and reports this episode of pain is different in nature. Patient was previously on OCPs self-stopped 3 weeks ago due to nausea.  Patient has hx of repeated presentations with abdominal pain, and is being followed by GI for peptic ulcer disease. Colonoscopy done 3/19 showed polyps that were resected and pathology is benign.       Ob/Gyn History:     sectionx1, full term, for arrest of dilation and non-reassuring fetal heart tracing, otherwise uncomplicated  LMP  irregular cycles, varying in length, last to last menstrual period was in February.     History of right ovarian cyst on past sonogram in  and history of fibroid 2cm in 2018  Denies abnormal pap smears or STIs                  Last Pap Smear -   Last Colonoscopy 3/19    Denies the following: constitutional symptoms, visual symptoms, cardiovascular symptoms, respiratory symptoms, GI symptoms, musculoskeletal symptoms, skin symptoms, neurologic symptoms, hematologic symptoms, allergic symptoms, psychiatric symptoms  Except any pertinent positives listed.     Home Medications:  sucralfate 1 g oral tablet: 1 tab(s) orally 4 times a day (before meals and at bedtime) (14 May 2019 16:05)    Current Medications:   aluminum hydroxide/magnesium hydroxide/simethicone Suspension 30 milliLiter(s) Oral every 4 hours PRN  chlorhexidine 4% Liquid 1 Application(s) Topical <User Schedule>  enoxaparin Injectable 40 milliGRAM(s) SubCutaneous at bedtime  lactated ringers. 1000 milliLiter(s) IV Continuous <Continuous>  morphine  - Injectable 2 milliGRAM(s) IV Push every 4 hours PRN  ondansetron Injectable 4 milliGRAM(s) IV Push three times a day PRN  pantoprazole    Tablet 40 milliGRAM(s) Oral two times a day  sucralfate 1 Gram(s) Oral four times a day    Allergies  iodine containing compounds (Unknown)  shellfish (Anaphylaxis)    PAST MEDICAL & SURGICAL HISTORY:  Peptic ulcer  Fibroid, uterine  Abscess: vaginal  Fibroid: uterine surgery  Delivery with history of       FAMILY HISTORY:  FH: colon cancer in great grandmother      SOCIAL HISTORY: Social alcohol use. Denies cigarette use, or illicit drug use    Vital Signs Last 24 Hrs  T(F): 98.4 (15 May 2019 05:16), Max: 98.4 (14 May 2019 13:03)  HR: 62 (15 May 2019 05:16) (62 - 93)  BP: 112/67 (15 May 2019 05:16) (112/67 - 141/73)  RR: 18 (15 May 2019 05:16) (18 - 18)    General Appearance - AAOx3, NAD  Abdomen - Soft, mildly tender on palpation in the periumbilical region, nondistended, no rebound, no rigidity, voluntary guarding noted.    GYN/Pelvis:  External genitalia appears normal  Speculum: cervix closed, no blood, no lesions seen.  Bimanual exam: cervix closed. No CMT, uterus 7week, anteverted uterus, non tender, no adnexal masses or tenderness noted      LABS:                        11.7   7.04  )-----------( 298      ( 15 May 2019 06:25 )             35.9       05-15    138  |  101  |  4<L>  ----------------------------<  74  4.2   |  24  |  0.6<L>    Ca    9.0      15 May 2019 06:25  Mg     2.0     -14    TPro  6.0  /  Alb  3.6  /  TBili  0.5  /  DBili  x   /  AST  11  /  ALT  16  /  AlkPhos  66  05-15      Urinalysis Basic - ( 14 May 2019 16:05 )    Color: Yellow / Appearance: Cloudy / SG: >=1.030 / pH: x  Gluc: x / Ketone: >=80  / Bili: Negative / Urobili: 0.2   Blood: x / Protein: Trace / Nitrite: Negative   Leuk Esterase: Negative / RBC: x / WBC x   Sq Epi: x / Non Sq Epi: Few /HPF / Bacteria: x          RADIOLOGY & ADDITIONAL STUDIES:    EXAM:  US TRANSVAGINAL        PROCEDURE DATE:  2019    INTERPRETATION:  CLINICAL HISTORY: Left lower quadrant pain.  COMPARISON: CT scan of the abdomen dated May 4, 2019. Pelvic ultrasound   dated 2018.  PROCEDURE: Transabdominal and transvaginal ultrasound of the pelvis was   performed, including Doppler.    LMP: May 2, 2019  FINDINGS:  UTERUS: The uterus measures 10.5 cm x 6.2 cm x 4 cm. It contains a 2.8 cm   fibroid, without change. The endometrial echo measures 6 mm which is   normal.   RIGHT OVARY: The right ovary measures 2.5 cm x 2 cm x 2.2 cm and is   normal.   LEFT OVARY: Left ovary measures 6 cm x 6 cm x 5 cm and contains a 5 cm   hemorrhagic cyst. There is no torsion.   OTHER: No freefluid in the pelvis.    IMPRESSION:  Fibroid uterus without change.  Left ovarian hemorrhagic cyst without torsion.

## 2019-05-15 NOTE — CONSULT NOTE ADULT - ASSESSMENT
39 yo F with mild acute ideopathic pancreatitis 37 yo F with mild acute pancreatitis RO gallstone vs ideopathic etiology.    1)Mild acute pancreatitis- etiology unclear  2)L ovarian cyst    Rec:  - LR at 250 cc/Hr  - Repeat US RUQ  - NPO  - Daily LFTs 39 yo F with mild acute pancreatitis RO gallstone vs ideopathic etiology.    1)Mild acute pancreatitis- etiology unclear  2)L ovarian cyst  3)Esophageal ulcer    Rec:  - LR at 250 cc/Hr  - Repeat US RUQ  - NPO  - Daily LFTs  - Continue PPIs 37 yo F with mild acute pancreatitis RO gallstone vs idiopathic etiology.    1)Mild acute pancreatitis- etiology unclear  2)L ovarian cyst  3)Esophageal ulcer    Rec:  - LR at 250 cc/Hr  - Repeat US RUQ  - NPO  - Daily LFTs  - Continue PPIs  will consider EGD to confirm ulcer healing

## 2019-05-16 ENCOUNTER — TRANSCRIPTION ENCOUNTER (OUTPATIENT)
Age: 39
End: 2019-05-16

## 2019-05-16 ENCOUNTER — RESULT REVIEW (OUTPATIENT)
Age: 39
End: 2019-05-16

## 2019-05-16 LAB
ALBUMIN SERPL ELPH-MCNC: 3.6 G/DL — SIGNIFICANT CHANGE UP (ref 3.5–5.2)
ALP SERPL-CCNC: 67 U/L — SIGNIFICANT CHANGE UP (ref 30–115)
ALT FLD-CCNC: 14 U/L — SIGNIFICANT CHANGE UP (ref 0–41)
ANION GAP SERPL CALC-SCNC: 12 MMOL/L — SIGNIFICANT CHANGE UP (ref 7–14)
AST SERPL-CCNC: 10 U/L — SIGNIFICANT CHANGE UP (ref 0–41)
BASOPHILS # BLD AUTO: 0.02 K/UL — SIGNIFICANT CHANGE UP (ref 0–0.2)
BASOPHILS NFR BLD AUTO: 0.3 % — SIGNIFICANT CHANGE UP (ref 0–1)
BILIRUB DIRECT SERPL-MCNC: <0.2 MG/DL — SIGNIFICANT CHANGE UP (ref 0–0.2)
BILIRUB INDIRECT FLD-MCNC: >0.2 MG/DL — SIGNIFICANT CHANGE UP (ref 0.2–1.2)
BILIRUB SERPL-MCNC: 0.4 MG/DL — SIGNIFICANT CHANGE UP (ref 0.2–1.2)
BUN SERPL-MCNC: <3 MG/DL — LOW (ref 10–20)
CALCIUM SERPL-MCNC: 9 MG/DL — SIGNIFICANT CHANGE UP (ref 8.5–10.1)
CHLORIDE SERPL-SCNC: 103 MMOL/L — SIGNIFICANT CHANGE UP (ref 98–110)
CO2 SERPL-SCNC: 24 MMOL/L — SIGNIFICANT CHANGE UP (ref 17–32)
CREAT SERPL-MCNC: 0.6 MG/DL — LOW (ref 0.7–1.5)
EOSINOPHIL # BLD AUTO: 0.21 K/UL — SIGNIFICANT CHANGE UP (ref 0–0.7)
EOSINOPHIL NFR BLD AUTO: 3.1 % — SIGNIFICANT CHANGE UP (ref 0–8)
GLUCOSE SERPL-MCNC: 91 MG/DL — SIGNIFICANT CHANGE UP (ref 70–99)
HCT VFR BLD CALC: 37 % — SIGNIFICANT CHANGE UP (ref 37–47)
HGB BLD-MCNC: 12.1 G/DL — SIGNIFICANT CHANGE UP (ref 12–16)
IMM GRANULOCYTES NFR BLD AUTO: 0.1 % — SIGNIFICANT CHANGE UP (ref 0.1–0.3)
LYMPHOCYTES # BLD AUTO: 3.27 K/UL — SIGNIFICANT CHANGE UP (ref 1.2–3.4)
LYMPHOCYTES # BLD AUTO: 48.3 % — SIGNIFICANT CHANGE UP (ref 20.5–51.1)
MCHC RBC-ENTMCNC: 27 PG — SIGNIFICANT CHANGE UP (ref 27–31)
MCHC RBC-ENTMCNC: 32.7 G/DL — SIGNIFICANT CHANGE UP (ref 32–37)
MCV RBC AUTO: 82.6 FL — SIGNIFICANT CHANGE UP (ref 81–99)
MONOCYTES # BLD AUTO: 0.77 K/UL — HIGH (ref 0.1–0.6)
MONOCYTES NFR BLD AUTO: 11.4 % — HIGH (ref 1.7–9.3)
NEUTROPHILS # BLD AUTO: 2.49 K/UL — SIGNIFICANT CHANGE UP (ref 1.4–6.5)
NEUTROPHILS NFR BLD AUTO: 36.8 % — LOW (ref 42.2–75.2)
NRBC # BLD: 0 /100 WBCS — SIGNIFICANT CHANGE UP (ref 0–0)
PLATELET # BLD AUTO: 313 K/UL — SIGNIFICANT CHANGE UP (ref 130–400)
POTASSIUM SERPL-MCNC: 3.5 MMOL/L — SIGNIFICANT CHANGE UP (ref 3.5–5)
POTASSIUM SERPL-SCNC: 3.5 MMOL/L — SIGNIFICANT CHANGE UP (ref 3.5–5)
PROT SERPL-MCNC: 6.2 G/DL — SIGNIFICANT CHANGE UP (ref 6–8)
RBC # BLD: 4.48 M/UL — SIGNIFICANT CHANGE UP (ref 4.2–5.4)
RBC # FLD: 13 % — SIGNIFICANT CHANGE UP (ref 11.5–14.5)
SODIUM SERPL-SCNC: 139 MMOL/L — SIGNIFICANT CHANGE UP (ref 135–146)
WBC # BLD: 6.77 K/UL — SIGNIFICANT CHANGE UP (ref 4.8–10.8)
WBC # FLD AUTO: 6.77 K/UL — SIGNIFICANT CHANGE UP (ref 4.8–10.8)

## 2019-05-16 PROCEDURE — 88305 TISSUE EXAM BY PATHOLOGIST: CPT | Mod: 26

## 2019-05-16 PROCEDURE — 88312 SPECIAL STAINS GROUP 1: CPT | Mod: 26

## 2019-05-16 RX ORDER — MORPHINE SULFATE 50 MG/1
4 CAPSULE, EXTENDED RELEASE ORAL
Refills: 0 | Status: DISCONTINUED | OUTPATIENT
Start: 2019-05-16 | End: 2019-05-19

## 2019-05-16 RX ORDER — MORPHINE SULFATE 50 MG/1
4 CAPSULE, EXTENDED RELEASE ORAL ONCE
Refills: 0 | Status: DISCONTINUED | OUTPATIENT
Start: 2019-05-16 | End: 2019-05-16

## 2019-05-16 RX ORDER — SODIUM CHLORIDE 9 MG/ML
1000 INJECTION, SOLUTION INTRAVENOUS
Refills: 0 | Status: DISCONTINUED | OUTPATIENT
Start: 2019-05-16 | End: 2019-05-16

## 2019-05-16 RX ADMIN — MORPHINE SULFATE 4 MILLIGRAM(S): 50 CAPSULE, EXTENDED RELEASE ORAL at 21:17

## 2019-05-16 RX ADMIN — SODIUM CHLORIDE 200 MILLILITER(S): 9 INJECTION, SOLUTION INTRAVENOUS at 05:46

## 2019-05-16 RX ADMIN — Medication 1 GRAM(S): at 11:50

## 2019-05-16 RX ADMIN — PANTOPRAZOLE SODIUM 40 MILLIGRAM(S): 20 TABLET, DELAYED RELEASE ORAL at 05:44

## 2019-05-16 RX ADMIN — MORPHINE SULFATE 4 MILLIGRAM(S): 50 CAPSULE, EXTENDED RELEASE ORAL at 11:58

## 2019-05-16 RX ADMIN — ENOXAPARIN SODIUM 40 MILLIGRAM(S): 100 INJECTION SUBCUTANEOUS at 21:18

## 2019-05-16 RX ADMIN — Medication 1 GRAM(S): at 17:37

## 2019-05-16 RX ADMIN — Medication 1 GRAM(S): at 05:44

## 2019-05-16 RX ADMIN — PANTOPRAZOLE SODIUM 40 MILLIGRAM(S): 20 TABLET, DELAYED RELEASE ORAL at 17:37

## 2019-05-16 RX ADMIN — SODIUM CHLORIDE 100 MILLILITER(S): 9 INJECTION, SOLUTION INTRAVENOUS at 14:07

## 2019-05-16 RX ADMIN — SODIUM CHLORIDE 200 MILLILITER(S): 9 INJECTION, SOLUTION INTRAVENOUS at 19:57

## 2019-05-16 RX ADMIN — MORPHINE SULFATE 4 MILLIGRAM(S): 50 CAPSULE, EXTENDED RELEASE ORAL at 11:39

## 2019-05-16 NOTE — PROGRESS NOTE ADULT - SUBJECTIVE AND OBJECTIVE BOX
MU OLVERA 38y Female  MRN#: [id number]     SUBJECTIVE  Patient is a 38y old Female who presents with a chief complaint of Abdominal pain , chills (15 May 2019 17:08)  Currently admitted to medicine with the primary diagnosis of Pancreatitis    Today is hospital day 2d, and this morning she is feeling abdominal pain mostly LLQ, radiating to her RLQ, associated with nausea/vomiting (nonbloody, nonbilious) worsening with meals, chills. No fevers, chest pain, SOB. Last menstrual period first week of the month. Pain feels different from her usual ovarian cyst pain.    OBJECTIVE  PAST MEDICAL & SURGICAL HISTORY  Peptic ulcer  Fibroid, uterine  Abscess: vaginal  Fibroid: uterine surgery  Delivery with history of     ALLERGIES:  iodine containing compounds (Unknown)  shellfish (Anaphylaxis)    MEDICATIONS:  STANDING MEDICATIONS  chlorhexidine 4% Liquid 1 Application(s) Topical <User Schedule>  enoxaparin Injectable 40 milliGRAM(s) SubCutaneous at bedtime  lactated ringers. 1000 milliLiter(s) IV Continuous <Continuous>  pantoprazole    Tablet 40 milliGRAM(s) Oral two times a day  sucralfate 1 Gram(s) Oral four times a day    PRN MEDICATIONS  aluminum hydroxide/magnesium hydroxide/simethicone Suspension 30 milliLiter(s) Oral every 4 hours PRN  morphine  - Injectable 2 milliGRAM(s) IV Push every 4 hours PRN  ondansetron Injectable 4 milliGRAM(s) IV Push three times a day PRN      VITAL SIGNS: Last 24 Hours  T(C): 36.5 (16 May 2019 04:30), Max: 37.3 (15 May 2019 21:43)  T(F): 97.7 (16 May 2019 04:30), Max: 99.2 (15 May 2019 21:43)  HR: 89 (16 May 2019 04:30) (69 - 89)  BP: 132/78 (16 May 2019 04:30) (109/66 - 132/78)  BP(mean): --  RR: 18 (16 May 2019 04:30) (18 - 18)  SpO2: --    LABS:                        12.1   6.77  )-----------( 313      ( 16 May 2019 07:34 )             37.0     05-16    139  |  103  |  <3<L>  ----------------------------<  91  3.5   |  24  |  0.6<L>    Ca    9.0      16 May 2019 07:34  Mg     2.0     05-14    TPro  6.2  /  Alb  3.6  /  TBili  0.4  /  DBili  <0.2  /  AST  10  /  ALT  14  /  AlkPhos  67  05-16      Urinalysis Basic - ( 14 May 2019 16:05 )    Color: Yellow / Appearance: Cloudy / SG: >=1.030 / pH: x  Gluc: x / Ketone: >=80  / Bili: Negative / Urobili: 0.2   Blood: x / Protein: Trace / Nitrite: Negative   Leuk Esterase: Negative / RBC: x / WBC x   Sq Epi: x / Non Sq Epi: Few /HPF / Bacteria: x            GI PCR Panel, Stool (collected 15 May 2019 06:42)  Source: .Stool Feces  Final Report (15 May 2019 21:44):    GI PCR Results: NOT detected    *******Please Note:*******    GI panel PCR evaluates for:    Campylobacter, Plesiomonas shigelloides, Salmonella,    Vibrio, Yersinia enterocolitica, Enteroaggregative    Escherichia coli (EAEC), Enteropathogenic E.coli (EPEC),    Enterotoxigenic E. coli (ETEC) lt/st, Shiga-like    toxin-producing E. coli (STEC) stx1/stx2,    Shigella/ Enteroinvasive E. coli (EIEC), Cryptosporidium,    Cyclospora cayetanensis, Entamoeba histolytica,    Giardia lamblia, Adenovirus F 40/41, Astrovirus,    Norovirus GI/GII, Rotavirus A, Sapovirus          RADIOLOGY:      PHYSICAL EXAM:  GENERAL: NAD, obese, AAOx3  HEENT: Atraumatic, Normocephalic. EOMI   PULMONARY: Clear to auscultation bilaterally; decreased breath sounds at bases   CARDIOVASCULAR: Regular rate and rhythm; No murmurs   GASTROINTESTINAL: Soft, TTP LLQ, no rebound, no guarding, obese abdomen; Bowel sounds present  EXTREMITIES: 2+ Peripheral Pulses, No clubbing, cyanosis, or edema  NEUROLOGY: non-focal MU OLVERA 38y Female  MRN#: [id number]     SUBJECTIVE  Patient is a 38y old Female who presents with a chief complaint of Abdominal pain , chills (15 May 2019 17:08)  Currently admitted to medicine with the primary diagnosis of Pancreatitis    Today is hospital day 2d, and this morning she is feeling abdominal pain mostly LLQ, radiating to her RLQ, associated with nausea/vomiting (nonbloody, nonbilious) worsening with meals, chills. No fevers, chest pain, SOB. Last menstrual period first week of the month. Pain feels different from her usual ovarian cyst pain.    OBJECTIVE  PAST MEDICAL & SURGICAL HISTORY  Peptic ulcer  Fibroid, uterine  Abscess: vaginal  Fibroid: uterine surgery  Delivery with history of     ALLERGIES:  iodine containing compounds (Unknown)  shellfish (Anaphylaxis)    MEDICATIONS:  STANDING MEDICATIONS  chlorhexidine 4% Liquid 1 Application(s) Topical <User Schedule>  enoxaparin Injectable 40 milliGRAM(s) SubCutaneous at bedtime  lactated ringers. 1000 milliLiter(s) IV Continuous <Continuous>  pantoprazole    Tablet 40 milliGRAM(s) Oral two times a day  sucralfate 1 Gram(s) Oral four times a day    PRN MEDICATIONS  aluminum hydroxide/magnesium hydroxide/simethicone Suspension 30 milliLiter(s) Oral every 4 hours PRN  morphine  - Injectable 2 milliGRAM(s) IV Push every 4 hours PRN  ondansetron Injectable 4 milliGRAM(s) IV Push three times a day PRN      VITAL SIGNS: Last 24 Hours  T(C): 36.5 (16 May 2019 04:30), Max: 37.3 (15 May 2019 21:43)  T(F): 97.7 (16 May 2019 04:30), Max: 99.2 (15 May 2019 21:43)  HR: 89 (16 May 2019 04:30) (69 - 89)  BP: 132/78 (16 May 2019 04:30) (109/66 - 132/78)  RR: 18 (16 May 2019 04:30) (18 - 18)    LABS:                        12.1   6.77  )-----------( 313      ( 16 May 2019 07:34 )             37.0     05-16    139  |  103  |  <3<L>  ----------------------------<  91  3.5   |  24  |  0.6<L>    Ca    9.0      16 May 2019 07:34  Mg     2.0     05-14    TPro  6.2  /  Alb  3.6  /  TBili  0.4  /  DBili  <0.2  /  AST  10  /  ALT  14  /  AlkPhos  67  05-16    Urinalysis Basic - ( 14 May 2019 16:05 )    Color: Yellow / Appearance: Cloudy / SG: >=1.030 / pH: x  Gluc: x / Ketone: >=80  / Bili: Negative / Urobili: 0.2   Blood: x / Protein: Trace / Nitrite: Negative   Leuk Esterase: Negative / RBC: x / WBC x   Sq Epi: x / Non Sq Epi: Few /HPF / Bacteria: x    GI PCR Panel, Stool (collected 15 May 2019 06:42)  Source: .Stool Feces  Final Report (15 May 2019 21:44):    GI PCR Results: NOT detected    *******Please Note:*******    GI panel PCR evaluates for:    Campylobacter, Plesiomonas shigelloides, Salmonella,    Vibrio, Yersinia enterocolitica, Enteroaggregative    Escherichia coli (EAEC), Enteropathogenic E.coli (EPEC),    Enterotoxigenic E. coli (ETEC) lt/st, Shiga-like    toxin-producing E. coli (STEC) stx1/stx2,    Shigella/ Enteroinvasive E. coli (EIEC), Cryptosporidium,    Cyclospora cayetanensis, Entamoeba histolytica,    Giardia lamblia, Adenovirus F 40/41, Astrovirus,    Norovirus GI/GII, Rotavirus A, Sapovirus    RADIOLOGY:  IMPRESSION:  Unremarkable right upper quadrant ultrasound.  Pancreas is obscured by overlying bowel gas, and therefore not evaluated.      PHYSICAL EXAM:  GENERAL: NAD, obese, AAOx3  HEENT: Atraumatic, Normocephalic. EOMI   PULMONARY: Clear to auscultation bilaterally; decreased breath sounds at bases   CARDIOVASCULAR: Regular rate and rhythm; No murmurs   GASTROINTESTINAL: Soft, TTP LLQ, no rebound, no guarding, obese abdomen; Bowel sounds present  EXTREMITIES: 2+ Peripheral Pulses, No clubbing, cyanosis, or edema  NEUROLOGY: non-focal

## 2019-05-16 NOTE — PROGRESS NOTE ADULT - ASSESSMENT
ADMISSION SUMMARY  38 y o F with PMH of gastric ulcer on sucralfate and ovarian cysts presented to ER with c/o diffuse abdominal pain for 2 wks duration now. Pt reports diffuse abdominal pain band like, non radiating , sharp, 10/10, associated with nausea and vomiting. Pt does not report any fevers but does report chills.     ASSESSMENT & PLAN    # ABDOMINAL PAIN / NAUSEA VOMITING secondary to acute pancreatitis vs less likely viral gastroenteritis   - US abdomen previously done without CBD dilation or cholelithiasis  - Repeat RUQ sono negative as well, poor pancreas visualization due to bowel gas  - Diarrhea overnight - C.diff neg, GI PCR panel neg as well  - GI following, recs appreciated - likely acute pancreatitis, lipase 483, c/w IVF @ 200cc/hr, NPO, going for EGD today to evaluate previously moderate gastritis/ulcer  - No need to repeat CT abdomen/pelvis  - Pain control and Zofran for nausea    # HEMORRHAGIC OVARIAN CYST  - Transvaginal us showed large hemorrhagic ovarian cyst without torsion  - Gyn eval appreciated - repeat sono in 6-8 weeks, outpatient GYN f/u    # GI ppx  - Diet: NPO   - Protonix and Carafate    # DVT  - Lovenox    # Disposition  - Acute, pending EGD  - Code status: FULL CODE ADMISSION SUMMARY  38 y o F with PMH of gastric ulcer on sucralfate and ovarian cysts presented to ER with c/o diffuse abdominal pain for 2 wks duration now. Pt reports diffuse abdominal pain band like, non radiating , sharp, 10/10, associated with nausea and vomiting. Pt does not report any fevers but does report chills.     ASSESSMENT & PLAN    # ABDOMINAL PAIN / NAUSEA VOMITING secondary to acute pancreatitis vs less likely viral gastroenteritis   - US abdomen previously done without CBD dilation or cholelithiasis  - Repeat RUQ sono negative as well, poor pancreas visualization due to bowel gas  - Diarrhea overnight - C.diff neg, GI PCR panel neg as well  - GI following, recs appreciated - likely acute pancreatitis, lipase 483, c/w IVF @ 200cc/hr, NPO.   - EGD today to evaluate previously moderate PUD healing.   - No need to repeat CT abdomen/pelvis  - Pain control and Zofran for nausea    # HEMORRHAGIC OVARIAN CYST  - Transvaginal us showed large hemorrhagic ovarian cyst without torsion  - Gyn eval appreciated - repeat sono in 6-8 weeks, outpatient GYN f/u    # GI ppx  - Diet: NPO   - Protonix and Carafate    # DVT  - Lovenox    # Disposition  - Acute, pending EGD  - Code status: FULL CODE    Attending Attestation  Pt seen and examined. Case and Plan discussed at rounds, with GI and at bedside with the patient. She still feels 10/10 LUQ Abdominal Pain and has not asked for Morphine today per nurse. Above Resident note reviewed and agree as corrected. Pt with Acute Pancreatitis of unknown etiology and has a h/x of Anaphylaxis to IV dye hence CT A/P will not be performed due to high risk for anaphylaxis. We will treat the pt clinically and can possibly get MRCP with GI follow up in the office to r/o possible anatomical pathologies. Lipids WNL. Denies ETOH Use and No evident of GB Stones or any pathology. No transaminitis.   PUD - GI is planning to do a f/u EGD to re-evaluate healing.   GI Care appreciated.    Plan: NPO, EGD, IVF 200cc/hr, Increase Morphine 4mg q3h/prn. Will follow.

## 2019-05-17 LAB
ALBUMIN SERPL ELPH-MCNC: 3.4 G/DL — LOW (ref 3.5–5.2)
ALP SERPL-CCNC: 58 U/L — SIGNIFICANT CHANGE UP (ref 30–115)
ALT FLD-CCNC: 12 U/L — SIGNIFICANT CHANGE UP (ref 0–41)
ANION GAP SERPL CALC-SCNC: 10 MMOL/L — SIGNIFICANT CHANGE UP (ref 7–14)
AST SERPL-CCNC: 10 U/L — SIGNIFICANT CHANGE UP (ref 0–41)
BASOPHILS # BLD AUTO: 0.01 K/UL — SIGNIFICANT CHANGE UP (ref 0–0.2)
BASOPHILS NFR BLD AUTO: 0.2 % — SIGNIFICANT CHANGE UP (ref 0–1)
BILIRUB DIRECT SERPL-MCNC: <0.2 MG/DL — SIGNIFICANT CHANGE UP (ref 0–0.2)
BILIRUB INDIRECT FLD-MCNC: >0.1 MG/DL — LOW (ref 0.2–1.2)
BILIRUB SERPL-MCNC: 0.3 MG/DL — SIGNIFICANT CHANGE UP (ref 0.2–1.2)
BUN SERPL-MCNC: <3 MG/DL — LOW (ref 10–20)
CALCIUM SERPL-MCNC: 8.6 MG/DL — SIGNIFICANT CHANGE UP (ref 8.5–10.1)
CHLORIDE SERPL-SCNC: 98 MMOL/L — SIGNIFICANT CHANGE UP (ref 98–110)
CO2 SERPL-SCNC: 27 MMOL/L — SIGNIFICANT CHANGE UP (ref 17–32)
CREAT SERPL-MCNC: 0.6 MG/DL — LOW (ref 0.7–1.5)
EOSINOPHIL # BLD AUTO: 0.35 K/UL — SIGNIFICANT CHANGE UP (ref 0–0.7)
EOSINOPHIL NFR BLD AUTO: 6.2 % — SIGNIFICANT CHANGE UP (ref 0–8)
GLUCOSE SERPL-MCNC: 85 MG/DL — SIGNIFICANT CHANGE UP (ref 70–99)
HCT VFR BLD CALC: 32.6 % — LOW (ref 37–47)
HGB BLD-MCNC: 10.8 G/DL — LOW (ref 12–16)
IMM GRANULOCYTES NFR BLD AUTO: 0.2 % — SIGNIFICANT CHANGE UP (ref 0.1–0.3)
LYMPHOCYTES # BLD AUTO: 2.41 K/UL — SIGNIFICANT CHANGE UP (ref 1.2–3.4)
LYMPHOCYTES # BLD AUTO: 42.5 % — SIGNIFICANT CHANGE UP (ref 20.5–51.1)
MCHC RBC-ENTMCNC: 27.3 PG — SIGNIFICANT CHANGE UP (ref 27–31)
MCHC RBC-ENTMCNC: 33.1 G/DL — SIGNIFICANT CHANGE UP (ref 32–37)
MCV RBC AUTO: 82.3 FL — SIGNIFICANT CHANGE UP (ref 81–99)
MONOCYTES # BLD AUTO: 0.74 K/UL — HIGH (ref 0.1–0.6)
MONOCYTES NFR BLD AUTO: 13.1 % — HIGH (ref 1.7–9.3)
NEUTROPHILS # BLD AUTO: 2.15 K/UL — SIGNIFICANT CHANGE UP (ref 1.4–6.5)
NEUTROPHILS NFR BLD AUTO: 37.8 % — LOW (ref 42.2–75.2)
NRBC # BLD: 0 /100 WBCS — SIGNIFICANT CHANGE UP (ref 0–0)
PLATELET # BLD AUTO: 275 K/UL — SIGNIFICANT CHANGE UP (ref 130–400)
POTASSIUM SERPL-MCNC: 3.8 MMOL/L — SIGNIFICANT CHANGE UP (ref 3.5–5)
POTASSIUM SERPL-SCNC: 3.8 MMOL/L — SIGNIFICANT CHANGE UP (ref 3.5–5)
PROT SERPL-MCNC: 5.4 G/DL — LOW (ref 6–8)
RBC # BLD: 3.96 M/UL — LOW (ref 4.2–5.4)
RBC # FLD: 12.9 % — SIGNIFICANT CHANGE UP (ref 11.5–14.5)
SODIUM SERPL-SCNC: 135 MMOL/L — SIGNIFICANT CHANGE UP (ref 135–146)
SURGICAL PATHOLOGY STUDY: SIGNIFICANT CHANGE UP
WBC # BLD: 5.67 K/UL — SIGNIFICANT CHANGE UP (ref 4.8–10.8)
WBC # FLD AUTO: 5.67 K/UL — SIGNIFICANT CHANGE UP (ref 4.8–10.8)

## 2019-05-17 RX ORDER — DIPHENHYDRAMINE HCL 50 MG
50 CAPSULE ORAL ONCE
Refills: 0 | Status: COMPLETED | OUTPATIENT
Start: 2019-05-18 | End: 2019-05-18

## 2019-05-17 RX ORDER — HYDROMORPHONE HYDROCHLORIDE 2 MG/ML
0.5 INJECTION INTRAMUSCULAR; INTRAVENOUS; SUBCUTANEOUS ONCE
Refills: 0 | Status: DISCONTINUED | OUTPATIENT
Start: 2019-05-17 | End: 2019-05-17

## 2019-05-17 RX ADMIN — Medication 50 MILLIGRAM(S): at 16:29

## 2019-05-17 RX ADMIN — ONDANSETRON 4 MILLIGRAM(S): 8 TABLET, FILM COATED ORAL at 08:07

## 2019-05-17 RX ADMIN — Medication 1 GRAM(S): at 06:17

## 2019-05-17 RX ADMIN — MORPHINE SULFATE 4 MILLIGRAM(S): 50 CAPSULE, EXTENDED RELEASE ORAL at 08:07

## 2019-05-17 RX ADMIN — HYDROMORPHONE HYDROCHLORIDE 0.5 MILLIGRAM(S): 2 INJECTION INTRAMUSCULAR; INTRAVENOUS; SUBCUTANEOUS at 00:49

## 2019-05-17 RX ADMIN — SODIUM CHLORIDE 200 MILLILITER(S): 9 INJECTION, SOLUTION INTRAVENOUS at 21:29

## 2019-05-17 RX ADMIN — Medication 1 GRAM(S): at 23:30

## 2019-05-17 RX ADMIN — SODIUM CHLORIDE 200 MILLILITER(S): 9 INJECTION, SOLUTION INTRAVENOUS at 00:51

## 2019-05-17 RX ADMIN — Medication 1 GRAM(S): at 11:16

## 2019-05-17 RX ADMIN — ENOXAPARIN SODIUM 40 MILLIGRAM(S): 100 INJECTION SUBCUTANEOUS at 21:26

## 2019-05-17 RX ADMIN — MORPHINE SULFATE 4 MILLIGRAM(S): 50 CAPSULE, EXTENDED RELEASE ORAL at 16:49

## 2019-05-17 RX ADMIN — SODIUM CHLORIDE 200 MILLILITER(S): 9 INJECTION, SOLUTION INTRAVENOUS at 08:52

## 2019-05-17 RX ADMIN — Medication 1 GRAM(S): at 00:49

## 2019-05-17 RX ADMIN — Medication 50 MILLIGRAM(S): at 21:25

## 2019-05-17 RX ADMIN — MORPHINE SULFATE 4 MILLIGRAM(S): 50 CAPSULE, EXTENDED RELEASE ORAL at 08:20

## 2019-05-17 RX ADMIN — PANTOPRAZOLE SODIUM 40 MILLIGRAM(S): 20 TABLET, DELAYED RELEASE ORAL at 17:13

## 2019-05-17 RX ADMIN — Medication 1 GRAM(S): at 17:13

## 2019-05-17 RX ADMIN — PANTOPRAZOLE SODIUM 40 MILLIGRAM(S): 20 TABLET, DELAYED RELEASE ORAL at 06:17

## 2019-05-17 NOTE — DIETITIAN INITIAL EVALUATION ADULT. - OTHER INFO
Pt admitted for abdominal pain and chills. GI on board, s/p EGD (found healed GEj ulcer), pt could not tolerate clears afterwards (nausea, abd pain and vomiting after sips of broth). As per GI pt has mild acute pancreatitis - etiology unclear (? alcohol induced). Plan for MRCP and CT scan of abdomen with contrast to r/o necrosis. Reason for assessment: NPO/clears days 4.

## 2019-05-17 NOTE — DIETITIAN INITIAL EVALUATION ADULT. - MD RECOMMEND
diet advancement per GI, if pt started on clear liquids please add Ensure CLear to each meal tray (720kcal, 24 gm protein)./other other/diet advancement per GI, if pt started on clear liquids please add Ensure CLear to each meal tray (720kcal, 24 gm protein). ? suspected ETOH abuse - consider MVI, Folic acid and Thiamine daily.

## 2019-05-17 NOTE — PROGRESS NOTE ADULT - SUBJECTIVE AND OBJECTIVE BOX
Hepatology/GI follow up note: Pt seen and examined at bedside.   38y Female seen for: Pancreatitis ?EtoH?    Subjective/Interval events:  Sp EGD: healed GEj ulcer  Could not tolerate clears: Nausea , pain and vomiting after sips of broth    Review of system  General:  (-) weight loss, (-) fevers  Eyes:  (-) visual changes  CV:  (-) chest pain  Resp: (-) SOB, (-) wheezing  GI: (-) abdominal pain,  (-) nausea, (-) vomiting, (-) dysphagia, (-) diarrhea, (-) constipation, (-) rectal bleeding, (-) melena, (-) hematemesis.  Neuro: (-) confusion, (-) weakness  Psych:  (-) Hallucinations  Heme:  (-) easy bruisability    Past medical/surgical Hx:  PAST MEDICAL & SURGICAL HISTORY:  Peptic ulcer  Fibroid, uterine  Abscess: vaginal  Fibroid: uterine surgery  Delivery with history of     Home Medications:  Last Order Reconciliation Date: 19 @ 16:10 (Admission Reconciliation)  sucralfate 1 g oral tablet: 1 tab(s) orally 4 times a day (before meals and at bedtime)      Allergies:  iodine containing compounds (Unknown)  shellfish (Anaphylaxis)      Current Medications:   aluminum hydroxide/magnesium hydroxide/simethicone Suspension 30 milliLiter(s) Oral every 4 hours PRN  chlorhexidine 4% Liquid 1 Application(s) Topical <User Schedule>  enoxaparin Injectable 40 milliGRAM(s) SubCutaneous at bedtime  lactated ringers. 1000 milliLiter(s) IV Continuous <Continuous>  morphine  - Injectable 4 milliGRAM(s) IV Push every 3 hours PRN  ondansetron Injectable 4 milliGRAM(s) IV Push three times a day PRN  pantoprazole    Tablet 40 milliGRAM(s) Oral two times a day  sucralfate 1 Gram(s) Oral four times a day        Physical exam:  T(C): 36.5 (19 @ 04:42), Max: 37.4 (19 @ 12:06)  HR: 70 (19 @ 04:42) (69 - 84)  BP: 109/59 (19 @ 04:42) (88/50 - 123/70)  RR: 18 (19 @ 04:42) (16 - 20)  SpO2: 96% (19 @ 13:04) (96% - 97%)    GENERAL: NAD  HEAD:  Atraumatic, Normocephalic  EYES: Sclera:NL  NECK: Supple, no JVD or thyromegaly  CHEST/LUNG: Good bilateral air entry  HEART: normal S1, S2. Regular  ABDOMEN: (-) distended, (-) tender, (-) rebound, (+) BS, (-)HSM  EXTREMITIES: (-) edema  NEUROLOGY: (-) asterixis  SKIN: (-) jaundice      Data:                        10.8   .  )-----------( 275      ( 17 May 2019 06:12 )             32.6     MCV 82.3 (19)    RDW 12.9 (19)    HGB trend:  10.8  19 @ 06:12  12.1  19 @ 07:34  11.7  05-15-19 @ 06:25  12.9  19 @ 13:55        WBC trend:  5.67  19 @ 06:12  6.77  19 @ 07:34  7.04  05-15-19 @ 06:25  7.11  19 @ 13:55    05-17    135  |  98  |  <3<L>  ----------------------------<  85  3.8   |  27  |  0.6<L>    Ca    8.6      17 May 2019 06:12    TPro  5.4<L>  /  Alb  3.4<L>  /  TBili  0.3  /  DBili  <0.2  /  AST  10  /  ALT  12  /  AlkPhos  58      Liver panel trend:  TBili 0.3   /   AST 10   /   ALT 12   /   AlkP 58   /   Tptn 5.4   /   Alb 3.4    /   DBili <0.2        TBili 0.4   /   AST 10   /   ALT 14   /   AlkP 67   /   Tptn 6.2   /   Alb 3.6    /   DBili <0.2        TBili 0.5   /   AST 11   /   ALT 16   /   AlkP 66   /   Tptn 6.0   /   Alb 3.6    /   DBili --      05-15  TBili 0.5   /   AST 14   /   ALT 19   /   AlkP 75   /   Tptn 6.7   /   Alb 4.1    /   DBili <0.2                  GI PCR Panel, Stool (collected 15 May 2019 06:42)  Source: .Stool Feces  Final Report (15 May 2019 21:44):    GI PCR Results: NOT detected    *******Please Note:*******    GI panel PCR evaluates for:    Campylobacter, Plesiomonas shigelloides, Salmonella,    Vibrio, Yersinia enterocolitica, Enteroaggregative    Escherichia coli (EAEC), Enteropathogenic E.coli (EPEC),    Enterotoxigenic E. coli (ETEC) lt/st, Shiga-like    toxin-producing E. coli (STEC) stx1/stx2,    Shigella/ Enteroinvasive E. coli (EIEC), Cryptosporidium,    Cyclospora cayetanensis, Entamoeba histolytica,    Giardia lamblia, Adenovirus F 40/41, Astrovirus,    Norovirus GI/GII, Rotavirus A, Sapovirus

## 2019-05-17 NOTE — PROGRESS NOTE ADULT - ASSESSMENT
39 yo F with mild acute pancreatitis RO gallstone vs idiopathic etiology.    1)Mild acute pancreatitis- etiology unclear  2)L ovarian cyst  3)Esophageal ulcer    Rec:  - LR at 250 cc/Hr  - deescalate to NPO  - Pain control  - PPI QD  - Awaiting MRCP    For questions and inquiries please page [phone].  For urgent matters or after 5pm and on weekends please page the fellow on call through the GI paging system. 39 yo F with mild acute pancreatitis RO gallstone vs idiopathic etiology.    1)Mild acute pancreatitis- etiology unclear ? alcohol induced  2)L ovarian cyst  3)Esophageal ulcer, healed on repeat EGD    Rec:  - LR at 250 cc/Hr  - deescalate to NPO  - Pain control  - PPI QD  - Awaiting MRCP  Please obtain CT scan of abdomen with contrast as well to r/o necrosis    For questions and inquiries please page [phone].  For urgent matters or after 5pm and on weekends please page the fellow on call through the GI paging system.

## 2019-05-17 NOTE — DIETITIAN INITIAL EVALUATION ADULT. - PHYSICAL APPEARANCE
Alert, oriented. UBW 190lbs. BMI 37.1 (190lbs/5'0"). IBW 100lbs +/- 10%. No chewing/swallowing difficulties reported. Skin intact. Some nausea today, last BM yesterday./obese

## 2019-05-17 NOTE — DIETITIAN INITIAL EVALUATION ADULT. - ORAL INTAKE PTA
good/pt reports good appetite pta until ~6 days ago = 2 days pta). On low fat diet at home, takes MVI daily. Allergic to shellfish.

## 2019-05-17 NOTE — PROGRESS NOTE ADULT - SUBJECTIVE AND OBJECTIVE BOX
MU OLVERA 38y Female  MRN#: [id number]     SUBJECTIVE  Patient is a 38y old Female who presents with a chief complaint of Abdominal pain , chills (17 May 2019 09:13)  Currently admitted to medicine with the primary diagnosis of Pancreatitis    Today is hospital day 3d, and this morning she is still c/o abdominal pain. Yesterday evening tolerated some clears post-EGD however this AM experiencing nausea/vomiting with sips of broth. No overnight events.     OBJECTIVE  PAST MEDICAL & SURGICAL HISTORY  Peptic ulcer  Fibroid, uterine  Abscess: vaginal  Fibroid: uterine surgery  Delivery with history of     ALLERGIES:  iodine containing compounds (Unknown)  shellfish (Anaphylaxis)    MEDICATIONS:  STANDING MEDICATIONS  chlorhexidine 4% Liquid 1 Application(s) Topical <User Schedule>  enoxaparin Injectable 40 milliGRAM(s) SubCutaneous at bedtime  lactated ringers. 1000 milliLiter(s) IV Continuous <Continuous>  pantoprazole    Tablet 40 milliGRAM(s) Oral two times a day  sucralfate 1 Gram(s) Oral four times a day    PRN MEDICATIONS  aluminum hydroxide/magnesium hydroxide/simethicone Suspension 30 milliLiter(s) Oral every 4 hours PRN  morphine  - Injectable 4 milliGRAM(s) IV Push every 3 hours PRN  ondansetron Injectable 4 milliGRAM(s) IV Push three times a day PRN      VITAL SIGNS: Last 24 Hours  T(C): 36.5 (17 May 2019 04:42), Max: 37.4 (16 May 2019 12:06)  T(F): 97.7 (17 May 2019 04:42), Max: 99.3 (16 May 2019 12:06)  HR: 70 (17 May 2019 04:42) (69 - 84)  BP: 109/59 (17 May 2019 04:42) (88/50 - 123/70)  BP(mean): --  RR: 18 (17 May 2019 04:42) (16 - 20)  SpO2: 96% (16 May 2019 13:04) (96% - 97%)    LABS:                        10.8   5.67  )-----------( 275      ( 17 May 2019 06:12 )             32.6     05-17    135  |  98  |  <3<L>  ----------------------------<  85  3.8   |  27  |  0.6<L>    Ca    8.6      17 May 2019 06:12    TPro  5.4<L>  /  Alb  3.4<L>  /  TBili  0.3  /  DBili  <0.2  /  AST  10  /  ALT  12  /  AlkPhos  58  05-17              GI PCR Panel, Stool (collected 15 May 2019 06:42)  Source: .Stool Feces  Final Report (15 May 2019 21:44):    GI PCR Results: NOT detected    *******Please Note:*******    GI panel PCR evaluates for:    Campylobacter, Plesiomonas shigelloides, Salmonella,    Vibrio, Yersinia enterocolitica, Enteroaggregative    Escherichia coli (EAEC), Enteropathogenic E.coli (EPEC),    Enterotoxigenic E. coli (ETEC) lt/st, Shiga-like    toxin-producing E. coli (STEC) stx1/stx2,    Shigella/ Enteroinvasive E. coli (EIEC), Cryptosporidium,    Cyclospora cayetanensis, Entamoeba histolytica,    Giardia lamblia, Adenovirus F 40/41, Astrovirus,    Norovirus GI/GII, Rotavirus A, Sapovirus          RADIOLOGY:      PHYSICAL EXAM:  GENERAL: NAD, obese, AAOx3  HEENT: Atraumatic, Normocephalic. EOMI   PULMONARY: Clear to auscultation bilaterally; decreased breath sounds at bases   CARDIOVASCULAR: Regular rate and rhythm; No murmurs   GASTROINTESTINAL: Soft, TTP LLQ, no rebound, no guarding, obese abdomen; Bowel sounds present  EXTREMITIES: 2+ Peripheral Pulses, No clubbing, cyanosis, or edema  NEUROLOGY: non-focal MU OLVERA 38y Female  MRN#: [id number]     SUBJECTIVE  Patient is a 38y old Female who presents with a chief complaint of Abdominal pain , chills (17 May 2019 09:13)  Currently admitted to medicine with the primary diagnosis of Pancreatitis    Today is hospital day 3d, and this morning she is still c/o abdominal pain. Yesterday evening tolerated some clears post-EGD however this AM experiencing nausea/vomiting with sips of broth. No overnight events.     OBJECTIVE  PAST MEDICAL & SURGICAL HISTORY  Peptic ulcer  Fibroid, uterine  Abscess: vaginal  Fibroid: uterine surgery  Delivery with history of     ALLERGIES:  iodine containing compounds (Unknown)  shellfish (Anaphylaxis)    MEDICATIONS:  STANDING MEDICATIONS  chlorhexidine 4% Liquid 1 Application(s) Topical <User Schedule>  enoxaparin Injectable 40 milliGRAM(s) SubCutaneous at bedtime  lactated ringers. 1000 milliLiter(s) IV Continuous <Continuous>  pantoprazole    Tablet 40 milliGRAM(s) Oral two times a day  sucralfate 1 Gram(s) Oral four times a day    PRN MEDICATIONS  aluminum hydroxide/magnesium hydroxide/simethicone Suspension 30 milliLiter(s) Oral every 4 hours PRN  morphine  - Injectable 4 milliGRAM(s) IV Push every 3 hours PRN  ondansetron Injectable 4 milliGRAM(s) IV Push three times a day PRN      VITAL SIGNS: Last 24 Hours  T(C): 36.5 (17 May 2019 04:42), Max: 37.4 (16 May 2019 12:06)  T(F): 97.7 (17 May 2019 04:42), Max: 99.3 (16 May 2019 12:06)  HR: 70 (17 May 2019 04:42) (69 - 84)  BP: 109/59 (17 May 2019 04:42) (88/50 - 123/70)  RR: 18 (17 May 2019 04:42) (16 - 20)  SpO2: 96% (16 May 2019 13:04) (96% - 97%)    LABS:                        10.8   5.67  )-----------( 275      ( 17 May 2019 06:12 )             32.6     05-    135  |  98  |  <3<L>  ----------------------------<  85  3.8   |  27  |  0.6<L>    Ca    8.6      17 May 2019 06:12    TPro  5.4<L>  /  Alb  3.4<L>  /  TBili  0.3  /  DBili  <0.2  /  AST  10  /  ALT  12  /  AlkPhos  58  05-17    GI PCR Panel, Stool (collected 15 May 2019 06:42)  Source: .Stool Feces  Final Report (15 May 2019 21:44):    GI PCR Results: NOT detected    *******Please Note:*******    GI panel PCR evaluates for:    Campylobacter, Plesiomonas shigelloides, Salmonella,    Vibrio, Yersinia enterocolitica, Enteroaggregative    Escherichia coli (EAEC), Enteropathogenic E.coli (EPEC),    Enterotoxigenic E. coli (ETEC) lt/st, Shiga-like    toxin-producing E. coli (STEC) stx1/stx2,    Shigella/ Enteroinvasive E. coli (EIEC), Cryptosporidium,    Cyclospora cayetanensis, Entamoeba histolytica,    Giardia lamblia, Adenovirus F 40/41, Astrovirus,    Norovirus GI/GII, Rotavirus A, Sapovirus    RADIOLOGY:  none     PHYSICAL EXAM:  GENERAL: NAD, obese, AAOx3  HEENT: Atraumatic, Normocephalic. EOMI   PULMONARY: Clear to auscultation bilaterally; decreased breath sounds at bases   CARDIOVASCULAR: Regular rate and rhythm; No murmurs   GASTROINTESTINAL: Soft, TTP LLQ, no rebound, no guarding, obese abdomen; Bowel sounds present  EXTREMITIES: 2+ Peripheral Pulses, No clubbing, cyanosis, or edema  NEUROLOGY: non-focal

## 2019-05-18 ENCOUNTER — TRANSCRIPTION ENCOUNTER (OUTPATIENT)
Age: 39
End: 2019-05-18

## 2019-05-18 LAB
ALBUMIN SERPL ELPH-MCNC: 3.8 G/DL — SIGNIFICANT CHANGE UP (ref 3.5–5.2)
ALP SERPL-CCNC: 68 U/L — SIGNIFICANT CHANGE UP (ref 30–115)
ALT FLD-CCNC: 15 U/L — SIGNIFICANT CHANGE UP (ref 0–41)
ANION GAP SERPL CALC-SCNC: 15 MMOL/L — HIGH (ref 7–14)
AST SERPL-CCNC: 14 U/L — SIGNIFICANT CHANGE UP (ref 0–41)
BILIRUB DIRECT SERPL-MCNC: <0.2 MG/DL — SIGNIFICANT CHANGE UP (ref 0–0.2)
BILIRUB INDIRECT FLD-MCNC: >0 MG/DL — LOW (ref 0.2–1.2)
BILIRUB SERPL-MCNC: 0.2 MG/DL — SIGNIFICANT CHANGE UP (ref 0.2–1.2)
BUN SERPL-MCNC: 3 MG/DL — LOW (ref 10–20)
CALCIUM SERPL-MCNC: 9.3 MG/DL — SIGNIFICANT CHANGE UP (ref 8.5–10.1)
CHLORIDE SERPL-SCNC: 98 MMOL/L — SIGNIFICANT CHANGE UP (ref 98–110)
CO2 SERPL-SCNC: 22 MMOL/L — SIGNIFICANT CHANGE UP (ref 17–32)
CREAT SERPL-MCNC: 0.5 MG/DL — LOW (ref 0.7–1.5)
GLUCOSE SERPL-MCNC: 112 MG/DL — HIGH (ref 70–99)
HCG UR QL: NEGATIVE — SIGNIFICANT CHANGE UP
POTASSIUM SERPL-MCNC: 4.4 MMOL/L — SIGNIFICANT CHANGE UP (ref 3.5–5)
POTASSIUM SERPL-SCNC: 4.4 MMOL/L — SIGNIFICANT CHANGE UP (ref 3.5–5)
PROT SERPL-MCNC: 6.3 G/DL — SIGNIFICANT CHANGE UP (ref 6–8)
SODIUM SERPL-SCNC: 135 MMOL/L — SIGNIFICANT CHANGE UP (ref 135–146)

## 2019-05-18 PROCEDURE — 74177 CT ABD & PELVIS W/CONTRAST: CPT | Mod: 26

## 2019-05-18 PROCEDURE — 74181 MRI ABDOMEN W/O CONTRAST: CPT | Mod: 26

## 2019-05-18 RX ORDER — PANTOPRAZOLE SODIUM 20 MG/1
1 TABLET, DELAYED RELEASE ORAL
Qty: 0 | Refills: 0 | DISCHARGE
Start: 2019-05-18

## 2019-05-18 RX ADMIN — Medication 50 MILLIGRAM(S): at 03:36

## 2019-05-18 RX ADMIN — SODIUM CHLORIDE 200 MILLILITER(S): 9 INJECTION, SOLUTION INTRAVENOUS at 06:20

## 2019-05-18 RX ADMIN — Medication 1 GRAM(S): at 23:41

## 2019-05-18 RX ADMIN — PANTOPRAZOLE SODIUM 40 MILLIGRAM(S): 20 TABLET, DELAYED RELEASE ORAL at 06:16

## 2019-05-18 RX ADMIN — SODIUM CHLORIDE 200 MILLILITER(S): 9 INJECTION, SOLUTION INTRAVENOUS at 17:01

## 2019-05-18 RX ADMIN — SODIUM CHLORIDE 200 MILLILITER(S): 9 INJECTION, SOLUTION INTRAVENOUS at 23:42

## 2019-05-18 RX ADMIN — SODIUM CHLORIDE 200 MILLILITER(S): 9 INJECTION, SOLUTION INTRAVENOUS at 01:03

## 2019-05-18 RX ADMIN — Medication 1 GRAM(S): at 17:01

## 2019-05-18 RX ADMIN — SODIUM CHLORIDE 200 MILLILITER(S): 9 INJECTION, SOLUTION INTRAVENOUS at 11:51

## 2019-05-18 RX ADMIN — Medication 1 GRAM(S): at 11:50

## 2019-05-18 RX ADMIN — PANTOPRAZOLE SODIUM 40 MILLIGRAM(S): 20 TABLET, DELAYED RELEASE ORAL at 17:01

## 2019-05-18 RX ADMIN — ENOXAPARIN SODIUM 40 MILLIGRAM(S): 100 INJECTION SUBCUTANEOUS at 21:00

## 2019-05-18 RX ADMIN — Medication 1 GRAM(S): at 06:16

## 2019-05-18 RX ADMIN — MORPHINE SULFATE 4 MILLIGRAM(S): 50 CAPSULE, EXTENDED RELEASE ORAL at 02:20

## 2019-05-18 NOTE — DISCHARGE NOTE PROVIDER - HOSPITAL COURSE
38 y o F with PMH of gastric ulcer on sucralfate and ovarian cysts presented to ER with c/o diffuse abdominal pain for 2 wks duration now. Pt reports diffuse abdominal pain band like , non radiating , sharp , 10/10 , associated with nausea and vomiting. Pt does not report any fevers but does report chills. Pt had no diarrhea but has h/o constipation , reports that last bowel movement was , it was hard stools, no blood or mucus in stools. Her appetite is very poor sec to fear of eating as it exacerbates the pain. Pt reports it all started earlier this month, pt had an ER visit on 5/4 with similar symptoms, had CT abd and us that was unremarkable.  Pt was recommended to f/u with GI as out pt for further recommendations. Ct abd done last year showed thickened base of appendix was seen by  surgery and , was recommended to f/u with GI and return to clinic. Pt had colonoscopy in March and was found to have a polyp and biopsies were done. Biopsies did not show any evidence of inflammation. Previous RUQ sono neg for stones or sludge.        In the ED patient had an elevated lipase to 483, admitted for acute pancreatitis, started on IVF. Subsequently began to have diarrhea, continued nausea and abdominal pain. Cdiff and GI PCR neg, remained afebrile, no leukocytosis, vitals stable, LFT's wnl. Repeat RUQ sono showed no evidence of stones or sludge. GI eval rec NPO, EGD performed to evaluate previous gastric ulcer which showed resolution of ulcer and no other significant findings. CT abdomen w/ IV contrast done for persistent abdominal pain to r/o necrotizing pancreatitis which was neg, MRCP done also neg for structural abnormalities. Following imaging, nausea/vomiting and abdominal pain improved, tolerating diet. Cleared from GI for outpatient f/u. C/w PPI qd, low fat diet.         TVUS done in the ED for abdominal pain and hx of ovarian cyst showed hemorrhagic ovarian cyst, large without torsion. GYN eval done, repeat sono in 6-8 weeks, outpatient GYN f/u. Torsion precautions given to patient.         Patient clinically improved, labs reviewed, clinically stable for d/c with outpatient GI and GYN f/u. 38 y o F with PMH of gastric ulcer on sucralfate and ovarian cysts presented to ER with c/o diffuse abdominal pain for 2 wks duration now. Pt reports diffuse abdominal pain band like , non radiating , sharp , 10/10 , associated with nausea and vomiting. Pt does not report any fevers but does report chills. Pt had no diarrhea but has h/o constipation , reports that last bowel movement was , it was hard stools, no blood or mucus in stools. Her appetite is very poor sec to fear of eating as it exacerbates the pain. Pt reports it all started earlier this month, pt had an ER visit on 5/4 with similar symptoms, had CT abd and us that was unremarkable.  Pt was recommended to f/u with GI as out pt for further recommendations. Ct abd done last year showed thickened base of appendix was seen by  surgery and , was recommended to f/u with GI and return to clinic. Pt had colonoscopy in March and was found to have a polyp and biopsies were done. Biopsies did not show any evidence of inflammation. Previous RUQ sono neg for stones or sludge.        In the ED patient had an elevated lipase to 483, admitted for acute pancreatitis, started on IVF. Subsequently began to have diarrhea, continued nausea and abdominal pain. Cdiff and GI PCR neg, remained afebrile, no leukocytosis, vitals stable, LFT's wnl. Repeat RUQ sono showed no evidence of stones or sludge. GI eval rec NPO, EGD performed to evaluate previous gastric ulcer which showed resolution of ulcer and no other significant findings. CT abdomen w/ IV contrast done for persistent abdominal pain to r/o necrotizing pancreatitis which was neg, MRCP done also neg for structural abnormalities. Following imaging, nausea/vomiting and abdominal pain improved, tolerating diet. Cleared from GI for outpatient f/u. C/w PPI qd, low fat diet.         TVUS done in the ED for abdominal pain and hx of ovarian cyst showed hemorrhagic ovarian cyst, large without torsion. GYN eval done, repeat sono in 6-8 weeks, outpatient GYN f/u. Torsion precautions given to patient.         Patient clinically improved, labs reviewed, clinically stable for d/c with outpatient GI and GYN f/u.        Vital Signs Last 24 Hrs    T(C): 35.9 (19 May 2019 05:11), Max: 36.7 (18 May 2019 21:03)    T(F): 96.7 (19 May 2019 05:11), Max: 98 (18 May 2019 21:03)    HR: 74 (19 May 2019 05:11) (74 - 99)    BP: 111/64 (19 May 2019 05:11) (111/64 - 124/59)    RR: 18 (19 May 2019 05:11) (18 - 18)    SpO2: 99% (18 May 2019 23:49) (99% - 99%)        PHYSICAL EXAM:    GENERAL: NAD, well-developed    HEAD:  Atraumatic, Normocephalic    EYES: EOMI, PERRLA, conjunctiva and sclera clear    NECK: Supple, No JVD    CHEST/LUNG: Clear to auscultation bilaterally; No wheeze    HEART: Regular rate and rhythm; No murmurs, rubs, or gallops    ABDOMEN: Soft, Nontender, Nondistended; Bowel sounds present    EXTREMITIES:  2+ Peripheral Pulses, No clubbing, cyanosis, or edema    PSYCH: AAOx3    NEUROLOGY: non-focal    SKIN: No rashes or lesions        05-18        135  |  98  |  3<L>    ----------------------------<  112<H>    4.4   |  22  |  0.5<L>        Ca    9.3      18 May 2019 05:54        TPro  6.3  /  Alb  3.8  /  TBili  0.2  /  DBili  <0.2  /  AST  14  /  ALT  15  /  AlkPhos  68  05-18            MRI Abd    Impression:    1. No biliary distention, filling defect, or stricture.    2. Left ovarian 4.7 cm cyst.        Assessment:    Possible Pancreatitis    Hemorrhagic Ovarian Cyst        Plan: d/c home f/u [facility] and GI clinic if need and GYN clinic next week        Meds: per medrec / no narcotics 38 y o F with PMH of gastric ulcer on sucralfate and ovarian cysts presented to ER with c/o diffuse abdominal pain for 2 wks duration now. Pt reports diffuse abdominal pain band like , non radiating , sharp , 10/10 , associated with nausea and vomiting. Pt does not report any fevers but does report chills. Pt had no diarrhea but has h/o constipation , reports that last bowel movement was , it was hard stools, no blood or mucus in stools. Her appetite is very poor sec to fear of eating as it exacerbates the pain. Pt reports it all started earlier this month, pt had an ER visit on 5/4 with similar symptoms, had CT abd and us that was unremarkable.  Pt was recommended to f/u with GI as out pt for further recommendations. Ct abd done last year showed thickened base of appendix was seen by  surgery and , was recommended to f/u with GI and return to clinic. Pt had colonoscopy in March and was found to have a polyp and biopsies were done. Biopsies did not show any evidence of inflammation. Previous RUQ sono neg for stones or sludge.        In the ED patient had an elevated lipase to 483, admitted for acute pancreatitis, started on IVF. Subsequently began to have diarrhea, continued nausea and abdominal pain. Cdiff and GI PCR neg, remained afebrile, no leukocytosis, vitals stable, LFT's wnl. Repeat RUQ sono showed no evidence of stones or sludge. GI eval rec NPO, EGD performed to evaluate previous gastric ulcer which showed resolution of ulcer and no other significant findings. CT abdomen w/ IV contrast done for persistent abdominal pain to r/o necrotizing pancreatitis which was neg, MRCP done also neg for structural abnormalities. Following imaging, nausea/vomiting and abdominal pain improved, tolerating diet. Cleared from GI for outpatient f/u. C/w PPI qd, low fat diet.         TVUS done in the ED for abdominal pain and hx of ovarian cyst showed hemorrhagic ovarian cyst, large without torsion. GYN eval done, repeat sono in 6-8 weeks, outpatient GYN f/u. Torsion precautions given to patient.         Patient clinically improved, labs reviewed, clinically stable for d/c with outpatient GI and GYN f/u.        Vital Signs Last 24 Hrs    T(C): 35.9 (19 May 2019 05:11), Max: 36.7 (18 May 2019 21:03)    T(F): 96.7 (19 May 2019 05:11), Max: 98 (18 May 2019 21:03)    HR: 74 (19 May 2019 05:11) (74 - 99)    BP: 111/64 (19 May 2019 05:11) (111/64 - 124/59)    RR: 18 (19 May 2019 05:11) (18 - 18)    SpO2: 99% (18 May 2019 23:49) (99% - 99%)        PHYSICAL EXAM:    GENERAL: NAD, well-developed    HEAD:  Atraumatic, Normocephalic    EYES: EOMI, PERRLA, conjunctiva and sclera clear    NECK: Supple, No JVD    CHEST/LUNG: Clear to auscultation bilaterally; No wheeze    HEART: Regular rate and rhythm; No murmurs, rubs, or gallops    ABDOMEN: Soft, Nontender, Nondistended; Bowel sounds present    EXTREMITIES:  2+ Peripheral Pulses, No clubbing, cyanosis, or edema    PSYCH: AAOx3    NEUROLOGY: non-focal    SKIN: No rashes or lesions        05-18        135  |  98  |  3<L>    ----------------------------<  112<H>    4.4   |  22  |  0.5<L>        Ca    9.3      18 May 2019 05:54        TPro  6.3  /  Alb  3.8  /  TBili  0.2  /  DBili  <0.2  /  AST  14  /  ALT  15  /  AlkPhos  68  05-18            MRI Abd    Impression:    1. No biliary distention, filling defect, or stricture.    2. Left ovarian 4.7 cm cyst.        Assessment:    Possible Pancreatitis    Hemorrhagic Ovarian Cyst    Healing Gastric Ulcer         Plan: d/c home f/u [facility] and GI clinic if need and GYN clinic next week. PPI/sSucralfate         Meds: per medrec / no narcotics

## 2019-05-18 NOTE — DISCHARGE NOTE PROVIDER - NSFOLLOWUPCLINICS_GEN_ALL_ED_FT
[facility]  Medicine  [address], [place]   Phone: [phone]  Fax:   Follow Up Time: 7-10 Days    [facility]  OB/GYN  [address] [place] [address]  Phone: [phone]  Fax:   Follow Up Time: Routine

## 2019-05-18 NOTE — PROGRESS NOTE ADULT - ASSESSMENT
39 yo F with mild acute pancreatitis RO gallstone vs idiopathic etiology.    1)Mild acute pancreatitis-  Resolved   elevated lipase and based on symptoms, ON CT abd - no pancreatitis    etiology unclear ? alcohol induced  No gallstones , no billary pathology on MRCP     2)L ovarian cyst  3)Esophageal ulcer, healed on repeat EGD    Rec:  - Advance diet to low fat   - DC IV fluids   - DC pain meds   - PPI QD  Outpatient Follow up in [facility]

## 2019-05-18 NOTE — DISCHARGE NOTE PROVIDER - CARE PROVIDER_API CALL
09-Mar-2019 03:12 Rita Maldonado)  Internal Medicine  [address] [place] [address]  Phone: [phone]  Fax: [phone]  Follow Up Time: 2 weeks

## 2019-05-18 NOTE — PROGRESS NOTE ADULT - SUBJECTIVE AND OBJECTIVE BOX
GI HPI Today:  Patient is a 38y old  Female who presents with a chief complaint of Abdominal pain , chills (18 May 2019 15:04)  GI following the patient for mild pancreatitis   Interval Events  Abd pain resolved   no nausea, no vomiting , no fever, no chills , no diarrhea         PAST MEDICAL & SURGICAL HISTORY  Peptic ulcer  Fibroid, uterine  Abscess: vaginal  Fibroid: uterine surgery  Delivery with history of       ALLERGIES:  iodine containing compounds (Unknown)  shellfish (Anaphylaxis)      MEDICATIONS:  MEDICATIONS  (STANDING):  chlorhexidine 4% Liquid 1 Application(s) Topical <User Schedule>  enoxaparin Injectable 40 milliGRAM(s) SubCutaneous at bedtime  lactated ringers. 1000 milliLiter(s) (200 mL/Hr) IV Continuous <Continuous>  pantoprazole    Tablet 40 milliGRAM(s) Oral two times a day  sucralfate 1 Gram(s) Oral four times a day    MEDICATIONS  (PRN):  aluminum hydroxide/magnesium hydroxide/simethicone Suspension 30 milliLiter(s) Oral every 4 hours PRN Dyspepsia  morphine  - Injectable 4 milliGRAM(s) IV Push every 3 hours PRN Severe Pain (7 - 10)  ondansetron Injectable 4 milliGRAM(s) IV Push three times a day PRN Nausea and/or Vomiting      REVIEW OF SYSTEMS  General:  No fevers  Eyes:  No reported pain   ENT:  No sore throat   NECK: No stiffness   CV:  No chest pain   Resp:  No shortness of breath  GI:  See HPI  :  No dysuria  Muscle:  No weakness  Neuro:  No tingling  Endocrine:  No polyuria  Heme:  No ecchymosis        VITALS:   T(F): 97.5 ( @ 05:11), Max: 99.3 ( @ 12:06)  HR: 93 ( @ 05:11) (69 - 93)  BP: 124/66 ( @ 05:11) (88/50 - 151/69)  BP(mean): --  RR: 18 ( @ 05:11) (16 - 20)  SpO2: 96% ( @ 13:04) (96% - 97%)        PHYSICAL EXAM:  Gen: Comfortable   EYES: No scleral icterus   LUNG: Clear to auscultation bilaterally;  HEART: RRR; s1 and s2 heard   ABDOMEN: Soft, +BS, no abd distension , no Abdominal Tenderness, No guarding, No Vidales Sign   Neuro: AAO x 3  Ext: no edema      Blood Work :                        10.8   5.67  )-----------( 275      ( 17 May 2019 06:12 )             32.6       05-18    135  |  98  |  3<L>  ----------------------------<  112<H>  4.4   |  22  |  0.5<L>    Ca    9.3      18 May 2019 05:54      CBC -  ( 17 May 2019 06:12 )  Hemoglobin : 10.8    CBC -  ( 16 May 2019 07:34 )  Hemoglobin : 12.1    CBC -  ( 15 May 2019 06:25 )  Hemoglobin : 11.7      LIVER FUNCTIONS - ( 18 May 2019 05:54 )  Alb: 3.8 [3.5 - 5.2] / Pro: 6.3 [6.0 - 8.0] / ALK PHOS: 68 [30 - 115] / ALT: 15 [0 - 41] / AST: 14 [0 - 41] / GGT: x     LIVER FUNCTIONS - ( 17 May 2019 06:12 )  Alb: 3.4 [3.5 - 5.2] / Pro: 5.4 [6.0 - 8.0] / ALK PHOS: 58 [30 - 115] / ALT: 12 [0 - 41] / AST: 10 [0 - 41] / GGT: x     LIVER FUNCTIONS - ( 16 May 2019 07:34 )  Alb: 3.6 [3.5 - 5.2] / Pro: 6.2 [6.0 - 8.0] / ALK PHOS: 67 [30 - 115] / ALT: 14 [0 - 41] / AST: 10 [0 - 41] / GGT: x     LIVER FUNCTIONS - ( 15 May 2019 06:25 )  Alb: 3.6 [3.5 - 5.2] / Pro: 6.0 [6.0 - 8.0] / ALK PHOS: 66 [30 - 115] / ALT: 16 [0 - 41] / AST: 11 [0 - 41] / GGT: x     LIVER FUNCTIONS - ( 14 May 2019 13:55 )  Alb: 4.1 [3.5 - 5.2] / Pro: 6.7 [6.0 - 8.0] / ALK PHOS: 75 [30 - 115] / ALT: 19 [0 - 41] / AST: 14 [0 - 41] / GGT: x         RADIOLOGY:  < from: MR Abdomen No Cont (19 @ 11:39) >    Impression:  1. No biliary distention, filling defect, or stricture.  2. Left ovarian 4.7 cm cyst.    < end of copied text >    < from: CT Abdomen and Pelvis w/ IV Cont (19 @ 05:36) >  IMPRESSION:   1. Unremarkable CT evaluation of pancreas.  2. Left ovarian 5.1 cm cyst, previously 3.1 cm.    < end of copied text > GI HPI Today:  Patient is a 38y old  Female who presents with a chief complaint of Abdominal pain , chills (18 May 2019 15:04)  GI following the patient for mild pancreatitis   Interval Events  Abd pain resolved   no nausea, no vomiting , no fever, no chills , no diarrhea   + appetite        PAST MEDICAL & SURGICAL HISTORY  Peptic ulcer  Fibroid, uterine  Abscess: vaginal  Fibroid: uterine surgery  Delivery with history of       ALLERGIES:  iodine containing compounds (Unknown)  shellfish (Anaphylaxis)      MEDICATIONS:  MEDICATIONS  (STANDING):  chlorhexidine 4% Liquid 1 Application(s) Topical <User Schedule>  enoxaparin Injectable 40 milliGRAM(s) SubCutaneous at bedtime  lactated ringers. 1000 milliLiter(s) (200 mL/Hr) IV Continuous <Continuous>  pantoprazole    Tablet 40 milliGRAM(s) Oral two times a day  sucralfate 1 Gram(s) Oral four times a day    MEDICATIONS  (PRN):  aluminum hydroxide/magnesium hydroxide/simethicone Suspension 30 milliLiter(s) Oral every 4 hours PRN Dyspepsia  morphine  - Injectable 4 milliGRAM(s) IV Push every 3 hours PRN Severe Pain (7 - 10)  ondansetron Injectable 4 milliGRAM(s) IV Push three times a day PRN Nausea and/or Vomiting      REVIEW OF SYSTEMS  General:  No fevers  Eyes:  No reported pain   ENT:  No sore throat   NECK: No stiffness   CV:  No chest pain   Resp:  No shortness of breath  GI:  See HPI  :  No dysuria  Muscle:  No weakness  Neuro:  No tingling  Endocrine:  No polyuria  Heme:  No ecchymosis        VITALS:   T(F): 97.5 ( @ 05:11), Max: 99.3 ( @ 12:06)  HR: 93 ( @ 05:11) (69 - 93)  BP: 124/66 ( @ 05:11) (88/50 - 151/69)  BP(mean): --  RR: 18 ( @ 05:11) (16 - 20)  SpO2: 96% ( @ 13:04) (96% - 97%)        PHYSICAL EXAM:  Gen: Comfortable   EYES: No scleral icterus   LUNG: Clear to auscultation bilaterally;  HEART: RRR; s1 and s2 heard   ABDOMEN: Soft, +BS, no abd distension , no Abdominal Tenderness, No guarding, No Vidales Sign   Neuro: AAO x 3  Ext: no edema      Blood Work :                        10.8   5.67  )-----------( 275      ( 17 May 2019 06:12 )             32.6       05-18    135  |  98  |  3<L>  ----------------------------<  112<H>  4.4   |  22  |  0.5<L>    Ca    9.3      18 May 2019 05:54      CBC -  ( 17 May 2019 06:12 )  Hemoglobin : 10.8    CBC -  ( 16 May 2019 07:34 )  Hemoglobin : 12.1    CBC -  ( 15 May 2019 06:25 )  Hemoglobin : 11.7      LIVER FUNCTIONS - ( 18 May 2019 05:54 )  Alb: 3.8 [3.5 - 5.2] / Pro: 6.3 [6.0 - 8.0] / ALK PHOS: 68 [30 - 115] / ALT: 15 [0 - 41] / AST: 14 [0 - 41] / GGT: x     LIVER FUNCTIONS - ( 17 May 2019 06:12 )  Alb: 3.4 [3.5 - 5.2] / Pro: 5.4 [6.0 - 8.0] / ALK PHOS: 58 [30 - 115] / ALT: 12 [0 - 41] / AST: 10 [0 - 41] / GGT: x     LIVER FUNCTIONS - ( 16 May 2019 07:34 )  Alb: 3.6 [3.5 - 5.2] / Pro: 6.2 [6.0 - 8.0] / ALK PHOS: 67 [30 - 115] / ALT: 14 [0 - 41] / AST: 10 [0 - 41] / GGT: x     LIVER FUNCTIONS - ( 15 May 2019 06:25 )  Alb: 3.6 [3.5 - 5.2] / Pro: 6.0 [6.0 - 8.0] / ALK PHOS: 66 [30 - 115] / ALT: 16 [0 - 41] / AST: 11 [0 - 41] / GGT: x     LIVER FUNCTIONS - ( 14 May 2019 13:55 )  Alb: 4.1 [3.5 - 5.2] / Pro: 6.7 [6.0 - 8.0] / ALK PHOS: 75 [30 - 115] / ALT: 19 [0 - 41] / AST: 14 [0 - 41] / GGT: x         RADIOLOGY:  < from: MR Abdomen No Cont (19 @ 11:39) >    Impression:  1. No biliary distention, filling defect, or stricture.  2. Left ovarian 4.7 cm cyst.    < end of copied text >    < from: CT Abdomen and Pelvis w/ IV Cont (19 @ 05:36) >  IMPRESSION:   1. Unremarkable CT evaluation of pancreas.  2. Left ovarian 5.1 cm cyst, previously 3.1 cm.    < end of copied text >

## 2019-05-18 NOTE — DISCHARGE NOTE PROVIDER - NSDCCPCAREPLAN_GEN_ALL_CORE_FT
PRINCIPAL DISCHARGE DIAGNOSIS  Diagnosis: Pancreatitis  Assessment and Plan of Treatment: Your abdominal pain was likely due to pancreatitis and improved with IV hydration and supportive care. Please follow a low fat diet to prevent recurrence, continue taking Protonix and follow up with the GI doctors at the [facility] clinic in 1-2 weeks.      SECONDARY DISCHARGE DIAGNOSES  Diagnosis: Hemorrhagic cyst of left ovary  Assessment and Plan of Treatment: Please follow up with your gynecologist in 6-8 weeks to repeat imaging and evaluate cyst found on imaging. If you notice any signs/symptoms of torsion as explained by the GYN doctors, return to the ED immediately.

## 2019-05-18 NOTE — PROGRESS NOTE ADULT - SUBJECTIVE AND OBJECTIVE BOX
MU OLVERA 38y Female  MRN#: [id number]     SUBJECTIVE  Patient is a 38y old Female who presents with a chief complaint of Abdominal pain , chills (17 May 2019 09:13)  Currently admitted to medicine with the primary diagnosis of Pancreatitis    Today is hospital day 4d. Today pt feels a little better and slept well last night and per the nurse did not ask for pain meds.    OBJECTIVE  PAST MEDICAL & SURGICAL HISTORY  Peptic ulcer  Fibroid, uterine  Abscess: vaginal  Fibroid: uterine surgery  Delivery with history of     ALLERGIES:  iodine containing compounds (Unknown)  shellfish (Anaphylaxis)    MEDICATIONS:    MEDICATIONS  (STANDING):  chlorhexidine 4% Liquid 1 Application(s) Topical <User Schedule>  enoxaparin Injectable 40 milliGRAM(s) SubCutaneous at bedtime  lactated ringers. 1000 milliLiter(s) (200 mL/Hr) IV Continuous <Continuous>  pantoprazole    Tablet 40 milliGRAM(s) Oral two times a day  sucralfate 1 Gram(s) Oral four times a day    MEDICATIONS  (PRN):  aluminum hydroxide/magnesium hydroxide/simethicone Suspension 30 milliLiter(s) Oral every 4 hours PRN Dyspepsia  morphine  - Injectable 4 milliGRAM(s) IV Push every 3 hours PRN Severe Pain (7 - 10)  ondansetron Injectable 4 milliGRAM(s) IV Push three times a day PRN Nausea and/or Vomiting      VITAL SIGNS: Last 24 Hours    T(C): 36.4 (18 May 2019 05:11), Max: 36.4 (18 May 2019 05:11)  T(F): 97.5 (18 May 2019 05:11), Max: 97.5 (18 May 2019 05:11)  HR: 93 (18 May 2019 05:11) (93 - 93)  BP: 124/66 (18 May 2019 05:11) (124/66 - 124/66)  RR: 18 (18 May 2019 05:11) (18 - 18)      LABS:                          10.8   5.67  )-----------( 275      ( 17 May 2019 06:12 )             32.6           135  |  98  |  3<L>  ----------------------------<  112<H>  4.4   |  22  |  0.5<L>    Ca    9.3      18 May 2019 05:54    TPro  6.3  /  Alb  3.8  /  TBili  0.2  /  DBili  <0.2  /  AST  14  /  ALT  15  /  AlkPhos  68  05-18    GI PCR Panel, Stool (collected 15 May 2019 06:42)  Source: .Stool Feces  Final Report (15 May 2019 21:44):    GI PCR Results: NOT detected    *******Please Note:*******    GI panel PCR evaluates for:    Campylobacter, Plesiomonas shigelloides, Salmonella,    Vibrio, Yersinia enterocolitica, Enteroaggregative    Escherichia coli (EAEC), Enteropathogenic E.coli (EPEC),    Enterotoxigenic E. coli (ETEC) lt/st, Shiga-like    toxin-producing E. coli (STEC) stx1/stx2,    Shigella/ Enteroinvasive E. coli (EIEC), Cryptosporidium,    Cyclospora cayetanensis, Entamoeba histolytica,    Giardia lamblia, Adenovirus F 40/41, Astrovirus,    Norovirus GI/GII, Rotavirus A, Sapovirus    RADIOLOGY:  none     PHYSICAL EXAM:  GENERAL: NAD, obese, AAOx3  HEENT: Atraumatic, Normocephalic. EOMI   PULMONARY: Clear to auscultation bilaterally; decreased breath sounds at bases   CARDIOVASCULAR: Regular rate and rhythm; No murmurs   GASTROINTESTINAL: Soft, TTP LLQ, no rebound, no guarding, obese abdomen; Bowel sounds present  EXTREMITIES: 2+ Peripheral Pulses, No clubbing, cyanosis, or edema  NEUROLOGY: non-focal

## 2019-05-18 NOTE — PROGRESS NOTE ADULT - ASSESSMENT
ADMISSION SUMMARY  38 y o F with PMH of gastric ulcer on sucralfate and ovarian cysts presented to ER with c/o diffuse abdominal pain for 2 wks duration now. Pt reports diffuse abdominal pain band like, non radiating , sharp, 10/10, associated with nausea and vomiting. Pt does not report any fevers but does report chills.     ASSESSMENT & PLAN    # ABDOMINAL PAIN / NAUSEA VOMITING secondary to acute pancreatitis  - US abdomen previously done without CBD dilation or cholelithiasis  - Repeat RUQ sono negative as well, poor pancreas visualization due to bowel gas  - Diarrhea now improved - C.diff neg, GI PCR panel neg as well  - GI following, recs appreciated - likely acute pancreatitis, lipase 483, c/w IVF @ 200cc/hr, de-escalate to NPO.   - S/p EGD (5/16): previous GEJ ulcer completely healed, erythema in stomach (biopsy taken), mild pancreatitis   - Possible due to EtOH use? though patient only admits to occasional use, LFT's wnl  - Pain control morphine 4mg q3h PRN and Zofran for nausea    # HEMORRHAGIC OVARIAN CYST  - Transvaginal us showed large hemorrhagic ovarian cyst without torsion  - Gyn eval appreciated - repeat sono in 6-8 weeks, outpatient GYN f/u    # GI ppx  - Diet: NPO   - Protonix and Carafate    # DVT  - Lovenox    # Disposition  - Acute, pending EGD  - Code status: FULL CODE    Summary   Pt has been seen and examined. Case and plan discussed at rounds and at bedside.  Pt is here for Pre-summed Acute Pancreatitis.  PUD / Esophageal Ulcer - healing s/p EGD 5/16/19  Try to advance diet to clear liquids today   Morphine 4mg IV q4h/prn  -250cc/hr   BMP / CBC in am  CT A/P IV contrast is Unremarkable   Awaiting MRCP Abdomen    Dispo: Acute

## 2019-05-19 ENCOUNTER — TRANSCRIPTION ENCOUNTER (OUTPATIENT)
Age: 39
End: 2019-05-19

## 2019-05-19 VITALS
SYSTOLIC BLOOD PRESSURE: 108 MMHG | TEMPERATURE: 98 F | DIASTOLIC BLOOD PRESSURE: 59 MMHG | RESPIRATION RATE: 18 BRPM | HEART RATE: 87 BPM

## 2019-05-19 RX ADMIN — MORPHINE SULFATE 4 MILLIGRAM(S): 50 CAPSULE, EXTENDED RELEASE ORAL at 00:15

## 2019-05-19 RX ADMIN — PANTOPRAZOLE SODIUM 40 MILLIGRAM(S): 20 TABLET, DELAYED RELEASE ORAL at 05:49

## 2019-05-19 RX ADMIN — MORPHINE SULFATE 4 MILLIGRAM(S): 50 CAPSULE, EXTENDED RELEASE ORAL at 08:10

## 2019-05-19 RX ADMIN — SODIUM CHLORIDE 200 MILLILITER(S): 9 INJECTION, SOLUTION INTRAVENOUS at 04:20

## 2019-05-19 RX ADMIN — Medication 1 GRAM(S): at 11:11

## 2019-05-19 RX ADMIN — MORPHINE SULFATE 4 MILLIGRAM(S): 50 CAPSULE, EXTENDED RELEASE ORAL at 08:25

## 2019-05-19 RX ADMIN — Medication 1 GRAM(S): at 05:49

## 2019-05-19 RX ADMIN — SODIUM CHLORIDE 200 MILLILITER(S): 9 INJECTION, SOLUTION INTRAVENOUS at 09:38

## 2019-05-19 NOTE — DISCHARGE NOTE NURSING/CASE MANAGEMENT/SOCIAL WORK - NSDCDPATPORTLINK_GEN_ALL_CORE
You can access the [facility] Patient Portal, offered by [facility], by registering with the following website: [email or web address]

## 2019-05-22 DIAGNOSIS — Z86.010 PERSONAL HISTORY OF COLONIC POLYPS: ICD-10-CM

## 2019-05-22 DIAGNOSIS — K31.89 OTHER DISEASES OF STOMACH AND DUODENUM: ICD-10-CM

## 2019-05-22 DIAGNOSIS — Z87.11 PERSONAL HISTORY OF PEPTIC ULCER DISEASE: ICD-10-CM

## 2019-05-22 DIAGNOSIS — K52.9 NONINFECTIVE GASTROENTERITIS AND COLITIS, UNSPECIFIED: ICD-10-CM

## 2019-05-22 DIAGNOSIS — Z91.041 RADIOGRAPHIC DYE ALLERGY STATUS: ICD-10-CM

## 2019-05-22 DIAGNOSIS — Z80.0 FAMILY HISTORY OF MALIGNANT NEOPLASM OF DIGESTIVE ORGANS: ICD-10-CM

## 2019-05-22 DIAGNOSIS — K85.20 ALCOHOL INDUCED ACUTE PANCREATITIS WITHOUT NECROSIS OR INFECTION: ICD-10-CM

## 2019-05-22 DIAGNOSIS — Z91.013 ALLERGY TO SEAFOOD: ICD-10-CM

## 2019-05-22 DIAGNOSIS — K85.90 ACUTE PANCREATITIS WITHOUT NECROSIS OR INFECTION, UNSPECIFIED: ICD-10-CM

## 2019-05-22 DIAGNOSIS — N83.202 UNSPECIFIED OVARIAN CYST, LEFT SIDE: ICD-10-CM

## 2019-05-22 DIAGNOSIS — E66.9 OBESITY, UNSPECIFIED: ICD-10-CM

## 2019-05-24 ENCOUNTER — APPOINTMENT (OUTPATIENT)
Dept: OBGYN | Facility: CLINIC | Age: 39
End: 2019-05-24

## 2019-06-28 ENCOUNTER — RECORD ABSTRACTING (OUTPATIENT)
Age: 39
End: 2019-06-28

## 2019-06-28 DIAGNOSIS — Z78.9 OTHER SPECIFIED HEALTH STATUS: ICD-10-CM

## 2019-06-28 DIAGNOSIS — K64.9 UNSPECIFIED HEMORRHOIDS: ICD-10-CM

## 2019-07-02 ENCOUNTER — APPOINTMENT (OUTPATIENT)
Dept: GASTROENTEROLOGY | Facility: CLINIC | Age: 39
End: 2019-07-02

## 2019-08-01 ENCOUNTER — OUTPATIENT (OUTPATIENT)
Dept: OUTPATIENT SERVICES | Facility: HOSPITAL | Age: 39
LOS: 1 days | End: 2019-08-01
Payer: MEDICAID

## 2019-08-01 DIAGNOSIS — O34.219 MATERNAL CARE FOR UNSPECIFIED TYPE SCAR FROM PREVIOUS CESAREAN DELIVERY: Chronic | ICD-10-CM

## 2019-08-01 DIAGNOSIS — D25.9 LEIOMYOMA OF UTERUS, UNSPECIFIED: Chronic | ICD-10-CM

## 2019-08-01 PROCEDURE — G9001: CPT

## 2019-08-06 ENCOUNTER — APPOINTMENT (OUTPATIENT)
Dept: GASTROENTEROLOGY | Facility: CLINIC | Age: 39
End: 2019-08-06
Payer: MEDICAID

## 2019-08-06 VITALS
HEIGHT: 60 IN | DIASTOLIC BLOOD PRESSURE: 80 MMHG | HEART RATE: 70 BPM | SYSTOLIC BLOOD PRESSURE: 130 MMHG | BODY MASS INDEX: 38.09 KG/M2 | WEIGHT: 194 LBS

## 2019-08-06 PROCEDURE — 99214 OFFICE O/P EST MOD 30 MIN: CPT

## 2019-08-06 RX ORDER — PANTOPRAZOLE SODIUM 20 MG/1
TABLET, DELAYED RELEASE ORAL
Refills: 0 | Status: DISCONTINUED | COMMUNITY
End: 2019-08-06

## 2019-08-06 RX ORDER — NORGESTIMATE AND ETHINYL ESTRADIOL 7DAYSX3 28
0.18/0.215/0.25 KIT ORAL DAILY
Qty: 1 | Refills: 3 | Status: DISCONTINUED | COMMUNITY
Start: 2018-08-31 | End: 2019-08-06

## 2019-08-06 NOTE — REASON FOR VISIT
[Post Hospitalization] : a post hospitalization visit [FreeTextEntry1] : POst hospitalization for Pancreatitis 05/16/19

## 2019-08-06 NOTE — PHYSICAL EXAM
[General Appearance - Alert] : alert [General Appearance - In No Acute Distress] : in no acute distress [Sclera] : the sclera and conjunctiva were normal [Extraocular Movements] : extraocular movements were intact [Outer Ear] : the ears and nose were normal in appearance [Oropharynx] : the oropharynx was normal [] : no respiratory distress [Auscultation Breath Sounds / Voice Sounds] : lungs were clear to auscultation bilaterally [Edema] : there was no peripheral edema [Epigastric] : in the epigastric area [Periumbilical] : in the periumbilical area [LUQ] : in the left upper quadrant [Cervical Lymph Nodes Enlarged Posterior Bilaterally] : posterior cervical [Supraclavicular Lymph Nodes Enlarged Bilaterally] : supraclavicular [Cervical Lymph Nodes Enlarged Anterior Bilaterally] : anterior cervical [Axillary Lymph Nodes Enlarged Bilaterally] : axillary [Oriented To Time, Place, And Person] : oriented to person, place, and time [Impaired Insight] : insight and judgment were intact [Affect] : the affect was normal

## 2019-08-16 ENCOUNTER — LABORATORY RESULT (OUTPATIENT)
Age: 39
End: 2019-08-16

## 2019-08-16 ENCOUNTER — APPOINTMENT (OUTPATIENT)
Dept: OBGYN | Facility: CLINIC | Age: 39
End: 2019-08-16
Payer: MEDICAID

## 2019-08-16 ENCOUNTER — OUTPATIENT (OUTPATIENT)
Dept: OUTPATIENT SERVICES | Facility: HOSPITAL | Age: 39
LOS: 1 days | Discharge: HOME | End: 2019-08-16

## 2019-08-16 ENCOUNTER — RESULT CHARGE (OUTPATIENT)
Age: 39
End: 2019-08-16

## 2019-08-16 VITALS
SYSTOLIC BLOOD PRESSURE: 120 MMHG | WEIGHT: 192 LBS | DIASTOLIC BLOOD PRESSURE: 88 MMHG | BODY MASS INDEX: 37.69 KG/M2 | HEIGHT: 60 IN

## 2019-08-16 DIAGNOSIS — Z00.00 ENCOUNTER FOR GENERAL ADULT MEDICAL EXAMINATION WITHOUT ABNORMAL FINDINGS: ICD-10-CM

## 2019-08-16 DIAGNOSIS — O34.219 MATERNAL CARE FOR UNSPECIFIED TYPE SCAR FROM PREVIOUS CESAREAN DELIVERY: Chronic | ICD-10-CM

## 2019-08-16 DIAGNOSIS — D25.9 LEIOMYOMA OF UTERUS, UNSPECIFIED: Chronic | ICD-10-CM

## 2019-08-16 PROCEDURE — 99395 PREV VISIT EST AGE 18-39: CPT

## 2019-08-16 PROCEDURE — 99213 OFFICE O/P EST LOW 20 MIN: CPT | Mod: 25

## 2019-08-16 NOTE — PHYSICAL EXAM
[Awake] : awake [Alert] : alert [Soft] : soft [Oriented x3] : oriented to person, place, and time [Normal] : uterus [Pap Obtained] : a Pap smear was performed [No Bleeding] : there was no active vaginal bleeding [Adnexa Tenderness On The Left] : was tender to palpation [CTAB] : CTAB [RRR, No Murmurs] : RRR, no murmurs [Acute Distress] : no acute distress [Mass] : no breast mass [Tender] : no tenderness [Nipple Discharge] : no nipple discharge [Axillary LAD] : no axillary lymphadenopathy [Distended] : not distended [Discharge] : had no discharge [Motion Tenderness] : there was no cervical motion tenderness [Adnexa Tenderness On The Right] : was not tender [FreeTextEntry7] : minimal tenderness to palpation of cul-de-sac, difficulty to assess uterus due to patient discomfort and body habitus

## 2019-08-20 DIAGNOSIS — N93.9 ABNORMAL UTERINE AND VAGINAL BLEEDING, UNSPECIFIED: ICD-10-CM

## 2019-08-20 DIAGNOSIS — R10.2 PELVIC AND PERINEAL PAIN: ICD-10-CM

## 2019-08-20 DIAGNOSIS — Z01.419 ENCOUNTER FOR GYNECOLOGICAL EXAMINATION (GENERAL) (ROUTINE) WITHOUT ABNORMAL FINDINGS: ICD-10-CM

## 2019-08-21 ENCOUNTER — EMERGENCY (EMERGENCY)
Facility: HOSPITAL | Age: 39
LOS: 0 days | Discharge: HOME | End: 2019-08-21
Attending: EMERGENCY MEDICINE | Admitting: EMERGENCY MEDICINE
Payer: MEDICAID

## 2019-08-21 VITALS
SYSTOLIC BLOOD PRESSURE: 123 MMHG | DIASTOLIC BLOOD PRESSURE: 67 MMHG | HEART RATE: 67 BPM | OXYGEN SATURATION: 99 % | RESPIRATION RATE: 16 BRPM | TEMPERATURE: 98 F

## 2019-08-21 VITALS
DIASTOLIC BLOOD PRESSURE: 80 MMHG | OXYGEN SATURATION: 99 % | WEIGHT: 194.01 LBS | HEIGHT: 60 IN | HEART RATE: 82 BPM | RESPIRATION RATE: 18 BRPM | TEMPERATURE: 98 F | SYSTOLIC BLOOD PRESSURE: 132 MMHG

## 2019-08-21 DIAGNOSIS — D25.9 LEIOMYOMA OF UTERUS, UNSPECIFIED: Chronic | ICD-10-CM

## 2019-08-21 DIAGNOSIS — K85.90 ACUTE PANCREATITIS WITHOUT NECROSIS OR INFECTION, UNSPECIFIED: ICD-10-CM

## 2019-08-21 DIAGNOSIS — R10.2 PELVIC AND PERINEAL PAIN: ICD-10-CM

## 2019-08-21 DIAGNOSIS — O34.219 MATERNAL CARE FOR UNSPECIFIED TYPE SCAR FROM PREVIOUS CESAREAN DELIVERY: Chronic | ICD-10-CM

## 2019-08-21 LAB
A VAGINAE DNA VAG QL NAA+PROBE: ABNORMAL
ALBUMIN SERPL ELPH-MCNC: 4.2 G/DL — SIGNIFICANT CHANGE UP (ref 3.5–5.2)
ALP SERPL-CCNC: 76 U/L — SIGNIFICANT CHANGE UP (ref 30–115)
ALT FLD-CCNC: 14 U/L — SIGNIFICANT CHANGE UP (ref 0–41)
ANION GAP SERPL CALC-SCNC: 8 MMOL/L — SIGNIFICANT CHANGE UP (ref 7–14)
APPEARANCE UR: CLEAR — SIGNIFICANT CHANGE UP
AST SERPL-CCNC: 12 U/L — SIGNIFICANT CHANGE UP (ref 0–41)
BACTERIA # UR AUTO: NEGATIVE — SIGNIFICANT CHANGE UP
BASOPHILS # BLD AUTO: 0.04 K/UL — SIGNIFICANT CHANGE UP (ref 0–0.2)
BASOPHILS NFR BLD AUTO: 0.5 % — SIGNIFICANT CHANGE UP (ref 0–1)
BILIRUB SERPL-MCNC: <0.2 MG/DL — SIGNIFICANT CHANGE UP (ref 0.2–1.2)
BILIRUB UR-MCNC: NEGATIVE — SIGNIFICANT CHANGE UP
BUN SERPL-MCNC: 13 MG/DL — SIGNIFICANT CHANGE UP (ref 10–20)
BVAB2 DNA VAG QL NAA+PROBE: ABNORMAL
C KRUSEI DNA VAG QL NAA+PROBE: NEGATIVE
C TRACH RRNA SPEC QL NAA+PROBE: NEGATIVE
CALCIUM SERPL-MCNC: 9.1 MG/DL — SIGNIFICANT CHANGE UP (ref 8.5–10.1)
CHLORIDE SERPL-SCNC: 104 MMOL/L — SIGNIFICANT CHANGE UP (ref 98–110)
CHOLEST SERPL-MCNC: 150 MG/DL — SIGNIFICANT CHANGE UP (ref 100–200)
CO2 SERPL-SCNC: 27 MMOL/L — SIGNIFICANT CHANGE UP (ref 17–32)
COLOR SPEC: SIGNIFICANT CHANGE UP
CREAT SERPL-MCNC: 0.8 MG/DL — SIGNIFICANT CHANGE UP (ref 0.7–1.5)
DIFF PNL FLD: NEGATIVE — SIGNIFICANT CHANGE UP
EOSINOPHIL # BLD AUTO: 0.46 K/UL — SIGNIFICANT CHANGE UP (ref 0–0.7)
EOSINOPHIL NFR BLD AUTO: 5.5 % — SIGNIFICANT CHANGE UP (ref 0–8)
EPI CELLS # UR: 2 /HPF — SIGNIFICANT CHANGE UP (ref 0–5)
GLUCOSE SERPL-MCNC: 91 MG/DL — SIGNIFICANT CHANGE UP (ref 70–99)
GLUCOSE UR QL: NEGATIVE — SIGNIFICANT CHANGE UP
HCG SERPL-ACNC: <0.6 MIU/ML — SIGNIFICANT CHANGE UP
HCG UR QL: NEGATIVE
HCT VFR BLD CALC: 39.6 % — SIGNIFICANT CHANGE UP (ref 37–47)
HDLC SERPL-MCNC: 68 MG/DL — SIGNIFICANT CHANGE UP
HGB BLD-MCNC: 12.8 G/DL — SIGNIFICANT CHANGE UP (ref 12–16)
HYALINE CASTS # UR AUTO: 1 /LPF — SIGNIFICANT CHANGE UP (ref 0–7)
IMM GRANULOCYTES NFR BLD AUTO: 0.1 % — SIGNIFICANT CHANGE UP (ref 0.1–0.3)
KETONES UR-MCNC: NEGATIVE — SIGNIFICANT CHANGE UP
LEUKOCYTE ESTERASE UR-ACNC: NEGATIVE — SIGNIFICANT CHANGE UP
LIDOCAIN IGE QN: >300 U/L — HIGH (ref 7–60)
LIPID PNL WITH DIRECT LDL SERPL: 69 MG/DL — SIGNIFICANT CHANGE UP (ref 4–129)
LYMPHOCYTES # BLD AUTO: 3.45 K/UL — HIGH (ref 1.2–3.4)
LYMPHOCYTES # BLD AUTO: 41.4 % — SIGNIFICANT CHANGE UP (ref 20.5–51.1)
MCHC RBC-ENTMCNC: 27.9 PG — SIGNIFICANT CHANGE UP (ref 27–31)
MCHC RBC-ENTMCNC: 32.3 G/DL — SIGNIFICANT CHANGE UP (ref 32–37)
MCV RBC AUTO: 86.3 FL — SIGNIFICANT CHANGE UP (ref 81–99)
MEGA1 DNA VAG QL NAA+PROBE: ABNORMAL
MONOCYTES # BLD AUTO: 0.82 K/UL — HIGH (ref 0.1–0.6)
MONOCYTES NFR BLD AUTO: 9.8 % — HIGH (ref 1.7–9.3)
N GONORRHOEA RRNA SPEC QL NAA+PROBE: NEGATIVE
NEUTROPHILS # BLD AUTO: 3.56 K/UL — SIGNIFICANT CHANGE UP (ref 1.4–6.5)
NEUTROPHILS NFR BLD AUTO: 42.7 % — SIGNIFICANT CHANGE UP (ref 42.2–75.2)
NITRITE UR-MCNC: NEGATIVE — SIGNIFICANT CHANGE UP
NRBC # BLD: 0 /100 WBCS — SIGNIFICANT CHANGE UP (ref 0–0)
PH UR: 7 — SIGNIFICANT CHANGE UP (ref 5–8)
PLATELET # BLD AUTO: 339 K/UL — SIGNIFICANT CHANGE UP (ref 130–400)
POTASSIUM SERPL-MCNC: 4.2 MMOL/L — SIGNIFICANT CHANGE UP (ref 3.5–5)
POTASSIUM SERPL-SCNC: 4.2 MMOL/L — SIGNIFICANT CHANGE UP (ref 3.5–5)
PROT SERPL-MCNC: 7.2 G/DL — SIGNIFICANT CHANGE UP (ref 6–8)
PROT UR-MCNC: ABNORMAL
RBC # BLD: 4.59 M/UL — SIGNIFICANT CHANGE UP (ref 4.2–5.4)
RBC # FLD: 14.4 % — SIGNIFICANT CHANGE UP (ref 11.5–14.5)
RBC CASTS # UR COMP ASSIST: 2 /HPF — SIGNIFICANT CHANGE UP (ref 0–4)
SODIUM SERPL-SCNC: 139 MMOL/L — SIGNIFICANT CHANGE UP (ref 135–146)
SP GR SPEC: 1.03 — HIGH (ref 1.01–1.02)
T VAGINALIS RRNA SPEC QL NAA+PROBE: NEGATIVE
TOTAL CHOLESTEROL/HDL RATIO MEASUREMENT: 2.2 RATIO — LOW (ref 4–5.5)
TRIGL SERPL-MCNC: 110 MG/DL — SIGNIFICANT CHANGE UP (ref 10–149)
UROBILINOGEN FLD QL: SIGNIFICANT CHANGE UP
WBC # BLD: 8.34 K/UL — SIGNIFICANT CHANGE UP (ref 4.8–10.8)
WBC # FLD AUTO: 8.34 K/UL — SIGNIFICANT CHANGE UP (ref 4.8–10.8)
WBC UR QL: 5 /HPF — SIGNIFICANT CHANGE UP (ref 0–5)

## 2019-08-21 PROCEDURE — 76830 TRANSVAGINAL US NON-OB: CPT | Mod: 26

## 2019-08-21 PROCEDURE — 76856 US EXAM PELVIC COMPLETE: CPT | Mod: 26,59

## 2019-08-21 PROCEDURE — 74176 CT ABD & PELVIS W/O CONTRAST: CPT | Mod: 26

## 2019-08-21 PROCEDURE — 99285 EMERGENCY DEPT VISIT HI MDM: CPT

## 2019-08-21 RX ORDER — PANTOPRAZOLE SODIUM 20 MG/1
40 TABLET, DELAYED RELEASE ORAL ONCE
Refills: 0 | Status: COMPLETED | OUTPATIENT
Start: 2019-08-21 | End: 2019-08-21

## 2019-08-21 RX ORDER — SODIUM CHLORIDE 9 MG/ML
3 INJECTION INTRAMUSCULAR; INTRAVENOUS; SUBCUTANEOUS EVERY 8 HOURS
Refills: 0 | Status: DISCONTINUED | OUTPATIENT
Start: 2019-08-21 | End: 2019-08-21

## 2019-08-21 RX ORDER — SODIUM CHLORIDE 9 MG/ML
2000 INJECTION INTRAMUSCULAR; INTRAVENOUS; SUBCUTANEOUS ONCE
Refills: 0 | Status: COMPLETED | OUTPATIENT
Start: 2019-08-21 | End: 2019-08-21

## 2019-08-21 RX ORDER — MORPHINE SULFATE 50 MG/1
4 CAPSULE, EXTENDED RELEASE ORAL ONCE
Refills: 0 | Status: DISCONTINUED | OUTPATIENT
Start: 2019-08-21 | End: 2019-08-21

## 2019-08-21 RX ADMIN — SODIUM CHLORIDE 2000 MILLILITER(S): 9 INJECTION INTRAMUSCULAR; INTRAVENOUS; SUBCUTANEOUS at 05:47

## 2019-08-21 RX ADMIN — MORPHINE SULFATE 4 MILLIGRAM(S): 50 CAPSULE, EXTENDED RELEASE ORAL at 03:52

## 2019-08-21 RX ADMIN — SODIUM CHLORIDE 3 MILLILITER(S): 9 INJECTION INTRAMUSCULAR; INTRAVENOUS; SUBCUTANEOUS at 05:28

## 2019-08-21 RX ADMIN — PANTOPRAZOLE SODIUM 40 MILLIGRAM(S): 20 TABLET, DELAYED RELEASE ORAL at 04:52

## 2019-08-21 RX ADMIN — MORPHINE SULFATE 4 MILLIGRAM(S): 50 CAPSULE, EXTENDED RELEASE ORAL at 08:29

## 2019-08-21 NOTE — ED PROVIDER NOTE - CLINICAL SUMMARY MEDICAL DECISION MAKING FREE TEXT BOX
evaluated for abd pain, previous ovarian cyst resovled on todays us, lipase elevated, ct scan nml. discussed results she is tolerating po and willing to go home with modified diet and fu with pmd. she understands inditications to return to ed.

## 2019-08-21 NOTE — ED PROVIDER NOTE - PHYSICAL EXAMINATION
PHYSICAL EXAM:    GENERAL: Alert, appears stated age, well appearing, non-toxic  SKIN: Warm, pink and dry. MMM.   EYE: Normal lids/conjunctiva  ENT: Normal hearing, patent oropharynx  NECK: +supple. No meningismus, or JVD  Pulm: Bilateral BS, normal resp effort, no wheezes, stridor, or retractions  CV: RRR, no M/R/G, 2+and = radial pulses  Abd: soft, +epigastric TTP. +L pelvic TTP. non-distended. no CVA tenderness. no rebound/guarding. no RUQ/RLQ TTP.   : external genitalia WNL, without lesions. mucosa pink and moist. No lesions in vaginal canal/on cervix. cervical os closed without active bleeding. +mild blood in vault. no CMT, adnexal fullness, adnexal TTP. Chaperoned by PCA  Mskel: no erythema, cyanosis, edema. no calf tenderness  Neuro: AAOx3, 5/5 strength throughout. normal gait.

## 2019-08-21 NOTE — ED PROVIDER NOTE - OBJECTIVE STATEMENT
40 y/o F with PMH gastric ulcer, pancreatitis, ovarian cyst presents with L moderate throbbing constant non-radiating pelvic pain x wks, worsened after "rough" pelvic exam done to evaluate this pain 6 days ago. +worse with palpation, better with laying in fetal position. +vaginal bleeding x today. no urinary symptoms/n/v/d/hx abdominal surgeries/cp/sob/back pain/fever/other symptoms. denies alcohol use/heavy NSAID use/fatty diet/hx HLD.   LMP in july, irregular, currently bleeding.   PMD- does not have one  GI - [facility]- our clinic

## 2019-08-21 NOTE — ED PROVIDER NOTE - NS ED ROS FT
Review of Systems    Constitutional: (-) fever   Eyes/ENT: (-) vision changes  Cardiovascular: (-) chest pain, (-) syncope (-) palpitations  Respiratory: (-) cough, (-) shortness of breath  Gastrointestinal: (-) vomiting, (-) diarrhea (-)black/bloody stools (+) abdominal pain  Genitourinary:  (-) dysuria   Musculoskeletal: (-) neck pain, (-) back pain, (-) leg pain/swelling  Integumentary: (-) rash, (-) edema  Neurological: (-) headache  Hematologic: (-) easy bruising   Allergic/Immunologic: (-) pruritus

## 2019-08-21 NOTE — ED PROVIDER NOTE - NSFOLLOWUPINSTRUCTIONS_ED_ALL_ED_FT
Pancreatitis    Pancreatitis is a condition in which the pancreas becomes irritated and swollen (inflammation). The pancreas is a gland that is located behind the stomach. It produces enzymes that help to digest food. Most acute attacks last a couple of days and can cause serious problems and even be life threatening. The most common causes are alcohol abuse and gallstones. Symptoms include pain in the upper abdomen that may radiate to the back, nausea, and vomiting. Diagnosis is made with a medical history and physical exam as well as additional diagnostic testing. At home, eat smaller, more frequent meals and avoid alcohol and fatty foods. Drink enough fluid to keep your urine clear or pale yellow.     SEEK IMMEDIATE MEDICAL CARE IF YOU HAVE ANY OF THE FOLLOWING SYMPTOMS: inability to keep fluids down, increasing pain, yellowing of your skin or eyes, or lightheadedness/dizziness

## 2019-08-21 NOTE — ED PROVIDER NOTE - ATTENDING CONTRIBUTION TO CARE
40 yo female with pmh of pancreatitis, stomach ulcer, left ovarian cyst presents to the ER fo pain to the left pelvic area radiating up to LUQ today. Pt states on Friday (4 days ago) had a pelvic exam per GYN and states since then has had even more pain to her chronic left pelvic pain. THe entire weekend tried tylenol and rest with no relief. Pt had some N/V/D/F during the weekend with Tmax of 101 F. No dysuria. NO BRBPR. Got her menstrual period today and has regular periods. Denies flank pain/rash/trauma. On exam +LLQ tenderness and LUQ tenderness, no masses +BS, no rebound or guarding. Pelvic exam done by ASHLEY Pettit. Will check labs, US, UA and pain meds. TO reassess.

## 2019-08-21 NOTE — ED PROVIDER NOTE - CARE PROVIDER_API CALL
Bony Cason)  Gastroenterology; Internal Medicine  [address] [place] [address]  Phone: [phone]  Fax: [phone]  Follow Up Time: 7-10 Days

## 2019-08-21 NOTE — ED ADULT NURSE NOTE - OBJECTIVE STATEMENT
Pt reports lower abd/pelvic pain aggravated by palpation. Denies nausea, vomiting, diarrhea. Report vaginal bleeding.

## 2019-08-21 NOTE — ED ADULT NURSE NOTE - NSIMPLEMENTINTERV_GEN_ALL_ED
Implemented All Universal Safety Interventions:  [place] to call system. Call bell, personal items and telephone within reach. Instruct patient to call for assistance. Room bathroom lighting operational. Non-slip footwear when patient is off stretcher. Physically safe environment: no spills, clutter or unnecessary equipment. Stretcher in lowest position, wheels locked, appropriate side rails in place.

## 2019-08-21 NOTE — ED ADULT TRIAGE NOTE - CHIEF COMPLAINT QUOTE
I have pain on my left side, I went to my GYN Thursday and she pressed on a cyst and since then its been hurting - patient

## 2019-08-21 NOTE — ED PROVIDER NOTE - PROGRESS NOTE DETAILS
Still having pain. Elevated lipase, but pt states hx of chronic pancreatitis. With N/V/D and fever will check CT abdom/pelvis and evaluate fo diverticulitis as well. endorsed to dr armando pending ct scan, and dispo. tolerated juice, results reviewd, imaging reviewed, previous cyst no longer present, lipase is elevated but pain has resolved and tolerating po. patient revevaluated pain has resolved, tolerating juice, lab work significant for elevated pancreas, imaging negative. discussed result she has fu with gyn on thurs.

## 2019-08-21 NOTE — ED PROVIDER NOTE - NSFOLLOWUPCLINICS_GEN_ALL_ED_FT
[facility]  OB/GYN  [address] [place] [address]  Phone: [phone]  Fax:   Follow Up Time: 7-10 Days

## 2019-08-22 ENCOUNTER — APPOINTMENT (OUTPATIENT)
Dept: ANTEPARTUM | Facility: CLINIC | Age: 39
End: 2019-08-22

## 2019-08-22 DIAGNOSIS — Z71.89 OTHER SPECIFIED COUNSELING: ICD-10-CM

## 2019-08-30 ENCOUNTER — OUTPATIENT (OUTPATIENT)
Dept: OUTPATIENT SERVICES | Facility: HOSPITAL | Age: 39
LOS: 1 days | Discharge: HOME | End: 2019-08-30

## 2019-08-30 ENCOUNTER — APPOINTMENT (OUTPATIENT)
Dept: OBGYN | Facility: CLINIC | Age: 39
End: 2019-08-30
Payer: MEDICAID

## 2019-08-30 VITALS
SYSTOLIC BLOOD PRESSURE: 110 MMHG | WEIGHT: 193 LBS | HEIGHT: 60 IN | DIASTOLIC BLOOD PRESSURE: 70 MMHG | BODY MASS INDEX: 37.89 KG/M2

## 2019-08-30 DIAGNOSIS — O34.219 MATERNAL CARE FOR UNSPECIFIED TYPE SCAR FROM PREVIOUS CESAREAN DELIVERY: Chronic | ICD-10-CM

## 2019-08-30 DIAGNOSIS — D25.9 LEIOMYOMA OF UTERUS, UNSPECIFIED: Chronic | ICD-10-CM

## 2019-08-30 PROBLEM — N83.209 UNSPECIFIED OVARIAN CYST, UNSPECIFIED SIDE: Chronic | Status: ACTIVE | Noted: 2019-08-21

## 2019-08-30 PROCEDURE — 99213 OFFICE O/P EST LOW 20 MIN: CPT

## 2019-08-30 RX ORDER — TERCONAZOLE 4 MG/G
0.4 CREAM VAGINAL
Qty: 1 | Refills: 1 | Status: DISCONTINUED | COMMUNITY
Start: 2019-02-22 | End: 2019-08-30

## 2019-08-30 NOTE — HISTORY OF PRESENT ILLNESS
[Definite:  ___ (Date)] : the last menstrual period was [unfilled] [Current] : cancer screening reviewed and current [Sexually Active] : is sexually active [Monogamous] : is monogamous [Regular Cycle Intervals] : periods have been irregular [Currently In Menopause] : not currently in menopause [Experiencing Menopausal Sxs] : not experiencing menopausal symptoms

## 2019-08-30 NOTE — COUNSELING
[Breast Self Exam] : breast self exam [Nutrition] : nutrition [Exercise] : exercise [STD (testing, results, tx)] : STD (testing, results, tx) [Vitamins/Supplements] : vitamins/supplements [Contraception] : contraception [Lab Results] : lab results

## 2019-09-05 DIAGNOSIS — Z30.011 ENCOUNTER FOR INITIAL PRESCRIPTION OF CONTRACEPTIVE PILLS: ICD-10-CM

## 2019-09-05 DIAGNOSIS — N93.9 ABNORMAL UTERINE AND VAGINAL BLEEDING, UNSPECIFIED: ICD-10-CM

## 2019-09-05 DIAGNOSIS — Z30.09 ENCOUNTER FOR OTHER GENERAL COUNSELING AND ADVICE ON CONTRACEPTION: ICD-10-CM

## 2019-09-24 ENCOUNTER — APPOINTMENT (OUTPATIENT)
Dept: GASTROENTEROLOGY | Facility: CLINIC | Age: 39
End: 2019-09-24

## 2019-10-01 ENCOUNTER — APPOINTMENT (OUTPATIENT)
Dept: GASTROENTEROLOGY | Facility: CLINIC | Age: 39
End: 2019-10-01

## 2019-10-01 ENCOUNTER — OUTPATIENT (OUTPATIENT)
Dept: OUTPATIENT SERVICES | Facility: HOSPITAL | Age: 39
LOS: 1 days | End: 2019-10-01

## 2019-10-01 DIAGNOSIS — D25.9 LEIOMYOMA OF UTERUS, UNSPECIFIED: Chronic | ICD-10-CM

## 2019-10-01 DIAGNOSIS — O34.219 MATERNAL CARE FOR UNSPECIFIED TYPE SCAR FROM PREVIOUS CESAREAN DELIVERY: Chronic | ICD-10-CM

## 2019-10-22 ENCOUNTER — EMERGENCY (EMERGENCY)
Facility: HOSPITAL | Age: 39
LOS: 0 days | Discharge: HOME | End: 2019-10-22
Attending: EMERGENCY MEDICINE | Admitting: EMERGENCY MEDICINE
Payer: MEDICAID

## 2019-10-22 VITALS
HEART RATE: 77 BPM | SYSTOLIC BLOOD PRESSURE: 138 MMHG | DIASTOLIC BLOOD PRESSURE: 65 MMHG | TEMPERATURE: 98 F | RESPIRATION RATE: 20 BRPM | OXYGEN SATURATION: 100 %

## 2019-10-22 DIAGNOSIS — R07.89 OTHER CHEST PAIN: ICD-10-CM

## 2019-10-22 DIAGNOSIS — Z91.013 ALLERGY TO SEAFOOD: ICD-10-CM

## 2019-10-22 DIAGNOSIS — Z91.040 LATEX ALLERGY STATUS: ICD-10-CM

## 2019-10-22 DIAGNOSIS — D25.9 LEIOMYOMA OF UTERUS, UNSPECIFIED: Chronic | ICD-10-CM

## 2019-10-22 DIAGNOSIS — O34.219 MATERNAL CARE FOR UNSPECIFIED TYPE SCAR FROM PREVIOUS CESAREAN DELIVERY: Chronic | ICD-10-CM

## 2019-10-22 DIAGNOSIS — R07.9 CHEST PAIN, UNSPECIFIED: ICD-10-CM

## 2019-10-22 LAB
ALBUMIN SERPL ELPH-MCNC: 4.4 G/DL — SIGNIFICANT CHANGE UP (ref 3.5–5.2)
ALP SERPL-CCNC: 70 U/L — SIGNIFICANT CHANGE UP (ref 30–115)
ALT FLD-CCNC: 15 U/L — SIGNIFICANT CHANGE UP (ref 0–41)
ANION GAP SERPL CALC-SCNC: 11 MMOL/L — SIGNIFICANT CHANGE UP (ref 7–14)
AST SERPL-CCNC: 12 U/L — SIGNIFICANT CHANGE UP (ref 0–41)
BASOPHILS # BLD AUTO: 0.04 K/UL — SIGNIFICANT CHANGE UP (ref 0–0.2)
BASOPHILS NFR BLD AUTO: 0.5 % — SIGNIFICANT CHANGE UP (ref 0–1)
BILIRUB SERPL-MCNC: 0.2 MG/DL — SIGNIFICANT CHANGE UP (ref 0.2–1.2)
BUN SERPL-MCNC: 9 MG/DL — LOW (ref 10–20)
CALCIUM SERPL-MCNC: 9.2 MG/DL — SIGNIFICANT CHANGE UP (ref 8.5–10.1)
CHLORIDE SERPL-SCNC: 99 MMOL/L — SIGNIFICANT CHANGE UP (ref 98–110)
CO2 SERPL-SCNC: 24 MMOL/L — SIGNIFICANT CHANGE UP (ref 17–32)
CREAT SERPL-MCNC: 0.7 MG/DL — SIGNIFICANT CHANGE UP (ref 0.7–1.5)
EOSINOPHIL # BLD AUTO: 0.3 K/UL — SIGNIFICANT CHANGE UP (ref 0–0.7)
EOSINOPHIL NFR BLD AUTO: 4 % — SIGNIFICANT CHANGE UP (ref 0–8)
GLUCOSE SERPL-MCNC: 101 MG/DL — HIGH (ref 70–99)
HCT VFR BLD CALC: 40.2 % — SIGNIFICANT CHANGE UP (ref 37–47)
HGB BLD-MCNC: 13.5 G/DL — SIGNIFICANT CHANGE UP (ref 12–16)
IMM GRANULOCYTES NFR BLD AUTO: 0.3 % — SIGNIFICANT CHANGE UP (ref 0.1–0.3)
LIDOCAIN IGE QN: 48 U/L — SIGNIFICANT CHANGE UP (ref 7–60)
LYMPHOCYTES # BLD AUTO: 3.12 K/UL — SIGNIFICANT CHANGE UP (ref 1.2–3.4)
LYMPHOCYTES # BLD AUTO: 42 % — SIGNIFICANT CHANGE UP (ref 20.5–51.1)
MCHC RBC-ENTMCNC: 29.2 PG — SIGNIFICANT CHANGE UP (ref 27–31)
MCHC RBC-ENTMCNC: 33.6 G/DL — SIGNIFICANT CHANGE UP (ref 32–37)
MCV RBC AUTO: 86.8 FL — SIGNIFICANT CHANGE UP (ref 81–99)
MONOCYTES # BLD AUTO: 0.77 K/UL — HIGH (ref 0.1–0.6)
MONOCYTES NFR BLD AUTO: 10.4 % — HIGH (ref 1.7–9.3)
NEUTROPHILS # BLD AUTO: 3.18 K/UL — SIGNIFICANT CHANGE UP (ref 1.4–6.5)
NEUTROPHILS NFR BLD AUTO: 42.8 % — SIGNIFICANT CHANGE UP (ref 42.2–75.2)
NRBC # BLD: 0 /100 WBCS — SIGNIFICANT CHANGE UP (ref 0–0)
PLATELET # BLD AUTO: 320 K/UL — SIGNIFICANT CHANGE UP (ref 130–400)
POTASSIUM SERPL-MCNC: 4.4 MMOL/L — SIGNIFICANT CHANGE UP (ref 3.5–5)
POTASSIUM SERPL-SCNC: 4.4 MMOL/L — SIGNIFICANT CHANGE UP (ref 3.5–5)
PROT SERPL-MCNC: 7.1 G/DL — SIGNIFICANT CHANGE UP (ref 6–8)
RBC # BLD: 4.63 M/UL — SIGNIFICANT CHANGE UP (ref 4.2–5.4)
RBC # FLD: 13 % — SIGNIFICANT CHANGE UP (ref 11.5–14.5)
SODIUM SERPL-SCNC: 134 MMOL/L — LOW (ref 135–146)
TROPONIN T SERPL-MCNC: <0.01 NG/ML — SIGNIFICANT CHANGE UP
WBC # BLD: 7.43 K/UL — SIGNIFICANT CHANGE UP (ref 4.8–10.8)
WBC # FLD AUTO: 7.43 K/UL — SIGNIFICANT CHANGE UP (ref 4.8–10.8)

## 2019-10-22 PROCEDURE — 93010 ELECTROCARDIOGRAM REPORT: CPT

## 2019-10-22 PROCEDURE — 71046 X-RAY EXAM CHEST 2 VIEWS: CPT | Mod: 26

## 2019-10-22 PROCEDURE — 99285 EMERGENCY DEPT VISIT HI MDM: CPT

## 2019-10-22 RX ORDER — KETOROLAC TROMETHAMINE 30 MG/ML
15 SYRINGE (ML) INJECTION ONCE
Refills: 0 | Status: DISCONTINUED | OUTPATIENT
Start: 2019-10-22 | End: 2019-10-22

## 2019-10-22 RX ADMIN — Medication 15 MILLIGRAM(S): at 11:45

## 2019-10-22 NOTE — ED PROVIDER NOTE - CARE PROVIDER_API CALL
Torin Calderón (MD)  Cardiovascular Disease; Internal Medicine; Interventional Cardiology  [address] [place] [address]  Phone: [phone]  Fax: [phone]  Follow Up Time: 1-3 Days

## 2019-10-22 NOTE — ED ADULT NURSE NOTE - OBJECTIVE STATEMENT
Patient is AA&OX4 in NAD complaining of left sided chest pain radiating under breast bone on and off for a week, progressively getting worse during ambulation  this AM. Patient denies any falls. Patient advised been having clear sticky drainage from breast this week. Last LMP beginning of September, advised menstrual cycle is irregular.

## 2019-10-22 NOTE — ED PROVIDER NOTE - OBJECTIVE STATEMENT
40 y/o female with pmhx of pancreatitis presents with left sided chest pain. Patient states she began to have sharp, tight pain in the left chest earlier today, constant in nature, worse on deep breaths. Patient states she had mild pain similar to this throughout the week however it wasn't constant like this. Denies fevers/chills, sob, cough, lightheadedness, n/v/d, abdominal pain, leg swelling, recent travel, hx of PE/DVT, no recent OCP use. Denies family hx of cardiac disease. PERC negative. Heart score 0.

## 2019-10-22 NOTE — ED PROVIDER NOTE - NS ED ROS FT
Constitutional: See HPI.  Eyes: No visual changes, eye pain or discharge.  ENMT: No hearing changes, pain, discharge or infections. No neck pain or stiffness.  Cardiac: + chest pain; No SOB or edema. No chest pain with exertion.  Respiratory: No cough or respiratory distress.   GI: No nausea, vomiting, diarrhea or abdominal pain.  : No dysuria, frequency or burning.  MS: No myalgia, muscle weakness, joint pain or back pain.  Neuro: No headache or weakness. No LOC.  Skin: No skin rash.  Endo: No hx of DM, thyroid disease  Except as documented in HPI, all other review of systems is negative

## 2019-10-22 NOTE — ED PROVIDER NOTE - PATIENT PORTAL LINK FT
You can access the FollowMyHealth Patient Portal offered by [facility] by registering at the following website: [email or web address]. By joining [employer]’s FollowMyHealth portal, you will also be able to view your health information using other applications (apps) compatible with our system.

## 2019-10-23 DIAGNOSIS — Z71.89 OTHER SPECIFIED COUNSELING: ICD-10-CM

## 2019-11-29 ENCOUNTER — EMERGENCY (EMERGENCY)
Facility: HOSPITAL | Age: 39
LOS: 0 days | Discharge: HOME | End: 2019-11-29
Admitting: EMERGENCY MEDICINE
Payer: MEDICAID

## 2019-11-29 VITALS
RESPIRATION RATE: 18 BRPM | SYSTOLIC BLOOD PRESSURE: 125 MMHG | OXYGEN SATURATION: 100 % | HEART RATE: 99 BPM | HEIGHT: 60 IN | TEMPERATURE: 98 F | DIASTOLIC BLOOD PRESSURE: 75 MMHG | WEIGHT: 188.94 LBS

## 2019-11-29 DIAGNOSIS — R68.84 JAW PAIN: ICD-10-CM

## 2019-11-29 DIAGNOSIS — R10.9 UNSPECIFIED ABDOMINAL PAIN: ICD-10-CM

## 2019-11-29 DIAGNOSIS — D25.9 LEIOMYOMA OF UTERUS, UNSPECIFIED: Chronic | ICD-10-CM

## 2019-11-29 DIAGNOSIS — O34.219 MATERNAL CARE FOR UNSPECIFIED TYPE SCAR FROM PREVIOUS CESAREAN DELIVERY: Chronic | ICD-10-CM

## 2019-11-29 PROCEDURE — 99283 EMERGENCY DEPT VISIT LOW MDM: CPT

## 2019-11-29 RX ORDER — KETOROLAC TROMETHAMINE 30 MG/ML
60 SYRINGE (ML) INJECTION ONCE
Refills: 0 | Status: DISCONTINUED | OUTPATIENT
Start: 2019-11-29 | End: 2019-11-29

## 2019-11-29 RX ADMIN — Medication 60 MILLIGRAM(S): at 07:26

## 2019-11-29 NOTE — ED PROVIDER NOTE - PHYSICAL EXAMINATION
Physical Exam    Vital Signs: I have reviewed the initial vital signs.  Constitutional: well-nourished, appears stated age, no acute distress  Eyes: Conjunctiva pink, Sclera clear, PERRLA, EOMI.  Ears: TM intact, no erythema and non bulging.   Throat: No tonsilar erythema or exudates. No laryngeo or angio edema. Healthy Dentition with no loose teeth, abscesses or caries noted. Pain to palpation over R TMJ, no audible clicking noted. No swelling noted over norberto.  Gastrointestinal: soft, non-tender abdomen, no pulsatile mass, normal bowl sounds. No guarding or peritoneal sounds noted.  Musculoskeletal: supple neck, no lower extremity edema, no midline tenderness  Integumentary: warm, dry, no rash  Neurologic: awake, alert, cranial nerves II-XII grossly intact, extremities’ motor and sensory functions grossly intact  Psychiatric: appropriate mood, appropriate affect

## 2019-11-29 NOTE — ED PROVIDER NOTE - PATIENT PORTAL LINK FT
You can access the FollowMyHealth Patient Portal offered by [facility] by registering at the following website: [email or web address]. By joining [employer]’s FollowMyHealth portal, you will also be able to view your health information using other applications (apps) compatible with our system.

## 2019-11-29 NOTE — ED PROVIDER NOTE - CLINICAL SUMMARY MEDICAL DECISION MAKING FREE TEXT BOX
39 female with hx of TMJ, presents with acute flare of TMJ. Pain located on R TMJ, onset with eating dinner last night, no trauma. Denies fever, chills. Pain with palpation over R TMJ, no dental abscesses or caries noted. Pt tx with Toradol and given ENT f/u.

## 2019-11-29 NOTE — ED PROVIDER NOTE - NSFOLLOWUPINSTRUCTIONS_ED_ALL_ED_FT
Follow up with your Primary Medical Doctor in 1-2 days, as well as with the ear, Nose, Throat Doctor that will be provided to you upon discharge    TMJ  - warm compresses  - soft diet  - nsaids if no contraindication  - dental eval for bite guard

## 2019-11-29 NOTE — ED PROVIDER NOTE - NSFOLLOWUPCLINICS_GEN_ALL_ED_FT
[facility]  ENT  [address], [address] [place] [address]  Phone: [phone]  Fax:   Follow Up Time: 1-3 Days

## 2019-11-29 NOTE — ED ADULT NURSE NOTE - OBJECTIVE STATEMENT
Pt a&ox3, pt c/o right ear and jaw pain, as per pt has dx of TMJ. Pt states pain is worsening uncontrolled over the past two weeks. Pt wears  at night and takes midol for pain. Denies Ha/dizziness, SOB/CP, abd pain. Pt states nausea from the pain as well. In NAD at this time.

## 2019-11-29 NOTE — ED ADULT NURSE NOTE - CAS DISCH ACCOMP BY
Pt is an 86 y/o Male, PMHX of htn, DM, presents to ED accompanied by daughter for altered mental status, weakness, lethargy. Daughter reports a few days ago pt was coughing, had post nasal drip, she thought it was a cold and gave him over the counter cold medicines. She reports pt has also had a decreased PO intake over the last several days. Reports low grade fevers at home. Agata productive cough, hemoptysis, chest pain, SOB, abdominal pain, n/v/d, hematuria, dysuria.
Self

## 2019-11-29 NOTE — ED PROVIDER NOTE - OBJECTIVE STATEMENT
Pt is a 39 year old female with PMH Pancreatitis, Uterine Fibroids and TMJ presents to ED with acute Flare of TMJ. Pt states she was eating dinner last night, when she developed a gradually increasing pain to her R jaw. Pt states pain was a 2/10 at onset, now 7/10. Pain is described as a stabbing pain, made worse with chewing. Pt states took Tylenol 500mg at 0400, with minimal relief. Pt attests to hear clicking sounds of R jaw. Pt denies trauma, fever or chills. Denies chest pain, SOB or abdominal pain. Denies Vomiting however attests to mild Nausea 2/2 pain.

## 2019-11-29 NOTE — ED PROVIDER NOTE - NS ED ROS FT
Constitutional: (-) fever  Eyes/ENT: (-) blurry vision, (-) epistaxis, (+) R jaw pain  Cardiovascular: (-) chest pain, (-) syncope  Respiratory: (-) cough, (-) shortness of breath  Gastrointestinal: (-) vomiting, (-) diarrhea, (+) nausea   Musculoskeletal: (-) neck pain, (-) back pain, (-) joint pain  Integumentary: (-) rash, (-) edema  Neurological: (-) headache, (-) altered mental status  Psychiatric: (-) hallucinations  Allergic/Immunologic: (-) pruritus

## 2019-12-06 ENCOUNTER — APPOINTMENT (OUTPATIENT)
Dept: OTOLARYNGOLOGY | Facility: CLINIC | Age: 39
End: 2019-12-06

## 2019-12-17 NOTE — CONSULT NOTE ADULT - ASSESSMENT
complains of pain/discomfort 38F with abdominal pain and tenderness predominately in epigastric and hypogastric/suprapubic region associated with nausea and vomiting CT findings concerning for questionable appendicitis however patient has no elevation of WBC, is afebrile and clinical history and physical exam findings make appendicitis unlikely. Based on history findings more consistent with gastritis or peptic ulcer disease especially with recent increased use of NSAIDS    Plan:  No need for acute surgical intervention  recommend PTX and Carafate for symptomatic control  GI consult if patient will be admitted to medical service vs outpatient follow up for endoscopy    plan discussed with Dr. Reno

## 2019-12-20 ENCOUNTER — EMERGENCY (EMERGENCY)
Facility: HOSPITAL | Age: 39
LOS: 0 days | Discharge: HOME | End: 2019-12-20
Attending: EMERGENCY MEDICINE | Admitting: EMERGENCY MEDICINE
Payer: MEDICAID

## 2019-12-20 ENCOUNTER — APPOINTMENT (OUTPATIENT)
Dept: OTOLARYNGOLOGY | Facility: CLINIC | Age: 39
End: 2019-12-20

## 2019-12-20 VITALS
SYSTOLIC BLOOD PRESSURE: 116 MMHG | HEART RATE: 69 BPM | TEMPERATURE: 98 F | DIASTOLIC BLOOD PRESSURE: 59 MMHG | OXYGEN SATURATION: 98 % | RESPIRATION RATE: 18 BRPM

## 2019-12-20 VITALS
RESPIRATION RATE: 18 BRPM | DIASTOLIC BLOOD PRESSURE: 64 MMHG | SYSTOLIC BLOOD PRESSURE: 129 MMHG | TEMPERATURE: 98 F | HEART RATE: 102 BPM

## 2019-12-20 DIAGNOSIS — Z91.013 ALLERGY TO SEAFOOD: ICD-10-CM

## 2019-12-20 DIAGNOSIS — O34.219 MATERNAL CARE FOR UNSPECIFIED TYPE SCAR FROM PREVIOUS CESAREAN DELIVERY: Chronic | ICD-10-CM

## 2019-12-20 DIAGNOSIS — R10.9 UNSPECIFIED ABDOMINAL PAIN: ICD-10-CM

## 2019-12-20 DIAGNOSIS — D25.9 LEIOMYOMA OF UTERUS, UNSPECIFIED: Chronic | ICD-10-CM

## 2019-12-20 DIAGNOSIS — K52.9 NONINFECTIVE GASTROENTERITIS AND COLITIS, UNSPECIFIED: ICD-10-CM

## 2019-12-20 DIAGNOSIS — Z88.8 ALLERGY STATUS TO OTHER DRUGS, MEDICAMENTS AND BIOLOGICAL SUBSTANCES STATUS: ICD-10-CM

## 2019-12-20 LAB
ALBUMIN SERPL ELPH-MCNC: 4.6 G/DL — SIGNIFICANT CHANGE UP (ref 3.5–5.2)
ALP SERPL-CCNC: 74 U/L — SIGNIFICANT CHANGE UP (ref 30–115)
ALT FLD-CCNC: 17 U/L — SIGNIFICANT CHANGE UP (ref 0–41)
ANION GAP SERPL CALC-SCNC: 17 MMOL/L — HIGH (ref 7–14)
AST SERPL-CCNC: 14 U/L — SIGNIFICANT CHANGE UP (ref 0–41)
BASOPHILS # BLD AUTO: 0.01 K/UL — SIGNIFICANT CHANGE UP (ref 0–0.2)
BASOPHILS NFR BLD AUTO: 0.1 % — SIGNIFICANT CHANGE UP (ref 0–1)
BILIRUB DIRECT SERPL-MCNC: <0.2 MG/DL — SIGNIFICANT CHANGE UP (ref 0–0.2)
BILIRUB INDIRECT FLD-MCNC: >0.2 MG/DL — SIGNIFICANT CHANGE UP (ref 0.2–1.2)
BILIRUB SERPL-MCNC: 0.4 MG/DL — SIGNIFICANT CHANGE UP (ref 0.2–1.2)
BUN SERPL-MCNC: 8 MG/DL — LOW (ref 10–20)
CALCIUM SERPL-MCNC: 9.7 MG/DL — SIGNIFICANT CHANGE UP (ref 8.5–10.1)
CHLORIDE SERPL-SCNC: 102 MMOL/L — SIGNIFICANT CHANGE UP (ref 98–110)
CHOLEST SERPL-MCNC: 158 MG/DL — SIGNIFICANT CHANGE UP (ref 100–200)
CO2 SERPL-SCNC: 20 MMOL/L — SIGNIFICANT CHANGE UP (ref 17–32)
CREAT SERPL-MCNC: 0.6 MG/DL — LOW (ref 0.7–1.5)
EOSINOPHIL # BLD AUTO: 0.12 K/UL — SIGNIFICANT CHANGE UP (ref 0–0.7)
EOSINOPHIL NFR BLD AUTO: 1.7 % — SIGNIFICANT CHANGE UP (ref 0–8)
ETHANOL SERPL-MCNC: <10 MG/DL — SIGNIFICANT CHANGE UP
GLUCOSE SERPL-MCNC: 117 MG/DL — HIGH (ref 70–99)
HCT VFR BLD CALC: 41.1 % — SIGNIFICANT CHANGE UP (ref 37–47)
HDLC SERPL-MCNC: 77 MG/DL — SIGNIFICANT CHANGE UP
HGB BLD-MCNC: 13.9 G/DL — SIGNIFICANT CHANGE UP (ref 12–16)
IMM GRANULOCYTES NFR BLD AUTO: 0.3 % — SIGNIFICANT CHANGE UP (ref 0.1–0.3)
LIDOCAIN IGE QN: 27 U/L — SIGNIFICANT CHANGE UP (ref 7–60)
LIPID PNL WITH DIRECT LDL SERPL: 76 MG/DL — SIGNIFICANT CHANGE UP (ref 4–129)
LYMPHOCYTES # BLD AUTO: 1.79 K/UL — SIGNIFICANT CHANGE UP (ref 1.2–3.4)
LYMPHOCYTES # BLD AUTO: 24.6 % — SIGNIFICANT CHANGE UP (ref 20.5–51.1)
MAGNESIUM SERPL-MCNC: 1.9 MG/DL — SIGNIFICANT CHANGE UP (ref 1.8–2.4)
MCHC RBC-ENTMCNC: 29 PG — SIGNIFICANT CHANGE UP (ref 27–31)
MCHC RBC-ENTMCNC: 33.8 G/DL — SIGNIFICANT CHANGE UP (ref 32–37)
MCV RBC AUTO: 85.8 FL — SIGNIFICANT CHANGE UP (ref 81–99)
MONOCYTES # BLD AUTO: 0.7 K/UL — HIGH (ref 0.1–0.6)
MONOCYTES NFR BLD AUTO: 9.6 % — HIGH (ref 1.7–9.3)
NEUTROPHILS # BLD AUTO: 4.63 K/UL — SIGNIFICANT CHANGE UP (ref 1.4–6.5)
NEUTROPHILS NFR BLD AUTO: 63.7 % — SIGNIFICANT CHANGE UP (ref 42.2–75.2)
NRBC # BLD: 0 /100 WBCS — SIGNIFICANT CHANGE UP (ref 0–0)
PLATELET # BLD AUTO: 385 K/UL — SIGNIFICANT CHANGE UP (ref 130–400)
POTASSIUM SERPL-MCNC: 3.9 MMOL/L — SIGNIFICANT CHANGE UP (ref 3.5–5)
POTASSIUM SERPL-SCNC: 3.9 MMOL/L — SIGNIFICANT CHANGE UP (ref 3.5–5)
PROT SERPL-MCNC: 7.7 G/DL — SIGNIFICANT CHANGE UP (ref 6–8)
RBC # BLD: 4.79 M/UL — SIGNIFICANT CHANGE UP (ref 4.2–5.4)
RBC # FLD: 12.6 % — SIGNIFICANT CHANGE UP (ref 11.5–14.5)
SODIUM SERPL-SCNC: 139 MMOL/L — SIGNIFICANT CHANGE UP (ref 135–146)
TOTAL CHOLESTEROL/HDL RATIO MEASUREMENT: 2.1 RATIO — LOW (ref 4–5.5)
TRIGL SERPL-MCNC: 58 MG/DL — SIGNIFICANT CHANGE UP (ref 10–149)
WBC # BLD: 7.27 K/UL — SIGNIFICANT CHANGE UP (ref 4.8–10.8)
WBC # FLD AUTO: 7.27 K/UL — SIGNIFICANT CHANGE UP (ref 4.8–10.8)

## 2019-12-20 PROCEDURE — 99285 EMERGENCY DEPT VISIT HI MDM: CPT

## 2019-12-20 PROCEDURE — 74177 CT ABD & PELVIS W/CONTRAST: CPT | Mod: 26

## 2019-12-20 PROCEDURE — 71046 X-RAY EXAM CHEST 2 VIEWS: CPT | Mod: 26

## 2019-12-20 PROCEDURE — 71260 CT THORAX DX C+: CPT | Mod: 26

## 2019-12-20 PROCEDURE — 76705 ECHO EXAM OF ABDOMEN: CPT | Mod: 26

## 2019-12-20 RX ORDER — MORPHINE SULFATE 50 MG/1
4 CAPSULE, EXTENDED RELEASE ORAL ONCE
Refills: 0 | Status: DISCONTINUED | OUTPATIENT
Start: 2019-12-20 | End: 2019-12-20

## 2019-12-20 RX ORDER — MOXIFLOXACIN HYDROCHLORIDE TABLETS, 400 MG 400 MG/1
1 TABLET, FILM COATED ORAL
Qty: 20 | Refills: 0
Start: 2019-12-20 | End: 2019-12-29

## 2019-12-20 RX ORDER — ONDANSETRON 8 MG/1
4 TABLET, FILM COATED ORAL ONCE
Refills: 0 | Status: COMPLETED | OUTPATIENT
Start: 2019-12-20 | End: 2019-12-20

## 2019-12-20 RX ORDER — METRONIDAZOLE 500 MG
1 TABLET ORAL
Qty: 21 | Refills: 0
Start: 2019-12-20 | End: 2019-12-26

## 2019-12-20 RX ORDER — SODIUM CHLORIDE 9 MG/ML
2000 INJECTION, SOLUTION INTRAVENOUS ONCE
Refills: 0 | Status: COMPLETED | OUTPATIENT
Start: 2019-12-20 | End: 2019-12-20

## 2019-12-20 RX ORDER — KETOROLAC TROMETHAMINE 30 MG/ML
15 SYRINGE (ML) INJECTION ONCE
Refills: 0 | Status: DISCONTINUED | OUTPATIENT
Start: 2019-12-20 | End: 2019-12-20

## 2019-12-20 RX ADMIN — MORPHINE SULFATE 4 MILLIGRAM(S): 50 CAPSULE, EXTENDED RELEASE ORAL at 14:00

## 2019-12-20 RX ADMIN — Medication 15 MILLIGRAM(S): at 18:25

## 2019-12-20 RX ADMIN — Medication 20 MILLIGRAM(S): at 18:25

## 2019-12-20 RX ADMIN — SODIUM CHLORIDE 4000 MILLILITER(S): 9 INJECTION, SOLUTION INTRAVENOUS at 14:00

## 2019-12-20 RX ADMIN — ONDANSETRON 4 MILLIGRAM(S): 8 TABLET, FILM COATED ORAL at 14:00

## 2019-12-20 NOTE — ED PROVIDER NOTE - PROGRESS NOTE DETAILS
Impression: Pt with HX of pancreatitis and GERD, with TTP over epigastrium, plan for labs, upright CXR, CT abdomen, IVF, pain management, and antiemetic. Patient to be discharged from ED. Any available test results were discussed with patient and/or family. Verbal instructions given, including instructions to return to ED immediately for any new, worsening, or concerning symptoms. Patient endorsed understanding. Written discharge instructions additionally given, including follow-up plan.

## 2019-12-20 NOTE — ED ADULT NURSE REASSESSMENT NOTE - NS ED NURSE REASSESS COMMENT FT1
pt reassessed. pt states "I feel so much better" after medications. instructed to follow up with md. ambulated steady with spouse.
pt reassessed. pt reports pain relief.

## 2019-12-20 NOTE — ED PROVIDER NOTE - PHYSICAL EXAMINATION
VITAL SIGNS: I have reviewed nursing notes and confirm.  CONSTITUTIONAL: Well-developed; well-nourished; in no acute distress.  SKIN: Skin exam is warm and dry, no acute rash.  HEAD: Normocephalic; atraumatic.  EYES: PERRL, EOM intact; conjunctiva and sclera clear.  ENT: No nasal discharge; airway clear. TMs clear.  NECK: Supple; non tender.  CARD: S1, S2 normal; no murmurs, gallops, or rubs. Regular rate and rhythm.  RESP: No wheezes, rales or rhonchi.  ABD: Normal bowel sounds; soft; non-distended; (+)Diffusely tender, especially over epigastrium ,no rebound or guarding; no hepatosplenomegaly; mild CVAT b/l  EXT: Normal ROM. No clubbing, cyanosis or edema.  LYMPH: No acute cervical adenopathy.  NEURO: Alert, oriented. Grossly unremarkable. No focal deficits.  PSYCH: Cooperative, appropriate.

## 2019-12-20 NOTE — ED PROVIDER NOTE - OBJECTIVE STATEMENT
38 y/o F with PMH of GERD and pancreatitis, follows with of Dr. Cason for GI, unclear if truly on Prednisone for her GERD, p/w complaints of upper abdominal pain and n/v/d x 2 days. Pt reports gradual onset of SX and is now c/o of pressure in the lower abdomen before and after bowel movements and urination. Also notes (+) chills. Was told pancreatitis was due to her thyroid. No fever, CP, SOB, dysuria or hematuria.

## 2019-12-20 NOTE — ED PROVIDER NOTE - NS ED ROS FT
Constitutional: See HPI. No fever/chills.  Eyes: No visual changes, eye pain or discharge.  ENT: No hearing changes, pain, discharge or infections.  Neck: No neck pain or stiffness.  Cardiac: No chest pain, SOB or edema. No chest pain with exertion.  Respiratory: No cough or respiratory distress. No hemoptysis.   GI: (+) upper abdominal pain and n/v/d.   : No dysuria, frequency or burning.   MS: No myalgia, muscle weakness, joint pain or back pain.  Neuro: No headache or weakness. No LOC.  Skin: No rash.  Endocrine: No history of thyroid disease or diabetes.  Except as documented in the HPI, all other systems are negative.

## 2019-12-20 NOTE — ED PROVIDER NOTE - CARE PROVIDER_API CALL
Bony Cason)  Gastroenterology; Internal Medicine  [address] [place] [address]  Phone: [phone]  Fax: [phone]  Follow Up Time: Urgent

## 2019-12-20 NOTE — ED PROVIDER NOTE - CONDITION AT DISCHARGE:
Subjective:      Patient ID: Lalo Harper is a 57 y.o. male.    Chief Complaint: Annual Exam    HPI   56 yo with   Patient Active Problem List   Diagnosis    Hypertension    Hyperlipidemia    AURELIO on CPAP    Prediabetes    Smoker     Past Medical History:   Diagnosis Date    Hyperlipidemia     Hypertension      Here today for annual prev exam.  Compliant with meds without significant side effects. Energy and appetite are good.     Review of Systems   Constitutional: Negative for chills and fever.   HENT: Negative for ear pain and sore throat.    Respiratory: Negative for cough.    Cardiovascular: Negative for chest pain.   Gastrointestinal: Negative for abdominal pain and blood in stool.   Genitourinary: Negative for dysuria and hematuria.   Neurological: Negative for seizures and syncope.     Objective:   /87   Pulse 77   Temp 97.1 °F (36.2 °C) (Tympanic)   Wt 103.8 kg (228 lb 13.4 oz)   SpO2 96%   BMI 35.84 kg/m²     Physical Exam   Constitutional: He is oriented to person, place, and time. He appears well-developed and well-nourished. No distress.   HENT:   Head: Normocephalic and atraumatic.   Mouth/Throat: Oropharynx is clear and moist.   Eyes: Pupils are equal, round, and reactive to light. EOM are normal.   Neck: Neck supple. No thyromegaly present.   Cardiovascular: Normal rate and regular rhythm.   Pulmonary/Chest: Breath sounds normal. He has no wheezes. He has no rales.   Abdominal: Soft. Bowel sounds are normal. There is no tenderness.   Musculoskeletal: He exhibits no edema.   Lymphadenopathy:     He has no cervical adenopathy.   Neurological: He is alert and oriented to person, place, and time.   Skin: Skin is warm and dry.   Psychiatric: He has a normal mood and affect. His behavior is normal.       Assessment:     1. Routine general medical examination at a health care facility    2. Hyperlipidemia, unspecified hyperlipidemia type    3. Essential hypertension    4. Prediabetes     5. Need for influenza vaccination    6. Colon cancer screening    7. Smoker      Plan:   Routine general medical examination at a health care facility  Heart healthy diet and reg exercise  HM reviewed    -     Comprehensive metabolic panel; Future; Expected date: 12/09/2019  -     CBC auto differential; Future; Expected date: 12/09/2019  -     TSH; Future; Expected date: 12/09/2019  -     Lipid panel; Future; Expected date: 12/09/2019  -     PSA, Screening; Future; Expected date: 12/09/2019  -     Hemoglobin A1c; Future; Expected date: 12/09/2019    Hyperlipidemia, unspecified hyperlipidemia type  Continue statin  -     rosuvastatin (CRESTOR) 10 MG tablet; Take 1 tablet (10 mg total) by mouth every evening.  Dispense: 90 tablet; Refill: 1    Essential hypertension  Fair control.  Adjust meds as below  -     valsartan-hydrochlorothiazide (DIOVAN-HCT) 320-25 mg per tablet; Take 1 tablet by mouth once daily.  Dispense: 90 tablet; Refill: 3  -     metoprolol succinate (TOPROL-XL) 50 MG 24 hr tablet; Take 1 tablet (50 mg total) by mouth once daily.  Dispense: 90 tablet; Refill: 1    Prediabetes  Continue metformin  -     metFORMIN (GLUCOPHAGE) 500 MG tablet; Take 1 tablet (500 mg total) by mouth 2 (two) times daily with meals.  Dispense: 180 tablet; Refill: 0    Need for influenza vaccination    Colon cancer screening  -     Case request GI: COLONOSCOPY    Smoker  Advised cessation  Other orders  -     Influenza - Quadrivalent (PF)        Lab Frequency Next Occurrence   Fecal Immunochemical Test (iFOBT) Once 11/12/2019       Problem List Items Addressed This Visit        Cardiac/Vascular    Hypertension    Relevant Medications    valsartan-hydrochlorothiazide (DIOVAN-HCT) 320-25 mg per tablet    metoprolol succinate (TOPROL-XL) 50 MG 24 hr tablet    Hyperlipidemia    Relevant Medications    rosuvastatin (CRESTOR) 10 MG tablet       Endocrine    Prediabetes    Relevant Medications    metFORMIN (GLUCOPHAGE) 500 MG tablet        Other    Smoker      Other Visit Diagnoses     Routine general medical examination at a health care facility    -  Primary    Relevant Orders    Comprehensive metabolic panel (Completed)    CBC auto differential (Completed)    TSH (Completed)    Lipid panel (Completed)    PSA, Screening (Completed)    Hemoglobin A1c (Completed)    Need for influenza vaccination        Colon cancer screening        Relevant Orders    Case request GI: COLONOSCOPY (Completed)          Follow up in about 4 weeks (around 1/6/2020), or if symptoms worsen or fail to improve.   Satisfactory

## 2020-01-14 ENCOUNTER — APPOINTMENT (OUTPATIENT)
Dept: GASTROENTEROLOGY | Facility: CLINIC | Age: 40
End: 2020-01-14
Payer: MEDICAID

## 2020-01-14 VITALS
HEART RATE: 92 BPM | DIASTOLIC BLOOD PRESSURE: 80 MMHG | SYSTOLIC BLOOD PRESSURE: 130 MMHG | HEIGHT: 60 IN | WEIGHT: 190 LBS | BODY MASS INDEX: 37.3 KG/M2

## 2020-01-14 DIAGNOSIS — K52.9 NONINFECTIVE GASTROENTERITIS AND COLITIS, UNSPECIFIED: ICD-10-CM

## 2020-01-14 PROCEDURE — 99214 OFFICE O/P EST MOD 30 MIN: CPT

## 2020-01-14 NOTE — HISTORY OF PRESENT ILLNESS
[Heartburn] : heartburn [Nausea] : nausea [Abdominal Pain] : abdominal pain [Vomiting] : vomiting [de-identified] : 40 yo f abdominal pain and diarrhea.  The patient was previously seen for esophageal ulcer.  Repeat endoscopy revealed resolution of the ulcer.  In December the patient noted mid and lower abdominal pain with diarrhea.  The patient was seen in the emergency room and started on Cipro and Flagyl.  The patient also had the flu the same period of time and did not take the Cipro Flagyl until this past week.  Currently she has 5-6 loose bowel movements per day without blood.

## 2020-01-15 PROBLEM — K85.90 ACUTE PANCREATITIS WITHOUT NECROSIS OR INFECTION, UNSPECIFIED: Chronic | Status: ACTIVE | Noted: 2019-12-20

## 2020-01-22 ENCOUNTER — EMERGENCY (EMERGENCY)
Facility: HOSPITAL | Age: 40
LOS: 0 days | Discharge: HOME | End: 2020-01-22
Attending: EMERGENCY MEDICINE | Admitting: EMERGENCY MEDICINE
Payer: MEDICAID

## 2020-01-22 VITALS
SYSTOLIC BLOOD PRESSURE: 155 MMHG | TEMPERATURE: 98 F | OXYGEN SATURATION: 100 % | RESPIRATION RATE: 19 BRPM | DIASTOLIC BLOOD PRESSURE: 78 MMHG | WEIGHT: 190.04 LBS | HEIGHT: 60 IN | HEART RATE: 102 BPM

## 2020-01-22 VITALS
DIASTOLIC BLOOD PRESSURE: 83 MMHG | HEART RATE: 73 BPM | RESPIRATION RATE: 18 BRPM | TEMPERATURE: 100 F | OXYGEN SATURATION: 98 % | SYSTOLIC BLOOD PRESSURE: 125 MMHG

## 2020-01-22 DIAGNOSIS — R10.2 PELVIC AND PERINEAL PAIN: ICD-10-CM

## 2020-01-22 DIAGNOSIS — O34.219 MATERNAL CARE FOR UNSPECIFIED TYPE SCAR FROM PREVIOUS CESAREAN DELIVERY: Chronic | ICD-10-CM

## 2020-01-22 DIAGNOSIS — R10.9 UNSPECIFIED ABDOMINAL PAIN: ICD-10-CM

## 2020-01-22 DIAGNOSIS — D25.9 LEIOMYOMA OF UTERUS, UNSPECIFIED: Chronic | ICD-10-CM

## 2020-01-22 DIAGNOSIS — Z91.013 ALLERGY TO SEAFOOD: ICD-10-CM

## 2020-01-22 DIAGNOSIS — Z87.891 PERSONAL HISTORY OF NICOTINE DEPENDENCE: ICD-10-CM

## 2020-01-22 DIAGNOSIS — Z91.041 RADIOGRAPHIC DYE ALLERGY STATUS: ICD-10-CM

## 2020-01-22 LAB
ALBUMIN SERPL ELPH-MCNC: 4.2 G/DL — SIGNIFICANT CHANGE UP (ref 3.5–5.2)
ALP SERPL-CCNC: 71 U/L — SIGNIFICANT CHANGE UP (ref 30–115)
ALT FLD-CCNC: 11 U/L — SIGNIFICANT CHANGE UP (ref 0–41)
ANION GAP SERPL CALC-SCNC: 13 MMOL/L — SIGNIFICANT CHANGE UP (ref 7–14)
APPEARANCE UR: CLEAR — SIGNIFICANT CHANGE UP
AST SERPL-CCNC: 10 U/L — SIGNIFICANT CHANGE UP (ref 0–41)
BASOPHILS # BLD AUTO: 0.03 K/UL — SIGNIFICANT CHANGE UP (ref 0–0.2)
BASOPHILS NFR BLD AUTO: 0.5 % — SIGNIFICANT CHANGE UP (ref 0–1)
BILIRUB SERPL-MCNC: <0.2 MG/DL — SIGNIFICANT CHANGE UP (ref 0.2–1.2)
BILIRUB UR-MCNC: NEGATIVE — SIGNIFICANT CHANGE UP
BUN SERPL-MCNC: 13 MG/DL — SIGNIFICANT CHANGE UP (ref 10–20)
CALCIUM SERPL-MCNC: 9.5 MG/DL — SIGNIFICANT CHANGE UP (ref 8.5–10.1)
CHLORIDE SERPL-SCNC: 105 MMOL/L — SIGNIFICANT CHANGE UP (ref 98–110)
CO2 SERPL-SCNC: 23 MMOL/L — SIGNIFICANT CHANGE UP (ref 17–32)
COLOR SPEC: YELLOW — SIGNIFICANT CHANGE UP
CREAT SERPL-MCNC: 0.5 MG/DL — LOW (ref 0.7–1.5)
DIFF PNL FLD: NEGATIVE — SIGNIFICANT CHANGE UP
EOSINOPHIL # BLD AUTO: 0.23 K/UL — SIGNIFICANT CHANGE UP (ref 0–0.7)
EOSINOPHIL NFR BLD AUTO: 3.8 % — SIGNIFICANT CHANGE UP (ref 0–8)
GLUCOSE SERPL-MCNC: 96 MG/DL — SIGNIFICANT CHANGE UP (ref 70–99)
GLUCOSE UR QL: NEGATIVE — SIGNIFICANT CHANGE UP
HCT VFR BLD CALC: 39.4 % — SIGNIFICANT CHANGE UP (ref 37–47)
HGB BLD-MCNC: 13.2 G/DL — SIGNIFICANT CHANGE UP (ref 12–16)
IMM GRANULOCYTES NFR BLD AUTO: 0.3 % — SIGNIFICANT CHANGE UP (ref 0.1–0.3)
KETONES UR-MCNC: NEGATIVE — SIGNIFICANT CHANGE UP
LEUKOCYTE ESTERASE UR-ACNC: NEGATIVE — SIGNIFICANT CHANGE UP
LIDOCAIN IGE QN: 61 U/L — HIGH (ref 7–60)
LYMPHOCYTES # BLD AUTO: 2.37 K/UL — SIGNIFICANT CHANGE UP (ref 1.2–3.4)
LYMPHOCYTES # BLD AUTO: 39.2 % — SIGNIFICANT CHANGE UP (ref 20.5–51.1)
MCHC RBC-ENTMCNC: 29.5 PG — SIGNIFICANT CHANGE UP (ref 27–31)
MCHC RBC-ENTMCNC: 33.5 G/DL — SIGNIFICANT CHANGE UP (ref 32–37)
MCV RBC AUTO: 87.9 FL — SIGNIFICANT CHANGE UP (ref 81–99)
MONOCYTES # BLD AUTO: 0.71 K/UL — HIGH (ref 0.1–0.6)
MONOCYTES NFR BLD AUTO: 11.7 % — HIGH (ref 1.7–9.3)
NEUTROPHILS # BLD AUTO: 2.69 K/UL — SIGNIFICANT CHANGE UP (ref 1.4–6.5)
NEUTROPHILS NFR BLD AUTO: 44.5 % — SIGNIFICANT CHANGE UP (ref 42.2–75.2)
NITRITE UR-MCNC: NEGATIVE — SIGNIFICANT CHANGE UP
NRBC # BLD: 0 /100 WBCS — SIGNIFICANT CHANGE UP (ref 0–0)
PH UR: 6 — SIGNIFICANT CHANGE UP (ref 5–8)
PLATELET # BLD AUTO: 325 K/UL — SIGNIFICANT CHANGE UP (ref 130–400)
POTASSIUM SERPL-MCNC: 4.2 MMOL/L — SIGNIFICANT CHANGE UP (ref 3.5–5)
POTASSIUM SERPL-SCNC: 4.2 MMOL/L — SIGNIFICANT CHANGE UP (ref 3.5–5)
PROT SERPL-MCNC: 7 G/DL — SIGNIFICANT CHANGE UP (ref 6–8)
PROT UR-MCNC: SIGNIFICANT CHANGE UP
RBC # BLD: 4.48 M/UL — SIGNIFICANT CHANGE UP (ref 4.2–5.4)
RBC # FLD: 12.9 % — SIGNIFICANT CHANGE UP (ref 11.5–14.5)
SODIUM SERPL-SCNC: 141 MMOL/L — SIGNIFICANT CHANGE UP (ref 135–146)
SP GR SPEC: 1.03 — HIGH (ref 1.01–1.02)
UROBILINOGEN FLD QL: SIGNIFICANT CHANGE UP
WBC # BLD: 6.05 K/UL — SIGNIFICANT CHANGE UP (ref 4.8–10.8)
WBC # FLD AUTO: 6.05 K/UL — SIGNIFICANT CHANGE UP (ref 4.8–10.8)

## 2020-01-22 PROCEDURE — 76830 TRANSVAGINAL US NON-OB: CPT | Mod: 26

## 2020-01-22 PROCEDURE — 99285 EMERGENCY DEPT VISIT HI MDM: CPT

## 2020-01-22 PROCEDURE — 74176 CT ABD & PELVIS W/O CONTRAST: CPT | Mod: 26

## 2020-01-22 RX ORDER — MORPHINE SULFATE 50 MG/1
2 CAPSULE, EXTENDED RELEASE ORAL ONCE
Refills: 0 | Status: DISCONTINUED | OUTPATIENT
Start: 2020-01-22 | End: 2020-01-22

## 2020-01-22 RX ORDER — ONDANSETRON 8 MG/1
4 TABLET, FILM COATED ORAL ONCE
Refills: 0 | Status: COMPLETED | OUTPATIENT
Start: 2020-01-22 | End: 2020-01-22

## 2020-01-22 RX ORDER — SODIUM CHLORIDE 9 MG/ML
1000 INJECTION, SOLUTION INTRAVENOUS ONCE
Refills: 0 | Status: COMPLETED | OUTPATIENT
Start: 2020-01-22 | End: 2020-01-22

## 2020-01-22 RX ORDER — KETOROLAC TROMETHAMINE 30 MG/ML
15 SYRINGE (ML) INJECTION ONCE
Refills: 0 | Status: DISCONTINUED | OUTPATIENT
Start: 2020-01-22 | End: 2020-01-22

## 2020-01-22 RX ADMIN — MORPHINE SULFATE 2 MILLIGRAM(S): 50 CAPSULE, EXTENDED RELEASE ORAL at 12:09

## 2020-01-22 RX ADMIN — SODIUM CHLORIDE 1000 MILLILITER(S): 9 INJECTION, SOLUTION INTRAVENOUS at 09:52

## 2020-01-22 RX ADMIN — Medication 15 MILLIGRAM(S): at 09:53

## 2020-01-22 RX ADMIN — ONDANSETRON 4 MILLIGRAM(S): 8 TABLET, FILM COATED ORAL at 09:52

## 2020-01-22 RX ADMIN — SODIUM CHLORIDE 1000 MILLILITER(S): 9 INJECTION, SOLUTION INTRAVENOUS at 12:09

## 2020-01-22 RX ADMIN — MORPHINE SULFATE 2 MILLIGRAM(S): 50 CAPSULE, EXTENDED RELEASE ORAL at 19:19

## 2020-01-22 RX ADMIN — MORPHINE SULFATE 2 MILLIGRAM(S): 50 CAPSULE, EXTENDED RELEASE ORAL at 14:26

## 2020-01-22 NOTE — ED PROVIDER NOTE - PATIENT PORTAL LINK FT
You can access the FollowMyHealth Patient Portal offered by [facility] by registering at the following website: [email or web address]. By joining [employer]’s FollowMyHealth portal, you will also be able to view your health information using other applications (apps) compatible with our system.

## 2020-01-22 NOTE — ED PROVIDER NOTE - CARE PROVIDER_API CALL
Bony Cason)  Gastroenterology; Internal Medicine  [address] [place] [address]  Phone: [phone]  Fax: [phone]  Established Patient  Follow Up Time: 1-3 Days    Fe Contreras)  Obstetrics and Gynecology  [address] [place] [address]  Phone: [phone]  Fax: [phone]  Established Patient  Follow Up Time: 1-3 Days

## 2020-01-22 NOTE — ED ADULT NURSE NOTE - OBJECTIVE STATEMENT
pt c/o b/l lower abdominal pain. Left side hurting worse than right, pain described as sharp. As per pt, she saw GI doctor last week and was informed that she has "left ovarian cyst". LMP earlier this month. Pt denies fever/chills/SOB/Nausea

## 2020-01-22 NOTE — ED PROVIDER NOTE - PHYSICAL EXAMINATION
Vital Signs: I have reviewed the initial vital signs.  Constitutional: well-nourished, appears stated age, no acute distress  HEENT: PERRL, mmm, nml phonation  Cardiovascular: regular rate, regular rhythm, well-perfused extremities, distal pulses intact  Respiratory: unlabored respiratory effort, clear to auscultation bilaterally, speaking full sentences  Gastrointestinal: soft, non-distended abdomen, no pulsatile mass, + pelvic ttp without rebound, no CVA ttp  Musculoskeletal: supple neck, no lower extremity edema or calf ttp  Integumentary: warm, dry, no rash  Neurologic: awake, alert, cranial nerves II-XII grossly intact, extremities’ motor and sensory functions grossly intact  Psychiatric: appropriate mood, appropriate affect Vital Signs: I have reviewed the initial vital signs.  Constitutional: well-nourished, appears stated age, no acute distress  HEENT: PERRL, mmm, nml phonation  Cardiovascular: regular rate, regular rhythm, well-perfused extremities, distal pulses intact  Respiratory: unlabored respiratory effort, clear to auscultation bilaterally, speaking full sentences  Gastrointestinal: soft, non-distended abdomen, no pulsatile mass, + pelvic ttp without rebound, no CVA ttp  : ( chaperoned by ASHLEY Doan) nml external female genitalia, no vaginal d/c or odor, unable to reach cervix, but no adnexal ttp,  Musculoskeletal: supple neck, no lower extremity edema or calf ttp  Integumentary: warm, dry, no rash  Neurologic: awake, alert, cranial nerves II-XII grossly intact, extremities’ motor and sensory functions grossly intact  Psychiatric: appropriate mood, appropriate affect

## 2020-01-22 NOTE — ED PROVIDER NOTE - NS ED ROS FT
Constitutional: (-) fever, (-) chills,   Eyes/ENT: (-) blurry vision, (-) epistaxis, (-) sore throat  Cardiovascular: (-) chest pain, (-) syncope, (-) palpitations  Respiratory: (-) cough, (-) shortness of breath  Gastrointestinal: (+) nausea, (-) vomiting, (-) diarrhea, (-) black or bloody stools, (-) lower abdominal pain  : (-) vaginal bleeding/discharge. (-) dysuria/hematuria /frequency  Musculoskeletal: (-) neck pain, (+) back pain, (-) joint pain  Integumentary: (-) rash, (-) edema  Neurological: (-) headache, (-) altered mental status, (-) focal weakness or paresthesias  Psychiatric: (-) hallucinations  Allergic/Immunologic: (-) pruritus

## 2020-01-22 NOTE — ED PROVIDER NOTE - NSFOLLOWUPINSTRUCTIONS_ED_ALL_ED_FT
FOLLOW-UP WITH YOUR GI DOCTOR AND WITH YOUR GYN.     Abdominal Pain, Adult     Many things can cause belly (abdominal) pain. Most times, belly pain is not dangerous. Many cases of belly pain can be watched and treated at home. Sometimes belly pain is serious, though. Your doctor will try to find the cause of your belly pain.  Follow these instructions at home:  Take over-the-counter and prescription medicines only as told by your doctor. Do not take medicines that help you poop (laxatives) unless told to by your doctor.Drink enough fluid to keep your pee (urine) clear or pale yellow.Watch your belly pain for any changes.Keep all follow-up visits as told by your doctor. This is important.Contact a doctor if:  Your belly pain changes or gets worse.You are not hungry, or you lose weight without trying.You are having trouble pooping (constipated) or have watery poop (diarrhea) for more than 2–3 days.You have pain when you pee or poop.Your belly pain wakes you up at night.Your pain gets worse with meals, after eating, or with certain foods.You are throwing up and cannot keep anything down.You have a fever.Get help right away if:  Your pain does not go away as soon as your doctor says it should.You cannot stop throwing up.Your pain is only in areas of your belly, such as the right side or the left lower part of the belly.You have bloody or black poop, or poop that looks like tar.You have very bad pain, cramping, or bloating in your belly.You have signs of not having enough fluid or water in your body (dehydration), such as:  Dark pee, very little pee, or no pee.Cracked lips.Dry mouth.Sunken eyes.Sleepiness.Weakness.This information is not intended to replace advice given to you by your health care provider. Make sure you discuss any questions you have with your health care provider.

## 2020-01-22 NOTE — ED PROVIDER NOTE - OBJECTIVE STATEMENT
38 yo female PMH pancreatitis ( unknown etiology), PUD, uterine fibroids, ovarian cyst c/p pelvic pain for a few weeks, worse since last night,  Mostly on the left, but also reports pain on the right, non-radiating, associated with nausea without vomiting, no fever, chills, black or bloody stools, no vaginal bleeding or discharge ( LMP beginning of January), no dysuria or hematuria.  Patient did not take anything fo pain today. 38 yo female PMH pancreatitis ( unknown etiology), PUD, uterine fibroids, ovarian cyst c/p pelvic pain for a few weeks, worse since last night,  Mostly on the left, but also reports pain on the right, non-radiating, associated with nausea without vomiting, no fever, chills, black or bloody stools, no vaginal bleeding or discharge ( LMP beginning of January), no dysuria or hematuria.  Patient did not take anything fo pain today.  Patient was seen in th [facility] in December, had CT scan and was found to have  5 cm ovarian dyst, she has followed up since with OB-GYN .

## 2020-01-22 NOTE — ED PROVIDER NOTE - PROGRESS NOTE DETAILS
Patient returned from [facility], appears very well, but after sono has more pain, will give a dose of Morphine.  Will reassess later. Sono is negative, patient still in pain., will get CT scan on abdomen pelvis ( I have discussed with the patient risks and benefits of radiation, especially that she had CT scan in December, she agrees to have scan done. case endorsed to Dr Bustillos to follow up CT scan, reassess and dispo. CT scan neg for acute pathology,  labs, ua, pelvic sono neg.  Pt states she's been getting this pain on and off for 2 yrs, she's been following with gyn and with GI.  She will make f/u appt with them.  told to return to ER for worsening pain, fever, vomiting, or any other new/concerning symptoms. Pt given copy of labs and imaging reports.  pt understands and agrees with plan.

## 2020-01-22 NOTE — ED PROVIDER NOTE - PROVIDER TOKENS
PROVIDER:[TOKEN:[[id number]],FOLLOWUP:[1-3 Days],ESTABLISHEDPATIENT:[T]],PROVIDER:[TOKEN:[[id number]],FOLLOWUP:[1-3 Days],ESTABLISHEDPATIENT:[T]]

## 2020-01-22 NOTE — ED ADULT NURSE NOTE - ED STAT RN HANDOFF DETAILS
Patient left ED in stable condition, states that her abdominal pain has improved, inform to follow up with gastroenterology and OBGYN.

## 2020-01-28 ENCOUNTER — APPOINTMENT (OUTPATIENT)
Dept: GASTROENTEROLOGY | Facility: CLINIC | Age: 40
End: 2020-01-28

## 2020-02-21 ENCOUNTER — OUTPATIENT (OUTPATIENT)
Dept: OUTPATIENT SERVICES | Facility: HOSPITAL | Age: 40
LOS: 1 days | Discharge: HOME | End: 2020-02-21

## 2020-02-21 ENCOUNTER — APPOINTMENT (OUTPATIENT)
Dept: OBGYN | Facility: CLINIC | Age: 40
End: 2020-02-21
Payer: MEDICAID

## 2020-02-21 VITALS — DIASTOLIC BLOOD PRESSURE: 80 MMHG | WEIGHT: 194 LBS | SYSTOLIC BLOOD PRESSURE: 124 MMHG | BODY MASS INDEX: 37.89 KG/M2

## 2020-02-21 DIAGNOSIS — R93.5 ABNORMAL FINDINGS ON DIAGNOSTIC IMAGING OF OTHER ABDOMINAL REGIONS, INCLUDING RETROPERITONEUM: ICD-10-CM

## 2020-02-21 DIAGNOSIS — Z01.419 ENCOUNTER FOR GYNECOLOGICAL EXAMINATION (GENERAL) (ROUTINE) W/OUT ABNORMAL FINDINGS: ICD-10-CM

## 2020-02-21 DIAGNOSIS — R10.84 GENERALIZED ABDOMINAL PAIN: ICD-10-CM

## 2020-02-21 DIAGNOSIS — Z87.898 PERSONAL HISTORY OF OTHER SPECIFIED CONDITIONS: ICD-10-CM

## 2020-02-21 DIAGNOSIS — Z87.42 PERSONAL HISTORY OF OTHER DISEASES OF THE FEMALE GENITAL TRACT: ICD-10-CM

## 2020-02-21 DIAGNOSIS — Z31.69 ENCOUNTER FOR OTHER GENERAL COUNSELING AND ADVICE ON PROCREATION: ICD-10-CM

## 2020-02-21 DIAGNOSIS — D25.9 LEIOMYOMA OF UTERUS, UNSPECIFIED: Chronic | ICD-10-CM

## 2020-02-21 DIAGNOSIS — O34.219 MATERNAL CARE FOR UNSPECIFIED TYPE SCAR FROM PREVIOUS CESAREAN DELIVERY: Chronic | ICD-10-CM

## 2020-02-21 DIAGNOSIS — Z30.011 ENCOUNTER FOR INITIAL PRESCRIPTION OF CONTRACEPTIVE PILLS: ICD-10-CM

## 2020-02-21 DIAGNOSIS — Z30.09 ENCOUNTER FOR OTHER GENERAL COUNSELING AND ADVICE ON CONTRACEPTION: ICD-10-CM

## 2020-02-21 PROCEDURE — 99213 OFFICE O/P EST LOW 20 MIN: CPT | Mod: GE

## 2020-02-21 NOTE — ASU PATIENT PROFILE, ADULT - FALLEN IN THE PAST
Was seen at [facility] 2-9-20 and diagnosed with influenza and took tamiflu-fever returned 2 nights ago x 2 days and increased coughing and congestion-seen yesterday with uri-today has a red dotty rash , does not look like hives per mom, on her neck and buttocks and now on her face-is not itchy and does not bother her-no swallowing issues or respiratory difficulty per mom-made appt.for tomorrow-mom agrees with plan.-jif worsening, will need to be seen.   no

## 2020-02-21 NOTE — COUNSELING
[Exercise] : exercise [Nutrition] : nutrition [Preconception Care] : preconception care [Pregnancy Options] : pregnancy options [Pain Management] : pain management

## 2020-02-24 DIAGNOSIS — R10.2 PELVIC AND PERINEAL PAIN: ICD-10-CM

## 2020-02-24 DIAGNOSIS — Z31.69 ENCOUNTER FOR OTHER GENERAL COUNSELING AND ADVICE ON PROCREATION: ICD-10-CM

## 2020-03-03 ENCOUNTER — APPOINTMENT (OUTPATIENT)
Dept: GASTROENTEROLOGY | Facility: CLINIC | Age: 40
End: 2020-03-03
Payer: MEDICAID

## 2020-03-03 VITALS
DIASTOLIC BLOOD PRESSURE: 97 MMHG | WEIGHT: 195 LBS | HEART RATE: 80 BPM | SYSTOLIC BLOOD PRESSURE: 135 MMHG | BODY MASS INDEX: 38.28 KG/M2 | HEIGHT: 60 IN

## 2020-03-03 DIAGNOSIS — K85.90 ACUTE PANCREATITIS WITHOUT NECROSIS OR INFECTION, UNSPECIFIED: ICD-10-CM

## 2020-03-03 DIAGNOSIS — R19.7 DIARRHEA, UNSPECIFIED: ICD-10-CM

## 2020-03-03 DIAGNOSIS — R10.13 EPIGASTRIC PAIN: ICD-10-CM

## 2020-03-03 PROCEDURE — 99214 OFFICE O/P EST MOD 30 MIN: CPT

## 2020-03-03 RX ORDER — METRONIDAZOLE 500 MG/1
500 TABLET ORAL 3 TIMES DAILY
Qty: 21 | Refills: 0 | Status: DISCONTINUED | COMMUNITY
Start: 2020-01-14 | End: 2020-03-03

## 2020-03-03 RX ORDER — SUCRALFATE 1 G/10ML
1 SUSPENSION ORAL 4 TIMES DAILY
Qty: 1200 | Refills: 3 | Status: DISCONTINUED | COMMUNITY
Start: 2019-08-06 | End: 2020-03-03

## 2020-03-03 RX ORDER — CIPROFLOXACIN HYDROCHLORIDE 500 MG/1
500 TABLET, FILM COATED ORAL
Refills: 0 | Status: DISCONTINUED | COMMUNITY
End: 2020-03-03

## 2020-03-03 RX ORDER — PANTOPRAZOLE 40 MG/1
40 TABLET, DELAYED RELEASE ORAL
Qty: 60 | Refills: 5 | Status: DISCONTINUED | COMMUNITY
Start: 2019-08-06 | End: 2020-03-03

## 2020-03-03 RX ORDER — METRONIDAZOLE 500 MG/1
500 TABLET ORAL
Refills: 0 | Status: DISCONTINUED | COMMUNITY
End: 2020-03-03

## 2020-03-03 RX ORDER — CIPROFLOXACIN HYDROCHLORIDE 500 MG/1
500 TABLET, FILM COATED ORAL
Qty: 28 | Refills: 0 | Status: DISCONTINUED | COMMUNITY
Start: 2020-01-14 | End: 2020-03-03

## 2020-03-03 NOTE — REASON FOR VISIT
[FreeTextEntry1] : Abdominal pain / Back pain.  Patient has a history of pancreatitis, idiopathic, previously.  The patient recently saw her GYN who suggested the pain may be related to fibroids.  The pain is diffuse and episodically sharp.  She continues to have epigastric discomfort as well as diffuse pain.  The patient has 2 loose bowel movements per day without blood.

## 2020-03-03 NOTE — PHYSICAL EXAM
[General Appearance - Alert] : alert [General Appearance - In No Acute Distress] : in no acute distress [Sclera] : the sclera and conjunctiva were normal [Outer Ear] : the ears and nose were normal in appearance [Extraocular Movements] : extraocular movements were intact [Oropharynx] : the oropharynx was normal [] : no respiratory distress [Auscultation Breath Sounds / Voice Sounds] : lungs were clear to auscultation bilaterally [Respiration, Rhythm And Depth] : normal respiratory rhythm and effort [Bowel Sounds] : normal bowel sounds [Edema] : there was no peripheral edema [Abdomen Soft] : soft [FreeTextEntry1] : mild l and r lower abdominal tenderness, and epigastric pain [Cervical Lymph Nodes Enlarged Anterior Bilaterally] : anterior cervical [Supraclavicular Lymph Nodes Enlarged Bilaterally] : supraclavicular [Cervical Lymph Nodes Enlarged Posterior Bilaterally] : posterior cervical [Impaired Insight] : insight and judgment were intact [Oriented To Time, Place, And Person] : oriented to person, place, and time [Axillary Lymph Nodes Enlarged Bilaterally] : axillary [Affect] : the affect was normal

## 2020-03-03 NOTE — HISTORY OF PRESENT ILLNESS
[de-identified] : 39-year-old female with abdominal pain.  The patient completed a two-week course of ciprofloxacin and Flagyl.  She continues to have diffuse abdominal pain and 2 bowel movements per day which are liquid and semi-formed without blood.  She notes epigastric pain.  Of note, the patient previously had an episode of pancreatitis, idiopathic in origin.  The patient was recently seen by GYN who suggested that the pain may be related to fibroids or other GYN etiology such as endometriosis.  Because the patient is currently trying to get pregnant it is not possible for her to take birth control pills to control this.

## 2020-03-14 ENCOUNTER — EMERGENCY (EMERGENCY)
Facility: HOSPITAL | Age: 40
LOS: 0 days | Discharge: HOME | End: 2020-03-14
Attending: EMERGENCY MEDICINE | Admitting: EMERGENCY MEDICINE
Payer: MEDICAID

## 2020-03-14 VITALS
TEMPERATURE: 99 F | HEART RATE: 80 BPM | SYSTOLIC BLOOD PRESSURE: 135 MMHG | WEIGHT: 195.11 LBS | OXYGEN SATURATION: 100 % | DIASTOLIC BLOOD PRESSURE: 81 MMHG | RESPIRATION RATE: 16 BRPM

## 2020-03-14 DIAGNOSIS — R11.10 VOMITING, UNSPECIFIED: ICD-10-CM

## 2020-03-14 DIAGNOSIS — Z79.899 OTHER LONG TERM (CURRENT) DRUG THERAPY: ICD-10-CM

## 2020-03-14 DIAGNOSIS — Z91.013 ALLERGY TO SEAFOOD: ICD-10-CM

## 2020-03-14 DIAGNOSIS — Z91.041 RADIOGRAPHIC DYE ALLERGY STATUS: ICD-10-CM

## 2020-03-14 DIAGNOSIS — D25.9 LEIOMYOMA OF UTERUS, UNSPECIFIED: Chronic | ICD-10-CM

## 2020-03-14 DIAGNOSIS — R10.84 GENERALIZED ABDOMINAL PAIN: ICD-10-CM

## 2020-03-14 DIAGNOSIS — O34.219 MATERNAL CARE FOR UNSPECIFIED TYPE SCAR FROM PREVIOUS CESAREAN DELIVERY: Chronic | ICD-10-CM

## 2020-03-14 LAB
ALBUMIN SERPL ELPH-MCNC: 4.1 G/DL — SIGNIFICANT CHANGE UP (ref 3.5–5.2)
ALP SERPL-CCNC: 69 U/L — SIGNIFICANT CHANGE UP (ref 30–115)
ALT FLD-CCNC: 13 U/L — SIGNIFICANT CHANGE UP (ref 0–41)
ANION GAP SERPL CALC-SCNC: 11 MMOL/L — SIGNIFICANT CHANGE UP (ref 7–14)
APPEARANCE UR: CLEAR — SIGNIFICANT CHANGE UP
APTT BLD: 34.2 SEC — SIGNIFICANT CHANGE UP (ref 27–39.2)
AST SERPL-CCNC: 11 U/L — SIGNIFICANT CHANGE UP (ref 0–41)
BILIRUB DIRECT SERPL-MCNC: <0.2 MG/DL — SIGNIFICANT CHANGE UP (ref 0–0.2)
BILIRUB INDIRECT FLD-MCNC: SIGNIFICANT CHANGE UP MG/DL (ref 0.2–1.2)
BILIRUB SERPL-MCNC: <0.2 MG/DL — SIGNIFICANT CHANGE UP (ref 0.2–1.2)
BILIRUB UR-MCNC: NEGATIVE — SIGNIFICANT CHANGE UP
BUN SERPL-MCNC: 6 MG/DL — LOW (ref 10–20)
CALCIUM SERPL-MCNC: 9.2 MG/DL — SIGNIFICANT CHANGE UP (ref 8.5–10.1)
CHLORIDE SERPL-SCNC: 100 MMOL/L — SIGNIFICANT CHANGE UP (ref 98–110)
CO2 SERPL-SCNC: 26 MMOL/L — SIGNIFICANT CHANGE UP (ref 17–32)
COLOR SPEC: YELLOW — SIGNIFICANT CHANGE UP
CREAT SERPL-MCNC: 0.6 MG/DL — LOW (ref 0.7–1.5)
DIFF PNL FLD: NEGATIVE — SIGNIFICANT CHANGE UP
GLUCOSE SERPL-MCNC: 98 MG/DL — SIGNIFICANT CHANGE UP (ref 70–99)
GLUCOSE UR QL: NEGATIVE — SIGNIFICANT CHANGE UP
HCG SERPL QL: NEGATIVE — SIGNIFICANT CHANGE UP
HCT VFR BLD CALC: 39.1 % — SIGNIFICANT CHANGE UP (ref 37–47)
HGB BLD-MCNC: 13 G/DL — SIGNIFICANT CHANGE UP (ref 12–16)
INR BLD: 1.05 RATIO — SIGNIFICANT CHANGE UP (ref 0.65–1.3)
KETONES UR-MCNC: NEGATIVE — SIGNIFICANT CHANGE UP
LEUKOCYTE ESTERASE UR-ACNC: NEGATIVE — SIGNIFICANT CHANGE UP
LIDOCAIN IGE QN: 45 U/L — SIGNIFICANT CHANGE UP (ref 7–60)
MAGNESIUM SERPL-MCNC: 2.1 MG/DL — SIGNIFICANT CHANGE UP (ref 1.8–2.4)
MCHC RBC-ENTMCNC: 28.6 PG — SIGNIFICANT CHANGE UP (ref 27–31)
MCHC RBC-ENTMCNC: 33.2 G/DL — SIGNIFICANT CHANGE UP (ref 32–37)
MCV RBC AUTO: 86.1 FL — SIGNIFICANT CHANGE UP (ref 81–99)
NITRITE UR-MCNC: NEGATIVE — SIGNIFICANT CHANGE UP
NRBC # BLD: 0 /100 WBCS — SIGNIFICANT CHANGE UP (ref 0–0)
PH UR: 6 — SIGNIFICANT CHANGE UP (ref 5–8)
PLATELET # BLD AUTO: 330 K/UL — SIGNIFICANT CHANGE UP (ref 130–400)
POTASSIUM SERPL-MCNC: 3.8 MMOL/L — SIGNIFICANT CHANGE UP (ref 3.5–5)
POTASSIUM SERPL-SCNC: 3.8 MMOL/L — SIGNIFICANT CHANGE UP (ref 3.5–5)
PROT SERPL-MCNC: 7.1 G/DL — SIGNIFICANT CHANGE UP (ref 6–8)
PROT UR-MCNC: NEGATIVE — SIGNIFICANT CHANGE UP
PROTHROM AB SERPL-ACNC: 12.1 SEC — SIGNIFICANT CHANGE UP (ref 9.95–12.87)
RBC # BLD: 4.54 M/UL — SIGNIFICANT CHANGE UP (ref 4.2–5.4)
RBC # FLD: 12.3 % — SIGNIFICANT CHANGE UP (ref 11.5–14.5)
SODIUM SERPL-SCNC: 137 MMOL/L — SIGNIFICANT CHANGE UP (ref 135–146)
SP GR SPEC: 1.02 — SIGNIFICANT CHANGE UP (ref 1.01–1.02)
UROBILINOGEN FLD QL: SIGNIFICANT CHANGE UP
WBC # BLD: 7.55 K/UL — SIGNIFICANT CHANGE UP (ref 4.8–10.8)
WBC # FLD AUTO: 7.55 K/UL — SIGNIFICANT CHANGE UP (ref 4.8–10.8)

## 2020-03-14 PROCEDURE — 74177 CT ABD & PELVIS W/CONTRAST: CPT | Mod: 26

## 2020-03-14 PROCEDURE — 71045 X-RAY EXAM CHEST 1 VIEW: CPT | Mod: 26

## 2020-03-14 PROCEDURE — 99285 EMERGENCY DEPT VISIT HI MDM: CPT

## 2020-03-14 RX ORDER — DIPHENHYDRAMINE HCL 50 MG
50 CAPSULE ORAL ONCE
Refills: 0 | Status: COMPLETED | OUTPATIENT
Start: 2020-03-14 | End: 2020-03-14

## 2020-03-14 RX ORDER — FAMOTIDINE 10 MG/ML
1 INJECTION INTRAVENOUS
Qty: 60 | Refills: 0
Start: 2020-03-14 | End: 2020-05-12

## 2020-03-14 RX ORDER — PANTOPRAZOLE SODIUM 20 MG/1
40 TABLET, DELAYED RELEASE ORAL ONCE
Refills: 0 | Status: COMPLETED | OUTPATIENT
Start: 2020-03-14 | End: 2020-03-14

## 2020-03-14 RX ORDER — SODIUM CHLORIDE 9 MG/ML
1000 INJECTION, SOLUTION INTRAVENOUS ONCE
Refills: 0 | Status: COMPLETED | OUTPATIENT
Start: 2020-03-14 | End: 2020-03-14

## 2020-03-14 RX ORDER — OMEPRAZOLE 10 MG/1
1 CAPSULE, DELAYED RELEASE ORAL
Qty: 60 | Refills: 0
Start: 2020-03-14 | End: 2020-05-12

## 2020-03-14 RX ORDER — SUCRALFATE 1 G
1 TABLET ORAL
Qty: 30 | Refills: 0
Start: 2020-03-14 | End: 2020-04-12

## 2020-03-14 RX ORDER — MORPHINE SULFATE 50 MG/1
6 CAPSULE, EXTENDED RELEASE ORAL ONCE
Refills: 0 | Status: DISCONTINUED | OUTPATIENT
Start: 2020-03-14 | End: 2020-03-14

## 2020-03-14 RX ADMIN — MORPHINE SULFATE 6 MILLIGRAM(S): 50 CAPSULE, EXTENDED RELEASE ORAL at 06:28

## 2020-03-14 RX ADMIN — PANTOPRAZOLE SODIUM 40 MILLIGRAM(S): 20 TABLET, DELAYED RELEASE ORAL at 06:28

## 2020-03-14 RX ADMIN — Medication 50 MILLIGRAM(S): at 02:35

## 2020-03-14 RX ADMIN — SODIUM CHLORIDE 1000 MILLILITER(S): 9 INJECTION, SOLUTION INTRAVENOUS at 02:35

## 2020-03-14 RX ADMIN — SODIUM CHLORIDE 1000 MILLILITER(S): 9 INJECTION, SOLUTION INTRAVENOUS at 06:28

## 2020-03-14 RX ADMIN — MORPHINE SULFATE 6 MILLIGRAM(S): 50 CAPSULE, EXTENDED RELEASE ORAL at 02:35

## 2020-03-14 RX ADMIN — Medication 125 MILLIGRAM(S): at 02:35

## 2020-03-14 NOTE — ED PROVIDER NOTE - PHYSICAL EXAMINATION
Vital Signs: I have reviewed the initial vital signs.  Constitutional: NAD, appears stated age.  HEENT: Airway patent, moist MM, no erythema/swelling/deformity of oral structures. EOMI, PERRLA.  CV: regular rate, regular rhythm, well-perfused extremities, 2+ b/l DP and radial pulses equal.  Lungs: BCTA, no increased WOB.  ABD: + diffuse abd tenderness, greatest in epigastrium. no guarding or rebound, no pulsatile mass, no hernias.   MSK: Neck supple, nontender, nl ROM, no stepoff. Chest nontender. Back nontender in TLS spine or to b/l bony structures or flanks. Ext nontender, nl rom, no deformity.   INTEG: Skin warm, dry, no rash.  NEURO: A&Ox3, normal strength, nl sensation throughout, normal speech.   PSYCH: Calm, cooperative, normal affect and interaction.

## 2020-03-14 NOTE — ED PROVIDER NOTE - PATIENT PORTAL LINK FT
You can access the FollowMyHealth Patient Portal offered by [facility] by registering at the following website: [email or web address]. By joining [employer]’s FollowMyHealth portal, you will also be able to view your health information using other applications (apps) compatible with our system.

## 2020-03-14 NOTE — ED PROVIDER NOTE - CARE PROVIDER_API CALL
Bony Cason)  Gastroenterology; Internal Medicine  [address] [place] [address]  Phone: [phone]  Fax: [phone]  Follow Up Time: 1-3 Days

## 2020-03-14 NOTE — ED PROVIDER NOTE - PLAN OF CARE
eval for pancreatitis, complications, or other cause of acute abd pain. Labs, imaging, fluids, symptom control, reassess.

## 2020-03-14 NOTE — ED PROVIDER NOTE - CARE PLAN
Assessment and plan of treatment:	eval for pancreatitis, complications, or other cause of acute abd pain. Labs, imaging, fluids, symptom control, reassess. Principal Discharge DX:	Abdominal pain  Assessment and plan of treatment:	eval for pancreatitis, complications, or other cause of acute abd pain. Labs, imaging, fluids, symptom control, reassess.

## 2020-03-14 NOTE — ED ADULT NURSE NOTE - CAS EDN DISCHARGE INTERVENTIONS
Pt called to ask for a referral to see the cardiologist, Dr Princess Yanez. Pt stated that she has an appt scheduled for 5/17 and needs a referral for then. Pt informed that the referral needs to be faxed over to them (prior to her appt) at fax# [phone]. If there are any questions, please call their office at ph# [phone].       Pt's ph# [phone] IV discontinued, cath removed intact

## 2020-03-14 NOTE — ED PROVIDER NOTE - OBJECTIVE STATEMENT
39 Y F PMH pancreatitis, fibroids, PUD, pw diffuse abd pain worsening x 1 month, greatest in midepigastric area, increasingly severe, radiating diffusely throughout abd, worsened by any food, fasting improves but doesn't resolve pain. Had been worked up by sukhdeep Cason who determined patient had pancreatitis, instructed to come straight to the ED on Tuesday, 4 days ago, however patient was afraid of exposure to SARS-COV2 so did not come immediately. Denies fever, chills, dysuria, hematuria, numbness, tingling, weakness, vaginal bleeding or discharge, cough, SOB, pain elsewhere, recent travel or surgery.

## 2020-03-14 NOTE — ED PROVIDER NOTE - NS ED ROS FT
Constitutional: (-) fever, (-) chills  Eyes/ENT: (-) blurry vision, (-) epistaxis, (-) sore throat  Cardiovascular: (-) chest pain, (-) syncope  Respiratory: (-) cough, (-) shortness of breath  Gastrointestinal: (+) abdominal pain, (-) vomiting, (-) diarrhea  Musculoskeletal: (-) neck pain, (-) back pain, (-) joint pain  Integumentary: (-) rash, (-) edema  Neurological: (-) headache, (-) altered mental status  : (-) dysuria, hematuria.  Allergic/Immunologic: (-) pruritus, rash

## 2020-03-15 NOTE — ED POST DISCHARGE NOTE - DETAILS
left message for patient to call back left message for patient to call back. MULTIPLE PHONE CALLS WITH MESSAGES LEFT. FED-EX SENT TO HOME TODAY.

## 2020-03-23 ENCOUNTER — APPOINTMENT (OUTPATIENT)
Dept: GASTROENTEROLOGY | Facility: CLINIC | Age: 40
End: 2020-03-23
Payer: MEDICAID

## 2020-03-23 PROCEDURE — 99441: CPT

## 2020-03-23 NOTE — ASSESSMENT
[FreeTextEntry1] : Due to the Covid 19 epidemic, a telephone visit was performed.\par \par 40 yo female seen for abdominal pain.  Patient was seen by a second opinion physician regarding the fibroids, which were previously embolized.  His opinion was that the pain is related to incompletely embolized fibroids.. GYN is involved.\par \par Currently her pain  is minimal, and she is on pain meds as needed.\par She is on liquids, and tolerating them well, without nausea , vomiting or diarrhea.\par \par She was advised to followup with her gynecologist, and to return to GI\par after the pain from the fibroids has been addressed by GYN\par \par Total time 10 minutes\par

## 2020-03-24 ENCOUNTER — APPOINTMENT (OUTPATIENT)
Dept: GASTROENTEROLOGY | Facility: CLINIC | Age: 40
End: 2020-03-24

## 2020-03-31 ENCOUNTER — APPOINTMENT (OUTPATIENT)
Dept: GASTROENTEROLOGY | Facility: CLINIC | Age: 40
End: 2020-03-31

## 2020-04-17 ENCOUNTER — APPOINTMENT (OUTPATIENT)
Dept: OBGYN | Facility: CLINIC | Age: 40
End: 2020-04-17

## 2020-06-13 NOTE — ED PROVIDER NOTE - NSFOLLOWUPINSTRUCTIONS_ED_ALL_ED_FT
Stage 2: Additional Anesthesia Volume In Cc: 0 98 Diarrhea    Diarrhea is frequent loose or watery bowel movements that has many causes. Diarrhea can make you feel weak and cause you to become dehydrated. Diarrhea typically lasts 2–3 days, but can last longer if it is a sign of something more serious. Drink clear fluids to prevent dehydration. Eat bland, easy-to-digest foods as tolerated.     SEEK IMMEDIATE MEDICAL CARE IF YOU HAVE ANY OF THE FOLLOWING SYMPTOMS: high fevers, lightheadedness/dizziness, chest pain, black or bloody stools, shortness of breath, severe abdominal or back pain, or any signs of dehydration.

## 2020-06-22 ENCOUNTER — EMERGENCY (EMERGENCY)
Facility: HOSPITAL | Age: 40
LOS: 0 days | Discharge: HOME | End: 2020-06-22
Attending: EMERGENCY MEDICINE | Admitting: EMERGENCY MEDICINE
Payer: MEDICAID

## 2020-06-22 VITALS
TEMPERATURE: 98 F | SYSTOLIC BLOOD PRESSURE: 127 MMHG | DIASTOLIC BLOOD PRESSURE: 66 MMHG | OXYGEN SATURATION: 99 % | HEART RATE: 110 BPM | RESPIRATION RATE: 18 BRPM

## 2020-06-22 DIAGNOSIS — D25.9 LEIOMYOMA OF UTERUS, UNSPECIFIED: Chronic | ICD-10-CM

## 2020-06-22 DIAGNOSIS — R10.9 UNSPECIFIED ABDOMINAL PAIN: ICD-10-CM

## 2020-06-22 DIAGNOSIS — O34.219 MATERNAL CARE FOR UNSPECIFIED TYPE SCAR FROM PREVIOUS CESAREAN DELIVERY: Chronic | ICD-10-CM

## 2020-06-22 PROCEDURE — L9992: CPT

## 2020-06-26 ENCOUNTER — EMERGENCY (EMERGENCY)
Facility: HOSPITAL | Age: 40
LOS: 0 days | Discharge: HOME | End: 2020-06-27
Attending: EMERGENCY MEDICINE | Admitting: EMERGENCY MEDICINE
Payer: MEDICAID

## 2020-06-26 VITALS
WEIGHT: 195.99 LBS | HEART RATE: 93 BPM | TEMPERATURE: 99 F | DIASTOLIC BLOOD PRESSURE: 67 MMHG | SYSTOLIC BLOOD PRESSURE: 116 MMHG | RESPIRATION RATE: 16 BRPM | OXYGEN SATURATION: 100 %

## 2020-06-26 DIAGNOSIS — O34.219 MATERNAL CARE FOR UNSPECIFIED TYPE SCAR FROM PREVIOUS CESAREAN DELIVERY: Chronic | ICD-10-CM

## 2020-06-26 DIAGNOSIS — N80.9 ENDOMETRIOSIS, UNSPECIFIED: ICD-10-CM

## 2020-06-26 DIAGNOSIS — R10.9 UNSPECIFIED ABDOMINAL PAIN: ICD-10-CM

## 2020-06-26 DIAGNOSIS — D25.9 LEIOMYOMA OF UTERUS, UNSPECIFIED: ICD-10-CM

## 2020-06-26 DIAGNOSIS — N83.209 UNSPECIFIED OVARIAN CYST, UNSPECIFIED SIDE: ICD-10-CM

## 2020-06-26 DIAGNOSIS — Z87.891 PERSONAL HISTORY OF NICOTINE DEPENDENCE: ICD-10-CM

## 2020-06-26 DIAGNOSIS — Z88.8 ALLERGY STATUS TO OTHER DRUGS, MEDICAMENTS AND BIOLOGICAL SUBSTANCES STATUS: ICD-10-CM

## 2020-06-26 DIAGNOSIS — D25.9 LEIOMYOMA OF UTERUS, UNSPECIFIED: Chronic | ICD-10-CM

## 2020-06-26 DIAGNOSIS — Z91.013 ALLERGY TO SEAFOOD: ICD-10-CM

## 2020-06-26 PROCEDURE — 99285 EMERGENCY DEPT VISIT HI MDM: CPT

## 2020-06-26 RX ORDER — SODIUM CHLORIDE 9 MG/ML
1000 INJECTION, SOLUTION INTRAVENOUS ONCE
Refills: 0 | Status: COMPLETED | OUTPATIENT
Start: 2020-06-26 | End: 2020-06-26

## 2020-06-26 RX ORDER — KETOROLAC TROMETHAMINE 30 MG/ML
30 SYRINGE (ML) INJECTION ONCE
Refills: 0 | Status: DISCONTINUED | OUTPATIENT
Start: 2020-06-26 | End: 2020-06-26

## 2020-06-26 NOTE — ED PROVIDER NOTE - PATIENT PORTAL LINK FT
You can access the FollowMyHealth Patient Portal offered by [facility] by registering at the following website: [email or web address]. By joining [employer]’s FollowMyHealth portal, you will also be able to view your health information using other applications (apps) compatible with our system.

## 2020-06-26 NOTE — ED PROVIDER NOTE - OBJECTIVE STATEMENT
39Y F with PMH of pancreatitis, fibroids, PUD, gastric ulcer on sucralfate and ovarian cysts presents with CC of diffuse abdominal pain for 5 days duration. Patient reports that she has had similar pain in the past, but feels worse right now, started epigastric, now b/l LQ, rates 10/10, associated with +NBNB emesis, no diarrhea, Denies fevers, chills, chest pain, SOB, vaginal bleeding. 39Y F with PMH of pancreatitis, fibroids, Endometriosis, PUD, gastric ulcer on sucralfate and ovarian cysts presents with CC of diffuse abdominal pain for 5 days duration. Patient reports that she has had similar pain in the past, but feels worse right now, started epigastric, now b/l LQ, rates 10/10, associated with +NBNB emesis, no diarrhea, Denies fevers, chills, chest pain, SOB, vaginal bleeding.

## 2020-06-26 NOTE — ED PROVIDER NOTE - PROGRESS NOTE DETAILS
PP: Patient's pain well controlled currently, resting comfortably. Patient explained the results of US, the cyst slightly enlarged. Explained that her symptoms are likely due to her underlying endometriosis, and that she needs to follow up with OB/Gyn and find long term solution to her underlying issues. She endorses understanding and agrees with plan. Patient to be discharged from ED. Any available test results were discussed with patient and/or family. Verbal instructions given, including instructions to return to ED immediately for any new, worsening, or concerning symptoms. Patient endorsed understanding. Written discharge instructions additionally given, including follow-up plan.

## 2020-06-26 NOTE — ED PROVIDER NOTE - NS ED ROS FT
Review of Systems:  CONSTITUTIONAL - No fever  SKIN - No rash  HEMATOLOGIC - No abnormal bleeding or bruising  RESPIRATORY - No shortness of breath, No cough  CARDIAC -No chest pain, No palpitations  GI - No abdominal pain, No nausea, No vomiting, No diarrhea  - No dysuria, frequency, hematuria.  MUSCULOSKELETAL - No joint paint, No swelling, No back pain  NEUROLOGIC - No numbness, No focal weakness, No headache, No dizziness  All other systems negative, unless specified in HPI

## 2020-06-26 NOTE — ED PROVIDER NOTE - NSFOLLOWUPCLINICS_GEN_ALL_ED_FT
[facility]  OB/GYN  [address] [place] [address]  Phone: [phone]  Fax:   Follow Up Time:

## 2020-06-26 NOTE — ED PROVIDER NOTE - PHYSICAL EXAMINATION
VITALS: Reviewed  CONSTITUTIONAL: well developed, well nourished, in no acute distress, speaking in full sentences, nontoxic appearing  SKIN: warm, dry, no rash  HEAD: normocephalic, atraumatic  EYES: PERRL, EOMI, no conjunctival erythema, sclera clear  ENT: patent airway, moist mucous membranes  NECK: supple, no masses  CV:  regular rate, regular rhythm, 2+ radial pulses bilaterally  RESP: no wheezes, no rales, no rhonchi, normal work of breathing  ABD: soft, diffuse tenderness to palpation, nondistended, no rebound, no guarding  MSK: normal ROM, no cyanosis, no edema  NEURO: alert, oriented x3  PSYCH: cooperative, appropriate

## 2020-06-26 NOTE — ED ADULT TRIAGE NOTE - CHIEF COMPLAINT QUOTE
Pt c/o sharp pain in the abdomen started this am. Pt took midol in the am. Pt c/o NBNB N,V,D since Monday.

## 2020-06-26 NOTE — ED PROVIDER NOTE - CARE PLAN
Principal Discharge DX:	Abdominal pain  Secondary Diagnosis:	Ovarian cyst  Secondary Diagnosis:	Fibroid  Secondary Diagnosis:	Endometriosis

## 2020-06-26 NOTE — ED PROVIDER NOTE - ATTENDING CONTRIBUTION TO CARE
pt co diffuse ab pain, started epig, now b/l LQ. sharp. for 5 days. +nbnb nvd. no fever. no back pain. no UTI sx. no vaginal bleeding or dc.   pt in nad, anict, neck sup, ctab, rrr, ab soft, diffuse tender. no guarding or rebound. no cvat.  has had numerous CT and US of abdomen, findings included Adnexal cysts and fibroid uterus. last CT was 3 months ago which was neg.  will get labs, pain meds, fluids, US pelvis.

## 2020-06-26 NOTE — ED PROVIDER NOTE - NSFOLLOWUPINSTRUCTIONS_ED_ALL_ED_FT
Abdominal Pain    Many things can cause abdominal pain. Many times, abdominal pain is not caused by a disease and will improve without treatment. Your health care provider will do a physical exam to determine if there is a dangerous cause of your pain; blood tests and imaging may help determine the cause of your pain. However, in many cases, no cause may be found and you may need further testing as an outpatient. Monitor your abdominal pain for any changes.     SEEK IMMEDIATE MEDICAL CARE IF YOU HAVE ANY OF THE FOLLOWING SYMPTOMS: worsening abdominal pain, uncontrollable vomiting, profuse diarrhea, inability to have bowel movements or pass gas, black or bloody stools, fever accompanying chest pain or back pain, or fainting. These symptoms may represent a serious problem that is an emergency. Do not wait to see if the symptoms will go away. Get medical help right away. Call 911 and do not drive yourself to the hospital.    Endometriosis     Endometriosis is a condition in which the tissue that lines the uterus (endometrium) grows outside of its normal location. The tissue may grow in many locations close to the uterus, but it commonly grows on the ovaries, fallopian tubes, vagina, or bowel. When the uterus sheds the endometrium every menstrual cycle, there is bleeding wherever the endometrial tissue is located. This can cause pain because blood is irritating to tissues that are not normally exposed to it.  What are the causes?  The cause of endometriosis is not known.  What increases the risk?  You may be more likely to develop endometriosis if you:  Have a family history of endometriosis.Have never given birth.Started your period at age 10 or younger.Have high levels of estrogen in your body.Were exposed to a certain medicine (diethylstilbestrol) before you were born (in utero).Had low birth weight.Were born as a twin, triplet, or other multiple.Have a BMI of less than 25. BMI is an estimate of body fat and is calculated from height and weight.What are the signs or symptoms?  Often, there are no symptoms of this condition. If you do have symptoms, they may:  Vary depending on where your endometrial tissue is growing.Occur during your menstrual period (most common) or midcycle.Come and go, or you may go months with no symptoms at all.Stop with menopause.Symptoms may include:  Pain in the back or abdomen.Heavier bleeding during periods.Pain during sex.Painful bowel movements.Infertility.Pelvic pain.Bleeding more than once a month.How is this diagnosed?  This condition is diagnosed based on your symptoms and a physical exam. You may have tests, such as:  Blood tests and urine tests. These may be done to help rule out other possible causes of your symptoms.Ultrasound, to look for abnormal tissues.An X-ray of the lower bowel (barium enema).An ultrasound that is done through the vagina (transvaginally).CT scan.MRI.Laparoscopy. In this procedure, a lighted, pencil-sized instrument called a laparoscope is inserted into your abdomen through an incision. The laparoscope allows your health care provider to look at the organs inside your body and check for abnormal tissue to confirm the diagnosis. If abnormal tissue is found, your health care provider may remove a small piece of tissue (biopsy) to be examined under a microscope.How is this treated?  Treatment for this condition may include:  Medicines to relieve pain, such as NSAIDs.Hormone therapy. This involves using artificial (synthetic) hormones to reduce endometrial tissue growth. Your health care provider may recommend using a hormonal form of birth control, or other medicines.Surgery. This may be done to remove abnormal endometrial tissue.  In some cases, tissue may be removed using a laparoscope and a laser (laparoscopic laser treatment).In severe cases, surgery may be done to remove the fallopian tubes, uterus, and ovaries (hysterectomy).Follow these instructions at home:  Take over-the-counter and prescription medicines only as told by your health care provider.Do not drive or use heavy machinery while taking prescription pain medicine.Try to avoid activities that cause pain, including sexual activity.Keep all follow-up visits as told by your health care provider. This is important.Contact a health care provider if:  You have pain in the area between your hip bones (pelvic area) that occurs:  Before, during, or after your period.In between your period and gets worse during your period.During or after sex.With bowel movements or urination, especially during your period.You have problems getting pregnant.You have a fever.Get help right away if:  You have severe pain that does not get better with medicine.You have severe nausea and vomiting, or you cannot eat without vomiting.You have pain that affects only the lower, right side of your abdomen.You have abdominal pain that gets worse.You have abdominal swelling.You have blood in your stool.This information is not intended to replace advice given to you by your health care provider. Make sure you discuss any questions you have with your health care provider.

## 2020-06-27 LAB
ALBUMIN SERPL ELPH-MCNC: 4.3 G/DL — SIGNIFICANT CHANGE UP (ref 3.5–5.2)
ALP SERPL-CCNC: 64 U/L — SIGNIFICANT CHANGE UP (ref 30–115)
ALT FLD-CCNC: 14 U/L — SIGNIFICANT CHANGE UP (ref 0–41)
ANION GAP SERPL CALC-SCNC: 9 MMOL/L — SIGNIFICANT CHANGE UP (ref 7–14)
AST SERPL-CCNC: 14 U/L — SIGNIFICANT CHANGE UP (ref 0–41)
BASOPHILS # BLD AUTO: 0 K/UL — SIGNIFICANT CHANGE UP (ref 0–0.2)
BASOPHILS NFR BLD AUTO: 0 % — SIGNIFICANT CHANGE UP (ref 0–1)
BILIRUB SERPL-MCNC: <0.2 MG/DL — SIGNIFICANT CHANGE UP (ref 0.2–1.2)
BUN SERPL-MCNC: 7 MG/DL — LOW (ref 10–20)
CALCIUM SERPL-MCNC: 9.5 MG/DL — SIGNIFICANT CHANGE UP (ref 8.5–10.1)
CHLORIDE SERPL-SCNC: 101 MMOL/L — SIGNIFICANT CHANGE UP (ref 98–110)
CO2 SERPL-SCNC: 26 MMOL/L — SIGNIFICANT CHANGE UP (ref 17–32)
CREAT SERPL-MCNC: 0.6 MG/DL — LOW (ref 0.7–1.5)
EOSINOPHIL # BLD AUTO: 0.24 K/UL — SIGNIFICANT CHANGE UP (ref 0–0.7)
EOSINOPHIL NFR BLD AUTO: 3.5 % — SIGNIFICANT CHANGE UP (ref 0–8)
GIANT PLATELETS BLD QL SMEAR: PRESENT — SIGNIFICANT CHANGE UP
GLUCOSE SERPL-MCNC: 87 MG/DL — SIGNIFICANT CHANGE UP (ref 70–99)
HCG SERPL QL: NEGATIVE — SIGNIFICANT CHANGE UP
HCT VFR BLD CALC: 37.7 % — SIGNIFICANT CHANGE UP (ref 37–47)
HGB BLD-MCNC: 12.8 G/DL — SIGNIFICANT CHANGE UP (ref 12–16)
LIDOCAIN IGE QN: 31 U/L — SIGNIFICANT CHANGE UP (ref 7–60)
LYMPHOCYTES # BLD AUTO: 3.66 K/UL — HIGH (ref 1.2–3.4)
LYMPHOCYTES # BLD AUTO: 54.4 % — HIGH (ref 20.5–51.1)
MANUAL SMEAR VERIFICATION: SIGNIFICANT CHANGE UP
MCHC RBC-ENTMCNC: 29.6 PG — SIGNIFICANT CHANGE UP (ref 27–31)
MCHC RBC-ENTMCNC: 34 G/DL — SIGNIFICANT CHANGE UP (ref 32–37)
MCV RBC AUTO: 87.3 FL — SIGNIFICANT CHANGE UP (ref 81–99)
MONOCYTES # BLD AUTO: 0.11 K/UL — SIGNIFICANT CHANGE UP (ref 0.1–0.6)
MONOCYTES NFR BLD AUTO: 1.7 % — SIGNIFICANT CHANGE UP (ref 1.7–9.3)
NEUTROPHILS # BLD AUTO: 2.42 K/UL — SIGNIFICANT CHANGE UP (ref 1.4–6.5)
NEUTROPHILS NFR BLD AUTO: 36 % — LOW (ref 42.2–75.2)
PLAT MORPH BLD: NORMAL — SIGNIFICANT CHANGE UP
PLATELET # BLD AUTO: 317 K/UL — SIGNIFICANT CHANGE UP (ref 130–400)
POIKILOCYTOSIS BLD QL AUTO: SLIGHT — SIGNIFICANT CHANGE UP
POLYCHROMASIA BLD QL SMEAR: SLIGHT — SIGNIFICANT CHANGE UP
POTASSIUM SERPL-MCNC: 4.2 MMOL/L — SIGNIFICANT CHANGE UP (ref 3.5–5)
POTASSIUM SERPL-SCNC: 4.2 MMOL/L — SIGNIFICANT CHANGE UP (ref 3.5–5)
PROT SERPL-MCNC: 7.4 G/DL — SIGNIFICANT CHANGE UP (ref 6–8)
RBC # BLD: 4.32 M/UL — SIGNIFICANT CHANGE UP (ref 4.2–5.4)
RBC # FLD: 13.2 % — SIGNIFICANT CHANGE UP (ref 11.5–14.5)
RBC BLD AUTO: NORMAL — SIGNIFICANT CHANGE UP
SMUDGE CELLS # BLD: PRESENT — SIGNIFICANT CHANGE UP
SODIUM SERPL-SCNC: 136 MMOL/L — SIGNIFICANT CHANGE UP (ref 135–146)
VARIANT LYMPHS # BLD: 4.4 % — SIGNIFICANT CHANGE UP (ref 0–5)
WBC # BLD: 6.72 K/UL — SIGNIFICANT CHANGE UP (ref 4.8–10.8)
WBC # FLD AUTO: 6.72 K/UL — SIGNIFICANT CHANGE UP (ref 4.8–10.8)

## 2020-06-27 PROCEDURE — 76830 TRANSVAGINAL US NON-OB: CPT | Mod: 26

## 2020-06-27 RX ORDER — MORPHINE SULFATE 50 MG/1
4 CAPSULE, EXTENDED RELEASE ORAL ONCE
Refills: 0 | Status: DISCONTINUED | OUTPATIENT
Start: 2020-06-27 | End: 2020-06-27

## 2020-06-27 RX ADMIN — MORPHINE SULFATE 4 MILLIGRAM(S): 50 CAPSULE, EXTENDED RELEASE ORAL at 01:48

## 2020-06-27 RX ADMIN — Medication 30 MILLIGRAM(S): at 00:05

## 2020-06-27 RX ADMIN — SODIUM CHLORIDE 1000 MILLILITER(S): 9 INJECTION, SOLUTION INTRAVENOUS at 00:05

## 2020-07-08 ENCOUNTER — OUTPATIENT (OUTPATIENT)
Dept: OUTPATIENT SERVICES | Facility: HOSPITAL | Age: 40
LOS: 1 days | Discharge: HOME | End: 2020-07-08

## 2020-07-08 ENCOUNTER — APPOINTMENT (OUTPATIENT)
Dept: OBGYN | Facility: CLINIC | Age: 40
End: 2020-07-08
Payer: MEDICAID

## 2020-07-08 VITALS — SYSTOLIC BLOOD PRESSURE: 130 MMHG | BODY MASS INDEX: 38.67 KG/M2 | DIASTOLIC BLOOD PRESSURE: 80 MMHG | WEIGHT: 198 LBS

## 2020-07-08 DIAGNOSIS — D25.9 LEIOMYOMA OF UTERUS, UNSPECIFIED: Chronic | ICD-10-CM

## 2020-07-08 DIAGNOSIS — R10.2 PELVIC AND PERINEAL PAIN: ICD-10-CM

## 2020-07-08 DIAGNOSIS — O34.219 MATERNAL CARE FOR UNSPECIFIED TYPE SCAR FROM PREVIOUS CESAREAN DELIVERY: Chronic | ICD-10-CM

## 2020-07-08 PROCEDURE — 99213 OFFICE O/P EST LOW 20 MIN: CPT

## 2020-07-08 NOTE — END OF VISIT
[] : Resident [FreeTextEntry3] : Pt with pelvic pain desiring fertility. Preliminary fertility work up ordered. [facility] referral given [Time Spent: ___ minutes] : I have spent [unfilled] minutes of time on the encounter. [>50% of the face to face encounter time was spent on counseling and/or coordination of care for ___] : Greater than 50% of the face to face encounter time was spent on counseling and/or coordination of care for [unfilled]

## 2020-07-10 ENCOUNTER — APPOINTMENT (OUTPATIENT)
Dept: OBGYN | Facility: CLINIC | Age: 40
End: 2020-07-10

## 2020-08-02 LAB
ESTRADIOL SERPL-MCNC: 36 PG/ML
FSH SERPL-MCNC: 5.7 IU/L
PROGEST SERPL-MCNC: 0.3 NG/ML
PROLACTIN SERPL-MCNC: 11.3 NG/ML
TSH SERPL-ACNC: 0.1 UIU/ML

## 2020-08-05 ENCOUNTER — APPOINTMENT (OUTPATIENT)
Dept: OBGYN | Facility: CLINIC | Age: 40
End: 2020-08-05
Payer: MEDICAID

## 2020-08-05 ENCOUNTER — OUTPATIENT (OUTPATIENT)
Dept: OUTPATIENT SERVICES | Facility: HOSPITAL | Age: 40
LOS: 1 days | Discharge: HOME | End: 2020-08-05

## 2020-08-05 VITALS
WEIGHT: 199 LBS | SYSTOLIC BLOOD PRESSURE: 116 MMHG | BODY MASS INDEX: 39.07 KG/M2 | HEIGHT: 60 IN | DIASTOLIC BLOOD PRESSURE: 80 MMHG

## 2020-08-05 DIAGNOSIS — O34.219 MATERNAL CARE FOR UNSPECIFIED TYPE SCAR FROM PREVIOUS CESAREAN DELIVERY: Chronic | ICD-10-CM

## 2020-08-05 DIAGNOSIS — E05.90 THYROTOXICOSIS, UNSPECIFIED W/OUT THYROTOXIC CRISIS OR STORM: ICD-10-CM

## 2020-08-05 DIAGNOSIS — R10.2 PELVIC AND PERINEAL PAIN: ICD-10-CM

## 2020-08-05 DIAGNOSIS — E05.90 THYROTOXICOSIS, UNSPECIFIED WITHOUT THYROTOXIC CRISIS OR STORM: ICD-10-CM

## 2020-08-05 DIAGNOSIS — D25.9 LEIOMYOMA OF UTERUS, UNSPECIFIED: Chronic | ICD-10-CM

## 2020-08-05 PROCEDURE — 99212 OFFICE O/P EST SF 10 MIN: CPT

## 2020-08-05 NOTE — END OF VISIT
[] : Resident [FreeTextEntry3] : Pt with hyperthyroid, desiring conception. Pt to f/u with pmd and endocrinologist

## 2020-08-06 LAB — ANTI-MUELLERIAN HORMONE: 4.53 NG/ML

## 2020-09-16 NOTE — ED PROVIDER NOTE - PATIENT PORTAL LINK FT
Referring Provider:Lynne Ramirez*   PCP: Lynne Ramirez, [facility], Dr. Altamirano, fasciitis.    Review of Systems   Constitutional: Positive for malaise/fatigue.   Eyes: Positive for blurred vision.   Respiratory: Positive for shortness of breath.         Asthma   Cardiovascular: Positive for leg swelling.   Gastrointestinal: Positive for constipation.   Musculoskeletal: Positive for back pain, joint pain and neck pain.   Skin: Positive for itching and rash.   Neurological: Positive for tingling, sensory change and weakness.        Diabetic peripheral neuropathy   Endo/Heme/Allergies:        Diabetes   Psychiatric/Behavioral: The patient has insomnia.         Morbid obesity   All other systems reviewed and are negative.       Allergies   Allergen Reactions   • Myrbetriq [Mirabegron Base] RASH   • Percocet [Oxycodone-Acetaminophen] Nausea & Vomiting     Pt states tolerates Vicoden (and just took some) as of 08/31/2020   • Metformin DIARRHEA   • Morphine VOMITING   • Diclofenac RASH     Itching   • Elmiron [Pentosan Polysulfate Sodium] RASH   • Neurontin [Gabapentin] RASH   • Voltaren RASH        Past Medical History:   Diagnosis Date   • Bunion    • Carpal tunnel syndrome    • Cervical radiculopathy due to degenerative joint disease of spine    • CHRONIC PAIN ( [facility])     Chronic use of pain medication,bilateral feet secondary to diabetes   • Constipation    • Corns and callosities    • DDD lumbar spine     Right lumbar spine   • Degenerative disc disease, thoracic    • Dependent edema 9/1/2017   • Dyslipidemia    • Hypothyroidism    • Lumbosacral spondylosis without myelopathy    • Median neuropathy    • Mild intermittent asthma without complication 4/25/2018   • Obesity    • Onychomycosis     x 10   • JOSESITO (obstructive sleep apnea)     refuses CPAP   • Osteoarthrosis, unspecified whether generalized or localized, lower leg     Right knee   • Papillary thyroid carcinoma (CMS/HCC)    • PERIPHERAL NEUROPATHY-DIABET (250.6=)    • Pulmonary HTN (CMS/HCC)    • Recurrent vaginitis     with antibiotic use     • Skin cancer    • Thyroid nodule     history of thyroid cancere   • Type II or unspecified type diabetes mellitus without mention of complication, not stated as uncontrolled     NIDDM   • Unspecified essential hypertension    • Urinary incontinence 2017       Past Surgical History:   Procedure Laterality Date   • Appendectomy  age 16   •  section, classic     • Dexa bone density axial skeleton  2004   • Excision of lingual tonsil     • Hernia repair      umbilical   • Hysterectomy     • Joint replacement      left knee   • Joint replacement      right knee   • Joint replacement      right hip   • Repair ing hernia,5+y/o,reducibl      Hernia, inguinal   • Service to urology      bladder stimulator insertion   • Stress test  10/13   • Thyroid lobectomy  2013   • Tonsillectomy and adenoidectomy  childhood   • Total abdom hysterectomy     • Total hip replacement  2001    Right Hip replacement   • Total knee replacement  2008    Left knee       Family History   Problem Relation Age of Onset   • Cancer Father         Unspecified       Social History     Socioeconomic History   • Marital status: /Civil Union     Spouse name: Not on file   • Number of children: Not on file   • Years of education: Not on file   • Highest education level: Not on file   Occupational History   • Occupation: Teller     Employer: [employer]   Social Needs   • Financial resource strain: Not on file   • Food insecurity     Worry: Not on file     Inability: Not on file   • Transportation needs     Medical: Not on file     Non-medical: Not on file   Tobacco Use   • Smoking status: Former Smoker     Packs/day: 1.00     Years: 30.00     Pack years: 30.00     Types: Cigarettes     Quit date: 1999     Years since quittin.8   • Smokeless tobacco: Never Used   Substance and Sexual Activity   • Alcohol use: No     Alcohol/week: 0.0 standard drinks   • Drug use: No   • Lynne Ramirez DO  [address] [place] [address]    You can access the FollowMyHealth Patient Portal offered by [facility] by registering at the following website: [email or web address]. By joining [employer]’s FollowMyHealth portal, you will also be able to view your health information using other applications (apps) compatible with our system.

## 2021-01-18 ENCOUNTER — EMERGENCY (EMERGENCY)
Facility: HOSPITAL | Age: 41
LOS: 0 days | Discharge: HOME | End: 2021-01-19
Attending: EMERGENCY MEDICINE | Admitting: EMERGENCY MEDICINE
Payer: MEDICAID

## 2021-01-18 VITALS
DIASTOLIC BLOOD PRESSURE: 76 MMHG | SYSTOLIC BLOOD PRESSURE: 125 MMHG | TEMPERATURE: 99 F | WEIGHT: 195.11 LBS | OXYGEN SATURATION: 98 % | HEART RATE: 88 BPM | RESPIRATION RATE: 18 BRPM | HEIGHT: 60 IN

## 2021-01-18 VITALS — WEIGHT: 194.01 LBS | HEIGHT: 60 IN

## 2021-01-18 DIAGNOSIS — R10.2 PELVIC AND PERINEAL PAIN: ICD-10-CM

## 2021-01-18 DIAGNOSIS — O34.219 MATERNAL CARE FOR UNSPECIFIED TYPE SCAR FROM PREVIOUS CESAREAN DELIVERY: Chronic | ICD-10-CM

## 2021-01-18 DIAGNOSIS — M25.559 PAIN IN UNSPECIFIED HIP: ICD-10-CM

## 2021-01-18 DIAGNOSIS — Z79.899 OTHER LONG TERM (CURRENT) DRUG THERAPY: ICD-10-CM

## 2021-01-18 DIAGNOSIS — Z91.013 ALLERGY TO SEAFOOD: ICD-10-CM

## 2021-01-18 DIAGNOSIS — D25.9 LEIOMYOMA OF UTERUS, UNSPECIFIED: Chronic | ICD-10-CM

## 2021-01-18 LAB
BASOPHILS # BLD AUTO: 0.02 K/UL — SIGNIFICANT CHANGE UP (ref 0–0.2)
BASOPHILS NFR BLD AUTO: 0.3 % — SIGNIFICANT CHANGE UP (ref 0–1)
EOSINOPHIL # BLD AUTO: 0.24 K/UL — SIGNIFICANT CHANGE UP (ref 0–0.7)
EOSINOPHIL NFR BLD AUTO: 3.1 % — SIGNIFICANT CHANGE UP (ref 0–8)
HCG SERPL QL: NEGATIVE — SIGNIFICANT CHANGE UP
HCT VFR BLD CALC: 38.4 % — SIGNIFICANT CHANGE UP (ref 37–47)
HGB BLD-MCNC: 12.7 G/DL — SIGNIFICANT CHANGE UP (ref 12–16)
IMM GRANULOCYTES NFR BLD AUTO: 0.3 % — SIGNIFICANT CHANGE UP (ref 0.1–0.3)
LACTATE SERPL-SCNC: 1.3 MMOL/L — SIGNIFICANT CHANGE UP (ref 0.7–2)
LYMPHOCYTES # BLD AUTO: 3.72 K/UL — HIGH (ref 1.2–3.4)
LYMPHOCYTES # BLD AUTO: 48.4 % — SIGNIFICANT CHANGE UP (ref 20.5–51.1)
MCHC RBC-ENTMCNC: 29.1 PG — SIGNIFICANT CHANGE UP (ref 27–31)
MCHC RBC-ENTMCNC: 33.1 G/DL — SIGNIFICANT CHANGE UP (ref 32–37)
MCV RBC AUTO: 87.9 FL — SIGNIFICANT CHANGE UP (ref 81–99)
MONOCYTES # BLD AUTO: 0.51 K/UL — SIGNIFICANT CHANGE UP (ref 0.1–0.6)
MONOCYTES NFR BLD AUTO: 6.6 % — SIGNIFICANT CHANGE UP (ref 1.7–9.3)
NEUTROPHILS # BLD AUTO: 3.17 K/UL — SIGNIFICANT CHANGE UP (ref 1.4–6.5)
NEUTROPHILS NFR BLD AUTO: 41.3 % — LOW (ref 42.2–75.2)
NRBC # BLD: 0 /100 WBCS — SIGNIFICANT CHANGE UP (ref 0–0)
PLATELET # BLD AUTO: 272 K/UL — SIGNIFICANT CHANGE UP (ref 130–400)
RBC # BLD: 4.37 M/UL — SIGNIFICANT CHANGE UP (ref 4.2–5.4)
RBC # FLD: 13.2 % — SIGNIFICANT CHANGE UP (ref 11.5–14.5)
WBC # BLD: 7.68 K/UL — SIGNIFICANT CHANGE UP (ref 4.8–10.8)
WBC # FLD AUTO: 7.68 K/UL — SIGNIFICANT CHANGE UP (ref 4.8–10.8)

## 2021-01-18 PROCEDURE — 99285 EMERGENCY DEPT VISIT HI MDM: CPT

## 2021-01-18 RX ORDER — KETOROLAC TROMETHAMINE 30 MG/ML
30 SYRINGE (ML) INJECTION ONCE
Refills: 0 | Status: DISCONTINUED | OUTPATIENT
Start: 2021-01-18 | End: 2021-01-18

## 2021-01-18 RX ADMIN — Medication 30 MILLIGRAM(S): at 23:19

## 2021-01-18 NOTE — ED PROVIDER NOTE - NSFOLLOWUPCLINICS_GEN_ALL_ED_FT
A Family Medicine Doctor  Family Medicine  .  [place]   Phone:   Fax:   Follow Up Time: 1-3 Days    [facility]  OB/GYN  [address] [place] [address]  Phone: [phone]  Fax:   Follow Up Time: 1-3 Days

## 2021-01-18 NOTE — ED PROVIDER NOTE - NS ED ROS FT
Eyes:  No visual changes, eye pain or discharge.  ENMT:  No hearing changes, pain, discharge or infections. No neck pain or stiffness.  Cardiac:  No chest pain, SOB or edema. No chest pain with exertion.  Respiratory:  No cough or respiratory distress. No hemoptysis. No history of asthma or RAD.  GI: + abd pain  No nausea, vomiting, diarrhea   :  No dysuria, frequency or burning.  MS:  No myalgia, muscle weakness, joint pain or back pain.  Neuro:  No headache or weakness.  No LOC.  Skin:  No skin rash.   Endocrine: No history of thyroid disease or diabetes.  Except as documented in the HPI,  all other systems are negative.

## 2021-01-18 NOTE — ED PROVIDER NOTE - OBJECTIVE STATEMENT
Pt is a 39y/o female with a pmhx of ovarian cysts, fibroids presents today for eval of lower abd pain for 1 week at the start of her menses. Pt sts pain is typical to experience during her menses, but never lasts this long which is what prompted her visit. Pt denies fever, chills, weakness, numbness, CP.

## 2021-01-18 NOTE — ED PROVIDER NOTE - ATTENDING CONTRIBUTION TO CARE
40F h/o fibroids, ?uterine art embolization in past @ outside facility, endometriosis, ovarian cysts, pud/gastritis p/w R pelvic pain x 1 week. Intermitt, worsened w/mvmt, occ felt in L pelvis as well. No pain meds given PTA. Denies f/c, nvd, flank pain, urinaru sx, vag discharge. LMP 1 week ago. ObGyn @ [facility].     PE:  nad  skin warm, dry  ncat  neck supple  rrr nl s1s2 no mrg  ctab no wrr  abd soft nd bl lower abd ttp R>L, no palpable masses no rgr  back non-tender no cvat  ext no cce dpi  neuro aaox3 grossly nf exam

## 2021-01-18 NOTE — ED ADULT NURSE NOTE - NSIMPLEMENTINTERV_GEN_ALL_ED
Implemented All Universal Safety Interventions:  [place] to call system. Call bell, personal items and telephone within reach. Instruct patient to call for assistance. Room bathroom lighting operational. Non-slip footwear when patient is off stretcher. Physically safe environment: no spills, clutter or unnecessary equipment. Stretcher in lowest position, wheels locked, appropriate side rails in place.

## 2021-01-18 NOTE — ED PROVIDER NOTE - PROGRESS NOTE DETAILS
PALEVY: all symptoms resolved. rpt abdominal exam nontender. Reviewed all results and necessity for follow up. Counseled on red flags and to return for them.  Patient appears well on discharge.

## 2021-01-18 NOTE — ED ADULT NURSE NOTE - OBJECTIVE STATEMENT
Patient presents to ER in NAD A&OX4 c/o right sided hip pain since last week. pt denies any trauma to area. pt states pain is worse when she walks.

## 2021-01-18 NOTE — ED PROVIDER NOTE - CLINICAL SUMMARY MEDICAL DECISION MAKING FREE TEXT BOX
40yF p/w R sided pelvic pain during menses, similar to prior and attributed by pt to her known ovarian cysts.  Labs reassuring.  UA w/o UTI.  US c/w fibroid, minimally increased in size compared to prior evaluation and possible source for pt's pelvic pain.  Abd soft/benign on multiple assessments.  No clinical concern for torsion.  Ok to dc with supportive care, o/p gyn f/u, return precautions.

## 2021-01-18 NOTE — ED PROVIDER NOTE - PHYSICAL EXAMINATION
VITAL SIGNS: I have reviewed nursing notes and confirm.  CONSTITUTIONAL: Well-developed; well-nourished; in no acute distress.   SKIN:  skin exam is warm and dry, no acute rash.    HEAD: Normocephalic; atraumatic.  EYES: conjunctiva and sclera clear.  ENT: No nasal discharge; airway clear.  NECK: Supple; non tender.  CARD: S1, S2 normal; no murmurs, gallops, or rubs. Regular rate and rhythm.   RESP: No wheezes, rales or rhonchi.  ABD: TTP Lower abd, no CVA tenderness, Normal bowel sounds; soft; non-distended  EXT: Normal ROM.  No clubbing, cyanosis or edema.   LYMPH: No acute cervical adenopathy.  NEURO: Alert, oriented, grossly unremarkable

## 2021-01-18 NOTE — ED ADULT TRIAGE NOTE - CHIEF COMPLAINT QUOTE
pt c/o right sided hip pain since last week. pt denies any trauma to area. pt states pain is worse when she walks.

## 2021-01-19 LAB
ALBUMIN SERPL ELPH-MCNC: 4.1 G/DL — SIGNIFICANT CHANGE UP (ref 3.5–5.2)
ALP SERPL-CCNC: 69 U/L — SIGNIFICANT CHANGE UP (ref 30–115)
ALT FLD-CCNC: 10 U/L — SIGNIFICANT CHANGE UP (ref 0–41)
ANION GAP SERPL CALC-SCNC: 10 MMOL/L — SIGNIFICANT CHANGE UP (ref 7–14)
APPEARANCE UR: CLEAR — SIGNIFICANT CHANGE UP
AST SERPL-CCNC: 17 U/L — SIGNIFICANT CHANGE UP (ref 0–41)
BACTERIA # UR AUTO: NEGATIVE — SIGNIFICANT CHANGE UP
BILIRUB DIRECT SERPL-MCNC: <0.2 MG/DL — SIGNIFICANT CHANGE UP (ref 0–0.2)
BILIRUB INDIRECT FLD-MCNC: <0.2 MG/DL — SIGNIFICANT CHANGE UP (ref 0.2–1.2)
BILIRUB SERPL-MCNC: <0.2 MG/DL — SIGNIFICANT CHANGE UP (ref 0.2–1.2)
BILIRUB UR-MCNC: NEGATIVE — SIGNIFICANT CHANGE UP
BUN SERPL-MCNC: 11 MG/DL — SIGNIFICANT CHANGE UP (ref 10–20)
CALCIUM SERPL-MCNC: 9.7 MG/DL — SIGNIFICANT CHANGE UP (ref 8.5–10.1)
CHLORIDE SERPL-SCNC: 103 MMOL/L — SIGNIFICANT CHANGE UP (ref 98–110)
CO2 SERPL-SCNC: 26 MMOL/L — SIGNIFICANT CHANGE UP (ref 17–32)
COLOR SPEC: YELLOW — SIGNIFICANT CHANGE UP
CREAT SERPL-MCNC: 0.7 MG/DL — SIGNIFICANT CHANGE UP (ref 0.7–1.5)
DIFF PNL FLD: NEGATIVE — SIGNIFICANT CHANGE UP
EPI CELLS # UR: 3 /HPF — SIGNIFICANT CHANGE UP (ref 0–5)
GLUCOSE SERPL-MCNC: 90 MG/DL — SIGNIFICANT CHANGE UP (ref 70–99)
GLUCOSE UR QL: NEGATIVE — SIGNIFICANT CHANGE UP
HYALINE CASTS # UR AUTO: 7 /LPF — SIGNIFICANT CHANGE UP (ref 0–7)
KETONES UR-MCNC: SIGNIFICANT CHANGE UP
LEUKOCYTE ESTERASE UR-ACNC: NEGATIVE — SIGNIFICANT CHANGE UP
LIDOCAIN IGE QN: 53 U/L — SIGNIFICANT CHANGE UP (ref 7–60)
NITRITE UR-MCNC: NEGATIVE — SIGNIFICANT CHANGE UP
PH UR: 6 — SIGNIFICANT CHANGE UP (ref 5–8)
POTASSIUM SERPL-MCNC: 5.3 MMOL/L — HIGH (ref 3.5–5)
POTASSIUM SERPL-SCNC: 5.3 MMOL/L — HIGH (ref 3.5–5)
PROT SERPL-MCNC: 7.2 G/DL — SIGNIFICANT CHANGE UP (ref 6–8)
PROT UR-MCNC: ABNORMAL
RBC CASTS # UR COMP ASSIST: 2 /HPF — SIGNIFICANT CHANGE UP (ref 0–4)
SODIUM SERPL-SCNC: 139 MMOL/L — SIGNIFICANT CHANGE UP (ref 135–146)
SP GR SPEC: 1.04 — HIGH (ref 1.01–1.03)
UROBILINOGEN FLD QL: ABNORMAL
WBC UR QL: 9 /HPF — HIGH (ref 0–5)

## 2021-01-19 PROCEDURE — 76830 TRANSVAGINAL US NON-OB: CPT | Mod: 26

## 2021-01-19 RX ORDER — OXYCODONE AND ACETAMINOPHEN 5; 325 MG/1; MG/1
1 TABLET ORAL ONCE
Refills: 0 | Status: DISCONTINUED | OUTPATIENT
Start: 2021-01-19 | End: 2021-01-19

## 2021-01-19 RX ADMIN — OXYCODONE AND ACETAMINOPHEN 1 TABLET(S): 5; 325 TABLET ORAL at 01:35

## 2021-01-19 NOTE — ED ADULT NURSE REASSESSMENT NOTE - NS ED NURSE REASSESS COMMENT FT1
patient complaining of pain to right hip. ASHLEY villasenor contacted [phone]. Will put order in for pain medication.

## 2021-01-20 LAB
CULTURE RESULTS: SIGNIFICANT CHANGE UP
SPECIMEN SOURCE: SIGNIFICANT CHANGE UP

## 2021-02-02 NOTE — ED ADULT NURSE REASSESSMENT NOTE - NS ED NURSE REASSESS COMMENT FT1
Pt a&ox3, pt calm and cooperative, pt sent to CT medications administered. Pt denies any complaints at this time. Will continue to monitor. · Continue statin and metoprolol

## 2021-02-17 ENCOUNTER — EMERGENCY (EMERGENCY)
Facility: HOSPITAL | Age: 41
LOS: 0 days | Discharge: HOME | End: 2021-02-17
Attending: EMERGENCY MEDICINE | Admitting: EMERGENCY MEDICINE
Payer: MEDICAID

## 2021-02-17 VITALS
SYSTOLIC BLOOD PRESSURE: 139 MMHG | OXYGEN SATURATION: 99 % | HEART RATE: 66 BPM | DIASTOLIC BLOOD PRESSURE: 65 MMHG | TEMPERATURE: 99 F | RESPIRATION RATE: 18 BRPM

## 2021-02-17 VITALS
SYSTOLIC BLOOD PRESSURE: 133 MMHG | HEART RATE: 93 BPM | TEMPERATURE: 98 F | HEIGHT: 60 IN | OXYGEN SATURATION: 99 % | RESPIRATION RATE: 18 BRPM | WEIGHT: 192.9 LBS | DIASTOLIC BLOOD PRESSURE: 79 MMHG

## 2021-02-17 DIAGNOSIS — Z87.59 PERSONAL HISTORY OF OTHER COMPLICATIONS OF PREGNANCY, CHILDBIRTH AND THE PUERPERIUM: ICD-10-CM

## 2021-02-17 DIAGNOSIS — Z87.42 PERSONAL HISTORY OF OTHER DISEASES OF THE FEMALE GENITAL TRACT: ICD-10-CM

## 2021-02-17 DIAGNOSIS — R10.30 LOWER ABDOMINAL PAIN, UNSPECIFIED: ICD-10-CM

## 2021-02-17 DIAGNOSIS — Z91.013 ALLERGY TO SEAFOOD: ICD-10-CM

## 2021-02-17 DIAGNOSIS — Z79.899 OTHER LONG TERM (CURRENT) DRUG THERAPY: ICD-10-CM

## 2021-02-17 DIAGNOSIS — R11.2 NAUSEA WITH VOMITING, UNSPECIFIED: ICD-10-CM

## 2021-02-17 DIAGNOSIS — D25.9 LEIOMYOMA OF UTERUS, UNSPECIFIED: Chronic | ICD-10-CM

## 2021-02-17 DIAGNOSIS — Z87.19 PERSONAL HISTORY OF OTHER DISEASES OF THE DIGESTIVE SYSTEM: ICD-10-CM

## 2021-02-17 DIAGNOSIS — Z88.8 ALLERGY STATUS TO OTHER DRUGS, MEDICAMENTS AND BIOLOGICAL SUBSTANCES: ICD-10-CM

## 2021-02-17 DIAGNOSIS — O34.219 MATERNAL CARE FOR UNSPECIFIED TYPE SCAR FROM PREVIOUS CESAREAN DELIVERY: Chronic | ICD-10-CM

## 2021-02-17 LAB
ALBUMIN SERPL ELPH-MCNC: 4.3 G/DL — SIGNIFICANT CHANGE UP (ref 3.5–5.2)
ALP SERPL-CCNC: 68 U/L — SIGNIFICANT CHANGE UP (ref 30–115)
ALT FLD-CCNC: 17 U/L — SIGNIFICANT CHANGE UP (ref 0–41)
ANION GAP SERPL CALC-SCNC: 10 MMOL/L — SIGNIFICANT CHANGE UP (ref 7–14)
APPEARANCE UR: CLEAR — SIGNIFICANT CHANGE UP
AST SERPL-CCNC: 13 U/L — SIGNIFICANT CHANGE UP (ref 0–41)
BACTERIA # UR AUTO: NEGATIVE — SIGNIFICANT CHANGE UP
BASOPHILS # BLD AUTO: 0.02 K/UL — SIGNIFICANT CHANGE UP (ref 0–0.2)
BASOPHILS NFR BLD AUTO: 0.1 % — SIGNIFICANT CHANGE UP (ref 0–1)
BILIRUB DIRECT SERPL-MCNC: <0.2 MG/DL — SIGNIFICANT CHANGE UP (ref 0–0.2)
BILIRUB INDIRECT FLD-MCNC: >0.1 MG/DL — LOW (ref 0.2–1.2)
BILIRUB SERPL-MCNC: 0.3 MG/DL — SIGNIFICANT CHANGE UP (ref 0.2–1.2)
BILIRUB SERPL-MCNC: 0.3 MG/DL — SIGNIFICANT CHANGE UP (ref 0.2–1.2)
BILIRUB UR-MCNC: NEGATIVE — SIGNIFICANT CHANGE UP
BUN SERPL-MCNC: 13 MG/DL — SIGNIFICANT CHANGE UP (ref 10–20)
CALCIUM SERPL-MCNC: 9.1 MG/DL — SIGNIFICANT CHANGE UP (ref 8.5–10.1)
CHLORIDE SERPL-SCNC: 97 MMOL/L — LOW (ref 98–110)
CO2 SERPL-SCNC: 24 MMOL/L — SIGNIFICANT CHANGE UP (ref 17–32)
COLOR SPEC: SIGNIFICANT CHANGE UP
CREAT SERPL-MCNC: 0.7 MG/DL — SIGNIFICANT CHANGE UP (ref 0.7–1.5)
DIFF PNL FLD: NEGATIVE — SIGNIFICANT CHANGE UP
EOSINOPHIL # BLD AUTO: 0 K/UL — SIGNIFICANT CHANGE UP (ref 0–0.7)
EOSINOPHIL NFR BLD AUTO: 0 % — SIGNIFICANT CHANGE UP (ref 0–8)
EPI CELLS # UR: 2 /HPF — SIGNIFICANT CHANGE UP (ref 0–5)
GLUCOSE SERPL-MCNC: 118 MG/DL — HIGH (ref 70–99)
GLUCOSE UR QL: NEGATIVE — SIGNIFICANT CHANGE UP
HCG SERPL QL: NEGATIVE — SIGNIFICANT CHANGE UP
HCT VFR BLD CALC: 39.4 % — SIGNIFICANT CHANGE UP (ref 37–47)
HGB BLD-MCNC: 13.3 G/DL — SIGNIFICANT CHANGE UP (ref 12–16)
HYALINE CASTS # UR AUTO: 0 /LPF — SIGNIFICANT CHANGE UP (ref 0–7)
IMM GRANULOCYTES NFR BLD AUTO: 0.3 % — SIGNIFICANT CHANGE UP (ref 0.1–0.3)
KETONES UR-MCNC: SIGNIFICANT CHANGE UP
LACTATE SERPL-SCNC: 2 MMOL/L — SIGNIFICANT CHANGE UP (ref 0.7–2)
LEUKOCYTE ESTERASE UR-ACNC: NEGATIVE — SIGNIFICANT CHANGE UP
LIDOCAIN IGE QN: 47 U/L — SIGNIFICANT CHANGE UP (ref 7–60)
LYMPHOCYTES # BLD AUTO: 18.4 % — LOW (ref 20.5–51.1)
LYMPHOCYTES # BLD AUTO: 2.78 K/UL — SIGNIFICANT CHANGE UP (ref 1.2–3.4)
MAGNESIUM SERPL-MCNC: 2.4 MG/DL — SIGNIFICANT CHANGE UP (ref 1.8–2.4)
MCHC RBC-ENTMCNC: 28.9 PG — SIGNIFICANT CHANGE UP (ref 27–31)
MCHC RBC-ENTMCNC: 33.8 G/DL — SIGNIFICANT CHANGE UP (ref 32–37)
MCV RBC AUTO: 85.7 FL — SIGNIFICANT CHANGE UP (ref 81–99)
MONOCYTES # BLD AUTO: 1.31 K/UL — HIGH (ref 0.1–0.6)
MONOCYTES NFR BLD AUTO: 8.7 % — SIGNIFICANT CHANGE UP (ref 1.7–9.3)
NEUTROPHILS # BLD AUTO: 10.93 K/UL — HIGH (ref 1.4–6.5)
NEUTROPHILS NFR BLD AUTO: 72.5 % — SIGNIFICANT CHANGE UP (ref 42.2–75.2)
NITRITE UR-MCNC: NEGATIVE — SIGNIFICANT CHANGE UP
NRBC # BLD: 0 /100 WBCS — SIGNIFICANT CHANGE UP (ref 0–0)
PH UR: 7 — SIGNIFICANT CHANGE UP (ref 5–8)
PLATELET # BLD AUTO: 355 K/UL — SIGNIFICANT CHANGE UP (ref 130–400)
POTASSIUM SERPL-MCNC: 3.5 MMOL/L — SIGNIFICANT CHANGE UP (ref 3.5–5)
POTASSIUM SERPL-SCNC: 3.5 MMOL/L — SIGNIFICANT CHANGE UP (ref 3.5–5)
PROT SERPL-MCNC: 7.5 G/DL — SIGNIFICANT CHANGE UP (ref 6–8)
PROT UR-MCNC: ABNORMAL
RBC # BLD: 4.6 M/UL — SIGNIFICANT CHANGE UP (ref 4.2–5.4)
RBC # FLD: 12.9 % — SIGNIFICANT CHANGE UP (ref 11.5–14.5)
RBC CASTS # UR COMP ASSIST: 0 /HPF — SIGNIFICANT CHANGE UP (ref 0–4)
SODIUM SERPL-SCNC: 131 MMOL/L — LOW (ref 135–146)
SP GR SPEC: >1.05 (ref 1.01–1.03)
UROBILINOGEN FLD QL: SIGNIFICANT CHANGE UP
WBC # BLD: 15.09 K/UL — HIGH (ref 4.8–10.8)
WBC # FLD AUTO: 15.09 K/UL — HIGH (ref 4.8–10.8)
WBC UR QL: 2 /HPF — SIGNIFICANT CHANGE UP (ref 0–5)

## 2021-02-17 PROCEDURE — 99285 EMERGENCY DEPT VISIT HI MDM: CPT

## 2021-02-17 PROCEDURE — 99284 EMERGENCY DEPT VISIT MOD MDM: CPT

## 2021-02-17 PROCEDURE — 76830 TRANSVAGINAL US NON-OB: CPT | Mod: 26

## 2021-02-17 PROCEDURE — 74177 CT ABD & PELVIS W/CONTRAST: CPT | Mod: 26

## 2021-02-17 RX ORDER — DIPHENHYDRAMINE HCL 50 MG
50 CAPSULE ORAL ONCE
Refills: 0 | Status: COMPLETED | OUTPATIENT
Start: 2021-02-17 | End: 2021-02-17

## 2021-02-17 RX ORDER — MORPHINE SULFATE 50 MG/1
4 CAPSULE, EXTENDED RELEASE ORAL ONCE
Refills: 0 | Status: DISCONTINUED | OUTPATIENT
Start: 2021-02-17 | End: 2021-02-17

## 2021-02-17 RX ORDER — OXYCODONE AND ACETAMINOPHEN 5; 325 MG/1; MG/1
1 TABLET ORAL ONCE
Refills: 0 | Status: DISCONTINUED | OUTPATIENT
Start: 2021-02-17 | End: 2021-02-17

## 2021-02-17 RX ORDER — SODIUM CHLORIDE 9 MG/ML
1000 INJECTION, SOLUTION INTRAVENOUS ONCE
Refills: 0 | Status: COMPLETED | OUTPATIENT
Start: 2021-02-17 | End: 2021-02-17

## 2021-02-17 RX ORDER — ONDANSETRON 8 MG/1
1 TABLET, FILM COATED ORAL
Qty: 20 | Refills: 0
Start: 2021-02-17 | End: 2021-02-21

## 2021-02-17 RX ORDER — ONDANSETRON 8 MG/1
4 TABLET, FILM COATED ORAL ONCE
Refills: 0 | Status: COMPLETED | OUTPATIENT
Start: 2021-02-17 | End: 2021-02-17

## 2021-02-17 RX ADMIN — MORPHINE SULFATE 4 MILLIGRAM(S): 50 CAPSULE, EXTENDED RELEASE ORAL at 10:14

## 2021-02-17 RX ADMIN — ONDANSETRON 4 MILLIGRAM(S): 8 TABLET, FILM COATED ORAL at 10:14

## 2021-02-17 RX ADMIN — SODIUM CHLORIDE 1000 MILLILITER(S): 9 INJECTION, SOLUTION INTRAVENOUS at 10:14

## 2021-02-17 RX ADMIN — Medication 50 MILLIGRAM(S): at 11:32

## 2021-02-17 RX ADMIN — MORPHINE SULFATE 4 MILLIGRAM(S): 50 CAPSULE, EXTENDED RELEASE ORAL at 14:02

## 2021-02-17 NOTE — ED PROVIDER NOTE - PROGRESS NOTE DETAILS
Left message for Surgeon who performed procedure yesterday, pt is followed by GYN here, GYN made aware spoke with GYN regarding pt and recommends Transvaginal sono despite having procedure yesterday pt feeling better, PO Trialing, will reassess pt tolerated crackers and water, wants to go home, will f/u with GYN

## 2021-02-17 NOTE — ED PROVIDER NOTE - NS ED ROS FT
Eyes:  No visual changes, eye pain or discharge.  ENMT:  No hearing changes, pain, discharge or infections. No neck pain or stiffness.  Cardiac:  No chest pain, SOB or edema. No chest pain with exertion.  Respiratory:  No cough or respiratory distress. No hemoptysis. No history of asthma or RAD.  GI:  + abd pain + n/v No diarrhea   :  No dysuria, frequency or burning.  MS:  No myalgia, muscle weakness, joint pain or back pain.  Neuro:  No headache or weakness.  No LOC.  Skin:  No skin rash.   Endocrine: + hyperthyroid No history of diabetes.  Except as documented in the HPI,  all other systems are negative.

## 2021-02-17 NOTE — CONSULT NOTE ADULT - SUBJECTIVE AND OBJECTIVE BOX
Chief Complaint: abdominal pain s/p UAE    HPI: 39yo P1 LMP end of January, s/p UAE for symptomatic fibroid uterus on , h/o hyperthyroidism and chronic pancreatitis, presents to the ED with left sided abdominal pain. Reports pain is sharp and constant, radiating to the right side and 10/10 intensity. She states that the pain began immediately after the UAE procedure yesterday approximately at 12pm. Patient attempted ibuprofen and tylenol at home but began having nausea and vomiting, most recent episode of vomiting at 0800, vomit now with minimal streaks of blood. H/o chronic pancreatitis, patient is not supposed to take NSAIDs. Vomiting began after ibuprofen ingestion. She also reports feeling feverish with chills last night, did not take her temperature. She was given morphine in the ED and pain reduced to 5/10. Currently reports that morphine has worn off and pain is again 10/10. Last saw GYN in Aug 2020, she has known fibroid uterus and recommended to f/u for UAE again if fibroid pain persisted. Had previous UAE in 2018 with Dr. Obi Begum.    Ob/Gyn History:                   LMP - 2021                  Cycle Length - q28d  Denies history of ovarian cysts, abnormal paps, or STIs  Last Pap Smear - Aug 2019 NILM    OBHx: FT LTCS x1    Denies the following: constitutional symptoms, visual symptoms, cardiovascular symptoms, respiratory symptoms, GI symptoms, musculoskeletal symptoms, skin symptoms, neurologic symptoms, hematologic symptoms, allergic symptoms, psychiatric symptoms  Except any pertinent positives listed.     PAST MEDICAL & SURGICAL HISTORY:  Pancreatitis  Ovarian cyst  Peptic ulcer  Fibroid, uterine  Abscess    UAE  Delivery with history of     FAMILY HISTORY:  FH: colon cancer    SOCIAL HISTORY: Denies cigarette use, alcohol use, or illicit drug use    Home Medications:  pantoprazole 40 mg oral delayed release tablet: 1 tab(s) orally once a day (18 May 2019 19:06)  sucralfate 1 g oral tablet: 1 tab(s) orally 4 times a day (before meals and at bedtime) (14 May 2019 16:05)    Allergies  iodine containing compounds (Unknown)  shellfish (Anaphylaxis)  Intolerances    Vital Signs Last 24 Hrs  T(F): 98.3 (2021 09:41), Max: 98.3 (2021 09:41)  HR: 93 (2021 09:41) (93 - 93)  BP: 133/79 (2021 09:41) (133/79 - 133/79)  RR: 18 (2021 09:41) (18 - 18)  Height (cm): 152.4 (21 @ 09:41)  Weight (kg): 87.5 (21 @ 09:41)  BMI (kg/m2): 37.7 (21 @ 09:41)  BSA (m2): 1.84 (21 @ 09:41)    General Appearance - AAOx3, NAD  Heart - S1S2 regular rate and rhythm  Lung - CTA Bilaterally  Abdomen - Tender in the LLQ, inguinal region; Soft, nontender in all other quadrants, nondistended, no rebound, no rigidity, no guarding, bowel sounds present    Meds:   diphenhydrAMINE   Injectable 50 milliGRAM(s) IV Push Once  lactated ringers Bolus 1000 milliLiter(s) IV Bolus once  morphine  - Injectable 4 milliGRAM(s) IV Push Once  ondansetron Injectable 4 milliGRAM(s) IV Push once    Height (cm): 152.4 (21 @ 09:41)  Weight (kg): 87.5 (21 @ 09:41)  BMI (kg/m2): 37.7 (21 @ 09:41)  BSA (m2): 1.84 (21 @ 09:41)    LABS:                        13.3   15.09 )-----------( 355      ( 2021 09:52 )             39.4           131<L>  |  97<L>  |  13  ----------------------------<  118<H>  3.5   |  24  |  0.7    Ca    9.1      2021 09:52  Mg     2.4         TPro  7.5  /  Alb  4.3  /  TBili  0.3  /  DBili  <0.2  /  AST  13  /  ALT  17  /  AlkPhos  68        RADIOLOGY & ADDITIONAL STUDIES:  < from: CT Abdomen and Pelvis w/ IV Cont (21 @ 12:07) >    EXAM:  CT ABDOMEN AND PELVIS IC            PROCEDURE DATE:  2021            INTERPRETATION:  CLINICAL STATEMENT: Abdominal pain. Status post embolization one day earlier.    TECHNIQUE: Contiguous axial CT images were obtained from the lower chest to the pubic symphysis following administration of 100cc Optiray 320 intravenous contrast.  Oral contrast was not administered.  Reformatted images in the coronal and sagittal planes were acquired.    COMPARISON CT: 3/14/2020    OTHER STUDIES USED FOR CORRELATION: Pelvic ultrasound on 2021. Pelvic ultrasound on 2020.      FINDINGS:    LOWER CHEST: Unremarkable.    HEPATOBILIARY: Unremarkable.    SPLEEN: Unremarkable.    PANCREAS: Unremarkable.    ADRENAL GLANDS: Unremarkable.    KIDNEYS: Unremarkable.    ABDOMINOPELVIC NODES: Unremarkable.    PELVIC ORGANS: Redemonstrated known left uterine subserosal fibroid as also seen on recent pelvic ultrasound on 2021. It measured 6.4 x 4.0 cm on the current exam, measuring about 4.0 x 3.0 cm on 3/14/2020 CT of the abdomen and pelvis.  IIll-defined endometrial hypodensity measuring about 3.5 x 3.3 cm on series 3 image 59, new since 3/14/2020 and pelvic sonogram on 2021.    PERITONEUM/MESENTERY/BOWEL: No bowel obstruction freefluid or free air. Normal appendix.    BONES/SOFT TISSUES: Unremarkable.    OTHER: Some scarring/fascial thickening seen in the right inguinal region. Correlate for prior catheterization. Postsurgical changes in the subcutaneous fat of the anterior abdominal wall, stable.      IMPRESSION:    Changes in the uterus, likely due to recent embolization of left uterine subserosal fibroid.    New 3.5 cm ill-defined endometrial hypodensity since 2021, nonspecific. Recommend pelvic ultrasound to exclude a collection.    < end of copied text >

## 2021-02-17 NOTE — CONSULT NOTE ADULT - ASSESSMENT
39yo P1 s/p UAE on 2/16 in [place], with post-embolization abdominal pain, currently clinically and hemodynamically stable.    -recommend TVUS  -recommend PO pain medication   -IV hydration  -will re-evaluate    Dr. Bui and Dr. Mccoy aware 39yo P1 s/p UAE on 2/16 in [place], with post-embolization abdominal pain, currently clinically and hemodynamically stable.    -recommend TVUS  -recommend PO pain medication   -IV hydration  -will re-evaluate    Dr. Bui and Dr. Mccoy aware    Addendum: Patient re-evaluated @1715, pain improved to 5/10 and nausea now resolved. Patient desiring to attempt PO intake. If able to tolerate PO, recommend discharge to home with PO pain medication.    Dr. Augustin and Dr. Kitchen aware

## 2021-02-17 NOTE — ED PROVIDER NOTE - NSFOLLOWUPCLINICS_GEN_ALL_ED_FT
[facility]  OB/GYN  [address] [place] [address]  Phone: [phone]  Fax:   Follow Up Time:

## 2021-02-17 NOTE — ED PROVIDER NOTE - PHYSICAL EXAMINATION
VITAL SIGNS: I have reviewed nursing notes and confirm.  CONSTITUTIONAL: Well-developed; well-nourished; in no acute distress.   SKIN:  skin exam is warm and dry, no acute rash.    HEAD: Normocephalic; atraumatic.  EYES: conjunctiva and sclera clear.  ENT: No nasal discharge; airway clear.  CARD: S1, S2 normal; no murmurs, gallops, or rubs. Regular rate and rhythm.   RESP: No wheezes, rales or rhonchi.  ABD: TTP Lower abd Normal bowel sounds; soft; non-distended  EXT: Normal ROM.  No clubbing, cyanosis or edema.   LYMPH: No acute cervical adenopathy.  NEURO: Alert, oriented, grossly unremarkable  PSYCH: Cooperative, appropriate.

## 2021-02-17 NOTE — ED ADULT TRIAGE NOTE - CHIEF COMPLAINT QUOTE
Pt c/o abdominal pain, nausea, and vomiting since yesterday, pt had uterine tumor procedure done yesterday 2/16, pt endorses subjective fevers and chills

## 2021-02-17 NOTE — ED PROVIDER NOTE - PATIENT PORTAL LINK FT
You can access the FollowMyHealth Patient Portal offered by [facility] by registering at the following website: [email or web address]. By joining [employer]s FollowMyHealth portal, you will also be able to view your health information using other applications (apps) compatible with our system.

## 2021-02-17 NOTE — ED PROVIDER NOTE - ATTENDING CONTRIBUTION TO CARE
39 y/o female with hx of pancreatitis, s/p recent uterine fibroid ablation yesterday presents to ED with lower abdominal pain and nausea/vomiting x 1 day.  No fever or chills.  No numbness or tingling.  PE:  agree with above.  A/P:  Abdominal Pain, post procedure.  Labs, Meds, Imaging and GYN consult.

## 2021-02-17 NOTE — ED PROVIDER NOTE - OBJECTIVE STATEMENT
Pt is a 41y/o female with a pmhx of pancreatitis, uterine fibroid presents today for eval of lower abd pain with associated nausea and multiple episodes of vomiting s/p embolization of benign uterine tumor performed yesterday. Pt denies fever, chills, weakness, numbness.

## 2021-02-17 NOTE — ED PROVIDER NOTE - CLINICAL SUMMARY MEDICAL DECISION MAKING FREE TEXT BOX
Patient with imaging as noted.  No acute findings on scans.  Seen and evaluated by GYN.  No acute interventions.  Feels better.  Tolerates PO.  Will d/c home to f/u with GYN.  Strict return instructions discussed.

## 2021-02-17 NOTE — ED ADULT NURSE NOTE - OBJECTIVE STATEMENT
Pt c/o abdominal pain with associated nausea and vomiting since yesterday. Pt endorses having cauterization of benign uterine tumor yesterday 2/16 in office setting, pt endorses symptoms since then. Pt also endorses hx of pancreatitis. Denies cp, sob, diarrhea, back pain, urinary symptoms.

## 2021-02-19 LAB
CULTURE RESULTS: SIGNIFICANT CHANGE UP
SPECIMEN SOURCE: SIGNIFICANT CHANGE UP

## 2021-04-05 NOTE — ASU DISCHARGE PLAN (ADULT/PEDIATRIC) - DISCHARGE PLAN IS COMPLETE AND GIVEN TO PATIENT
 COLONOSCOPY N/A 9/3/2019    COLONOSCOPY WITH BIOPSY performed by Laura De Jesus MD at [address]  2006    x2    ENTEROSCOPY N/A 10/1/2019    PUSH ENTEROSCOPY BIOPSY SMALL BOWEL performed by Laura De Jesus MD at [address] Left 07/2019    RING FINGER - TRIGGER    HAND SURGERY  2005,2010    thumb ,nando trigger    HEMORRHOID SURGERY  2010    KNEE SURGERY  [id number]    RT x 3 one scope & 2 major    TOTAL KNEE ARTHROPLASTY Right 12/2014    UPPER GASTROINTESTINAL ENDOSCOPY N/A 9/25/2018    EGD BIOPSY GASTRIC FOR H PYLORI performed by Laura De Jesus MD at [address] 12/18/2018    EGD BIOPSY performed by Laura De Jesus MD at [facility] N/A 9/3/2019    EGD BIOPSY performed by Laura De Jesus MD at [facility] ENDOSCOPY       Allergies:  No Known Allergies    Medication:   Prior to Admission medications    Medication Sig Start Date End Date Taking?  Authorizing Provider   apixaban (ELIQUIS) 5 MG TABS tablet Take 1 tablet by mouth 2 times daily 3/16/21  Yes Michelle Torres MD   clopidogrel (PLAVIX) 75 MG tablet TAKE 1 TABLET BY MOUTH  DAILY 11/9/20  Yes Michelle Torres MD   metoprolol succinate (TOPROL XL) 50 MG extended release tablet Take 1 tablet by mouth daily 10/26/20  Yes Michelle Torres MD   potassium chloride (KLOR-CON M) 20 MEQ extended release tablet Take 1 tablet by mouth daily 10/26/20  Yes Michelle Torres MD   rosuvastatin (CRESTOR) 20 MG tablet Take 1 tablet by mouth nightly 10/26/20  Yes Michelle Torres MD   ibuprofen (ADVIL;MOTRIN) 800 MG tablet Take 1 tablet by mouth 2 times daily as needed for Pain 6/1/20  Yes Arabella Barahona, APRN - CNP   vitamin D (ERGOCALCIFEROL) 1.25 MG (98748 UT) CAPS capsule TAKE ONE CAPSULE BY MOUTH ONE TIME PER WEEK 1/22/20  Yes Ximena Silver MD   clotrimazole-betamethasone (LOTRISONE) 1-0.05 % cream APPLY TOPICALLY 2 TIMES disturbance, headaches, numbness/tingling, seizures, speech problems, tremors, visual changes or weakness  · Dermatological ROS: negative for - rash    Physical Examination:  Vitals:    04/13/21 0934   BP: 118/74   Pulse: 76       · Constitutional: Oriented. No distress. · Head: Normocephalic and atraumatic. · Mouth/Throat: Oropharynx is clear and moist.   · Eyes: Conjunctivae normal. EOM are normal.   · Neck: Normal range of motion. Neck supple. No rigidity. No JVD present. · Cardiovascular: Normal rate, regular rhythm, S1&S2 and intact distal pulses. · Pulmonary/Chest: Bilateral respiratory sounds. No wheezes. No rhonchi. · Abdominal: Soft. Bowel sounds present. No distension, No tenderness. · Musculoskeletal: No tenderness. No edema    · Lymphadenopathy: Has no cervical adenopathy. · Neurological: Alert and oriented. Cranial nerve appears intact, No Gross deficit   · Skin: Skin is warm and dry. No rash noted. · Psychiatric: Has a normal mood, affect and behavior     Labs:  Reviewed. ECG: reviewed, AF, rate 76, with QRS duration 110 ms. No pathologic Q waves, ventricular pre-excitation, or QT prolongation. Studies:   1. Event monitor:       2. YVROSE 3/16/21:    Summary   Limited study. .   There is mildly increased left ventricular wall thickness. Overall left ventricular systolic function appears mildly reduced with   estimated LVEF of 45%. Mitral valve prolapse is present. Mild to moderate mitral regurgitation. The left atrial size appears dilated. There is no evidence of mass or   thrombus in the left atrium or appendage however there is spontaneous   contrast seen. No intracardiac thrombus was seen on this study. Echo 11/15/17  Summary   Left ventricle size is normal. There is mild concentric left ventricular   hypertrophy. Left ventricular function is normal with ejection fraction   estimated at 55%. No regional wall motion abnormalities are noted.  Diastolic   filling parameters suggests grade I diastolic dysfunction. The aortic root   is dilated measuring 4.3 cm. Mild tricuspid regurgitation with RVSP   estimated at 35 mmHg. LA 3.9 cm    3. Stress Test 3/23/21:    Summary   Foster Crump is a small fixed perfusion defect at the inferoapical wall of the Left    ventricle consistent with infarction. There is no perfusion defect to    suggest ischemia.    The LVEF is reduced at 37% with global mild hypokinesis and moderate    hypokinesis at the inferoapical wall.    The TID is normal at 0.99.        This is an intermediate risk cardiac scan. 4. Cath 11/2006 (Dr. Nova Numbers):  Stents x 2 to LAD     The MCOT, echocardiogram, stress test, and coronary angiography/PCI were reviewed by myself and used for my plan of care. Procedures:  1. Assessment/Plan:  1. Persistent AF, on Eliquis  2. CAD, s/p PCI 2006, on Plavix and ASA  3.  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 HTN, stable on Toprol  5. HLD, stable on statin   6. 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 1. Keep your blood pressure under control. 2. Keep your blood sugar under control. 3. Eat heart healthy diet  4. Minimize alcohol intake  5. Stop smoking  6. Be active, keep your body weight optimal  7. Exercise on a regular basis  8. Manage your stress   9. If you snore, get evaluated for sleep apnea  10. Be aware of the symptoms of stroke    Due to symptomatic AF, possible tachycardia induced cardiomyopathy, and LAE, would recommend rhythm management. Secondary to the above risk factors, chance of staying sinus rhythm with atrial fibrillation ablation will be about 50%.   Probably, he would need more than 1 procedure to keep him in sinus rhythm, in addition to class III medications. This has been discussed in detail with the patient and he verbalized understanding. We educated the patient that atrial fibrillation and atrial flutter are both progressive diseases, with more frequent episodes that will ensue. Subsequent episodes usually become more sustained to the extent that many individuals would then develop persistent atrial fibrillation/atrial flutter. Once persistence is reached, permanent atrial dysrhythmia is inevitable. Treatment options including cardioversion, anti-arrhythmic medication therapy, rate control strategy, oral anticoagulation, and atrial fibrillation ablation were discussed with patient. Risks, benefits and alternative of each treatment options were explained. We discussed different management options for both atrial tachydysrhythmia including their risks and benefits. These options include use of cardioversion (mainly for persisting atrial fibrillation or atrial flutter) which provides an effective immediate therapy with success rates of 75% or higher, but it provides no short nor long term efficacy. Anti-arrhythmic medications provide a very effective short term therapy, but even with our most potent anti-arrhythmic medication there is limited long term efficacy (clinical studies have shown that 40% of patients remain atrial fibrillation-free after 4 years of follow-up after starting one of the more powerful anti-arrhythmic medication (amiodarone), and, if extrapolated, may have further diminishing success as time goes on). Against atrial flutter, any anti-arrhythmic medication would be nearly ineffective. Atrial fibrillation and atrial flutter ablation is a potentially curative therapy with very reasonable success rate after a first time procedure and with improving success rates with subsequent procedures. The risks, benefits and alternatives of the ablation procedure were discussed with the patient.  The risks including, but not limited to, the risks of bleeding, infection, radiation exposure, injury to vascular, cardiac and surrounding structures (including pneumothorax), stroke, cardiac perforation, tamponade, need for emergent open heart surgery, need for pacemaker implantation, injury to the phrenic nerve, injury to the esophagus, myocardial infarction and death were discussed in detail. The patient wishes to proceed. We will also schedule the atrial fibrillation and atrial flutter ablation with general anaesthesia. The patient will need to hold dofetilide for 2 doses prior to. The patient will continue oral anticoagulation as per our protocol (uninterrupted). Will schedule for RF ablation with [employer] Navigation system. He would need Cardiac CTA, 3 days prior to procedure for pulmonary vein mapping. Will order BMP, CBC, PT/INR, and Type & Screen prior to the procedure. Due to decline in EF, would recommend prompt restoration of SR. Prior to AF ablation in May, will plan next week for admission for dofetilide loading with DCCV on the 2nd day if he does not chemically convert to SR. Follow up in 1 week with EP NP after the ablation  Follow up in 3 months with me    Thank you for allowing me to participate in the care of Elmon Kehr. All questions and concerns were addressed to the patient/family. Alternatives to my treatment were discussed. Diana Holguin RN, am scribing for and in the presence of Dr. Jacqui Longo. 04/13/21 10:14 AM  RADHA Garcia MD, personally performed the services prescribed in this documentation as scribed by Ms. Skyler Arora RN in my presence and it is both accurate and complete.        Carol Clark MD  Cardiac Electrophysiology  [facility] : Yes

## 2022-01-04 ENCOUNTER — EMERGENCY (EMERGENCY)
Facility: HOSPITAL | Age: 42
LOS: 0 days | Discharge: HOME | End: 2022-01-04
Attending: EMERGENCY MEDICINE | Admitting: EMERGENCY MEDICINE
Payer: MEDICAID

## 2022-01-04 VITALS
HEART RATE: 84 BPM | HEIGHT: 60 IN | OXYGEN SATURATION: 98 % | RESPIRATION RATE: 18 BRPM | DIASTOLIC BLOOD PRESSURE: 68 MMHG | TEMPERATURE: 100 F | SYSTOLIC BLOOD PRESSURE: 125 MMHG

## 2022-01-04 DIAGNOSIS — Z91.041 RADIOGRAPHIC DYE ALLERGY STATUS: ICD-10-CM

## 2022-01-04 DIAGNOSIS — R50.9 FEVER, UNSPECIFIED: ICD-10-CM

## 2022-01-04 DIAGNOSIS — R11.2 NAUSEA WITH VOMITING, UNSPECIFIED: ICD-10-CM

## 2022-01-04 DIAGNOSIS — R10.30 LOWER ABDOMINAL PAIN, UNSPECIFIED: ICD-10-CM

## 2022-01-04 DIAGNOSIS — D25.9 LEIOMYOMA OF UTERUS, UNSPECIFIED: Chronic | ICD-10-CM

## 2022-01-04 DIAGNOSIS — O34.219 MATERNAL CARE FOR UNSPECIFIED TYPE SCAR FROM PREVIOUS CESAREAN DELIVERY: Chronic | ICD-10-CM

## 2022-01-04 DIAGNOSIS — Z20.822 CONTACT WITH AND (SUSPECTED) EXPOSURE TO COVID-19: ICD-10-CM

## 2022-01-04 DIAGNOSIS — Z91.013 ALLERGY TO SEAFOOD: ICD-10-CM

## 2022-01-04 DIAGNOSIS — R19.7 DIARRHEA, UNSPECIFIED: ICD-10-CM

## 2022-01-04 LAB
ALBUMIN SERPL ELPH-MCNC: 4.7 G/DL — SIGNIFICANT CHANGE UP (ref 3.5–5.2)
ALP SERPL-CCNC: 74 U/L — SIGNIFICANT CHANGE UP (ref 30–115)
ALT FLD-CCNC: 21 U/L — SIGNIFICANT CHANGE UP (ref 0–41)
ANION GAP SERPL CALC-SCNC: 18 MMOL/L — HIGH (ref 7–14)
APPEARANCE UR: CLEAR — SIGNIFICANT CHANGE UP
AST SERPL-CCNC: 15 U/L — SIGNIFICANT CHANGE UP (ref 0–41)
BACTERIA # UR AUTO: NEGATIVE — SIGNIFICANT CHANGE UP
BASOPHILS # BLD AUTO: 0.04 K/UL — SIGNIFICANT CHANGE UP (ref 0–0.2)
BASOPHILS NFR BLD AUTO: 0.4 % — SIGNIFICANT CHANGE UP (ref 0–1)
BILIRUB DIRECT SERPL-MCNC: <0.2 MG/DL — SIGNIFICANT CHANGE UP (ref 0–0.3)
BILIRUB INDIRECT FLD-MCNC: SIGNIFICANT CHANGE UP MG/DL (ref 0.2–1.2)
BILIRUB SERPL-MCNC: 0.2 MG/DL — SIGNIFICANT CHANGE UP (ref 0.2–1.2)
BILIRUB UR-MCNC: NEGATIVE — SIGNIFICANT CHANGE UP
BUN SERPL-MCNC: 8 MG/DL — LOW (ref 10–20)
CALCIUM SERPL-MCNC: 9.3 MG/DL — SIGNIFICANT CHANGE UP (ref 8.5–10.1)
CHLORIDE SERPL-SCNC: 102 MMOL/L — SIGNIFICANT CHANGE UP (ref 98–110)
CO2 SERPL-SCNC: 21 MMOL/L — SIGNIFICANT CHANGE UP (ref 17–32)
COLOR SPEC: SIGNIFICANT CHANGE UP
CREAT SERPL-MCNC: 0.7 MG/DL — SIGNIFICANT CHANGE UP (ref 0.7–1.5)
DIFF PNL FLD: NEGATIVE — SIGNIFICANT CHANGE UP
EOSINOPHIL # BLD AUTO: 0.02 K/UL — SIGNIFICANT CHANGE UP (ref 0–0.7)
EOSINOPHIL NFR BLD AUTO: 0.2 % — SIGNIFICANT CHANGE UP (ref 0–8)
EPI CELLS # UR: 2 /HPF — SIGNIFICANT CHANGE UP (ref 0–5)
GLUCOSE SERPL-MCNC: 98 MG/DL — SIGNIFICANT CHANGE UP (ref 70–99)
GLUCOSE UR QL: NEGATIVE — SIGNIFICANT CHANGE UP
HCG SERPL QL: NEGATIVE — SIGNIFICANT CHANGE UP
HCT VFR BLD CALC: 39.6 % — SIGNIFICANT CHANGE UP (ref 37–47)
HGB BLD-MCNC: 13.3 G/DL — SIGNIFICANT CHANGE UP (ref 12–16)
HYALINE CASTS # UR AUTO: 0 /LPF — SIGNIFICANT CHANGE UP (ref 0–7)
IMM GRANULOCYTES NFR BLD AUTO: 0.3 % — SIGNIFICANT CHANGE UP (ref 0.1–0.3)
KETONES UR-MCNC: ABNORMAL
LACTATE SERPL-SCNC: 1.5 MMOL/L — SIGNIFICANT CHANGE UP (ref 0.7–2)
LEUKOCYTE ESTERASE UR-ACNC: NEGATIVE — SIGNIFICANT CHANGE UP
LIDOCAIN IGE QN: 37 U/L — SIGNIFICANT CHANGE UP (ref 7–60)
LYMPHOCYTES # BLD AUTO: 1.51 K/UL — SIGNIFICANT CHANGE UP (ref 1.2–3.4)
LYMPHOCYTES # BLD AUTO: 16.4 % — LOW (ref 20.5–51.1)
MCHC RBC-ENTMCNC: 28.4 PG — SIGNIFICANT CHANGE UP (ref 27–31)
MCHC RBC-ENTMCNC: 33.6 G/DL — SIGNIFICANT CHANGE UP (ref 32–37)
MCV RBC AUTO: 84.6 FL — SIGNIFICANT CHANGE UP (ref 81–99)
MONOCYTES # BLD AUTO: 0.62 K/UL — HIGH (ref 0.1–0.6)
MONOCYTES NFR BLD AUTO: 6.7 % — SIGNIFICANT CHANGE UP (ref 1.7–9.3)
NEUTROPHILS # BLD AUTO: 6.99 K/UL — HIGH (ref 1.4–6.5)
NEUTROPHILS NFR BLD AUTO: 76 % — HIGH (ref 42.2–75.2)
NITRITE UR-MCNC: NEGATIVE — SIGNIFICANT CHANGE UP
NRBC # BLD: 0 /100 WBCS — SIGNIFICANT CHANGE UP (ref 0–0)
PH UR: 7 — SIGNIFICANT CHANGE UP (ref 5–8)
PLATELET # BLD AUTO: 392 K/UL — SIGNIFICANT CHANGE UP (ref 130–400)
POTASSIUM SERPL-MCNC: 4.5 MMOL/L — SIGNIFICANT CHANGE UP (ref 3.5–5)
POTASSIUM SERPL-SCNC: 4.5 MMOL/L — SIGNIFICANT CHANGE UP (ref 3.5–5)
PROT SERPL-MCNC: 7.7 G/DL — SIGNIFICANT CHANGE UP (ref 6–8)
PROT UR-MCNC: ABNORMAL
RBC # BLD: 4.68 M/UL — SIGNIFICANT CHANGE UP (ref 4.2–5.4)
RBC # FLD: 12.8 % — SIGNIFICANT CHANGE UP (ref 11.5–14.5)
RBC CASTS # UR COMP ASSIST: 1 /HPF — SIGNIFICANT CHANGE UP (ref 0–4)
SARS-COV-2 RNA SPEC QL NAA+PROBE: SIGNIFICANT CHANGE UP
SODIUM SERPL-SCNC: 141 MMOL/L — SIGNIFICANT CHANGE UP (ref 135–146)
SP GR SPEC: 1.03 — SIGNIFICANT CHANGE UP (ref 1.01–1.03)
UROBILINOGEN FLD QL: SIGNIFICANT CHANGE UP
WBC # BLD: 9.21 K/UL — SIGNIFICANT CHANGE UP (ref 4.8–10.8)
WBC # FLD AUTO: 9.21 K/UL — SIGNIFICANT CHANGE UP (ref 4.8–10.8)
WBC UR QL: 1 /HPF — SIGNIFICANT CHANGE UP (ref 0–5)

## 2022-01-04 PROCEDURE — 99285 EMERGENCY DEPT VISIT HI MDM: CPT

## 2022-01-04 PROCEDURE — G1004: CPT

## 2022-01-04 PROCEDURE — 74177 CT ABD & PELVIS W/CONTRAST: CPT | Mod: 26,ME

## 2022-01-04 RX ORDER — ONDANSETRON 8 MG/1
4 TABLET, FILM COATED ORAL ONCE
Refills: 0 | Status: COMPLETED | OUTPATIENT
Start: 2022-01-04 | End: 2022-01-04

## 2022-01-04 RX ORDER — SODIUM CHLORIDE 9 MG/ML
2000 INJECTION, SOLUTION INTRAVENOUS ONCE
Refills: 0 | Status: COMPLETED | OUTPATIENT
Start: 2022-01-04 | End: 2022-01-04

## 2022-01-04 RX ORDER — FAMOTIDINE 10 MG/ML
20 INJECTION INTRAVENOUS ONCE
Refills: 0 | Status: COMPLETED | OUTPATIENT
Start: 2022-01-04 | End: 2022-01-04

## 2022-01-04 RX ORDER — DIPHENHYDRAMINE HCL 50 MG
50 CAPSULE ORAL ONCE
Refills: 0 | Status: COMPLETED | OUTPATIENT
Start: 2022-01-04 | End: 2022-01-04

## 2022-01-04 RX ADMIN — ONDANSETRON 4 MILLIGRAM(S): 8 TABLET, FILM COATED ORAL at 04:42

## 2022-01-04 RX ADMIN — FAMOTIDINE 104 MILLIGRAM(S): 10 INJECTION INTRAVENOUS at 04:51

## 2022-01-04 RX ADMIN — Medication 20 MILLIGRAM(S): at 04:42

## 2022-01-04 RX ADMIN — Medication 50 MILLIGRAM(S): at 06:47

## 2022-01-04 RX ADMIN — SODIUM CHLORIDE 2000 MILLILITER(S): 9 INJECTION, SOLUTION INTRAVENOUS at 04:43

## 2022-01-04 RX ADMIN — Medication 125 MILLIGRAM(S): at 06:47

## 2022-01-04 NOTE — ED PROVIDER NOTE - CLINICAL SUMMARY MEDICAL DECISION MAKING FREE TEXT BOX
40 yo F presented to ED for abdominal pain. Labs WNL. CT scan normal Pt feels improved. Tolerating PO. DC home

## 2022-01-04 NOTE — ED PROVIDER NOTE - PATIENT PORTAL LINK FT
You can access the FollowMyHealth Patient Portal offered by [facility] by registering at the following website: [email or web address]. By joining [employer]’s FollowMyHealth portal, you will also be able to view your health information using other applications (apps) compatible with our system.

## 2022-01-04 NOTE — ED PROVIDER NOTE - NSFOLLOWUPINSTRUCTIONS_ED_ALL_ED_FT
Please follow up with your primary care doctor this week.     Abdominal Pain, Pediatric    Abdominal pain is one of the most common complaints in pediatrics. Many things can cause abdominal pain, and the causes change as your child grows. Usually, abdominal pain is not serious and will improve without treatment. It can often be observed and treated at home. Your child's health care provider will take a careful history and do a physical exam to help diagnose the cause of your child's pain. The health care provider may order blood tests and X-rays to help determine the cause or seriousness of your child's pain. However, in many cases, more time must pass before a clear cause of the pain can be found. Until then, your child's health care provider may not know if your child needs more testing or further treatment.    HOME CARE INSTRUCTIONS  Monitor your child's abdominal pain for any changes.  Give medicines only as directed by your child's health care provider.  Do not give your child laxatives unless directed to do so by the health care provider.  Try giving your child a clear liquid diet (broth, tea, or water) if directed by the health care provider. Slowly move to a bland diet as tolerated. Make sure to do this only as directed.  Have your child drink enough fluid to keep his or her urine clear or pale yellow.  Keep all follow-up visits as directed by your child's health care provider.    SEEK MEDICAL CARE IF:  Your child's abdominal pain changes.  Your child does not have an appetite or begins to lose weight.  Your child is constipated or has diarrhea that does not improve over 2–3 days.  Your child's pain seems to get worse with meals, after eating, or with certain foods.  Your child develops urinary problems like bedwetting or pain with urinating.  Pain wakes your child up at night.  Your child begins to miss school.  Your child's mood or behavior changes.  Your child who is older than 3 months has a fever.    SEEK IMMEDIATE MEDICAL CARE IF:  Your child's pain does not go away or the pain increases.  Your child's pain stays in one portion of the abdomen. Pain on the right side could be caused by appendicitis.  Your child's abdomen is swollen or bloated.  Your child who is younger than 3 months has a fever of 100°F (38°C) or higher.  Your child vomits repeatedly for 24 hours or vomits blood or green bile.  There is blood in your child's stool (it may be bright red, dark red, or black).  Your child is dizzy.  Your child pushes your hand away or screams when you touch his or her abdomen.  Your infant is extremely irritable.  Your child has weakness or is abnormally sleepy or sluggish (lethargic).  Your child develops new or severe problems.  Your child becomes dehydrated. Signs of dehydration include:  Extreme thirst.  Cold hands and feet.  Blotchy (mottled) or bluish discoloration of the hands, lower legs, and feet.  Not able to sweat in spite of heat.  Rapid breathing or pulse.  Confusion.  Feeling dizzy or feeling off-balance when standing.  Difficulty being awakened.  Minimal urine production.  No tears.    MAKE SURE YOU:  Understand these instructions.  Will watch your child's condition.  Will get help right away if your child is not doing well or gets worse.    ADDITIONAL NOTES AND INSTRUCTIONS    Please follow up with your Primary MD in 24-48 hr.  Seek immediate medical care for any new/worsening signs or symptoms.

## 2022-01-04 NOTE — ED PROVIDER NOTE - OBJECTIVE STATEMENT
40 yo F with PMHx of ovarian cysts, uterine fibroids, pancreatitis and PUD presents to the ED c/o moderate lower abdominal pain that started around 7PM and been persisting. Pain is cramp-like, constant, and non-radiating. Pt admits to associated subjective fever, chills, nausea, NBNB vomiting and non-bloody diarrhea. Pt has been unable to keep PO intake down so she came to ED. Pt reports eating Creole food earlier in the day which she typically does not eat. She denies other complaints. Pt denies headache, dizziness, weakness, chest pain, SOB, back pain, LOC, trauma, urinary symptoms, cough, calf pain/swelling, recent travel, recent surgery.

## 2022-01-04 NOTE — ED PROVIDER NOTE - ATTENDING CONTRIBUTION TO CARE
pt co n, v, d, diffuse ab pain for 1 night.  reported fever, chills. no cp, sob, cough. no urinary sx.   pt in nad, anict, ctab, rrr, ab soft, diffusely tender, guarding, no rebound. no cvat.    will get labs, imaging, supportive care, reassess.

## 2022-01-04 NOTE — ED PROVIDER NOTE - NS ED ROS FT
Review of Systems  Constitutional:  (+) fever, chills.  Eyes:  No visual changes, eye pain, or discharge.  ENMT:  No hearing changes, pain, or discharge. No nasal congestion, discharge, or bleeding. No throat pain, swelling, or difficulty swallowing.  Cardiac:  No chest pain, palpitations, syncope, or edema.  Respiratory:  No dyspnea, cough. No hemoptysis.  GI:  (+) nausea, vomiting, diarrhea, abdominal pain.   :  No dysuria, hematuria, frequency, or burning.   MS:  No back pain.  Skin:  No skin rash, pruritis, jaundice, or lesions.  Neuro:  No headache, dizziness, loss of sensation, or focal weakness.  No change in mental status.

## 2022-01-04 NOTE — ED PROVIDER NOTE - PHYSICAL EXAMINATION
VITAL SIGNS: I have reviewed nursing notes and confirm.  CONSTITUTIONAL: Well-developed; well-nourished; in no acute distress.  SKIN: Skin exam is warm and dry, no acute rash.  HEAD: Normocephalic; atraumatic.  EYES: Conjunctiva and sclera clear.  ENT: No nasal discharge; airway clear.   CARD: S1, S2 normal; no murmurs, gallops, or rubs. Regular rate and rhythm.  RESP: No wheezes, rales or rhonchi. Speaking in full sentences.   ABD: Normal bowel sounds; soft; non-distended; (+) lower abd TTP; No rebound or guarding. No CVA tenderness.  EXT: Normal ROM.   NEURO: Alert, oriented. Grossly unremarkable. No focal deficits.

## 2022-01-05 LAB
CULTURE RESULTS: SIGNIFICANT CHANGE UP
SPECIMEN SOURCE: SIGNIFICANT CHANGE UP

## 2022-02-14 NOTE — ED ADULT TRIAGE NOTE - CHIEF COMPLAINT QUOTE
"this morning I woke up with sharp pains in my chest and it's getting worse" also c/o sob Detail Level: Zone Otc Regimen: Apply Aquaphor to lips for dryness Gentle cleansers such as CeraVe hydrating. Continue Regimen: Absorica- take 1 tab bid

## 2022-04-09 ENCOUNTER — EMERGENCY (EMERGENCY)
Facility: HOSPITAL | Age: 42
LOS: 0 days | Discharge: HOME | End: 2022-04-09
Attending: STUDENT IN AN ORGANIZED HEALTH CARE EDUCATION/TRAINING PROGRAM | Admitting: STUDENT IN AN ORGANIZED HEALTH CARE EDUCATION/TRAINING PROGRAM
Payer: MEDICAID

## 2022-04-09 VITALS
SYSTOLIC BLOOD PRESSURE: 134 MMHG | WEIGHT: 195.99 LBS | TEMPERATURE: 99 F | HEIGHT: 60 IN | OXYGEN SATURATION: 98 % | HEART RATE: 75 BPM | RESPIRATION RATE: 20 BRPM | DIASTOLIC BLOOD PRESSURE: 89 MMHG

## 2022-04-09 DIAGNOSIS — Z87.19 PERSONAL HISTORY OF OTHER DISEASES OF THE DIGESTIVE SYSTEM: ICD-10-CM

## 2022-04-09 DIAGNOSIS — R68.84 JAW PAIN: ICD-10-CM

## 2022-04-09 DIAGNOSIS — D25.9 LEIOMYOMA OF UTERUS, UNSPECIFIED: Chronic | ICD-10-CM

## 2022-04-09 DIAGNOSIS — Z87.11 PERSONAL HISTORY OF PEPTIC ULCER DISEASE: ICD-10-CM

## 2022-04-09 DIAGNOSIS — Z91.013 ALLERGY TO SEAFOOD: ICD-10-CM

## 2022-04-09 DIAGNOSIS — Z88.8 ALLERGY STATUS TO OTHER DRUGS, MEDICAMENTS AND BIOLOGICAL SUBSTANCES STATUS: ICD-10-CM

## 2022-04-09 DIAGNOSIS — M62.838 OTHER MUSCLE SPASM: ICD-10-CM

## 2022-04-09 DIAGNOSIS — M26.621 ARTHRALGIA OF RIGHT TEMPOROMANDIBULAR JOINT: ICD-10-CM

## 2022-04-09 DIAGNOSIS — O34.219 MATERNAL CARE FOR UNSPECIFIED TYPE SCAR FROM PREVIOUS CESAREAN DELIVERY: Chronic | ICD-10-CM

## 2022-04-09 DIAGNOSIS — Z87.42 PERSONAL HISTORY OF OTHER DISEASES OF THE FEMALE GENITAL TRACT: ICD-10-CM

## 2022-04-09 PROCEDURE — 99284 EMERGENCY DEPT VISIT MOD MDM: CPT

## 2022-04-09 RX ORDER — METHOCARBAMOL 500 MG/1
1500 TABLET, FILM COATED ORAL ONCE
Refills: 0 | Status: COMPLETED | OUTPATIENT
Start: 2022-04-09 | End: 2022-04-09

## 2022-04-09 RX ORDER — FAMOTIDINE 10 MG/ML
20 INJECTION INTRAVENOUS ONCE
Refills: 0 | Status: COMPLETED | OUTPATIENT
Start: 2022-04-09 | End: 2022-04-09

## 2022-04-09 RX ORDER — KETOROLAC TROMETHAMINE 30 MG/ML
30 SYRINGE (ML) INJECTION ONCE
Refills: 0 | Status: DISCONTINUED | OUTPATIENT
Start: 2022-04-09 | End: 2022-04-09

## 2022-04-09 RX ORDER — OMEPRAZOLE 10 MG/1
1 CAPSULE, DELAYED RELEASE ORAL
Qty: 30 | Refills: 0
Start: 2022-04-09 | End: 2022-05-08

## 2022-04-09 RX ORDER — METHOCARBAMOL 500 MG/1
2 TABLET, FILM COATED ORAL
Qty: 40 | Refills: 0
Start: 2022-04-09 | End: 2022-04-13

## 2022-04-09 RX ADMIN — Medication 30 MILLIGRAM(S): at 11:39

## 2022-04-09 RX ADMIN — METHOCARBAMOL 1500 MILLIGRAM(S): 500 TABLET, FILM COATED ORAL at 11:36

## 2022-04-09 RX ADMIN — FAMOTIDINE 20 MILLIGRAM(S): 10 INJECTION INTRAVENOUS at 11:35

## 2022-04-09 NOTE — ED PROVIDER NOTE - NS ED ROS FT
Constitutional: (-) fever  Eyes/ENT: (+) R sided TMJ pain, (-) blurry vision, (-) epistaxis  Cardiovascular: (-) chest pain, (-) syncope  Respiratory: (-) cough, (-) shortness of breath  Gastrointestinal: (-) vomiting, (-) diarrhea  : (-) dysuria, (-) hematuria  Musculoskeletal: (-) neck pain, (-) back pain, (-) joint pain  Integumentary: (-) rash, (-) edema  Neurological: (-) headache, (-) altered mental status  Allergic/Immunologic: (-) pruritus

## 2022-04-09 NOTE — ED PROVIDER NOTE - OBJECTIVE STATEMENT
41y F pmh ovarian cyst, fibroids, pancreatitis, PUD, TMJ presents for eval of jaw pain. Pt has moderate sharp R sided TMJ pain x4 days, aggravated with rom of jaw, mild improvement with Midol. Associated R sided neck spasm. Denies fever, ha, cp, sob, weakness, numbness, dysuria, hematuria, n/v/d/c

## 2022-04-09 NOTE — ED ADULT NURSE NOTE - NSICDXPASTMEDICALHX_GEN_ALL_CORE_FT
PAST MEDICAL HISTORY:  Abscess vaginal    Fibroid, uterine     Ovarian cyst     Pancreatitis     Peptic ulcer

## 2022-04-09 NOTE — ED PROVIDER NOTE - PHYSICAL EXAMINATION
CONST: NAD  EYES: Sclera and conjunctiva clear.   ENT: R TMJ tenderness. Oropharynx normal appearing, no erythema or exudates. No abscess or swelling. Uvula midline. No nasal discharge.   NECK: Non-tender, no meningeal signs. normal ROM. supple   CARD: S1 S2; No jvd  RESP: Equal BS B/L, No wheezes, rhonchi or rales. No distress  GI: Soft, non-tender, non-distended. normal BS  MS: Normal ROM in all extremities. pulses 2 +.  SKIN: Warm, dry, no acute rashes. Good turgor  NEURO: A&Ox3, No focal deficits.

## 2022-04-09 NOTE — ED PROVIDER NOTE - ATTENDING APP SHARED VISIT CONTRIBUTION OF CARE
41y F pmh ovarian cyst, fibroids, pancreatitis, PUD, TMJ   pt presents for eval of R jaw pain. pain ongoing for 4 days, exacerbated by certain jaw movements. pain moderate. no fevers/chills/dental pain. no ear discharge or change in hearing. no vision chance. no trauma.  pain began like previous TMJ pain.     vss  gen- NAD, aaox3  HENT - R TMJ tenderness, pain w/ jaw ROM, no dental abscess, base of tongue normal, R ear- no canal discharge, TM w/o erythema nor bulging  card-rrr  lungs-ctab, no wheezing or rhonchi  abd-sntnd, no guarding or rebound  neuro- full str/sensation, cn ii-xii grossly intact, normal coordination and gait  Spine- no midline c/t/l spine ttp, neck supple, FROM to neck

## 2022-04-09 NOTE — ED PROVIDER NOTE - NSFOLLOWUPINSTRUCTIONS_ED_ALL_ED_FT
Follow up with PMD and Dental in 1-2 days.    What is the TMJ?    The temporomandibular ([id number]-[id number]-man-JEY-jonah-ler) joint, or the TMJ, connects the upper and lower jawbones. This joint allows the jaw to open wide and move back and forth when you chew, talk, or yawn.    What causes TMJ pain?  There are many causes of TMJ pain. Repeated chewing (for example, chewing gum) and clenching your teeth can cause pain in the joint. Some TMJ pain has no obvious cause.    What can I do to ease the pain?  There are many things you can do to help your pain get better. When you have pain:    Eat soft foods and stay away from chewy foods (for example, taffy)    Try to use both sides of your mouth to chew    Don't chew gum    Don't open your mouth wide (for example, during yawning or singing)    Don't bite your cheeks or fingernails    Lower your amount of stress and worry    Applying a warm, damp washcloth to the joint may help.    Over-the-counter pain medicines such as ibuprofen (one brand: Advil) or acetaminophen (one brand: Tylenol) might also help. Do not use these medicines if you are allergic to them or if your doctor told you not to use them.    How can I stop the pain from coming back?  When your pain is better, you can do these exercises to make your muscles stronger and to keep the pain from coming back:    Resisted mouth opening: Place your thumb or two fingers under your chin and open your mouth slowly, pushing up lightly on your chin with your thumb. Hold for three to six seconds. Close your mouth slowly.    Resisted mouth closing: Place your thumbs under your chin and your two index fingers on the ridge between your mouth and the bottom of your chin. Push down lightly on your chin as you close your mouth.    Tongue up: Slowly open and close your mouth while keeping the tongue touching the roof of the mouth.    Side-to-side jaw movement: Place an object about one fourth of an inch thick (for example, two tongue depressors) between your front teeth. Slowly move your jaw from side to side. Increase the thickness of the object as the exercise becomes easier.    Forward jaw movement: Place an object about one fourth of an inch thick between your front teeth and move the bottom jaw forward so that the bottom teeth are in front of the top teeth. Increase the thickness of the object as the exercise becomes easier.    These exercises should not be painful. If it hurts to do these exercises, stop doing them and talk to your family doctor.      This handout is provided to you by your family doctor and the [employer]. Other health-related information is available from the AAFP online at http://familydoctor.org.    This information provides a general overview and may not apply to everyone. Talk to your family doctor to find out if this information applies to you and to get more information on this subject.

## 2022-04-09 NOTE — ED PROVIDER NOTE - CLINICAL SUMMARY MEDICAL DECISION MAKING FREE TEXT BOX
licha TMJ exacerbation, no overt dislocation, jaw appears symmetric. no infectious complaints, no trauma, neck supple, pain w/ jaw ROM, no swelling  will treat supportively w/ nsaid and msk relaxant, rec close dental f/u and return precautions

## 2022-04-09 NOTE — ED PROVIDER NOTE - NSFOLLOWUPCLINICS_GEN_ALL_ED_FT
[facility]  Dental  [address] [place] [id number]  Phone: [phone]  Fax:

## 2022-04-09 NOTE — ED PROVIDER NOTE - NS ED ATTENDING STATEMENT MOD
This was a shared visit with the ELGIN. I reviewed and verified the documentation and independently performed the documented:

## 2022-04-09 NOTE — ED PROVIDER NOTE - PATIENT PORTAL LINK FT
You can access the FollowMyHealth Patient Portal offered by [facility] by registering at the following website: [email or web address]. By joining [employer]’s FollowMyHealth portal, you will also be able to view your health information using other applications (apps) compatible with our system.

## 2022-05-23 NOTE — H&P ADULT - NSICDXPASTMEDICALHX_GEN_ALL_CORE_FT
PAST MEDICAL HISTORY:  Abscess vaginal    Fibroid, uterine     Peptic ulcer MDD (major depressive disorder), recurrent episode, moderate

## 2022-06-10 ENCOUNTER — OUTPATIENT (OUTPATIENT)
Dept: OUTPATIENT SERVICES | Facility: HOSPITAL | Age: 42
LOS: 1 days | Discharge: HOME | End: 2022-06-10
Payer: MEDICAID

## 2022-06-10 DIAGNOSIS — O34.219 MATERNAL CARE FOR UNSPECIFIED TYPE SCAR FROM PREVIOUS CESAREAN DELIVERY: Chronic | ICD-10-CM

## 2022-06-10 DIAGNOSIS — D25.9 LEIOMYOMA OF UTERUS, UNSPECIFIED: Chronic | ICD-10-CM

## 2022-06-10 DIAGNOSIS — D25.9 LEIOMYOMA OF UTERUS, UNSPECIFIED: ICD-10-CM

## 2022-06-10 PROCEDURE — 76830 TRANSVAGINAL US NON-OB: CPT | Mod: 26

## 2022-06-10 NOTE — ED ADULT TRIAGE NOTE - MODE OF ARRIVAL
Walk in [Appropriately responsive] : appropriately responsive [Alert] : alert [No Acute Distress] : no acute distress [Soft] : soft [Non-distended] : non-distended [No HSM] : No HSM [No Lesions] : no lesions [No Mass] : no mass [Oriented x3] : oriented x3 Private [employer] [FreeTextEntry3] : supple [FreeTextEntry5] : Normal respiratory effort [FreeTextEntry7] : Mildly tender in LLQ, no rebound or guarding [Examination Of The Breasts] : a normal appearance [No Masses] : no breast masses were palpable [Labia Majora] : normal [Labia Minora] : normal [Normal] : normal [Uterine Adnexae] : normal [FreeTextEntry6] : No adnexal tenderness

## 2022-06-16 ENCOUNTER — EMERGENCY (EMERGENCY)
Facility: HOSPITAL | Age: 42
LOS: 0 days | Discharge: HOME | End: 2022-06-16
Attending: EMERGENCY MEDICINE | Admitting: EMERGENCY MEDICINE
Payer: MEDICAID

## 2022-06-16 VITALS
TEMPERATURE: 98 F | HEIGHT: 60 IN | HEART RATE: 76 BPM | WEIGHT: 195.99 LBS | DIASTOLIC BLOOD PRESSURE: 54 MMHG | SYSTOLIC BLOOD PRESSURE: 95 MMHG | OXYGEN SATURATION: 95 % | RESPIRATION RATE: 20 BRPM

## 2022-06-16 VITALS
OXYGEN SATURATION: 98 % | RESPIRATION RATE: 18 BRPM | TEMPERATURE: 98 F | SYSTOLIC BLOOD PRESSURE: 126 MMHG | HEART RATE: 100 BPM | DIASTOLIC BLOOD PRESSURE: 60 MMHG

## 2022-06-16 DIAGNOSIS — R10.32 LEFT LOWER QUADRANT PAIN: ICD-10-CM

## 2022-06-16 DIAGNOSIS — Z87.19 PERSONAL HISTORY OF OTHER DISEASES OF THE DIGESTIVE SYSTEM: ICD-10-CM

## 2022-06-16 DIAGNOSIS — D25.9 LEIOMYOMA OF UTERUS, UNSPECIFIED: Chronic | ICD-10-CM

## 2022-06-16 DIAGNOSIS — O34.219 MATERNAL CARE FOR UNSPECIFIED TYPE SCAR FROM PREVIOUS CESAREAN DELIVERY: Chronic | ICD-10-CM

## 2022-06-16 DIAGNOSIS — Z88.8 ALLERGY STATUS TO OTHER DRUGS, MEDICAMENTS AND BIOLOGICAL SUBSTANCES: ICD-10-CM

## 2022-06-16 DIAGNOSIS — R10.31 RIGHT LOWER QUADRANT PAIN: ICD-10-CM

## 2022-06-16 DIAGNOSIS — R10.9 UNSPECIFIED ABDOMINAL PAIN: ICD-10-CM

## 2022-06-16 DIAGNOSIS — Z87.42 PERSONAL HISTORY OF OTHER DISEASES OF THE FEMALE GENITAL TRACT: ICD-10-CM

## 2022-06-16 DIAGNOSIS — Z91.013 ALLERGY TO SEAFOOD: ICD-10-CM

## 2022-06-16 DIAGNOSIS — R11.2 NAUSEA WITH VOMITING, UNSPECIFIED: ICD-10-CM

## 2022-06-16 DIAGNOSIS — D25.9 LEIOMYOMA OF UTERUS, UNSPECIFIED: ICD-10-CM

## 2022-06-16 LAB
ALBUMIN SERPL ELPH-MCNC: 4.3 G/DL — SIGNIFICANT CHANGE UP (ref 3.5–5.2)
ALP SERPL-CCNC: 70 U/L — SIGNIFICANT CHANGE UP (ref 30–115)
ALT FLD-CCNC: 13 U/L — SIGNIFICANT CHANGE UP (ref 0–41)
ANION GAP SERPL CALC-SCNC: 13 MMOL/L — SIGNIFICANT CHANGE UP (ref 7–14)
APPEARANCE UR: CLEAR — SIGNIFICANT CHANGE UP
AST SERPL-CCNC: 16 U/L — SIGNIFICANT CHANGE UP (ref 0–41)
BASOPHILS # BLD AUTO: 0.01 K/UL — SIGNIFICANT CHANGE UP (ref 0–0.2)
BASOPHILS NFR BLD AUTO: 0.2 % — SIGNIFICANT CHANGE UP (ref 0–1)
BILIRUB SERPL-MCNC: <0.2 MG/DL — SIGNIFICANT CHANGE UP (ref 0.2–1.2)
BILIRUB UR-MCNC: NEGATIVE — SIGNIFICANT CHANGE UP
BUN SERPL-MCNC: 8 MG/DL — LOW (ref 10–20)
CALCIUM SERPL-MCNC: 9.6 MG/DL — SIGNIFICANT CHANGE UP (ref 8.5–10.1)
CHLORIDE SERPL-SCNC: 99 MMOL/L — SIGNIFICANT CHANGE UP (ref 98–110)
CO2 SERPL-SCNC: 25 MMOL/L — SIGNIFICANT CHANGE UP (ref 17–32)
COLOR SPEC: SIGNIFICANT CHANGE UP
CREAT SERPL-MCNC: 0.7 MG/DL — SIGNIFICANT CHANGE UP (ref 0.7–1.5)
DIFF PNL FLD: NEGATIVE — SIGNIFICANT CHANGE UP
EGFR: 113 ML/MIN/1.73M2 — SIGNIFICANT CHANGE UP
EOSINOPHIL # BLD AUTO: 0.07 K/UL — SIGNIFICANT CHANGE UP (ref 0–0.7)
EOSINOPHIL NFR BLD AUTO: 1.5 % — SIGNIFICANT CHANGE UP (ref 0–8)
GLUCOSE SERPL-MCNC: 110 MG/DL — HIGH (ref 70–99)
GLUCOSE UR QL: NEGATIVE — SIGNIFICANT CHANGE UP
HCG SERPL QL: NEGATIVE — SIGNIFICANT CHANGE UP
HCT VFR BLD CALC: 38.6 % — SIGNIFICANT CHANGE UP (ref 37–47)
HGB BLD-MCNC: 12.9 G/DL — SIGNIFICANT CHANGE UP (ref 12–16)
IMM GRANULOCYTES NFR BLD AUTO: 0.2 % — SIGNIFICANT CHANGE UP (ref 0.1–0.3)
KETONES UR-MCNC: NEGATIVE — SIGNIFICANT CHANGE UP
LACTATE SERPL-SCNC: 2 MMOL/L — SIGNIFICANT CHANGE UP (ref 0.7–2)
LEUKOCYTE ESTERASE UR-ACNC: NEGATIVE — SIGNIFICANT CHANGE UP
LIDOCAIN IGE QN: 52 U/L — SIGNIFICANT CHANGE UP (ref 7–60)
LYMPHOCYTES # BLD AUTO: 2.01 K/UL — SIGNIFICANT CHANGE UP (ref 1.2–3.4)
LYMPHOCYTES # BLD AUTO: 42.3 % — SIGNIFICANT CHANGE UP (ref 20.5–51.1)
MCHC RBC-ENTMCNC: 28.5 PG — SIGNIFICANT CHANGE UP (ref 27–31)
MCHC RBC-ENTMCNC: 33.4 G/DL — SIGNIFICANT CHANGE UP (ref 32–37)
MCV RBC AUTO: 85.2 FL — SIGNIFICANT CHANGE UP (ref 81–99)
MONOCYTES # BLD AUTO: 0.34 K/UL — SIGNIFICANT CHANGE UP (ref 0.1–0.6)
MONOCYTES NFR BLD AUTO: 7.2 % — SIGNIFICANT CHANGE UP (ref 1.7–9.3)
NEUTROPHILS # BLD AUTO: 2.31 K/UL — SIGNIFICANT CHANGE UP (ref 1.4–6.5)
NEUTROPHILS NFR BLD AUTO: 48.6 % — SIGNIFICANT CHANGE UP (ref 42.2–75.2)
NITRITE UR-MCNC: NEGATIVE — SIGNIFICANT CHANGE UP
NRBC # BLD: 0 /100 WBCS — SIGNIFICANT CHANGE UP (ref 0–0)
PH UR: 5.5 — SIGNIFICANT CHANGE UP (ref 5–8)
PLATELET # BLD AUTO: 340 K/UL — SIGNIFICANT CHANGE UP (ref 130–400)
POTASSIUM SERPL-MCNC: 4.6 MMOL/L — SIGNIFICANT CHANGE UP (ref 3.5–5)
POTASSIUM SERPL-SCNC: 4.6 MMOL/L — SIGNIFICANT CHANGE UP (ref 3.5–5)
PROT SERPL-MCNC: 7.4 G/DL — SIGNIFICANT CHANGE UP (ref 6–8)
PROT UR-MCNC: SIGNIFICANT CHANGE UP
RBC # BLD: 4.53 M/UL — SIGNIFICANT CHANGE UP (ref 4.2–5.4)
RBC # FLD: 12.5 % — SIGNIFICANT CHANGE UP (ref 11.5–14.5)
SODIUM SERPL-SCNC: 137 MMOL/L — SIGNIFICANT CHANGE UP (ref 135–146)
SP GR SPEC: 1.02 — SIGNIFICANT CHANGE UP (ref 1.01–1.03)
UROBILINOGEN FLD QL: SIGNIFICANT CHANGE UP
WBC # BLD: 4.75 K/UL — LOW (ref 4.8–10.8)
WBC # FLD AUTO: 4.75 K/UL — LOW (ref 4.8–10.8)

## 2022-06-16 PROCEDURE — 99285 EMERGENCY DEPT VISIT HI MDM: CPT

## 2022-06-16 PROCEDURE — 74177 CT ABD & PELVIS W/CONTRAST: CPT | Mod: 26,MA

## 2022-06-16 RX ORDER — SODIUM CHLORIDE 9 MG/ML
2000 INJECTION INTRAMUSCULAR; INTRAVENOUS; SUBCUTANEOUS ONCE
Refills: 0 | Status: COMPLETED | OUTPATIENT
Start: 2022-06-16 | End: 2022-06-16

## 2022-06-16 RX ORDER — DIPHENHYDRAMINE HCL 50 MG
25 CAPSULE ORAL ONCE
Refills: 0 | Status: COMPLETED | OUTPATIENT
Start: 2022-06-16 | End: 2022-06-16

## 2022-06-16 RX ORDER — ONDANSETRON 8 MG/1
4 TABLET, FILM COATED ORAL ONCE
Refills: 0 | Status: COMPLETED | OUTPATIENT
Start: 2022-06-16 | End: 2022-06-16

## 2022-06-16 RX ORDER — MORPHINE SULFATE 50 MG/1
4 CAPSULE, EXTENDED RELEASE ORAL ONCE
Refills: 0 | Status: DISCONTINUED | OUTPATIENT
Start: 2022-06-16 | End: 2022-06-16

## 2022-06-16 RX ADMIN — Medication 125 MILLIGRAM(S): at 04:19

## 2022-06-16 RX ADMIN — Medication 25 MILLIGRAM(S): at 04:19

## 2022-06-16 RX ADMIN — MORPHINE SULFATE 4 MILLIGRAM(S): 50 CAPSULE, EXTENDED RELEASE ORAL at 04:34

## 2022-06-16 RX ADMIN — MORPHINE SULFATE 4 MILLIGRAM(S): 50 CAPSULE, EXTENDED RELEASE ORAL at 04:19

## 2022-06-16 RX ADMIN — ONDANSETRON 4 MILLIGRAM(S): 8 TABLET, FILM COATED ORAL at 04:19

## 2022-06-16 RX ADMIN — SODIUM CHLORIDE 2000 MILLILITER(S): 9 INJECTION INTRAMUSCULAR; INTRAVENOUS; SUBCUTANEOUS at 04:20

## 2022-06-16 NOTE — ED PROVIDER NOTE - PROGRESS NOTE DETAILS
ASHLEY NAJERA: Reviewed all results and necessity for follow up. Counseled on red flags and to return for them.  Patient appears well on discharge.

## 2022-06-16 NOTE — ED PROVIDER NOTE - ATTENDING APP SHARED VISIT CONTRIBUTION OF CARE
41-year-old female history of fibroids complaining of crampy lower abdominal pain consistent with her fibroid pain.  No nausea vomiting diarrhea fever chills.  No urinary symptoms.    Patient no acute distress anicteric CTA RRR abdomen soft tender bilateral lower quadrants no rebound no CVAT    Will get labs imaging supportive care reassess

## 2022-06-16 NOTE — ED ADULT NURSE NOTE - OBJECTIVE STATEMENT
patient complaints of lower abdominal pain and nausea and vomiting x 5 days. Patient denies fever or difficulty with urination.

## 2022-06-16 NOTE — ED PROVIDER NOTE - CARE PROVIDERS DIRECT ADDRESSES
,DirectAddress_Unknown,DirectAddress_Unknown,[email or web address]

## 2022-06-16 NOTE — ED PROVIDER NOTE - PHYSICAL EXAMINATION
PHYSICAL EXAM:    GENERAL: Alert, appears stated age, well appearing, non-toxic  SKIN: Warm, pink and dry. MMM.   HEAD: NC, AT  EYE: Normal lids/conjunctiva  ENT: Normal hearing, patent oropharynx   NECK: +supple. No meningismus, or JVD  Pulm: Bilateral BS, normal resp effort, no wheezes, stridor, or retractions  CV: RRR, no M/R/G, 2+and = radial pulses  Abd: soft, +Diffuse tenderness, no rebound/guarding. non-distended. no CVA tenderness.   Mskel: no erythema, cyanosis, edema. no calf tenderness  Neuro: AAOx3 normal gait.

## 2022-06-16 NOTE — ED PROVIDER NOTE - NS ED ROS FT
Review of Systems    Constitutional: (-) fever   Eyes/ENT: (-) vision changes  Cardiovascular: (-) chest pain, (-) syncope (-) palpitations  Respiratory: (-) cough, (-) shortness of breath  Gastrointestinal: (+) vomiting, (-) diarrhea (-)black/bloody stools (+) abdominal pain  Genitourinary:  (-) dysuria   Musculoskeletal: (-) neck pain, (-) back pain, (-) leg pain/swelling  Integumentary: (-) rash, (-) edema  Neurological: (-) headache (-) confusion  Hematologic: (-) easy bruising

## 2022-06-16 NOTE — ED PROVIDER NOTE - CARE PROVIDER_API CALL
your gyn in 1-3 days,   Phone: (   )    -  Fax: (   )    -  Follow Up Time:     your pmd in 1-3 days,   Phone: (   )    -  Fax: (   )    -  Follow Up Time:     Bony Cason)  Gastroenterology; Internal Medicine  [address] [place] [address]  Phone: [phone]  Fax: [phone]  Follow Up Time: 1-3 Days

## 2022-06-16 NOTE — ED PROVIDER NOTE - PATIENT PORTAL LINK FT
You can access the FollowMyHealth Patient Portal offered by [facility] by registering at the following website: [email or web address]. By joining [employer]’s FollowMyHealth portal, you will also be able to view your health information using other applications (apps) compatible with our system.

## 2022-06-16 NOTE — ED PROVIDER NOTE - PROVIDER TOKENS
FREE:[LAST:[your gyn in 1-3 days],PHONE:[(   )    -],FAX:[(   )    -]],FREE:[LAST:[your pmd in 1-3 days],PHONE:[(   )    -],FAX:[(   )    -]],PROVIDER:[TOKEN:[[id number]],FOLLOWUP:[1-3 Days]]

## 2022-06-16 NOTE — ED PROVIDER NOTE - OBJECTIVE STATEMENT
41-year-old female with PMH fibroids, ovarian cyst, pancreatitis, PUD, transvaginal ultrasound on 6/10/2022 showing a 5.1 cm uterine fibroid presents with moderate constant throbbing diffuse abdominal pain x today associated with nausea, 2 episodes of nonbloody nonbilious vomiting.  No fever, urinary symptoms, vaginal symptoms, chest pain, shortness of breath, diarrhea, history of abdominal surgeries, any other symptoms.   LMP 6/5

## 2022-06-17 LAB
CULTURE RESULTS: SIGNIFICANT CHANGE UP
SPECIMEN SOURCE: SIGNIFICANT CHANGE UP

## 2022-07-13 RX ORDER — PANTOPRAZOLE 40 MG/1
40 TABLET, DELAYED RELEASE ORAL DAILY
Qty: 1 | Refills: 3 | Status: DISCONTINUED | COMMUNITY
Start: 2020-03-03 | End: 2022-07-13

## 2022-07-13 RX ORDER — PNV NO.95/FERROUS FUM/FOLIC AC 28MG-0.8MG
27-0.8 TABLET ORAL DAILY
Qty: 30 | Refills: 6 | Status: DISCONTINUED | COMMUNITY
Start: 2020-02-21 | End: 2022-07-13

## 2022-07-13 RX ORDER — FOLIC ACID 1 MG/1
1 TABLET ORAL DAILY
Qty: 30 | Refills: 6 | Status: DISCONTINUED | COMMUNITY
Start: 2020-02-21 | End: 2022-07-13

## 2022-07-14 ENCOUNTER — APPOINTMENT (OUTPATIENT)
Dept: GASTROENTEROLOGY | Facility: CLINIC | Age: 42
End: 2022-07-14

## 2022-07-14 DIAGNOSIS — R10.2 PELVIC AND PERINEAL PAIN: ICD-10-CM

## 2022-07-14 DIAGNOSIS — Z87.19 PERSONAL HISTORY OF OTHER DISEASES OF THE DIGESTIVE SYSTEM: ICD-10-CM

## 2022-07-14 PROCEDURE — 99443: CPT

## 2022-07-14 NOTE — HISTORY OF PRESENT ILLNESS
[Home] : at home, [unfilled] , at the time of the visit. [Medical Office: ([facility])___] : at the medical office located in  [Verbal consent obtained from patient] : the patient, [unfilled] [Heartburn] : denies heartburn [Diarrhea] : denies diarrhea [Constipation] : denies constipation [Yellow Skin Or Eyes (Jaundice)] : denies jaundice [Rectal Pain] : denies rectal pain [Nausea] : nausea [Vomiting] : vomiting [Abdominal Pain] : abdominal pain [Abdominal Swelling] : abdominal swelling [FreeTextEntry4] : Martha Conklin  [de-identified] : 41-year-old female with history of recurrent pancreatitis, history of fibroids status post embolization for fibroids, history of ovarian cysts and esophageal ulcer and H. pylori with recurrent abdominal pain.\par Patient has had multiple ER visits for abdominal pain.  The pain was previously thought to be related to fibroids.  She has had embolization with decrease in size of the fibroids.  The patient was in the emergency room again on June 16, 2022.  CT scan was negative, CBC was normal urinalysis was normal comprehensive metabolic panel was normal serum pregnancy was negative.  The patient describes postprandial abdominal pain with daily nausea and vomiting and occasional black stool she denies dysphagia odynophagia.  She states she has had a weight loss of 20 pounds. \par  Prior endoscopy was in January 2019.  Revealed H. pylori and an esophageal ulcer. \par  Repeat endoscopy in March 2019 revealed healing of the ulcer, and a small esophageal ring.  .  Prior colonoscopy was in March 2019, which revealed a small benign polyp consisting of hyperplastic tissue\par \par The patient remains quite symptomatic presently with postprandial pain and daily nausea and vomiting and occasional black stool

## 2022-07-14 NOTE — REVIEW OF SYSTEMS
[Feeling Poorly] : feeling poorly [Feeling Tired] : feeling tired [Recent Weight Loss (___ Lbs)] : recent [unfilled] ~Ulb weight loss [Red Eyes] : red eyes [Discharge From Eyes] : purulent discharge from the eyes [Earache] : earache [Lower Ext Edema] : lower extremity edema [Abdominal Pain] : abdominal pain [Vomiting] : vomiting [Melena] : meldora [Fever] : no fever [Chills] : no chills [Eye Pain] : no eye pain [Heart Rate Is Slow] : the heart rate was not slow [Heart Rate Is Fast] : the heart rate was not fast [Chest Pain] : no chest pain [Palpitations] : no palpitations [Leg Claudication] : no intermittent leg claudication [Shortness Of Breath] : no shortness of breath [Wheezing] : no wheezing [Cough] : no cough [SOB on Exertion] : no shortness of breath during exertion [FreeTextEntry4] : tinnitis

## 2022-07-14 NOTE — ASSESSMENT
[FreeTextEntry1] : 41-year-old female with abdominal pain.  At this point we will repeat the upper endoscopy.  .  The risks and benefits of upper endoscopy reviewed with the patient\par \par Problem #2 pelvic pain the patient will need to be seen by GYN to evaluate whether the continuing pain is related to her fibroids.  The patient will make an appointment with GYN\par \par Problem #3 history of pancreatitis: CT did not show evidence of pancreatitis in the emergency room.  We will check a lipase and amylase to be certain that this is not a contributing factor\par \par CT imaging of the abdomen and pelvis was reviewed.  CBC comprehensive panel from emergency room visit in the emergency room notes were reviewed

## 2022-08-19 ENCOUNTER — EMERGENCY (EMERGENCY)
Facility: HOSPITAL | Age: 42
LOS: 0 days | Discharge: HOME | End: 2022-08-19
Attending: EMERGENCY MEDICINE | Admitting: EMERGENCY MEDICINE

## 2022-08-19 VITALS
WEIGHT: 195.99 LBS | HEIGHT: 60 IN | OXYGEN SATURATION: 96 % | TEMPERATURE: 98 F | DIASTOLIC BLOOD PRESSURE: 62 MMHG | RESPIRATION RATE: 24 BRPM | HEART RATE: 88 BPM | SYSTOLIC BLOOD PRESSURE: 118 MMHG

## 2022-08-19 DIAGNOSIS — R11.2 NAUSEA WITH VOMITING, UNSPECIFIED: ICD-10-CM

## 2022-08-19 DIAGNOSIS — Z87.19 PERSONAL HISTORY OF OTHER DISEASES OF THE DIGESTIVE SYSTEM: ICD-10-CM

## 2022-08-19 DIAGNOSIS — Z91.041 RADIOGRAPHIC DYE ALLERGY STATUS: ICD-10-CM

## 2022-08-19 DIAGNOSIS — Z91.013 ALLERGY TO SEAFOOD: ICD-10-CM

## 2022-08-19 DIAGNOSIS — D25.9 LEIOMYOMA OF UTERUS, UNSPECIFIED: Chronic | ICD-10-CM

## 2022-08-19 DIAGNOSIS — R07.89 OTHER CHEST PAIN: ICD-10-CM

## 2022-08-19 DIAGNOSIS — D25.9 LEIOMYOMA OF UTERUS, UNSPECIFIED: ICD-10-CM

## 2022-08-19 DIAGNOSIS — O34.219 MATERNAL CARE FOR UNSPECIFIED TYPE SCAR FROM PREVIOUS CESAREAN DELIVERY: Chronic | ICD-10-CM

## 2022-08-19 DIAGNOSIS — R06.02 SHORTNESS OF BREATH: ICD-10-CM

## 2022-08-19 DIAGNOSIS — Z87.42 PERSONAL HISTORY OF OTHER DISEASES OF THE FEMALE GENITAL TRACT: ICD-10-CM

## 2022-08-19 DIAGNOSIS — R19.7 DIARRHEA, UNSPECIFIED: ICD-10-CM

## 2022-08-19 DIAGNOSIS — F41.9 ANXIETY DISORDER, UNSPECIFIED: ICD-10-CM

## 2022-08-19 LAB
ALBUMIN SERPL ELPH-MCNC: 4.4 G/DL — SIGNIFICANT CHANGE UP (ref 3.5–5.2)
ALP SERPL-CCNC: 80 U/L — SIGNIFICANT CHANGE UP (ref 30–115)
ALT FLD-CCNC: 14 U/L — SIGNIFICANT CHANGE UP (ref 0–41)
ANION GAP SERPL CALC-SCNC: 10 MMOL/L — SIGNIFICANT CHANGE UP (ref 7–14)
APPEARANCE UR: CLEAR — SIGNIFICANT CHANGE UP
AST SERPL-CCNC: 13 U/L — SIGNIFICANT CHANGE UP (ref 0–41)
BASOPHILS # BLD AUTO: 0.03 K/UL — SIGNIFICANT CHANGE UP (ref 0–0.2)
BASOPHILS NFR BLD AUTO: 0.4 % — SIGNIFICANT CHANGE UP (ref 0–1)
BILIRUB SERPL-MCNC: 0.3 MG/DL — SIGNIFICANT CHANGE UP (ref 0.2–1.2)
BILIRUB UR-MCNC: NEGATIVE — SIGNIFICANT CHANGE UP
BUN SERPL-MCNC: 8 MG/DL — LOW (ref 10–20)
CALCIUM SERPL-MCNC: 9.6 MG/DL — SIGNIFICANT CHANGE UP (ref 8.5–10.1)
CHLORIDE SERPL-SCNC: 100 MMOL/L — SIGNIFICANT CHANGE UP (ref 98–110)
CO2 SERPL-SCNC: 28 MMOL/L — SIGNIFICANT CHANGE UP (ref 17–32)
COLOR SPEC: SIGNIFICANT CHANGE UP
CREAT SERPL-MCNC: 0.6 MG/DL — LOW (ref 0.7–1.5)
DIFF PNL FLD: NEGATIVE — SIGNIFICANT CHANGE UP
EGFR: 116 ML/MIN/1.73M2 — SIGNIFICANT CHANGE UP
EOSINOPHIL # BLD AUTO: 0.22 K/UL — SIGNIFICANT CHANGE UP (ref 0–0.7)
EOSINOPHIL NFR BLD AUTO: 3.1 % — SIGNIFICANT CHANGE UP (ref 0–8)
GLUCOSE SERPL-MCNC: 99 MG/DL — SIGNIFICANT CHANGE UP (ref 70–99)
GLUCOSE UR QL: NEGATIVE — SIGNIFICANT CHANGE UP
HCT VFR BLD CALC: 37.2 % — SIGNIFICANT CHANGE UP (ref 37–47)
HGB BLD-MCNC: 12.7 G/DL — SIGNIFICANT CHANGE UP (ref 12–16)
IMM GRANULOCYTES NFR BLD AUTO: 0.3 % — SIGNIFICANT CHANGE UP (ref 0.1–0.3)
KETONES UR-MCNC: NEGATIVE — SIGNIFICANT CHANGE UP
LEUKOCYTE ESTERASE UR-ACNC: NEGATIVE — SIGNIFICANT CHANGE UP
LIDOCAIN IGE QN: 98 U/L — HIGH (ref 7–60)
LYMPHOCYTES # BLD AUTO: 2.7 K/UL — SIGNIFICANT CHANGE UP (ref 1.2–3.4)
LYMPHOCYTES # BLD AUTO: 37.8 % — SIGNIFICANT CHANGE UP (ref 20.5–51.1)
MCHC RBC-ENTMCNC: 28.1 PG — SIGNIFICANT CHANGE UP (ref 27–31)
MCHC RBC-ENTMCNC: 34.1 G/DL — SIGNIFICANT CHANGE UP (ref 32–37)
MCV RBC AUTO: 82.3 FL — SIGNIFICANT CHANGE UP (ref 81–99)
MONOCYTES # BLD AUTO: 0.68 K/UL — HIGH (ref 0.1–0.6)
MONOCYTES NFR BLD AUTO: 9.5 % — HIGH (ref 1.7–9.3)
NEUTROPHILS # BLD AUTO: 3.49 K/UL — SIGNIFICANT CHANGE UP (ref 1.4–6.5)
NEUTROPHILS NFR BLD AUTO: 48.9 % — SIGNIFICANT CHANGE UP (ref 42.2–75.2)
NITRITE UR-MCNC: NEGATIVE — SIGNIFICANT CHANGE UP
NRBC # BLD: 0 /100 WBCS — SIGNIFICANT CHANGE UP (ref 0–0)
PH UR: 5.5 — SIGNIFICANT CHANGE UP (ref 5–8)
PLATELET # BLD AUTO: 360 K/UL — SIGNIFICANT CHANGE UP (ref 130–400)
POTASSIUM SERPL-MCNC: 4.1 MMOL/L — SIGNIFICANT CHANGE UP (ref 3.5–5)
POTASSIUM SERPL-SCNC: 4.1 MMOL/L — SIGNIFICANT CHANGE UP (ref 3.5–5)
PROT SERPL-MCNC: 7.3 G/DL — SIGNIFICANT CHANGE UP (ref 6–8)
PROT UR-MCNC: NEGATIVE — SIGNIFICANT CHANGE UP
RBC # BLD: 4.52 M/UL — SIGNIFICANT CHANGE UP (ref 4.2–5.4)
RBC # FLD: 13 % — SIGNIFICANT CHANGE UP (ref 11.5–14.5)
SODIUM SERPL-SCNC: 138 MMOL/L — SIGNIFICANT CHANGE UP (ref 135–146)
SP GR SPEC: 1.01 — SIGNIFICANT CHANGE UP (ref 1.01–1.03)
TROPONIN T SERPL-MCNC: <0.01 NG/ML — SIGNIFICANT CHANGE UP
UROBILINOGEN FLD QL: SIGNIFICANT CHANGE UP
WBC # BLD: 7.14 K/UL — SIGNIFICANT CHANGE UP (ref 4.8–10.8)
WBC # FLD AUTO: 7.14 K/UL — SIGNIFICANT CHANGE UP (ref 4.8–10.8)

## 2022-08-19 PROCEDURE — 93010 ELECTROCARDIOGRAM REPORT: CPT

## 2022-08-19 PROCEDURE — 76604 US EXAM CHEST: CPT | Mod: 26

## 2022-08-19 PROCEDURE — 71045 X-RAY EXAM CHEST 1 VIEW: CPT | Mod: 26

## 2022-08-19 PROCEDURE — 99285 EMERGENCY DEPT VISIT HI MDM: CPT | Mod: 25

## 2022-08-19 RX ORDER — ACETAMINOPHEN 500 MG
650 TABLET ORAL ONCE
Refills: 0 | Status: COMPLETED | OUTPATIENT
Start: 2022-08-19 | End: 2022-08-19

## 2022-08-19 RX ORDER — DIAZEPAM 5 MG
5 TABLET ORAL ONCE
Refills: 0 | Status: DISCONTINUED | OUTPATIENT
Start: 2022-08-19 | End: 2022-08-19

## 2022-08-19 RX ORDER — ONDANSETRON 8 MG/1
4 TABLET, FILM COATED ORAL ONCE
Refills: 0 | Status: COMPLETED | OUTPATIENT
Start: 2022-08-19 | End: 2022-08-19

## 2022-08-19 RX ORDER — SODIUM CHLORIDE 9 MG/ML
1000 INJECTION, SOLUTION INTRAVENOUS ONCE
Refills: 0 | Status: COMPLETED | OUTPATIENT
Start: 2022-08-19 | End: 2022-08-19

## 2022-08-19 RX ADMIN — SODIUM CHLORIDE 1000 MILLILITER(S): 9 INJECTION, SOLUTION INTRAVENOUS at 08:05

## 2022-08-19 RX ADMIN — Medication 650 MILLIGRAM(S): at 09:34

## 2022-08-19 RX ADMIN — ONDANSETRON 4 MILLIGRAM(S): 8 TABLET, FILM COATED ORAL at 08:04

## 2022-08-19 RX ADMIN — Medication 5 MILLIGRAM(S): at 08:20

## 2022-08-19 NOTE — ED PROVIDER NOTE - OBJECTIVE STATEMENT
Patient is a 42y Female with history of pancreatitis and ovarian fibroids presenting for a one day history of sob, n/v and chest pain. Patient states that it began last night when she was watching TV. Patient states that she received news about an pelvic ultrasound earlier in the day Patient is a 42y Female with history of pancreatitis and ovarian fibroids presenting for a one day history of sob, nausea, multiple episodes of non bloody non bilious vomiting, and chest pain. Chest pain is midsternal and non radiating. Patient states that it began last night when she was watching TV. Patient states that she received news about an pelvic ultrasound earlier in the day and was not happy about the news. Dyspnea, chest pain and vomiting were persistent throughout the night, prompting her visit to the ED. Otherwise denies fevers, headache, chills, abd pain, back pain, calf tenderness or swelling, recent travel, OCP use, urinary complaints, vaginal bleeding or discharge, recent trauma.

## 2022-08-19 NOTE — ED PROVIDER NOTE - PATIENT PORTAL LINK FT
You can access the FollowMyHealth Patient Portal offered by [facility] by registering at the following website: [email or web address]. By joining [employer]’s FollowMyHealth portal, you will also be able to view your health information using other applications (apps) compatible with our system.

## 2022-08-19 NOTE — ED PROVIDER NOTE - CLINICAL SUMMARY MEDICAL DECISION MAKING FREE TEXT BOX
41 yo woman w/ pancreatitis hx, fibroids w/ chronic pain here w/ SOB since 2000 on 8/18. Denies chest/abdo pain. + continuous symptoms all night. + n/v (NB/NB) X 2 w/ loose stools. No cough, fever  Denies any leg swelling, long flights/immobility  Denies any exertional symptoms  no chest/back pain    wd/wn  non-toxic  tachypneic but, able to speak in full sentences and slow RR to 16 w/ speaking  cta b/l  rrr s1s2 wnl  nt/nd + BS  AAO X 4  Neuro intact  Anxious appearing    States was told yesterday that needed surgical procedure for her fibroids and that increased her stress. Occasionally when her stress is worse, she feels this way but this is more severe.    ECG - unchanged from prior. no ischemic changes    a/p: SOB w/ clear lungs. Inconsistent w/ PE, ACS, pulm HTN. Will obtain Tn for risk stratification (> 12 hours of symptoms), CBC for anemia, POCUS for effusion, CXR, offer BZD and reassess

## 2022-08-19 NOTE — ED PROVIDER NOTE - NSFOLLOWUPINSTRUCTIONS_ED_ALL_ED_FT
Generalized Anxiety Disorder, Adult  Generalized anxiety disorder (KATEY) is a mental health disorder. People with this condition constantly worry about everyday events. Unlike normal anxiety, worry related to KATEY is not triggered by a specific event. These worries also do not fade or get better with time. KATEY interferes with life functions, including relationships, work, and school.    KATEY can vary from mild to severe. People with severe KATEY can have intense waves of anxiety with physical symptoms (panic attacks).    What are the causes?  The exact cause of KATEY is not known.    What increases the risk?  This condition is more likely to develop in:    Women.  People who have a family history of anxiety disorders.  People who are very shy.  People who experience very stressful life events, such as the death of a loved one.  People who have a very stressful family environment.    What are the signs or symptoms?  People with KATEY often worry excessively about many things in their lives, such as their health and family. They may also be overly concerned about:    Doing well at work.  Being on time.  Natural disasters.  Friendships.    Physical symptoms of KATEY include:    Fatigue.  Muscle tension or having muscle twitches.  Trembling or feeling shaky.  Being easily startled.  Feeling like your heart is pounding or racing.  Feeling out of breath or like you cannot take a deep breath.  Having trouble falling asleep or staying asleep.  Sweating.  Nausea, diarrhea, or irritable bowel syndrome (IBS).  Headaches.  Trouble concentrating or remembering facts.  Restlessness.  Irritability.    How is this diagnosed?  Your health care provider can diagnose KATEY based on your symptoms and medical history. You will also have a physical exam. The health care provider will ask specific questions about your symptoms, including how severe they are, when they started, and if they come and go. Your health care provider may ask you about your use of alcohol or drugs, including prescription medicines. Your health care provider may refer you to a mental health specialist for further evaluation.    Your health care provider will do a thorough examination and may perform additional tests to rule out other possible causes of your symptoms.    To be diagnosed with KATEY, a person must have anxiety that:    Is out of his or her control.  Affects several different aspects of his or her life, such as work and relationships.  Causes distress that makes him or her unable to take part in normal activities.  Includes at least three physical symptoms of KATEY, such as restlessness, fatigue, trouble concentrating, irritability, muscle tension, or sleep problems.    Before your health care provider can confirm a diagnosis of KATEY, these symptoms must be present more days than they are not, and they must last for six months or longer.    How is this treated?  The following therapies are usually used to treat KATEY:    Medicine. Antidepressant medicine is usually prescribed for long-term daily control. Antianxiety medicines may be added in severe cases, especially when panic attacks occur.  Talk therapy (psychotherapy). Certain types of talk therapy can be helpful in treating KATEY by providing support, education, and guidance. Options include:    Cognitive behavioral therapy (CBT). People learn coping skills and techniques to ease their anxiety. They learn to identify unrealistic or negative thoughts and behaviors and to replace them with positive ones.  Acceptance and commitment therapy (ACT). This treatment teaches people how to be mindful as a way to cope with unwanted thoughts and feelings.  Biofeedback. This process trains you to manage your body's response (physiological response) through breathing techniques and relaxation methods. You will work with a therapist while machines are used to monitor your physical symptoms.    Stress management techniques. These include yoga, meditation, and exercise.    [facility] can help determine which treatment is best for you. Some people see improvement with one type of therapy. However, other people require a combination of therapies.    Follow these instructions at home:  Take over-the-counter and prescription medicines only as told by your health care provider.  Try to maintain a normal routine.  Try to anticipate stressful situations and allow extra time to manage them.  Practice any stress management or self-calming techniques as taught by your health care provider.  Do not punish yourself for setbacks or for not making progress.  Try to recognize your accomplishments, even if they are small.  Keep all follow-up visits as told by your health care provider. This is important.  Contact a health care provider if:  Your symptoms do not get better.  Your symptoms get worse.  You have signs of depression, such as:    A persistently sad, cranky, or irritable mood.  Loss of enjoyment in activities that used to bring you rita.  Change in weight or eating.  Changes in sleeping habits.  Avoiding friends or family members.  Loss of energy for normal tasks.  Feelings of guilt or worthlessness.    Get help right away if:  You have serious thoughts about hurting yourself or others.  If you ever feel like you may hurt yourself or others, or have thoughts about taking your own life, get help right away. You can go to your nearest emergency department or call:     Your local emergency services (911 in the [place]   A suicide crisis helpline, such as the National Suicide Prevention Lifeline at 1-324.486.9716. This is open 24 hours a day.     Summary  Generalized anxiety disorder (KATEY) is a mental health disorder that involves worry that is not triggered by a specific event.  People with KATEY often worry excessively about many things in their lives, such as their health and family.  KATEY may cause physical symptoms such as restlessness, trouble concentrating, sleep problems, frequent sweating, nausea, diarrhea, headaches, and trembling or muscle twitching.  A mental health specialist can help determine which treatment is best for you. Some people see improvement with one type of therapy. However, other people require a combination of therapies.  This information is not intended to replace advice given to you by your health care provider. Make sure you discuss any questions you have with your health care provider.

## 2022-08-19 NOTE — ED PROVIDER NOTE - ATTENDING CONTRIBUTION TO CARE
I have personally seen and examined this patient. I have fully participated in the care of this patient. I have made amendments to the documentation where appropriate and otherwise agree with the history, physical exam, and plan as documented by the Resident.     see MDM

## 2022-08-19 NOTE — ED ADULT NURSE NOTE - NSIMPLEMENTINTERV_GEN_ALL_ED
Implemented All Universal Safety Interventions:  [place] to call system. Call bell, personal items and telephone within reach. Instruct patient to call for assistance. Room bathroom lighting operational. Non-slip footwear when patient is off stretcher. Physically safe environment: no spills, clutter or unnecessary equipment. Stretcher in lowest position, wheels locked, appropriate side rails in place.

## 2022-08-19 NOTE — ED PROVIDER NOTE - NS ED ROS FT
Constitutional:  See HPI  Eyes:  No visual changes  ENMT: No neck pain or stiffness  Cardiac:  + chest pain  Respiratory:  No cough. + respiratory distress.   GI:  + nausea, vomiting, - diarrhea or abdominal pain.  :  No dysuria, frequency or burning.  MS:  No back pain.  Neuro:  No headache   Skin:  No skin rash  Except as documented in the HPI,  all other systems are negative

## 2022-08-19 NOTE — ED PROVIDER NOTE - PHYSICAL EXAMINATION
CONSTITUTIONAL: tachypneic at rest but normal RR when conversational, able to speak in complete sentences in no acute distress.  SKIN: Warm dry  HEAD: NCAT  EYES: NL inspection  ENT: MMM  NECK: Supple; non tender.  CARD: RRR  RESP: CTAB  ABD: S/NT no R/G  EXT: no pedal edema  NEURO: Grossly unremarkable  PSYCH: Cooperative, appropriate.

## 2022-08-19 NOTE — ED PROVIDER NOTE - PROGRESS NOTE DETAILS
PK: symptoms likely related to anxiety. Bedside US demonstrates no effusions or right heart strain. low suspicion for PE or ACS. Will obtain CXR, basic labs and reassess. Patient given valium, zofran and fluids. no vomiting and nausea improved  c/o pain secondary to fibroids  labs, CXR, ECG reviewed and are unremarkable  Discussed all findings w/ patient and she states she thinks stress of procedure (TVUS) along w/ pain and anxiety about procedure all contirbuting. No significant pathology found and exam reassuring. Will adminster analgesia and likely d/c home

## 2022-08-19 NOTE — ED ADULT TRIAGE NOTE - HISTORY OF COVID-19 VACCINATION
----- Message from Sebastian Pal MD sent at 10/7/2021  1:28 PM EDT -----  Please let the patient know that labs that were drawn at previous visit were stable. Patient should continue current treatment plan.  If patient has any questions they can contact me.     Vaccine status unknown

## 2022-08-20 LAB
CULTURE RESULTS: SIGNIFICANT CHANGE UP
SPECIMEN SOURCE: SIGNIFICANT CHANGE UP

## 2022-08-30 ENCOUNTER — INPATIENT (INPATIENT)
Facility: HOSPITAL | Age: 42
LOS: 1 days | Discharge: HOME | End: 2022-09-01
Attending: HOSPITALIST | Admitting: HOSPITALIST

## 2022-08-30 VITALS
TEMPERATURE: 98 F | HEIGHT: 60 IN | SYSTOLIC BLOOD PRESSURE: 156 MMHG | DIASTOLIC BLOOD PRESSURE: 72 MMHG | RESPIRATION RATE: 99 BRPM | HEART RATE: 113 BPM | OXYGEN SATURATION: 99 %

## 2022-08-30 DIAGNOSIS — R09.89 OTHER SPECIFIED SYMPTOMS AND SIGNS INVOLVING THE CIRCULATORY AND RESPIRATORY SYSTEMS: ICD-10-CM

## 2022-08-30 DIAGNOSIS — D25.9 LEIOMYOMA OF UTERUS, UNSPECIFIED: Chronic | ICD-10-CM

## 2022-08-30 DIAGNOSIS — O34.219 MATERNAL CARE FOR UNSPECIFIED TYPE SCAR FROM PREVIOUS CESAREAN DELIVERY: Chronic | ICD-10-CM

## 2022-08-30 LAB
ALBUMIN SERPL ELPH-MCNC: 4.3 G/DL — SIGNIFICANT CHANGE UP (ref 3.5–5.2)
ALP SERPL-CCNC: 78 U/L — SIGNIFICANT CHANGE UP (ref 30–115)
ALT FLD-CCNC: 17 U/L — SIGNIFICANT CHANGE UP (ref 0–41)
ANION GAP SERPL CALC-SCNC: 11 MMOL/L — SIGNIFICANT CHANGE UP (ref 7–14)
AST SERPL-CCNC: 14 U/L — SIGNIFICANT CHANGE UP (ref 0–41)
BASE EXCESS BLDV CALC-SCNC: -0.2 MMOL/L — SIGNIFICANT CHANGE UP (ref -2–3)
BASOPHILS # BLD AUTO: 0.02 K/UL — SIGNIFICANT CHANGE UP (ref 0–0.2)
BASOPHILS NFR BLD AUTO: 0.3 % — SIGNIFICANT CHANGE UP (ref 0–1)
BILIRUB SERPL-MCNC: 0.3 MG/DL — SIGNIFICANT CHANGE UP (ref 0.2–1.2)
BUN SERPL-MCNC: 7 MG/DL — LOW (ref 10–20)
CA-I SERPL-SCNC: 1.24 MMOL/L — SIGNIFICANT CHANGE UP (ref 1.15–1.33)
CALCIUM SERPL-MCNC: 9.7 MG/DL — SIGNIFICANT CHANGE UP (ref 8.5–10.1)
CHLORIDE SERPL-SCNC: 100 MMOL/L — SIGNIFICANT CHANGE UP (ref 98–110)
CO2 SERPL-SCNC: 25 MMOL/L — SIGNIFICANT CHANGE UP (ref 17–32)
CREAT SERPL-MCNC: 0.6 MG/DL — LOW (ref 0.7–1.5)
D DIMER BLD IA.RAPID-MCNC: 267 NG/ML DDU — HIGH (ref 0–230)
EGFR: 116 ML/MIN/1.73M2 — SIGNIFICANT CHANGE UP
EOSINOPHIL # BLD AUTO: 0.29 K/UL — SIGNIFICANT CHANGE UP (ref 0–0.7)
EOSINOPHIL NFR BLD AUTO: 4.6 % — SIGNIFICANT CHANGE UP (ref 0–8)
FLUAV AG NPH QL: SIGNIFICANT CHANGE UP
FLUBV AG NPH QL: SIGNIFICANT CHANGE UP
GAS PNL BLDA: SIGNIFICANT CHANGE UP
GAS PNL BLDV: 134 MMOL/L — LOW (ref 136–145)
GAS PNL BLDV: SIGNIFICANT CHANGE UP
GLUCOSE SERPL-MCNC: 91 MG/DL — SIGNIFICANT CHANGE UP (ref 70–99)
HCG SERPL QL: NEGATIVE — SIGNIFICANT CHANGE UP
HCO3 BLDV-SCNC: 26 MMOL/L — SIGNIFICANT CHANGE UP (ref 22–29)
HCT VFR BLD CALC: 39.7 % — SIGNIFICANT CHANGE UP (ref 37–47)
HCT VFR BLDA CALC: 42 % — SIGNIFICANT CHANGE UP (ref 39–51)
HGB BLD CALC-MCNC: 13.9 G/DL — SIGNIFICANT CHANGE UP (ref 12.6–17.4)
HGB BLD-MCNC: 13.5 G/DL — SIGNIFICANT CHANGE UP (ref 12–16)
IMM GRANULOCYTES NFR BLD AUTO: 0.2 % — SIGNIFICANT CHANGE UP (ref 0.1–0.3)
LACTATE BLDV-MCNC: 1.1 MMOL/L — SIGNIFICANT CHANGE UP (ref 0.5–2)
LYMPHOCYTES # BLD AUTO: 2.53 K/UL — SIGNIFICANT CHANGE UP (ref 1.2–3.4)
LYMPHOCYTES # BLD AUTO: 40.3 % — SIGNIFICANT CHANGE UP (ref 20.5–51.1)
MCHC RBC-ENTMCNC: 27.9 PG — SIGNIFICANT CHANGE UP (ref 27–31)
MCHC RBC-ENTMCNC: 34 G/DL — SIGNIFICANT CHANGE UP (ref 32–37)
MCV RBC AUTO: 82 FL — SIGNIFICANT CHANGE UP (ref 81–99)
MONOCYTES # BLD AUTO: 0.71 K/UL — HIGH (ref 0.1–0.6)
MONOCYTES NFR BLD AUTO: 11.3 % — HIGH (ref 1.7–9.3)
NEUTROPHILS # BLD AUTO: 2.72 K/UL — SIGNIFICANT CHANGE UP (ref 1.4–6.5)
NEUTROPHILS NFR BLD AUTO: 43.3 % — SIGNIFICANT CHANGE UP (ref 42.2–75.2)
NRBC # BLD: 0 /100 WBCS — SIGNIFICANT CHANGE UP (ref 0–0)
NT-PROBNP SERPL-SCNC: 31 PG/ML — SIGNIFICANT CHANGE UP (ref 0–300)
PCO2 BLDV: 47 MMHG — HIGH (ref 39–42)
PH BLDV: 7.35 — SIGNIFICANT CHANGE UP (ref 7.32–7.43)
PLATELET # BLD AUTO: 383 K/UL — SIGNIFICANT CHANGE UP (ref 130–400)
PO2 BLDV: 36 MMHG — SIGNIFICANT CHANGE UP
POTASSIUM BLDV-SCNC: 3.9 MMOL/L — SIGNIFICANT CHANGE UP (ref 3.5–5.1)
POTASSIUM SERPL-MCNC: 4.4 MMOL/L — SIGNIFICANT CHANGE UP (ref 3.5–5)
POTASSIUM SERPL-SCNC: 4.4 MMOL/L — SIGNIFICANT CHANGE UP (ref 3.5–5)
PROT SERPL-MCNC: 7.4 G/DL — SIGNIFICANT CHANGE UP (ref 6–8)
RBC # BLD: 4.84 M/UL — SIGNIFICANT CHANGE UP (ref 4.2–5.4)
RBC # FLD: 12.8 % — SIGNIFICANT CHANGE UP (ref 11.5–14.5)
RSV RNA NPH QL NAA+NON-PROBE: SIGNIFICANT CHANGE UP
SAO2 % BLDV: 54.7 % — SIGNIFICANT CHANGE UP
SARS-COV-2 RNA SPEC QL NAA+PROBE: SIGNIFICANT CHANGE UP
SODIUM SERPL-SCNC: 136 MMOL/L — SIGNIFICANT CHANGE UP (ref 135–146)
TROPONIN T SERPL-MCNC: <0.01 NG/ML — SIGNIFICANT CHANGE UP
WBC # BLD: 6.28 K/UL — SIGNIFICANT CHANGE UP (ref 4.8–10.8)
WBC # FLD AUTO: 6.28 K/UL — SIGNIFICANT CHANGE UP (ref 4.8–10.8)

## 2022-08-30 PROCEDURE — 93010 ELECTROCARDIOGRAM REPORT: CPT

## 2022-08-30 PROCEDURE — 76604 US EXAM CHEST: CPT | Mod: 26

## 2022-08-30 PROCEDURE — 99285 EMERGENCY DEPT VISIT HI MDM: CPT | Mod: 25

## 2022-08-30 PROCEDURE — 99223 1ST HOSP IP/OBS HIGH 75: CPT

## 2022-08-30 PROCEDURE — 71045 X-RAY EXAM CHEST 1 VIEW: CPT | Mod: 26

## 2022-08-30 RX ORDER — IPRATROPIUM/ALBUTEROL SULFATE 18-103MCG
3 AEROSOL WITH ADAPTER (GRAM) INHALATION
Refills: 0 | Status: COMPLETED | OUTPATIENT
Start: 2022-08-30 | End: 2022-08-30

## 2022-08-30 RX ORDER — ENOXAPARIN SODIUM 100 MG/ML
45 INJECTION SUBCUTANEOUS ONCE
Refills: 0 | Status: DISCONTINUED | OUTPATIENT
Start: 2022-08-30 | End: 2022-08-30

## 2022-08-30 RX ORDER — ENOXAPARIN SODIUM 100 MG/ML
88 INJECTION SUBCUTANEOUS ONCE
Refills: 0 | Status: DISCONTINUED | OUTPATIENT
Start: 2022-08-30 | End: 2022-08-31

## 2022-08-30 RX ADMIN — Medication 3 MILLILITER(S): at 18:45

## 2022-08-30 RX ADMIN — Medication 3 MILLILITER(S): at 18:41

## 2022-08-30 RX ADMIN — Medication 3 MILLILITER(S): at 18:46

## 2022-08-30 NOTE — H&P ADULT - ATTENDING COMMENTS
43 YO F w/ a PMH of ovarian cyst, Uterine fibroids, history of pancreatitis X2, Hyperthyroidism, and PUD who presents to the hospital w/ a c/o intermittent episodes of SOB for the past x 1 month. Associated w/ palpitations and productive cough. ROS is negative except as above.     In the ED, Chest X-Ray w/ no acute process. D-Dimer was 225. Pt given Duoneb in the ED.     FMHx: Reviewed, not relevant  SHX: Denies tobacco, EtOH, or drug use    Physical exam shows obese pt in NAD. VSS, afebrile, not hypoxic on RA. A&Ox3. Neuro exam without deficits, motor/sensory intact, no dysarthria, no facial asymmetry. Muscle strength/sensation intact. Wheezing noted throughout all lung fileds. RRR, no M/G/R. ABD is soft and non-tender, normoactive BSs. LEs without swelling. No rashes. Labs and radiology as above.     Dyspnea, suspect asthma exacerbation vs anxiety, less likely PE. Duonebs PRN and standing. LABA/ICS inhaler. PO steroid taper. Supplemental O2 PRN. Monitor peak flows. Pulm out-pt FU.   -ABG showed mildly elevated A-a gradient. Obtain V/Q scan (pt allergic to contrast)    HX of ovarian cyst, Uterine fibroids, history of pancreatitis X2, Hyperthyroidism, and PUD. Restart home meds, except as stated above. DVT PPX. Inform PCP of pt's admission to hospital. My note supersedes the residents note.     Date seen by Attendin22 43 YO F w/ a PMH of ovarian cyst, Uterine fibroids, history of pancreatitis X2, Hyperthyroidism, and PUD who presents to the hospital w/ a c/o intermittent episodes of SOB for the past x 1 month. Associated w/ palpitations and productive cough. ROS is negative except as above.     In the ED, Chest X-Ray w/ no acute process. D-Dimer was 225. Pt given Duoneb in the ED.     FMHx: Reviewed, not relevant  SHX: Denies tobacco, EtOH, or drug use    Physical exam shows obese pt in NAD. VSS, afebrile, not hypoxic on RA. A&Ox3. Neuro exam without deficits, motor/sensory intact, no dysarthria, no facial asymmetry. Muscle strength/sensation intact. Wheezing noted throughout all lung fileds. RRR, no M/G/R. ABD is soft and non-tender, normoactive BSs. LEs without swelling. No rashes. Labs and radiology as above.     Dyspnea, suspect asthma exacerbation vs anxiety, less likely PE. Duonebs PRN and standing. LABA/ICS inhaler. PO steroid taper. Supplemental O2 PRN. Monitor peak flows. TFTs. Pulm out-pt FU.   -ABG showed mildly elevated A-a gradient. Obtain V/Q scan (pt allergic to contrast)    HX of ovarian cyst, Uterine fibroids, history of pancreatitis X2, Hyperthyroidism, and PUD. Restart home meds, except as stated above. DVT PPX. Inform PCP of pt's admission to hospital. My note supersedes the residents note.     Date seen by Attendin22

## 2022-08-30 NOTE — ED ADULT NURSE NOTE - OBJECTIVE STATEMENT
Pt presents with SOB since last night at 6pm. Audible wheeze noted. Pt able to speak in full sentences.

## 2022-08-30 NOTE — ED PROVIDER NOTE - CLINICAL SUMMARY MEDICAL DECISION MAKING FREE TEXT BOX
42 yr old f that presents with wheezing  -labs  -ekg  -imaging  -nebs  -pt noted to have a positive d-dimer. of not pt has an anaphylactic rxn to contrast, therefore will admit for v/q. will start lovenox. pt admitted to medicine.

## 2022-08-30 NOTE — H&P ADULT - HISTORY OF PRESENT ILLNESS
42F PMHx ovarian fibroids, history of pancreatitis presents for shortness of breath. Pt was in ED on 8/19/22 for same complaint, had labs done at the time and unremarkable CXR and was discharged with PMD f/u. Pt presents today with continued shortness of breath. Reports episode of dyspnea yesterday while doing laundry, also had shortness of breath and tachypnea today around 6pm. Denies exertion at the time, denies chest pain, fever/chills, hemoptysis, cough, abd pain, n/v/d, numbness, tingling, focal weakness, syncope. Not on OCPs, no recent travel/long immobility, no leg swelling/pain, no hx of blood clots.  Denies smoking/vaping hx. Never had cardiac workup. No family hx of sudden death 42F PMHx ovarian cyst, Uterine fibroids, history of pancreatitis X2, Hyperthyroidism, Peptic ulcer disease presents for shortness of breath. The shortness of breath initially started in July and was initially intermittent and resolved after deep breathing and taking a shower. The patient started experiencing worsening shortness of breath starting 1 week ago and it then became constant causing her to come into the hospital. The SOB is associated with palpitations (preceded by palpitations), the SOB worsens with exertion, and lying flat, it improved with albuterol. The patient has associated Cough productive of yellow sputum.  She denies  chest pain, fever/chills, hemoptysis, abd pain, n/v/d, numbness, tingling, focal weakness, syncope. Not on OCPs, no recent travel/long immobility, no leg swelling/pain, no hx of blood clots.

## 2022-08-30 NOTE — H&P ADULT - NSHPPHYSICALEXAM_GEN_ALL_CORE
PHYSICAL EXAM:  GENERAL: NAD, lying in bed comfortably  HEAD:  Atraumatic, Normocephalic  EYES: EOMI, PERRLA, conjunctiva and sclera clear  ENT: Moist mucous membranes  NECK: Supple, No JVD  CHEST/LUNG: Clear to auscultation bilaterally; No rales, rhonchi, wheezing, or rubs. Unlabored respirations  HEART: Regular rate and rhythm; No murmurs, rubs, or gallops  ABDOMEN: Bowel sounds present; Soft, Nontender, Nondistended. No hepatomegally  EXTREMITIES:  2+ Peripheral Pulses, brisk capillary refill. No clubbing, cyanosis, or edema  NERVOUS SYSTEM:  Alert & Oriented X3, speech clear. No deficits   MSK: FROM all 4 extremities, full and equal strength  SKIN: No rashes or lesions PHYSICAL EXAM:  GENERAL: NAD, lying in bed comfortably  HEAD:  Atraumatic, Normocephalic  EYES: EOMI, PERRLA, conjunctiva and sclera clear  ENT: Moist mucous membranes  NECK: Supple, No JVD  CHEST/LUNG: B/L wheezing   HEART: Regular rate and rhythm; No murmurs, rubs, or gallops, tachycardic  ABDOMEN: Bowel sounds present; Soft, Nondistended. No hepatomegally, epigastric tenderness  EXTREMITIES:  2+ Peripheral Pulses, brisk capillary refill. No clubbing, cyanosis, or edema  NERVOUS SYSTEM:  Alert & Oriented X3, speech clear. No deficits   MSK: FROM all 4 extremities, full and equal strength  SKIN: No rashes or lesions

## 2022-08-30 NOTE — H&P ADULT - ASSESSMENT
#SOB  - Likely caused by-----?  - Echo  - VQ scan to R/O PE?  - Wheezing?  - HF?  - ACS?        #tachycardia  - Sinus tachycardia with T wave inversions in lead 3  - initial troponin negative  - Anxiety related ?  - Dehydration?  - Thyroid function tests       DVT PPX:  Diet:  Dispo:     42F PMHx ovarian cyst, Uterine fibroids, history of pancreatitis X2, Hyperthyroidism, Peptic ulcer disease presents for shortness of breath. The shortness of breath initially started in July and was initially intermittent and resolved after deep breathing and taking a shower. The patient started experiencing worsening shortness of breath starting 1 week ago and it then became constant causing her to come into the hospital. The SOB is associated with palpitations (preceded by palpitations), the SOB worsens with exertion, and lying flat, it improved with albuterol. The patient has associated Cough productive of yellow sputum.  She denies  chest pain, fever/chills, hemoptysis, abd pain, n/v/d, numbness, tingling, focal weakness, syncope. Not on OCPs, no recent travel/long immobility, no leg swelling/pain, no hx of blood clots.        #SOB  #Elevated A-a gradient at 25  - Likely caused by asthma exacerbation  - VQ scan to R/O PE because of elevated A-a gradient  - Albuterol inhalers Q 4 hours and PRN  - Start prednisone 40 mg QD with plan of taper  - Simple PFT's   - Incentive spirometer  - RVP negative  - anti-tussive PRN      #tachycardia  - Sinus tachycardia with   - initial troponin negative  - Thyroid function tests as patient has hyperthyroidism   - C/W home dose atenolol       #Hyperthyroidism   - Repeat TFT's   - C/W methimazole 5 mg PO QD    #Hx of PUD  - C/W pantoprazole 40 mg PO QD    DVT PPX: Lovenox   Diet: Regular   Dispo: Acute

## 2022-08-30 NOTE — ED PROVIDER NOTE - PHYSICAL EXAMINATION
VITAL SIGNS: I have reviewed nursing notes and confirm.  CONSTITUTIONAL: anxious appearing female, non-toxic appearing  SKIN: Warm dry, normal skin turgor  HEAD: NCAT  EYES: EOMI, no scleral icterus  ENT: Moist mucous membranes, normal pharynx with no erythema or exudates  NECK: Supple; non tender. Full ROM. No cervical LAD  CARD: tachycardic, S1/S2.  RESP: tachypneic, bilateral wheezing, saturating 99% on 1L NC.   ABD: soft, non-tender, non-distended, no rebound or guarding.   EXT: Full ROM, no bony tenderness, no pedal edema, no calf tenderness  NEURO: awake, alert, no focal deficits.  PSYCH: Cooperative, appropriate.

## 2022-08-30 NOTE — H&P ADULT - NSHPLABSRESULTS_GEN_ALL_CORE
13.5   6.28  )-----------( 383      ( 30 Aug 2022 18:31 )             39.7       08-30    136  |  100  |  7<L>  ----------------------------<  91  4.4   |  25  |  0.6<L>    Ca    9.7      30 Aug 2022 18:31    TPro  7.4  /  Alb  4.3  /  TBili  0.3  /  DBili  x   /  AST  14  /  ALT  17  /  AlkPhos  78  08-30          Lactate Trend      CARDIAC MARKERS ( 30 Aug 2022 18:31 )  x     / <0.01 ng/mL / x     / x     / x          CAPILLARY BLOOD GLUCOSE            Culture Results:   <10,000 CFU/mL Normal Urogenital Marcella (08-19 @ 10:00)

## 2022-08-30 NOTE — H&P ADULT - NSHPREVIEWOFSYSTEMS_GEN_ALL_CORE
REVIEW OF SYSTEMS:    CONSTITUTIONAL: No weakness, fevers or chills  EYES/ENT: No visual changes;  No vertigo or throat pain   NECK: No pain or stiffness  RESPIRATORY: No cough, wheezing, hemoptysis; No shortness of breath  CARDIOVASCULAR: No chest pain or palpitations  GASTROINTESTINAL: No abdominal or epigastric pain. No nausea, vomiting, or hematemesis; No diarrhea or constipation. No melena or hematochezia.  GENITOURINARY: No dysuria, frequency or hematuria  NEUROLOGICAL: No numbness or weakness  SKIN: No itching, rashes REVIEW OF SYSTEMS:    CONSTITUTIONAL: No weakness, fevers or chills  EYES/ENT: No visual changes;  No vertigo or throat pain   NECK: No pain or stiffness  RESPIRATORY: as noted in hpi  CARDIOVASCULAR: No chest pain, palpitations +  GASTROINTESTINAL: chronic intermittent epigastric pain  GENITOURINARY: No dysuria, frequency or hematuria  NEUROLOGICAL: No numbness or weakness  SKIN: No itching, rashes

## 2022-08-30 NOTE — ED PROVIDER NOTE - NS ED ROS FT
Constitutional: see HPI  Eyes/ENT: (-) runny nose  Cardiovascular: (-) chest pain  Respiratory: see HPI  Gastrointestinal: (-) vomiting, (-) diarrhea  : (-) dysuria   Musculoskeletal: (-) joint pain  Integumentary: (-) rash  Neurological: (-) LOC  Allergic/Immunologic: (-) pruritus

## 2022-08-30 NOTE — ED PROVIDER NOTE - ATTENDING CONTRIBUTION TO CARE
42 yr old female with a past medical history significant for ovarian fibroids, pancreatitis who presents with shortness of breath.  Patient states that since yesterday around 6 PM she became short of breath and developed wheezing.  Patient did not have any history of asthma.  Patient denies any chest pain nausea vomiting fever chills or any other medical complaints.    VITAL SIGNS: I have reviewed nursing notes and confirm.  CONSTITUTIONAL: non-toxic, well appearing  SKIN: no rash, no petechiae.  EYES: EOMI, pink conjunctiva, anicteric  ENT: tongue midline, no exudates, MMM  NECK: Supple; no meningismus, no JVD  CARD: RRR, no murmurs, equal radial pulses bilaterally 2+  RESP: tachypneic, wheezing noted throughout all lung fields   ABD: Soft, non-tender, non-distended, no peritoneal signs, no HSM, no CVA tenderness  EXT: Normal ROM x4. No edema. No calves tenderness  NEURO: Alert, oriented x3. CN2-12 intact, equal strength bilaterally    a/p  42 yr old f that presents with wheezing  -labs  -ekg  -imaging  -nebs  -reassess  -dispo pending

## 2022-08-30 NOTE — ED PROVIDER NOTE - OBJECTIVE STATEMENT
42F PMHx ovarian fibroids, history of pancreatitis presents for shortness of breath. Pt was in ED on 8/19/22 for same complaint, had labs done at the time and unremarkable CXR and was discharged with PMD f/u. Pt presents today with continued shortness of breath. Reports episode of dyspnea yesterday while doing laundry, also had shortness of breath and tachypnea today around 6pm. Denies exertion at the time, denies chest pain, fever/chills, hemoptysis, cough, abd pain, n/v/d, numbness, tingling, focal weakness, syncope. Not on OCPs, no recent travel/long immobility, no leg swelling/pain, no hx of blood clots.  Denies smoking/vaping hx. Never had cardiac workup. No family hx of sudden death.

## 2022-08-30 NOTE — ED PROVIDER NOTE - PROGRESS NOTE DETAILS
Zheng: pt with contrast allergy, reports reaction is anaphylaxis. Will cancel CT PE study for now. D-dimer sent. Endorsed to [facility] Vargas. ASHLEY NAJERA: pt has anaphylaxis to contrast, pt persistently tachycardic with sob, will admit for vq scan. had extensive conversation about this and about prophylactic lovenox dose. she understands risks v benefits.

## 2022-08-31 LAB
ALBUMIN SERPL ELPH-MCNC: 3.8 G/DL — SIGNIFICANT CHANGE UP (ref 3.5–5.2)
ALP SERPL-CCNC: 70 U/L — SIGNIFICANT CHANGE UP (ref 30–115)
ALT FLD-CCNC: 14 U/L — SIGNIFICANT CHANGE UP (ref 0–41)
ANION GAP SERPL CALC-SCNC: 11 MMOL/L — SIGNIFICANT CHANGE UP (ref 7–14)
AST SERPL-CCNC: 12 U/L — SIGNIFICANT CHANGE UP (ref 0–41)
BILIRUB SERPL-MCNC: 0.2 MG/DL — SIGNIFICANT CHANGE UP (ref 0.2–1.2)
BUN SERPL-MCNC: 10 MG/DL — SIGNIFICANT CHANGE UP (ref 10–20)
CALCIUM SERPL-MCNC: 9.1 MG/DL — SIGNIFICANT CHANGE UP (ref 8.5–10.1)
CHLORIDE SERPL-SCNC: 103 MMOL/L — SIGNIFICANT CHANGE UP (ref 98–110)
CO2 SERPL-SCNC: 25 MMOL/L — SIGNIFICANT CHANGE UP (ref 17–32)
CREAT SERPL-MCNC: 0.6 MG/DL — LOW (ref 0.7–1.5)
EGFR: 116 ML/MIN/1.73M2 — SIGNIFICANT CHANGE UP
GLUCOSE SERPL-MCNC: 98 MG/DL — SIGNIFICANT CHANGE UP (ref 70–99)
HCT VFR BLD CALC: 34.1 % — LOW (ref 37–47)
HGB BLD-MCNC: 11.5 G/DL — LOW (ref 12–16)
MAGNESIUM SERPL-MCNC: 2 MG/DL — SIGNIFICANT CHANGE UP (ref 1.8–2.4)
MCHC RBC-ENTMCNC: 27.3 PG — SIGNIFICANT CHANGE UP (ref 27–31)
MCHC RBC-ENTMCNC: 33.7 G/DL — SIGNIFICANT CHANGE UP (ref 32–37)
MCV RBC AUTO: 81 FL — SIGNIFICANT CHANGE UP (ref 81–99)
NRBC # BLD: 0 /100 WBCS — SIGNIFICANT CHANGE UP (ref 0–0)
PLATELET # BLD AUTO: 346 K/UL — SIGNIFICANT CHANGE UP (ref 130–400)
POTASSIUM SERPL-MCNC: 3.8 MMOL/L — SIGNIFICANT CHANGE UP (ref 3.5–5)
POTASSIUM SERPL-SCNC: 3.8 MMOL/L — SIGNIFICANT CHANGE UP (ref 3.5–5)
PROT SERPL-MCNC: 6.3 G/DL — SIGNIFICANT CHANGE UP (ref 6–8)
RBC # BLD: 4.21 M/UL — SIGNIFICANT CHANGE UP (ref 4.2–5.4)
RBC # FLD: 12.8 % — SIGNIFICANT CHANGE UP (ref 11.5–14.5)
SODIUM SERPL-SCNC: 139 MMOL/L — SIGNIFICANT CHANGE UP (ref 135–146)
T3 SERPL-MCNC: 172 NG/DL — SIGNIFICANT CHANGE UP (ref 80–200)
T4 AB SER-ACNC: 9.7 UG/DL — SIGNIFICANT CHANGE UP (ref 4.6–12)
TSH SERPL-MCNC: 0 UIU/ML — LOW (ref 0.27–4.2)
WBC # BLD: 6.25 K/UL — SIGNIFICANT CHANGE UP (ref 4.8–10.8)
WBC # FLD AUTO: 6.25 K/UL — SIGNIFICANT CHANGE UP (ref 4.8–10.8)

## 2022-08-31 PROCEDURE — 71250 CT THORAX DX C-: CPT | Mod: 26

## 2022-08-31 PROCEDURE — 99233 SBSQ HOSP IP/OBS HIGH 50: CPT

## 2022-08-31 PROCEDURE — 93970 EXTREMITY STUDY: CPT | Mod: 26

## 2022-08-31 PROCEDURE — 78582 LUNG VENTILAT&PERFUS IMAGING: CPT | Mod: 26

## 2022-08-31 RX ORDER — IPRATROPIUM/ALBUTEROL SULFATE 18-103MCG
3 AEROSOL WITH ADAPTER (GRAM) INHALATION EVERY 6 HOURS
Refills: 0 | Status: DISCONTINUED | OUTPATIENT
Start: 2022-08-31 | End: 2022-09-01

## 2022-08-31 RX ORDER — ATENOLOL 25 MG/1
25 TABLET ORAL DAILY
Refills: 0 | Status: DISCONTINUED | OUTPATIENT
Start: 2022-08-31 | End: 2022-09-01

## 2022-08-31 RX ORDER — ENOXAPARIN SODIUM 100 MG/ML
40 INJECTION SUBCUTANEOUS EVERY 24 HOURS
Refills: 0 | Status: DISCONTINUED | OUTPATIENT
Start: 2022-08-31 | End: 2022-09-01

## 2022-08-31 RX ORDER — SUCRALFATE 1 G
1 TABLET ORAL
Qty: 0 | Refills: 0 | DISCHARGE

## 2022-08-31 RX ORDER — ONDANSETRON 8 MG/1
4 TABLET, FILM COATED ORAL
Refills: 0 | Status: DISCONTINUED | OUTPATIENT
Start: 2022-08-31 | End: 2022-09-01

## 2022-08-31 RX ORDER — ALBUTEROL 90 UG/1
2 AEROSOL, METERED ORAL EVERY 4 HOURS
Refills: 0 | Status: DISCONTINUED | OUTPATIENT
Start: 2022-08-31 | End: 2022-09-01

## 2022-08-31 RX ORDER — PANTOPRAZOLE SODIUM 20 MG/1
40 TABLET, DELAYED RELEASE ORAL
Refills: 0 | Status: DISCONTINUED | OUTPATIENT
Start: 2022-08-31 | End: 2022-09-01

## 2022-08-31 RX ADMIN — ENOXAPARIN SODIUM 40 MILLIGRAM(S): 100 INJECTION SUBCUTANEOUS at 13:55

## 2022-08-31 RX ADMIN — PANTOPRAZOLE SODIUM 40 MILLIGRAM(S): 20 TABLET, DELAYED RELEASE ORAL at 06:05

## 2022-08-31 RX ADMIN — Medication 3 MILLILITER(S): at 09:39

## 2022-08-31 RX ADMIN — Medication 3 MILLILITER(S): at 20:40

## 2022-08-31 RX ADMIN — ONDANSETRON 4 MILLIGRAM(S): 8 TABLET, FILM COATED ORAL at 00:39

## 2022-08-31 RX ADMIN — Medication 3 MILLILITER(S): at 00:45

## 2022-08-31 RX ADMIN — ATENOLOL 25 MILLIGRAM(S): 25 TABLET ORAL at 05:57

## 2022-08-31 RX ADMIN — Medication 40 MILLIGRAM(S): at 05:57

## 2022-08-31 NOTE — PROGRESS NOTE ADULT - TIME BILLING
42F PMHx uterine fibroids, HTN, hyperthyroidism, PUD here with acute hypoxic resp failure.    #Acute hypoxic resp failure  unclear etiology  cxr unrevealing  v/q scan low prob; perfusion defects noted; pt h/o anaphylactic shock to iv contrast  d-dimer low  rvp neg  check va duplex  check ct chest  nc to keep spo2 >92  #Uterine fibroids  #Hyperthyroidism  methimazole 5  #HTN  atenolol 25  #PUD  outpt f/u  #DVT ppx  lovenox    #Progress Note Handoff:  Pending (specify):  Consults_________, Tests________, Test Results_______, Other___hypoxia, ct chest______  Family discussion: d/w pt,  at bedside re: treatment plan, primary dx  Disposition: Home___/SNF___/Other________/Unknown at this time____x____

## 2022-09-01 ENCOUNTER — TRANSCRIPTION ENCOUNTER (OUTPATIENT)
Age: 42
End: 2022-09-01

## 2022-09-01 VITALS
HEART RATE: 92 BPM | DIASTOLIC BLOOD PRESSURE: 56 MMHG | OXYGEN SATURATION: 99 % | TEMPERATURE: 100 F | SYSTOLIC BLOOD PRESSURE: 108 MMHG | RESPIRATION RATE: 18 BRPM

## 2022-09-01 LAB
ALBUMIN SERPL ELPH-MCNC: 3.7 G/DL — SIGNIFICANT CHANGE UP (ref 3.5–5.2)
ALP SERPL-CCNC: 67 U/L — SIGNIFICANT CHANGE UP (ref 30–115)
ALT FLD-CCNC: 13 U/L — SIGNIFICANT CHANGE UP (ref 0–41)
ANION GAP SERPL CALC-SCNC: 9 MMOL/L — SIGNIFICANT CHANGE UP (ref 7–14)
AST SERPL-CCNC: 10 U/L — SIGNIFICANT CHANGE UP (ref 0–41)
BASOPHILS # BLD AUTO: 0.03 K/UL — SIGNIFICANT CHANGE UP (ref 0–0.2)
BASOPHILS NFR BLD AUTO: 0.5 % — SIGNIFICANT CHANGE UP (ref 0–1)
BILIRUB SERPL-MCNC: 0.2 MG/DL — SIGNIFICANT CHANGE UP (ref 0.2–1.2)
BUN SERPL-MCNC: 11 MG/DL — SIGNIFICANT CHANGE UP (ref 10–20)
CALCIUM SERPL-MCNC: 9 MG/DL — SIGNIFICANT CHANGE UP (ref 8.5–10.1)
CHLORIDE SERPL-SCNC: 103 MMOL/L — SIGNIFICANT CHANGE UP (ref 98–110)
CO2 SERPL-SCNC: 24 MMOL/L — SIGNIFICANT CHANGE UP (ref 17–32)
CREAT SERPL-MCNC: 0.5 MG/DL — LOW (ref 0.7–1.5)
EGFR: 122 ML/MIN/1.73M2 — SIGNIFICANT CHANGE UP
EOSINOPHIL # BLD AUTO: 0.17 K/UL — SIGNIFICANT CHANGE UP (ref 0–0.7)
EOSINOPHIL NFR BLD AUTO: 3 % — SIGNIFICANT CHANGE UP (ref 0–8)
GLUCOSE SERPL-MCNC: 91 MG/DL — SIGNIFICANT CHANGE UP (ref 70–99)
HCT VFR BLD CALC: 35.7 % — LOW (ref 37–47)
HGB BLD-MCNC: 12 G/DL — SIGNIFICANT CHANGE UP (ref 12–16)
IMM GRANULOCYTES NFR BLD AUTO: 0.3 % — SIGNIFICANT CHANGE UP (ref 0.1–0.3)
LYMPHOCYTES # BLD AUTO: 2.07 K/UL — SIGNIFICANT CHANGE UP (ref 1.2–3.4)
LYMPHOCYTES # BLD AUTO: 36.1 % — SIGNIFICANT CHANGE UP (ref 20.5–51.1)
MAGNESIUM SERPL-MCNC: 1.9 MG/DL — SIGNIFICANT CHANGE UP (ref 1.8–2.4)
MCHC RBC-ENTMCNC: 27.6 PG — SIGNIFICANT CHANGE UP (ref 27–31)
MCHC RBC-ENTMCNC: 33.6 G/DL — SIGNIFICANT CHANGE UP (ref 32–37)
MCV RBC AUTO: 82.3 FL — SIGNIFICANT CHANGE UP (ref 81–99)
MONOCYTES # BLD AUTO: 0.71 K/UL — HIGH (ref 0.1–0.6)
MONOCYTES NFR BLD AUTO: 12.4 % — HIGH (ref 1.7–9.3)
NEUTROPHILS # BLD AUTO: 2.73 K/UL — SIGNIFICANT CHANGE UP (ref 1.4–6.5)
NEUTROPHILS NFR BLD AUTO: 47.7 % — SIGNIFICANT CHANGE UP (ref 42.2–75.2)
NRBC # BLD: 0 /100 WBCS — SIGNIFICANT CHANGE UP (ref 0–0)
PHOSPHATE SERPL-MCNC: 4.6 MG/DL — SIGNIFICANT CHANGE UP (ref 2.1–4.9)
PLATELET # BLD AUTO: 327 K/UL — SIGNIFICANT CHANGE UP (ref 130–400)
POTASSIUM SERPL-MCNC: 3.7 MMOL/L — SIGNIFICANT CHANGE UP (ref 3.5–5)
POTASSIUM SERPL-SCNC: 3.7 MMOL/L — SIGNIFICANT CHANGE UP (ref 3.5–5)
PROT SERPL-MCNC: 6.4 G/DL — SIGNIFICANT CHANGE UP (ref 6–8)
RBC # BLD: 4.34 M/UL — SIGNIFICANT CHANGE UP (ref 4.2–5.4)
RBC # FLD: 13.1 % — SIGNIFICANT CHANGE UP (ref 11.5–14.5)
SODIUM SERPL-SCNC: 136 MMOL/L — SIGNIFICANT CHANGE UP (ref 135–146)
WBC # BLD: 5.73 K/UL — SIGNIFICANT CHANGE UP (ref 4.8–10.8)
WBC # FLD AUTO: 5.73 K/UL — SIGNIFICANT CHANGE UP (ref 4.8–10.8)

## 2022-09-01 PROCEDURE — 99239 HOSP IP/OBS DSCHRG MGMT >30: CPT

## 2022-09-01 PROCEDURE — 71045 X-RAY EXAM CHEST 1 VIEW: CPT | Mod: 26

## 2022-09-01 RX ORDER — ALBUTEROL 90 UG/1
2 AEROSOL, METERED ORAL
Qty: 180 | Refills: 3
Start: 2022-09-01 | End: 2022-12-29

## 2022-09-01 RX ADMIN — ENOXAPARIN SODIUM 40 MILLIGRAM(S): 100 INJECTION SUBCUTANEOUS at 13:13

## 2022-09-01 RX ADMIN — Medication 3 MILLILITER(S): at 11:11

## 2022-09-01 RX ADMIN — PANTOPRAZOLE SODIUM 40 MILLIGRAM(S): 20 TABLET, DELAYED RELEASE ORAL at 06:03

## 2022-09-01 RX ADMIN — Medication 3 MILLILITER(S): at 15:42

## 2022-09-01 RX ADMIN — ATENOLOL 25 MILLIGRAM(S): 25 TABLET ORAL at 05:21

## 2022-09-01 NOTE — PROGRESS NOTE ADULT - TIME-BASED
Anticoagulation Summary  As of 2021    INR goal:  1.8-3.0   TTR:  80.9 % (3.9 y)   INR used for dosin.2 (2021)   Warfarin maintenance plan:  5 mg (5 mg x 1) every day   Weekly warfarin total:  35 mg   Plan last modified:  Tammy Valle LPN ()   Next INR check:     Target end date:      Indications    [facility] [I48.20]             Anticoagulation Episode Summary     INR check location:  Clinic Lab    Preferred lab:      Send INR reminders to:      Comments:        Anticoagulation Care Providers     Provider Role Specialty Phone number    Kolton Farrar MD [facility] Internal Medicine [phone]      INR 2.2 - no change in coumadin   Re check PT/INR 1 month
35
35

## 2022-09-01 NOTE — PROGRESS NOTE ADULT - SUBJECTIVE AND OBJECTIVE BOX
INTERVAL HPI/OVERNIGHT EVENTS:    SUBJECTIVE: Patient seen and examined at bedside.     cc: sob, resolved  no cp, abd pain, fever  sob resolved, no cough, orthopnea, pnd    OBJECTIVE:    VITAL SIGNS:  Vital Signs Last 24 Hrs  T(C): 37.2 (01 Sep 2022 07:22), Max: 37.4 (31 Aug 2022 23:31)  T(F): 98.9 (01 Sep 2022 07:22), Max: 99.3 (31 Aug 2022 23:31)  HR: 77 (01 Sep 2022 07:22) (69 - 92)  BP: 110/57 (01 Sep 2022 07:22) (110/57 - 132/76)  BP(mean): --  RR: 18 (01 Sep 2022 07:22) (18 - 20)  SpO2: 95% (01 Sep 2022 07:22) (95% - 100%)    Parameters below as of 01 Sep 2022 07:22  Patient On (Oxygen Delivery Method): room air          PHYSICAL EXAM:    General: NAD  HEENT: NC/AT; PERRL, clear conjunctiva  Neck: supple  Respiratory: CTA b/l  Cardiovascular: +S1/S2; RRR  Abdomen: soft, NT/ND; +BS x4  Extremities: WWP, 2+ peripheral pulses b/l; no LE edema  Skin: normal color and turgor; no rash  Neurological:    MEDICATIONS:  MEDICATIONS  (STANDING):  ALBUTerol    90 MICROgram(s) HFA Inhaler 2 Puff(s) Inhalation every 4 hours  albuterol/ipratropium for Nebulization 3 milliLiter(s) Nebulizer every 6 hours  ATENolol  Tablet 25 milliGRAM(s) Oral daily  enoxaparin Injectable 40 milliGRAM(s) SubCutaneous every 24 hours  methimazole 5 milliGRAM(s) Oral daily  pantoprazole    Tablet 40 milliGRAM(s) Oral before breakfast  predniSONE   Tablet 40 milliGRAM(s) Oral daily    MEDICATIONS  (PRN):  benzonatate 100 milliGRAM(s) Oral every 8 hours PRN Cough  ondansetron Injectable 4 milliGRAM(s) IV Push four times a day PRN Nausea and/or Vomiting      ALLERGIES:  Allergies    iodine containing compounds (Unknown)  IV CONTRAST (Anaphylaxis)  shellfish (Anaphylaxis)    Intolerances        LABS:                        12.0   5.73  )-----------( 327      ( 01 Sep 2022 08:06 )             35.7     Hemoglobin: 12.0 g/dL (09-01 @ 08:06)  Hemoglobin: 11.5 g/dL (08-31 @ 06:21)  Hemoglobin: 13.5 g/dL (08-30 @ 18:31)    CBC Full  -  ( 01 Sep 2022 08:06 )  WBC Count : 5.73 K/uL  RBC Count : 4.34 M/uL  Hemoglobin : 12.0 g/dL  Hematocrit : 35.7 %  Platelet Count - Automated : 327 K/uL  Mean Cell Volume : 82.3 fL  Mean Cell Hemoglobin : 27.6 pg  Mean Cell Hemoglobin Concentration : 33.6 g/dL  Auto Neutrophil # : 2.73 K/uL  Auto Lymphocyte # : 2.07 K/uL  Auto Monocyte # : 0.71 K/uL  Auto Eosinophil # : 0.17 K/uL  Auto Basophil # : 0.03 K/uL  Auto Neutrophil % : 47.7 %  Auto Lymphocyte % : 36.1 %  Auto Monocyte % : 12.4 %  Auto Eosinophil % : 3.0 %  Auto Basophil % : 0.5 %    09-01    136  |  103  |  11  ----------------------------<  91  3.7   |  24  |  0.5<L>    Ca    9.0      01 Sep 2022 08:06  Phos  4.6     09-01  Mg     1.9     09-01    TPro  6.4  /  Alb  3.7  /  TBili  0.2  /  DBili  x   /  AST  10  /  ALT  13  /  AlkPhos  67  09-01    Creatinine Trend: 0.5<--, 0.6<--, 0.6<--, 0.6<--  LIVER FUNCTIONS - ( 01 Sep 2022 08:06 )  Alb: 3.7 g/dL / Pro: 6.4 g/dL / ALK PHOS: 67 U/L / ALT: 13 U/L / AST: 10 U/L / GGT: x               hs Troponin:    ABG - ( 30 Aug 2022 23:49 )  pH, Arterial: 7.46  pH, Blood: x     /  pCO2: 34    /  pO2: 82    / HCO3: 24    / Base Excess: 0.8   /  SaO2: 99.1                      CSF:                      EKG:   MICROBIOLOGY:    IMAGING:      Labs, imaging, EKG personally reviewed    RADIOLOGY & ADDITIONAL TESTS: Reviewed.
INTERVAL HPI/OVERNIGHT EVENTS:    SUBJECTIVE: Patient seen and examined at bedside.     cc: sob  no cp, abd pain, fever  +sob, cough nonproductive, no orthopnea, pnd    OBJECTIVE:    VITAL SIGNS:  Vital Signs Last 24 Hrs  T(C): 37.3 (31 Aug 2022 09:44), Max: 37.3 (30 Aug 2022 23:33)  T(F): 99.2 (31 Aug 2022 09:44), Max: 99.2 (31 Aug 2022 09:44)  HR: 78 (31 Aug 2022 09:44) (78 - 113)  BP: 132/72 (31 Aug 2022 09:44) (128/70 - 156/72)  BP(mean): --  RR: 21 (31 Aug 2022 09:44) (18 - 99)  SpO2: 99% (31 Aug 2022 09:44) (96% - 100%)    Parameters below as of 31 Aug 2022 09:44  Patient On (Oxygen Delivery Method): nasal cannula  O2 Flow (L/min): 2        PHYSICAL EXAM:    General: NAD  HEENT: NC/AT; PERRL, clear conjunctiva  Neck: supple  Respiratory: CTA b/l  Cardiovascular: +S1/S2; RRR  Abdomen: soft, NT/ND; +BS x4  Extremities: WWP, 2+ peripheral pulses b/l; no LE edema  Skin: normal color and turgor; no rash  Neurological:    MEDICATIONS:  MEDICATIONS  (STANDING):  ALBUTerol    90 MICROgram(s) HFA Inhaler 2 Puff(s) Inhalation every 4 hours  albuterol/ipratropium for Nebulization 3 milliLiter(s) Nebulizer every 6 hours  ATENolol  Tablet 25 milliGRAM(s) Oral daily  enoxaparin Injectable 40 milliGRAM(s) SubCutaneous every 24 hours  methimazole 5 milliGRAM(s) Oral daily  pantoprazole    Tablet 40 milliGRAM(s) Oral before breakfast  predniSONE   Tablet 40 milliGRAM(s) Oral daily    MEDICATIONS  (PRN):  benzonatate 100 milliGRAM(s) Oral every 8 hours PRN Cough  ondansetron Injectable 4 milliGRAM(s) IV Push four times a day PRN Nausea and/or Vomiting      ALLERGIES:  Allergies    iodine containing compounds (Unknown)  IV CONTRAST (Anaphylaxis)  shellfish (Anaphylaxis)    Intolerances        LABS:                        11.5   6.25  )-----------( 346      ( 31 Aug 2022 06:21 )             34.1     Hemoglobin: 11.5 g/dL (08-31 @ 06:21)  Hemoglobin: 13.5 g/dL (08-30 @ 18:31)    CBC Full  -  ( 31 Aug 2022 06:21 )  WBC Count : 6.25 K/uL  RBC Count : 4.21 M/uL  Hemoglobin : 11.5 g/dL  Hematocrit : 34.1 %  Platelet Count - Automated : 346 K/uL  Mean Cell Volume : 81.0 fL  Mean Cell Hemoglobin : 27.3 pg  Mean Cell Hemoglobin Concentration : 33.7 g/dL  Auto Neutrophil # : x  Auto Lymphocyte # : x  Auto Monocyte # : x  Auto Eosinophil # : x  Auto Basophil # : x  Auto Neutrophil % : x  Auto Lymphocyte % : x  Auto Monocyte % : x  Auto Eosinophil % : x  Auto Basophil % : x    08-31    139  |  103  |  10  ----------------------------<  98  3.8   |  25  |  0.6<L>    Ca    9.1      31 Aug 2022 06:21  Mg     2.0     08-31    TPro  6.3  /  Alb  3.8  /  TBili  0.2  /  DBili  x   /  AST  12  /  ALT  14  /  AlkPhos  70  08-31    Creatinine Trend: 0.6<--, 0.6<--, 0.6<--  LIVER FUNCTIONS - ( 31 Aug 2022 06:21 )  Alb: 3.8 g/dL / Pro: 6.3 g/dL / ALK PHOS: 70 U/L / ALT: 14 U/L / AST: 12 U/L / GGT: x               hs Troponin:    ABG - ( 30 Aug 2022 23:49 )  pH, Arterial: 7.46  pH, Blood: x     /  pCO2: 34    /  pO2: 82    / HCO3: 24    / Base Excess: 0.8   /  SaO2: 99.1                23:49 - ABG - pH: 7.46  |  pCO2: 34    |  pO2: 82    | Lactate:       | BE: 0.8    18:47 - VBG - pH: 7.35  | pCO2: 47    | pO2: 36    | Lactate: 1.10         CSF:                      EKG:   MICROBIOLOGY:    IMAGING:      Labs, imaging, EKG personally reviewed    RADIOLOGY & ADDITIONAL TESTS: Reviewed.

## 2022-09-01 NOTE — DISCHARGE NOTE PROVIDER - CARE PROVIDERS DIRECT ADDRESSES
,DirectAddress_Unknown ,DirectAddress_Unknown,[email or web address]

## 2022-09-01 NOTE — DISCHARGE NOTE NURSING/CASE MANAGEMENT/SOCIAL WORK - PATIENT PORTAL LINK FT
You can access the FollowMyHealth Patient Portal offered by [facility] by registering at the following website: [email or web address]. By joining [employer] [facility]’s FollowMyHealth portal, you will also be able to view your health information using other applications (apps) compatible with our system.

## 2022-09-01 NOTE — DISCHARGE NOTE PROVIDER - CARE PROVIDER_API CALL
David Byrd)  Critical Care Medicine; Internal Medicine; Pulmonary Disease; Sleep Medicine  [address] [place] [address]  Phone: [phone]  Fax: [phone]  Follow Up Time: 1 week   David Byrd)  Critical Care Medicine; Internal Medicine; Pulmonary Disease; Sleep Medicine  [address] [place] [id number]  Phone: [phone]  Fax: [phone]  Follow Up Time: 1 week    Tank Blanco)  [address] [address]  [address] [facility], [facility] [address]  [place] [address]  Phone: [phone]  Fax: [phone]  Follow Up Time: 2 weeks

## 2022-09-01 NOTE — DISCHARGE NOTE PROVIDER - NSDCCPCAREPLAN_GEN_ALL_CORE_FT
PRINCIPAL DISCHARGE DIAGNOSIS  Diagnosis: Shortness of breath  Assessment and Plan of Treatment: you came to the hospital for shortness of breath for 1 week.   shortness of breath could indicate a medical problem. lab test were taken and imaging was done to rule out a clot in the lung vessels. th tests came negative and it turned out you did not have a clot. you were given medications such as steroids and inhlers which improved your condition. you still have some shortness of breath thereofre it is important for you to followup with a lung doct (pulmonologist) to explore the possibility of you having asthma and doing the reuiqred tests as an outpatient.    Please follow up with your primary care physician within two weeks after discharge to update them on your recent hospitalization and to review any changes in your medications.  please take the medications as instructed.   SEEK IMMEDIATE MEDICAL CARE IF YOU HAVE THE FOLLOWING SYMPTOMS: worsening shortness of breath, chest pain, back pain, abdominal pain, fever, coughing up blood, lightheadedness/dizziness.

## 2022-09-01 NOTE — DISCHARGE NOTE PROVIDER - HOSPITAL COURSE
HPI:  42F PMHx ovarian cyst, Uterine fibroids, history of pancreatitis X2, Hyperthyroidism, Peptic ulcer disease presents for shortness of breath. The shortness of breath initially started in July and was initially intermittent and resolved after deep breathing and taking a shower. The patient started experiencing worsening shortness of breath starting 1 week ago and it then became constant causing her to come into the hospital. The SOB is associated with palpitations (preceded by palpitations), the SOB worsens with exertion, and lying flat, it improved with albuterol. The patient has associated Cough productive of yellow sputum.  She denies  chest pain, fever/chills, hemoptysis, abd pain, n/v/d, numbness, tingling, focal weakness, syncope. Not on OCPs, no recent travel/long immobility, no leg swelling/pain, no hx of blood clots.      #SOB  - Elevated A-a gradient at 25  - Likely caused by asthma exacerbation  - CXR negative  - VQ scan negative, patient has a hx of anaphylactic shock to IV contrast   -  given Albuterol inhalers Q 4 hours and PRN  - va duplex neg  - d-dimer low  - rvp neg  - given prednisone 40 mg QD with plan of taper  - FU with op pulmo for asthma and PFTs evaluation  - encouraged Incentive spirometer  - RVP negative  - given anti-tussive PRN        #tachycardia  - Sinus tachycardia 103  - troponins were negative   - C/W home dose atenolol       #Hyperthyroidism   - T4. T3 normal   - given methimazole 5 mg PO QD    #Hx of PUD  - given pantoprazole 40 mg PO QD      #Uterine fibroids  outpt f/u

## 2022-09-01 NOTE — DISCHARGE NOTE PROVIDER - PROVIDER TOKENS
PROVIDER:[TOKEN:[[id number]],FOLLOWUP:[1 week]] PROVIDER:[TOKEN:[[id number]],FOLLOWUP:[1 week]],PROVIDER:[TOKEN:[[id number]],FOLLOWUP:[2 weeks]]

## 2022-09-01 NOTE — DISCHARGE NOTE PROVIDER - NSDCMRMEDTOKEN_GEN_ALL_CORE_FT
atenolol 25 mg oral tablet: 1 tab(s) orally once a day  methIMAzole 5 mg oral tablet: 1 tab(s) orally once a day  omeprazole 40 mg oral delayed release capsule: 1 cap(s) orally once a day   predniSONE 20 mg oral tablet: 2 tab(s) orally once a day    Albuterol (Eqv-ProAir HFA) 90 mcg/inh inhalation aerosol: 2 puff(s) inhaled 3 times a day   atenolol 25 mg oral tablet: 1 tab(s) orally once a day  methIMAzole 5 mg oral tablet: 1 tab(s) orally once a day  omeprazole 40 mg oral delayed release capsule: 1 cap(s) orally once a day   predniSONE 20 mg oral tablet: 2 tab(s) orally once a day

## 2022-09-01 NOTE — DISCHARGE NOTE PROVIDER - NSDCFUADDAPPT_GEN_ALL_CORE_FT
please followup with Dr. Lv Woo the lung doctor in 1 week and do "pulmonary function tests" PFTs, the test to evaluate asthma.   Please follow up with your primary care physician within two weeks after discharge to update them on your recent hospitalization and to review any changes in your medications.

## 2022-09-01 NOTE — PROGRESS NOTE ADULT - TIME BILLING
42F PMHx uterine fibroids, HTN, hyperthyroidism, PUD here with acute hypoxic resp failure.    #Acute hypoxic resp failure  resolved, satting well on ra  cxr unrevealing  v/q scan low prob; perfusion defects noted; pt h/o anaphylactic shock to iv contrast  ct chest unrevealing  va duplex neg  d-dimer low  rvp neg  empiric prednisone 40 for 5 days  stable for d/c, needs outpt pmd, pulm f/u one week  #Uterine fibroids  #Hyperthyroidism  methimazole 5  #HTN  atenolol 25  #PUD  outpt f/u  #DVT ppx  lovenox 42F PMHx uterine fibroids, HTN, hyperthyroidism, PUD here with acute hypoxic resp failure.    #Acute hypoxic resp failure  resolved, satting well on ra  cxr unrevealing  v/q scan low prob; perfusion defects noted; pt h/o anaphylactic shock to iv contrast  ct chest unrevealing  va duplex neg  d-dimer low  rvp neg  empiric prednisone 40 for 5 days  stable for d/c, needs outpt pmd, pulm f/u one week  #Uterine fibroids  outpt f/u  #Hyperthyroidism  methimazole 5  #HTN  atenolol 25  #PUD  outpt f/u  #DVT ppx  lovenox

## 2022-09-01 NOTE — DISCHARGE NOTE NURSING/CASE MANAGEMENT/SOCIAL WORK - NSDCPEFALRISK_GEN_ALL_CORE
For information on Fall & Injury Prevention, visit: [email or web address] OR  [email or web address] OR  https://www.cdc.gov/steadi/patient.html

## 2022-09-06 DIAGNOSIS — E05.90 THYROTOXICOSIS, UNSPECIFIED WITHOUT THYROTOXIC CRISIS OR STORM: ICD-10-CM

## 2022-09-06 DIAGNOSIS — E66.9 OBESITY, UNSPECIFIED: ICD-10-CM

## 2022-09-06 DIAGNOSIS — R06.02 SHORTNESS OF BREATH: ICD-10-CM

## 2022-09-06 DIAGNOSIS — J45.901 UNSPECIFIED ASTHMA WITH (ACUTE) EXACERBATION: ICD-10-CM

## 2022-09-06 DIAGNOSIS — R00.0 TACHYCARDIA, UNSPECIFIED: ICD-10-CM

## 2022-09-06 DIAGNOSIS — R79.1 ABNORMAL COAGULATION PROFILE: ICD-10-CM

## 2022-09-06 DIAGNOSIS — K27.9 PEPTIC ULCER, SITE UNSPECIFIED, UNSPECIFIED AS ACUTE OR CHRONIC, WITHOUT HEMORRHAGE OR PERFORATION: ICD-10-CM

## 2022-09-06 DIAGNOSIS — Z91.041 RADIOGRAPHIC DYE ALLERGY STATUS: ICD-10-CM

## 2022-09-06 DIAGNOSIS — Z91.013 ALLERGY TO SEAFOOD: ICD-10-CM

## 2022-09-06 DIAGNOSIS — J96.01 ACUTE RESPIRATORY FAILURE WITH HYPOXIA: ICD-10-CM

## 2022-09-28 ENCOUNTER — APPOINTMENT (OUTPATIENT)
Dept: PULMONOLOGY | Facility: CLINIC | Age: 42
End: 2022-09-28

## 2022-09-28 VITALS
BODY MASS INDEX: 38.68 KG/M2 | SYSTOLIC BLOOD PRESSURE: 120 MMHG | DIASTOLIC BLOOD PRESSURE: 80 MMHG | HEIGHT: 60 IN | WEIGHT: 197 LBS

## 2022-09-28 DIAGNOSIS — J45.41 MODERATE PERSISTENT ASTHMA WITH (ACUTE) EXACERBATION: ICD-10-CM

## 2022-09-28 DIAGNOSIS — Z87.01 PERSONAL HISTORY OF PNEUMONIA (RECURRENT): ICD-10-CM

## 2022-09-28 DIAGNOSIS — Z82.5 FAMILY HISTORY OF ASTHMA AND OTHER CHRONIC LOWER RESPIRATORY DISEASES: ICD-10-CM

## 2022-09-28 PROCEDURE — 99204 OFFICE O/P NEW MOD 45 MIN: CPT | Mod: 25

## 2022-09-28 PROCEDURE — 94010 BREATHING CAPACITY TEST: CPT

## 2022-09-28 RX ORDER — METHIMAZOLE 5 MG/1
TABLET ORAL
Refills: 0 | Status: ACTIVE | COMMUNITY

## 2022-09-28 RX ORDER — ALBUTEROL 90 MCG
90 AEROSOL (GRAM) INHALATION
Refills: 0 | Status: ACTIVE | COMMUNITY

## 2022-09-28 RX ORDER — PREDNISONE 10 MG/1
10 TABLET ORAL
Refills: 0 | Status: DISCONTINUED | COMMUNITY

## 2022-09-28 RX ORDER — ATENOLOL 50 MG/1
TABLET ORAL
Refills: 0 | Status: ACTIVE | COMMUNITY

## 2022-09-28 NOTE — REVIEW OF SYSTEMS
[Cough] : cough [Chest Tightness] : chest tightness [Dyspnea] : dyspnea [Wheezing] : wheezing [SOB on Exertion] : sob on exertion [Negative] : Endocrine

## 2022-09-28 NOTE — PHYSICAL EXAM
[No Acute Distress] : no acute distress [Normal Oropharynx] : normal oropharynx [Normal Appearance] : normal appearance [No Neck Mass] : no neck mass [Normal Rate/Rhythm] : normal rate/rhythm [Normal S1, S2] : normal s1, s2 [No Murmurs] : no murmurs [No Resp Distress] : no resp distress [No Abnormalities] : no abnormalities [Benign] : benign [Normal Gait] : normal gait [No Clubbing] : no clubbing [No Cyanosis] : no cyanosis [No Edema] : no edema [FROM] : FROM [Normal Color/ Pigmentation] : normal color/ pigmentation [No Focal Deficits] : no focal deficits [Oriented x3] : oriented x3 [Normal Affect] : normal affect [TextBox_68] : scant expiratory wheezes over the right lung fields, left side CTA

## 2022-09-28 NOTE — PROCEDURE
Lot #: [id number] Lot #: [id number] [FreeTextEntry1] : In-office spirometry performed.  Patient with mild obstruction per ATS criteria.  Test was reproducible and acceptable.

## 2022-09-28 NOTE — HISTORY OF PRESENT ILLNESS
[Never] : never [TextBox_4] : Ms. OLVERA is a 42 year woman with a medical history significant for prior episodes of pancreatitis, PUD, and hyperthyroidism presenting today to the clinic for follow-up from a brief admission visit earlier this month. The patient was having shortness of breath associated with cough and sputum production at that time that had been ongoing for over a week, and intermittently for over a month.  She was admitted, and got workup for a possible PE with a V/Q scan (showing matched ventilation and perfusion defects).\par \par Her thyroid tests were elevated prior to her recent hospitalization due to noncompliance with her methimazole.  Since that time, she has become more compliant due to her symptoms.\par \par Since discharge, she completed her steroids but still feels very poorly.  She is no longer as acute as her presentation, but she has absolutely not recovered to her pre-hospitalization baseline.  She still feels very short of breath and is using her albuterol inhaler several times per day, but is trying to conserve it.\par \par was hospitalized in 2007 with pneumonia, has had SOB mostly since then\par denies childhood asthma\par \par # Asthma History\par 	First Diagnosed:	2022\par 	Hospitalized:	once ever, 2022\par 	Intubated:		never\par 	Regimen:		Albuterol PRN\par 	Eosinophilia:	n/a\par 	Allergen Panel:	n/a\par 	Triggers:		Fish scent\par 	Nasal Polyps:	denies\par 	Aspirin sensitivity:	denies\par \par 	Abad 9/28/2022\par 	FEV1	77%	1.68L\par 	FVC	101%	2.67L\par 	FEV1/FVC	63\par \par ACT\par 	9/28/2022:	10\par \par \par # Asthma Follow-up\par 	Daily inhaler use:		2x/day\par 	Nightly inhaler use:		2x/week\par 	Medication adherence:	endorses\par \par \par Asthma Control Test\par \par 1) In the past 4 weeks, how much of the time did your asthma keep you from getting as much done at work, school, or home?\par \par ( 2 ) Most of the time\par \par 2) During the past 4 weeks, how often have you had shortness of breath\par \par ( 1 ) More than once a day\par \par 3) During the past 4 weeks, how often did your asthma symptoms (wheezing, coughing, shortness of breath, chest tightness, or pain) wake you up at night or earlier than usual in the morning?\par \par ( 2 ) 2 or 3 nights a week\par \par 4) During the past 4 weeks, how often have you used your rescue inhaler or nebulizer medication (such as albuterol)?\par \par ( 2 ) 1 or 2 times per day\par \par 5) How would you rate your asthma control during the past 4 weeks?\par \par ( 3 ) Somewhat controlled\par \par TOTAL SCORE: 10\par \par 0 - 15 -- Very poorly controlled\par 16 - 20 -- Poorly controlled\par 21 - 25 -- Well controlled\par \par \par \par 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

## 2022-10-31 NOTE — ED ADULT NURSE NOTE - NSSUHOSCREENINGYN_ED_ALL_ED
Conjuntivae and eyelids appear normal,  Sclerae : White without injection
Yes - the patient is able to be screened

## 2022-11-07 ENCOUNTER — APPOINTMENT (OUTPATIENT)
Dept: PULMONOLOGY | Facility: CLINIC | Age: 42
End: 2022-11-07

## 2022-11-07 VITALS
WEIGHT: 196 LBS | HEIGHT: 62 IN | SYSTOLIC BLOOD PRESSURE: 120 MMHG | BODY MASS INDEX: 36.07 KG/M2 | DIASTOLIC BLOOD PRESSURE: 80 MMHG

## 2022-11-07 DIAGNOSIS — J45.50 SEVERE PERSISTENT ASTHMA, UNCOMPLICATED: ICD-10-CM

## 2022-11-07 PROCEDURE — 99214 OFFICE O/P EST MOD 30 MIN: CPT

## 2022-11-07 RX ORDER — BUDESONIDE AND FORMOTEROL FUMARATE DIHYDRATE 80; 4.5 UG/1; UG/1
80-4.5 AEROSOL RESPIRATORY (INHALATION) EVERY 6 HOURS
Qty: 1 | Refills: 5 | Status: ACTIVE | COMMUNITY
Start: 2022-09-28 | End: 1900-01-01

## 2022-11-07 NOTE — HISTORY OF PRESENT ILLNESS
[Never] : never [TextBox_4] : Ms. FORD is a 42 year woman with a medical history significant for prior episodes of pancreatitis, PUD, and hyperthyroidism presenting today to the clinic for follow-up of asthma. \par \par Patient Summary:\par Ms Ford was initially seen after a brief hospitalization in September 2022, she was having shortness of breath associated with cough and sputum production at that time that had been ongoing for over a week, and intermittently for over a month.  She was admitted, and got workup for a possible PE with a V/Q scan (showing matched ventilation and perfusion defects).  She was seen by me after discharge still feeling very poorly, requiring another short course of Prednisone.  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\par \par Interval history:\par \par # Asthma Follow-up\par 	Daily inhaler use:		1x/day\par 	Nightly inhaler use:		0x/week\par 	Medication adherence:	taking albuterol and symbicort both daily, 1x extra dose\par \par \par Asthma Control Test\par TOTAL SCORE: 16\par \par 0 - 15 -- Very poorly controlled\par 16 - 20 -- Poorly controlled\par 21 - 25 -- Well controlled\par \par \par can't do strenuous activity or get exposed to dusts/allergen exposures\par needs more breaks than she feels she should, due to her asthma\par worst when moving around/exerting herself\par December will be undergoing fibroid removal\par \par

## 2022-11-07 NOTE — REVIEW OF SYSTEMS
[Chest Tightness] : chest tightness [SOB on Exertion] : sob on exertion [Negative] : Endocrine [Cough] : no cough [Dyspnea] : no dyspnea [Wheezing] : no wheezing

## 2022-12-18 ENCOUNTER — INPATIENT (INPATIENT)
Facility: HOSPITAL | Age: 42
LOS: 3 days | Discharge: HOME | End: 2022-12-22
Attending: STUDENT IN AN ORGANIZED HEALTH CARE EDUCATION/TRAINING PROGRAM | Admitting: STUDENT IN AN ORGANIZED HEALTH CARE EDUCATION/TRAINING PROGRAM
Payer: MEDICAID

## 2022-12-18 VITALS
OXYGEN SATURATION: 98 % | DIASTOLIC BLOOD PRESSURE: 73 MMHG | TEMPERATURE: 96 F | SYSTOLIC BLOOD PRESSURE: 137 MMHG | WEIGHT: 195.11 LBS | RESPIRATION RATE: 17 BRPM | HEART RATE: 92 BPM

## 2022-12-18 DIAGNOSIS — D25.9 LEIOMYOMA OF UTERUS, UNSPECIFIED: Chronic | ICD-10-CM

## 2022-12-18 DIAGNOSIS — O34.219 MATERNAL CARE FOR UNSPECIFIED TYPE SCAR FROM PREVIOUS CESAREAN DELIVERY: Chronic | ICD-10-CM

## 2022-12-18 LAB
ALBUMIN SERPL ELPH-MCNC: 4.1 G/DL — SIGNIFICANT CHANGE UP (ref 3.5–5.2)
ALP SERPL-CCNC: 74 U/L — SIGNIFICANT CHANGE UP (ref 30–115)
ALT FLD-CCNC: 14 U/L — SIGNIFICANT CHANGE UP (ref 0–41)
ANION GAP SERPL CALC-SCNC: 11 MMOL/L — SIGNIFICANT CHANGE UP (ref 7–14)
APTT BLD: 35 SEC — SIGNIFICANT CHANGE UP (ref 27–39.2)
AST SERPL-CCNC: 11 U/L — SIGNIFICANT CHANGE UP (ref 0–41)
BASOPHILS # BLD AUTO: 0.04 K/UL — SIGNIFICANT CHANGE UP (ref 0–0.2)
BASOPHILS NFR BLD AUTO: 0.6 % — SIGNIFICANT CHANGE UP (ref 0–1)
BILIRUB SERPL-MCNC: <0.2 MG/DL — SIGNIFICANT CHANGE UP (ref 0.2–1.2)
BUN SERPL-MCNC: 10 MG/DL — SIGNIFICANT CHANGE UP (ref 10–20)
CALCIUM SERPL-MCNC: 9.3 MG/DL — SIGNIFICANT CHANGE UP (ref 8.4–10.4)
CHLORIDE SERPL-SCNC: 101 MMOL/L — SIGNIFICANT CHANGE UP (ref 98–110)
CO2 SERPL-SCNC: 27 MMOL/L — SIGNIFICANT CHANGE UP (ref 17–32)
CREAT SERPL-MCNC: 0.5 MG/DL — LOW (ref 0.7–1.5)
EGFR: 122 ML/MIN/1.73M2 — SIGNIFICANT CHANGE UP
EOSINOPHIL # BLD AUTO: 0.59 K/UL — SIGNIFICANT CHANGE UP (ref 0–0.7)
EOSINOPHIL NFR BLD AUTO: 8.9 % — HIGH (ref 0–8)
GLUCOSE SERPL-MCNC: 101 MG/DL — HIGH (ref 70–99)
HCG SERPL QL: NEGATIVE — SIGNIFICANT CHANGE UP
HCT VFR BLD CALC: 34.4 % — LOW (ref 37–47)
HGB BLD-MCNC: 11.7 G/DL — LOW (ref 12–16)
IMM GRANULOCYTES NFR BLD AUTO: 0.3 % — SIGNIFICANT CHANGE UP (ref 0.1–0.3)
INR BLD: 1.06 RATIO — SIGNIFICANT CHANGE UP (ref 0.65–1.3)
LACTATE SERPL-SCNC: 0.8 MMOL/L — SIGNIFICANT CHANGE UP (ref 0.7–2)
LIDOCAIN IGE QN: 528 U/L — HIGH (ref 7–60)
LYMPHOCYTES # BLD AUTO: 2.14 K/UL — SIGNIFICANT CHANGE UP (ref 1.2–3.4)
LYMPHOCYTES # BLD AUTO: 32.2 % — SIGNIFICANT CHANGE UP (ref 20.5–51.1)
MCHC RBC-ENTMCNC: 28.1 PG — SIGNIFICANT CHANGE UP (ref 27–31)
MCHC RBC-ENTMCNC: 34 G/DL — SIGNIFICANT CHANGE UP (ref 32–37)
MCV RBC AUTO: 82.5 FL — SIGNIFICANT CHANGE UP (ref 81–99)
MONOCYTES # BLD AUTO: 0.62 K/UL — HIGH (ref 0.1–0.6)
MONOCYTES NFR BLD AUTO: 9.3 % — SIGNIFICANT CHANGE UP (ref 1.7–9.3)
NEUTROPHILS # BLD AUTO: 3.23 K/UL — SIGNIFICANT CHANGE UP (ref 1.4–6.5)
NEUTROPHILS NFR BLD AUTO: 48.7 % — SIGNIFICANT CHANGE UP (ref 42.2–75.2)
NRBC # BLD: 0 /100 WBCS — SIGNIFICANT CHANGE UP (ref 0–0)
PLATELET # BLD AUTO: 421 K/UL — HIGH (ref 130–400)
POTASSIUM SERPL-MCNC: 4 MMOL/L — SIGNIFICANT CHANGE UP (ref 3.5–5)
POTASSIUM SERPL-SCNC: 4 MMOL/L — SIGNIFICANT CHANGE UP (ref 3.5–5)
PROT SERPL-MCNC: 7.1 G/DL — SIGNIFICANT CHANGE UP (ref 6–8)
PROTHROM AB SERPL-ACNC: 12.1 SEC — SIGNIFICANT CHANGE UP (ref 9.95–12.87)
RBC # BLD: 4.17 M/UL — LOW (ref 4.2–5.4)
RBC # FLD: 14.2 % — SIGNIFICANT CHANGE UP (ref 11.5–14.5)
SODIUM SERPL-SCNC: 139 MMOL/L — SIGNIFICANT CHANGE UP (ref 135–146)
WBC # BLD: 6.64 K/UL — SIGNIFICANT CHANGE UP (ref 4.8–10.8)
WBC # FLD AUTO: 6.64 K/UL — SIGNIFICANT CHANGE UP (ref 4.8–10.8)

## 2022-12-18 PROCEDURE — 74177 CT ABD & PELVIS W/CONTRAST: CPT | Mod: 26,MA

## 2022-12-18 PROCEDURE — 99283 EMERGENCY DEPT VISIT LOW MDM: CPT | Mod: 24,25

## 2022-12-18 PROCEDURE — 99285 EMERGENCY DEPT VISIT HI MDM: CPT

## 2022-12-18 PROCEDURE — 99282 EMERGENCY DEPT VISIT SF MDM: CPT

## 2022-12-18 RX ORDER — DIPHENHYDRAMINE HCL 50 MG
25 CAPSULE ORAL ONCE
Refills: 0 | Status: COMPLETED | OUTPATIENT
Start: 2022-12-18 | End: 2022-12-18

## 2022-12-18 RX ORDER — MORPHINE SULFATE 50 MG/1
4 CAPSULE, EXTENDED RELEASE ORAL ONCE
Refills: 0 | Status: DISCONTINUED | OUTPATIENT
Start: 2022-12-18 | End: 2022-12-18

## 2022-12-18 RX ORDER — ONDANSETRON 8 MG/1
4 TABLET, FILM COATED ORAL ONCE
Refills: 0 | Status: COMPLETED | OUTPATIENT
Start: 2022-12-18 | End: 2022-12-18

## 2022-12-18 RX ORDER — SODIUM CHLORIDE 9 MG/ML
1000 INJECTION INTRAMUSCULAR; INTRAVENOUS; SUBCUTANEOUS ONCE
Refills: 0 | Status: COMPLETED | OUTPATIENT
Start: 2022-12-18 | End: 2022-12-18

## 2022-12-18 RX ORDER — FAMOTIDINE 10 MG/ML
20 INJECTION INTRAVENOUS ONCE
Refills: 0 | Status: COMPLETED | OUTPATIENT
Start: 2022-12-18 | End: 2022-12-18

## 2022-12-18 RX ADMIN — ONDANSETRON 4 MILLIGRAM(S): 8 TABLET, FILM COATED ORAL at 17:56

## 2022-12-18 RX ADMIN — Medication 25 MILLIGRAM(S): at 18:44

## 2022-12-18 RX ADMIN — MORPHINE SULFATE 4 MILLIGRAM(S): 50 CAPSULE, EXTENDED RELEASE ORAL at 19:15

## 2022-12-18 RX ADMIN — Medication 125 MILLIGRAM(S): at 18:34

## 2022-12-18 RX ADMIN — SODIUM CHLORIDE 1000 MILLILITER(S): 9 INJECTION INTRAMUSCULAR; INTRAVENOUS; SUBCUTANEOUS at 23:08

## 2022-12-18 RX ADMIN — MORPHINE SULFATE 4 MILLIGRAM(S): 50 CAPSULE, EXTENDED RELEASE ORAL at 17:56

## 2022-12-18 RX ADMIN — SODIUM CHLORIDE 1000 MILLILITER(S): 9 INJECTION INTRAMUSCULAR; INTRAVENOUS; SUBCUTANEOUS at 17:56

## 2022-12-18 RX ADMIN — FAMOTIDINE 20 MILLIGRAM(S): 10 INJECTION INTRAVENOUS at 17:56

## 2022-12-18 NOTE — H&P ADULT - NSICDXFAMILYHX_GEN_ALL_CORE_FT
FAMILY HISTORY:  FH: colon cancer     FAMILY HISTORY:  Father  Still living? Unknown  FH: colon cancer, Age at diagnosis: Age Unknown

## 2022-12-18 NOTE — H&P ADULT - ASSESSMENT
#Abdominal pain secondary to likely pancreatitis vs PUD vs Uterine fibroids vs postoperative inflammation  - CT postoperative inflammatory changes to the lower ventral subcutaneous abdominal wall. Multiloculated fluid collection superolateral to the uterine fundus measuring up to 4.1 cm likely represents a postoperative seroma associated with postoperative inflammatory fat stranding and trace pelvic ascites.  - Lipase 528  - pain control  - Ob gyn rec appreciated     no sx intervention  - Surgery rec appreciated      no sx intervention  - F/u GI c/s      #Hyperthyroidism  - methimazole 5      #HTN  - C/w atenolol 25     #Misc  - DVT Prophylaxis: Lovenox S/C  - GI Prophylaxis: Protonix  - Diet: NPO  - Dispo: Acute   42 year old woman with pmhx of uterine fibroids s/p myomectomy/cystectomy at [facility] with Dr. Phillip on 12/6/22, pancreatitis, hyperthyroidism, and peptic ulcer disease presents to the ED with 3 days of lower abdominal pain.      #Abdominal pain secondary to likely pancreatitis vs PUD vs Uterine fibroids vs postoperative inflammation  - CT postoperative inflammatory changes to the lower ventral subcutaneous abdominal wall. Multiloculated fluid collection superolateral to the uterine fundus measuring up to 4.1 cm likely represents a postoperative seroma associated with postoperative inflammatory fat stranding and trace pelvic ascites.  - Lipase 528  - pain control  - Ob gyn rec appreciated     no sx intervention  - Surgery rec appreciated      no sx intervention  - F/u GI c/s      #Hyperthyroidism  - C/w methimazole 5  - C/w atenolol 25     #Misc  - DVT Prophylaxis: Lovenox S/C  - GI Prophylaxis: Protonix  - Diet: NPO  - Dispo: Acute

## 2022-12-18 NOTE — CONSULT NOTE ADULT - SUBJECTIVE AND OBJECTIVE BOX
PGY 2 Note    Chief Complaint:     HPI: 43yo P2 POD 12 s/p abdominal myomectomy by Dr. Phillip at [facility] presents for worsening abdominal pain since the surgery. Patient states the pain is across entire lower abdomen, however it is worse on the LLQ. Per patient, she was discharged on  from [facility] with an oxycodone prescription which she finished. She missed her post-op appointment on  due to pain. Today the pain worsened to 10/10 pain prompted her ED visit. She received 4mg IV morphine push and pain improved to 7-8/10. Patient denies fevers or chills. Patient endorses nausea without vomiting. patient is passing gas and having bowel movements without difficulty. Denies CP, SOB, diarrhea, constipation, dysuria or urinary urgency.    Ob/Gyn History:  P1, LTCSx1, FT, no complications, 7-11          LMP - unknown    Cycle Length - regular  history of ovarian cysts, uterine fibroids. Denies abnormal paps, or STIs    Home Medications:  atenolol 25 mg oral tablet: 1 tab(s) orally once a day (31 Aug 2022 00:35)  methIMAzole 5 mg oral tablet: 1 tab(s) orally once a day (31 Aug 2022 00:35)    Allergies    iodine containing compounds (Unknown)  IV CONTRAST (Anaphylaxis)  shellfish (Anaphylaxis)    PAST MEDICAL & SURGICAL HISTORY:  Abscess  vaginal  Fibroid, uterine  Peptic ulcer  Ovarian cyst  Pancreatitis  Delivery with history of   Fibroid  uterine surgery    FAMILY HISTORY:  FH: colon cancer    SOCIAL HISTORY: Denies cigarette use, alcohol use, or illicit drug use    Vital Signs Last 24 Hrs  T(F): 96.3 (18 Dec 2022 16:31), Max: 96.3 (18 Dec 2022 16:31)  HR: 92 (18 Dec 2022 16:31) (92 - 92)  BP: 137/73 (18 Dec 2022 16:31) (137/73 - 137/73)  RR: 17 (18 Dec 2022 16:31) (17 - 17)    Weight (kg): 88.5 (12-18-22 @ 16:31)    General Appearance - AAOx3, NAD  Heart - S1S2 regular rate and rhythm  Lung - CTA Bilaterally  Abdomen - Soft, + tenderness in bilateral lower quadrants, nondistended, no rebound, no rigidity, no guarding, bowel sounds present    GYN/Pelvis:    Labia Majora - Normal  Labia Minora - Normal  Clitoris - Normal  Urethra - Normal  Vagina - Normal, no bleeding, no discharge  Cervix - Normal, appears closed, no lesions    Uterus:  Size - Normal  Tenderness - None  Mass - None  Freely mobile    Adnexa:  Masses - None  Tenderness - None      Meds:   (ADM OVERRIDE) 1 each &lt;see task&gt; GiveOnce  (ADM OVERRIDE) 1 each &lt;see task&gt; GiveOnce  (ADM OVERRIDE) 1 each &lt;see task&gt; GiveOnce  (ADM OVERRIDE) 1 each &lt;see task&gt; GiveOnce  diphenhydrAMINE Injectable 25 milliGRAM(s) IV Push once  famotidine Injectable 20 milliGRAM(s) IV Push once  methylPREDNISolone sodium succinate Injectable 125 milliGRAM(s) IV Push Once  morphine  - Injectable 4 milliGRAM(s) IV Push Once  ondansetron Injectable 4 milliGRAM(s) IV Push Once  sodium chloride 0.9% Bolus 1000 milliLiter(s) IV Bolus once      Weight (kg): 88.5 (22 @ 16:31)    LABS:                        11.7   6.64  )-----------( 421      ( 18 Dec 2022 18:04 )             34.4         -    139  |  101  |  10  ----------------------------<  101<H>  4.0   |  27  |  0.5<L>    Ca    9.3      18 Dec 2022 18:04    TPro  7.1  /  Alb  4.1  /  TBili  <0.2  /  DBili  x   /  AST  11  /  ALT  14  /  AlkPhos  74  12-18    PT/INR - ( 18 Dec 2022 18:04 )   PT: 12.10 sec;   INR: 1.06 ratio         PTT - ( 18 Dec 2022 18:04 )  PTT:35.0 sec      RADIOLOGY & ADDITIONAL STUDIES:  < from: CT Abdomen and Pelvis w/ IV Cont (22 @ 19:01) >  CT ABDOMEN AND PELVIS IC                          PROCEDURE DATE:  2022          INTERPRETATION:  CLINICAL STATEMENT: Post myomectomy presenting with pain.    TECHNIQUE: Contiguous axial CT images were obtained from the lower chest   to the pubic symphysis following the administration of 95 cc Omnipaque   350 intravenous contrast. Oral contrast was not administered. Reformatted   images in the coronal and sagittal planes were acquired.    COMPARISON CT: Abdomen/pelvis2022.      FINDINGS:  LOWER CHEST: Unchanged are millimeters solid right middle lobe pulmonary   nodule.    HEPATOBILIARY: No focal liver lesion. Patent portal vein. No biliary   ductal dilatation.    SPLEEN: Unremarkable.    PANCREAS: Unremarkable.    ADRENAL GLANDS: Unremarkable.    KIDNEYS: Symmetric renal enhancement. No hydronephrosis.    ABDOMINOPELVIC NODES: No enlarged abdominal or pelvic lymph nodes.    PELVIC ORGANS: Patient is noted to be status post lower ventral abdominal   wallapproach myomectomy with postoperative inflammatory changes. There   is a left-sided multiloculated fluid collection superolateral to the   uterine fundus measuring 4.1 x 4.1 x 3.5 which likely represents a   postoperative seroma (HU 10 on series 4 image 243) associated with   inflammatory fat stranding and trace pelvic ascites. Hematoma formation   is less likely.    PERITONEUM/MESENTERY/BOWEL: No free air, ascites, or bowel obstruction.   Inflammatory fat stranding surrounding the sigmoid, likely on the basis   of postoperative changes.    BONES/SOFT TISSUES: No acute osseous abnormality. Lower ventral   subcutaneous inflammatory changes as above.    VASCULAR: Normal caliber aorta.      IMPRESSION:  1.  Status post lower ventral abdominal wall approach for myomectomy with   postoperative inflammatory changes to the lower ventral subcutaneous   abdominal wall.  2.  Multiloculated fluid collection superolateral to the uterine fundus   measuring up to 4.1 cm likely represents a postoperative seroma   associated with postoperative inflammatory fat stranding and trace pelvic   ascites.    --- End of Report ---    < end of copied text >     PGY 2 Note    Chief Complaint:     HPI: 43yo P2 POD 12 s/p abdominal myomectomy by Dr. Phillip at [facility] presents for worsening abdominal pain since the surgery. Patient states the pain is across entire lower abdomen, however it is worse on the LLQ. Per patient, she was discharged on  from [facility] with an oxycodone prescription which she finished. She missed her post-op appointment on  due to pain. Today the pain worsened to 10/10 pain prompted her ED visit. She received 4mg IV morphine push and pain improved to 7-8/10. Patient denies fevers or chills. Patient endorses nausea without vomiting. patient is passing gas and having bowel movements without difficulty. Denies CP, SOB, diarrhea, constipation, dysuria or urinary urgency.    Ob/Gyn History:  P1, LTCSx1, FT, no complications, 7-11          LMP - unknown    Cycle Length - regular  history of ovarian cysts, uterine fibroids. Denies abnormal paps, or STIs    Home Medications:  atenolol 25 mg oral tablet: 1 tab(s) orally once a day (31 Aug 2022 00:35)  methIMAzole 5 mg oral tablet: 1 tab(s) orally once a day (31 Aug 2022 00:35)    Allergies    iodine containing compounds (Unknown)  IV CONTRAST (Anaphylaxis)  shellfish (Anaphylaxis)    PAST MEDICAL & SURGICAL HISTORY:  Abscess  vaginal  Fibroid, uterine  Peptic ulcer  Ovarian cyst  Pancreatitis  Delivery with history of   Fibroid  uterine surgery    FAMILY HISTORY:  FH: colon cancer    SOCIAL HISTORY: Denies cigarette use, alcohol use, or illicit drug use    Vital Signs Last 24 Hrs  T(F): 96.3 (18 Dec 2022 16:31), Max: 96.3 (18 Dec 2022 16:31)  HR: 92 (18 Dec 2022 16:31) (92 - 92)  BP: 137/73 (18 Dec 2022 16:31) (137/73 - 137/73)  RR: 17 (18 Dec 2022 16:31) (17 - 17)    Weight (kg): 88.5 (12-18-22 @ 16:31)    General Appearance - AAOx3, NAD  Heart - S1S2 regular rate and rhythm  Lung - CTA Bilaterally  Abdomen - Soft, + tenderness in bilateral lower quadrants, nondistended, no rebound, no rigidity, no guarding, bowel sounds present    GYN/Pelvis:    Labia Majora - Normal  Labia Minora - Normal  Clitoris - Normal  Urethra - Normal  Vagina - Normal, no bleeding, no discharge  Cervix - Normal, appears closed, no lesions    Uterus:  Size - Normal  Tenderness - None  Mass - None  Freely mobile    Adnexa:  Masses - None  Tenderness - None      Meds:   diphenhydrAMINE Injectable 25 milliGRAM(s) IV Push once  famotidine Injectable 20 milliGRAM(s) IV Push once  methylPREDNISolone sodium succinate Injectable 125 milliGRAM(s) IV Push Once  morphine  - Injectable 4 milliGRAM(s) IV Push Once  ondansetron Injectable 4 milliGRAM(s) IV Push Once  sodium chloride 0.9% Bolus 1000 milliLiter(s) IV Bolus once      Weight (kg): 88.5 (22 @ 16:31)    LABS:                        11.7   6.64  )-----------( 421      ( 18 Dec 2022 18:04 )             34.4             139  |  101  |  10  ----------------------------<  101<H>  4.0   |  27  |  0.5<L>    Ca    9.3      18 Dec 2022 18:04    TPro  7.1  /  Alb  4.1  /  TBili  <0.2  /  DBili  x   /  AST  11  /  ALT  14  /  AlkPhos  74  12-18    PT/INR - ( 18 Dec 2022 18:04 )   PT: 12.10 sec;   INR: 1.06 ratio         PTT - ( 18 Dec 2022 18:04 )  PTT:35.0 sec      RADIOLOGY & ADDITIONAL STUDIES:  < from: CT Abdomen and Pelvis w/ IV Cont (22 @ 19:01) >  CT ABDOMEN AND PELVIS IC                          PROCEDURE DATE:  2022          INTERPRETATION:  CLINICAL STATEMENT: Post myomectomy presenting with pain.    TECHNIQUE: Contiguous axial CT images were obtained from the lower chest   to the pubic symphysis following the administration of 95 cc Omnipaque   350 intravenous contrast. Oral contrast was not administered. Reformatted   images in the coronal and sagittal planes were acquired.    COMPARISON CT: Abdomen/pelvis2022.      FINDINGS:  LOWER CHEST: Unchanged are millimeters solid right middle lobe pulmonary   nodule.    HEPATOBILIARY: No focal liver lesion. Patent portal vein. No biliary   ductal dilatation.    SPLEEN: Unremarkable.    PANCREAS: Unremarkable.    ADRENAL GLANDS: Unremarkable.    KIDNEYS: Symmetric renal enhancement. No hydronephrosis.    ABDOMINOPELVIC NODES: No enlarged abdominal or pelvic lymph nodes.    PELVIC ORGANS: Patient is noted to be status post lower ventral abdominal   wallapproach myomectomy with postoperative inflammatory changes. There   is a left-sided multiloculated fluid collection superolateral to the   uterine fundus measuring 4.1 x 4.1 x 3.5 which likely represents a   postoperative seroma (HU 10 on series 4 image 243) associated with   inflammatory fat stranding and trace pelvic ascites. Hematoma formation   is less likely.    PERITONEUM/MESENTERY/BOWEL: No free air, ascites, or bowel obstruction.   Inflammatory fat stranding surrounding the sigmoid, likely on the basis   of postoperative changes.    BONES/SOFT TISSUES: No acute osseous abnormality. Lower ventral   subcutaneous inflammatory changes as above.    VASCULAR: Normal caliber aorta.      IMPRESSION:  1.  Status post lower ventral abdominal wall approach for myomectomy with   postoperative inflammatory changes to the lower ventral subcutaneous   abdominal wall.  2.  Multiloculated fluid collection superolateral to the uterine fundus   measuring up to 4.1 cm likely represents a postoperative seroma   associated with postoperative inflammatory fat stranding and trace pelvic   ascites.    --- End of Report ---    < end of copied text >

## 2022-12-18 NOTE — CONSULT NOTE ADULT - SUBJECTIVE AND OBJECTIVE BOX
GENERAL SURGERY CONSULT NOTE    Patient: MU OLVERA , 42y ([birth year])Female   MRN: [id number]  Location: [facility] ED  Visit: 22 Emergency  Date: 22 @ 21:28    HPI:  42 year old woman with pmh of uterine fibroids s/p myomectomy/cystectomy at [facility] with Dr. Phillip on 22, pancreatitis, hyperthyroidism, and peptic ulcer disease presents to the ED with 3 days of lower abdominal pain. Patient describes pain as 10/10 constant and feeling like "pressure". Patient describes feeling pressure when trying to pass stool, fevers/chills, denies any urinary symptoms, last BM last Friday, passing gas. Condones some nausea and 1x episode of emesis 2 days ago, NBNB. Of note, patient states she has had chronic issues with constipation and follows with her GI doctor Dr. Cason. Last colonscopy 2 years ago with removal of benign polyps, last endoscopy 2 years ago revealing a single duodenal ulcer for which the patient takes omeprazole. Of note, patient has recently stopped taking her miralax since her operation. CTAP revealing 4 cm multiloculated fluid collection superolateral to the uterine fundus. Labs significant for lipase of 528, LFTs wnl.    PAST MEDICAL & SURGICAL HISTORY:  Abscess  vaginal      Fibroid, uterine      Peptic ulcer      Ovarian cyst      Pancreatitis      Delivery with history of       Fibroid  uterine surgery          Home Medications:  atenolol 25 mg oral tablet: 1 tab(s) orally once a day (31 Aug 2022 00:35)  methIMAzole 5 mg oral tablet: 1 tab(s) orally once a day (31 Aug 2022 00:35)        VITALS:  T(F): 96.3 (22 @ 16:31), Max: 96.3 (22 @ 16:31)  HR: 92 (22 @ 16:31) (92 - 92)  BP: 137/73 (22 @ 16:31) (137/73 - 137/73)  RR: 17 (22 @ 16:31) (17 - 17)  SpO2: 98% (22 @ 16:31) (98% - 98%)    PHYSICAL EXAM:  General: NAD, AAOx3, calm and cooperative  HEENT: NCAT, ZAINA, EOMI, Trachea ML, Neck supple  Cardiac: RRR S1, S2, no Murmurs, rubs or gallops  Respiratory: CTAB, normal respiratory effort, breath sounds equal BL, no wheeze, rhonchi or crackles  Abdomen: Soft, non-distended, moderately tender diffusely, mostly in hypogastric region  Ext: warm and well-perfused    MEDICATIONS  (STANDING):    MEDICATIONS  (PRN):      LAB/STUDIES:                        11.7   6.64  )-----------( 421      ( 18 Dec 2022 18:04 )             34.4         139  |  101  |  10  ----------------------------<  101<H>  4.0   |  27  |  0.5<L>    Ca    9.3      18 Dec 2022 18:04    TPro  7.1  /  Alb  4.1  /  TBili  <0.2  /  DBili  x   /  AST  11  /  ALT  14  /  AlkPhos  74  18    PT/INR - ( 18 Dec 2022 18:04 )   PT: 12.10 sec;   INR: 1.06 ratio         PTT - ( 18 Dec 2022 18:04 )  PTT:35.0 sec  LIVER FUNCTIONS - ( 18 Dec 2022 18:04 )  Alb: 4.1 g/dL / Pro: 7.1 g/dL / ALK PHOS: 74 U/L / ALT: 14 U/L / AST: 11 U/L / GGT: x                         IMAGING:  < from: CT Abdomen and Pelvis w/ IV Cont (22 @ 19:01) >  PELVIC ORGANS: Patient is noted to be status post lower ventral abdominal   wallapproach myomectomy with postoperative inflammatory changes. There   is a left-sided multiloculated fluid collection superolateral to the   uterine fundus measuring 4.1 x 4.1 x 3.5 which likely represents a   postoperative seroma (HU 10 on series 4 image 243) associated with   inflammatory fat stranding and trace pelvic ascites. Hematoma formation   is less likely.    PERITONEUM/MESENTERY/BOWEL: No free air, ascites, or bowel obstruction.   Inflammatory fat stranding surrounding the sigmoid, likely on the basis   of postoperative changes.    BONES/SOFT TISSUES: No acute osseous abnormality. Lower ventral   subcutaneous inflammatory changes as above.    VASCULAR: Normal caliber aorta.      IMPRESSION:  1.  Status post lower ventral abdominal wall approach for myomectomy with   postoperative inflammatory changes to the lower ventral subcutaneous   abdominal wall.  2.  Multiloculated fluid collection superolateral to the uterine fundus   measuring up to 4.1 cm likely represents a postoperative seroma   associated with postoperative inflammatory fat stranding and trace pelvic   ascites.    < end of copied text >      ACCESS DEVICES:  [X] Peripheral IV       GENERAL SURGERY CONSULT NOTE    Patient: MU OLVERA , 42y ([birth year])Female   MRN: [id number]  Location: [facility] ED  Visit: 22 Emergency  Date: 22 @ 21:28    HPI:  42 year old woman with pmh of uterine fibroids s/p myomectomy/cystectomy at [facility] with Dr. Phillip on 22, pancreatitis, hyperthyroidism, and peptic ulcer disease presents to the ED with 3 days of lower abdominal pain. Patient describes pain as 10/10 constant and feeling like "pressure". Patient describes feeling pressure when trying to pass stool, fevers/chills, denies any urinary symptoms, last BM last Friday, passing gas. Condones some nausea and 1x episode of emesis 2 days ago, NBNB. Of note, patient states she has had chronic issues with constipation and follows with her GI doctor Dr. Cason. Last colonscopy 2 years ago with removal of benign polyps, last endoscopy 2 years ago revealing a single duodenal ulcer for which the patient takes omeprazole. Of note, patient has recently stopped taking her miralax since her operation. CTAP revealing 4 cm multiloculated fluid collection superolateral to the uterine fundus. Labs significant for lipase of 528, LFTs wnl. General surgery consulted for elevated lipase.    PAST MEDICAL & SURGICAL HISTORY:  Abscess  vaginal      Fibroid, uterine      Peptic ulcer      Ovarian cyst      Pancreatitis      Delivery with history of       Fibroid  uterine surgery          Home Medications:  atenolol 25 mg oral tablet: 1 tab(s) orally once a day (31 Aug 2022 00:35)  methIMAzole 5 mg oral tablet: 1 tab(s) orally once a day (31 Aug 2022 00:35)        VITALS:  T(F): 96.3 (22 @ 16:31), Max: 96.3 (22 @ 16:31)  HR: 92 (22 @ 16:31) (92 - 92)  BP: 137/73 (22 @ 16:31) (137/73 - 137/73)  RR: 17 (22 @ 16:31) (17 - 17)  SpO2: 98% (22 @ 16:31) (98% - 98%)    PHYSICAL EXAM:  General: NAD, AAOx3, calm and cooperative  HEENT: NCAT, ZAINA, EOMI, Trachea ML, Neck supple  Cardiac: RRR S1, S2, no Murmurs, rubs or gallops  Respiratory: CTAB, normal respiratory effort, breath sounds equal BL, no wheeze, rhonchi or crackles  Abdomen: Soft, non-distended, moderately tender diffusely, mostly in hypogastric region  Ext: warm and well-perfused    MEDICATIONS  (STANDING):    MEDICATIONS  (PRN):      LAB/STUDIES:                        11.7   6.64  )-----------( 421      ( 18 Dec 2022 18:04 )             34.4         139  |  101  |  10  ----------------------------<  101<H>  4.0   |  27  |  0.5<L>    Ca    9.3      18 Dec 2022 18:04    TPro  7.1  /  Alb  4.1  /  TBili  <0.2  /  DBili  x   /  AST  11  /  ALT  14  /  AlkPhos  74  12-18    PT/INR - ( 18 Dec 2022 18:04 )   PT: 12.10 sec;   INR: 1.06 ratio         PTT - ( 18 Dec 2022 18:04 )  PTT:35.0 sec  LIVER FUNCTIONS - ( 18 Dec 2022 18:04 )  Alb: 4.1 g/dL / Pro: 7.1 g/dL / ALK PHOS: 74 U/L / ALT: 14 U/L / AST: 11 U/L / GGT: x                         IMAGING:  < from: CT Abdomen and Pelvis w/ IV Cont (22 @ 19:01) >  PELVIC ORGANS: Patient is noted to be status post lower ventral abdominal   wallapproach myomectomy with postoperative inflammatory changes. There   is a left-sided multiloculated fluid collection superolateral to the   uterine fundus measuring 4.1 x 4.1 x 3.5 which likely represents a   postoperative seroma (HU 10 on series 4 image 243) associated with   inflammatory fat stranding and trace pelvic ascites. Hematoma formation   is less likely.    PERITONEUM/MESENTERY/BOWEL: No free air, ascites, or bowel obstruction.   Inflammatory fat stranding surrounding the sigmoid, likely on the basis   of postoperative changes.    BONES/SOFT TISSUES: No acute osseous abnormality. Lower ventral   subcutaneous inflammatory changes as above.    VASCULAR: Normal caliber aorta.      IMPRESSION:  1.  Status post lower ventral abdominal wall approach for myomectomy with   postoperative inflammatory changes to the lower ventral subcutaneous   abdominal wall.  2.  Multiloculated fluid collection superolateral to the uterine fundus   measuring up to 4.1 cm likely represents a postoperative seroma   associated with postoperative inflammatory fat stranding and trace pelvic   ascites.    < end of copied text >      ACCESS DEVICES:  [X] Peripheral IV

## 2022-12-18 NOTE — H&P ADULT - ATTENDING COMMENTS
**My note supersedes the resident's note in the event of discrepancies**    HPI as above.    Patient says still having pain in lower abdomen radiating to back. No nausea or vomiting. Currently NPO. Last BM was Friday.    PHYSICAL EXAM:   GENERAL: NAD, lying in bed in pain  NECK: Supple  CHEST/LUNG: Clear to auscultation bilaterally; No wheezing/crackles  HEART: Regular rate and rhythm; No murmurs  ABDOMEN: Bowel sounds present; Soft, Mildly tender on palpation in epigastric region.   EXTREMITIES:  + Peripheral Pulses, no edema  NERVOUS SYSTEM:  Alert & Oriented X3, no new focal deficits  SKIN: No rashes or lesions    42 year old woman with pmhx of uterine fibroids s/p myomectomy/cystectomy at [facility] with Dr. Phillip on 12/6/22, pancreatitis, hyperthyroidism, and peptic ulcer disease presents to the ED with 3 days of lower abdominal pain.     #Acute pancreatitis vs PUD vs postop pain vs other  -lipase 528, epigastric pain (2/3 criteria); nothing on imaging   -CT abd with unremarkable pancreas, post op findings from recent fibroid surgery   -RUQ sono unremarkable   -Of note pt has never had imaging findings of pancreatitis despite numerous hospitalizations for pancreatitis including two this year   -Hx of PUD, last egd in 2019 showing complete healing of prior ulcer at GJ junction   -Recent fibroid surgery last week  -increase fluids to rate of 200-250cc/hr  -Nausea and Pain control  -Send IgG4  -PPI BID   -Will tentatively plan for EGD on Wednesday   -keep NPO for now    #S/p abdominal myomectomy by Dr. Phillip at [facility], POD #13  -no GYN intervention    #Hyperthyroidism  -c/w methimazole    #DVT PPx  -LVX 40mg sq qhs    #Progress Note Handoff  Pending (specify):  EGD Wednesday, send IgG subsets, pain control, IVF  Family discussion: Plan of care discussed with patient, aware and agreeable   Disposition: Home when stable    ***SEEN BY ATTENDING 12/19/22

## 2022-12-18 NOTE — CONSULT NOTE ADULT - ASSESSMENT
42 year old woman with pmh of uterine fibroids s/p myomectomy/cystectomy at [facility] with Dr. Phillip on 12/6/22, pancreatitis, hyperthyroidism, and peptic ulcer disease presents to the ED with 3 days of lower abdominal pain. Patient describes pain as 10/10 constant and feeling like "pressure". Patient describes feeling pressure when trying to pass stool, fevers/chills, denies any urinary symptoms, last BM last Friday, passing gas. Condones some nausea and 1x episode of emesis 2 days ago, NBNB. Of note, patient states she has had chronic issues with constipation and follows with her GI doctor Dr. Cason. Last colonscopy 2 years ago with removal of benign polyps, last endoscopy 2 years ago revealing a single duodenal ulcer for which the patient takes omeprazole. Of note, patient has recently stopped taking her miralax since her operation. CTAP revealing 4 cm multiloculated fluid collection superolateral to the uterine fundus. Labs significant for lipase of 528, LFTs wnl.    Plan    No acute surgical intervention  If signs of infection consider IR consult for drainage  Patient with hx of pancreatitis, no epigastric tenderness, tolerating diet, LFTs wnl, no cholelithiasis or biliary ductal dilation on CTAP  Patient should follow with her GI doctor Dr. Cason    [id number] 42 year old woman with pmh of uterine fibroids s/p myomectomy/cystectomy at [facility] with Dr. Phillip on 12/6/22, pancreatitis, hyperthyroidism, and peptic ulcer disease presents to the ED with 3 days of lower abdominal pain. Patient describes pain as 10/10 constant and feeling like "pressure". Patient describes feeling pressure when trying to pass stool, fevers/chills, denies any urinary symptoms, last BM last Friday, passing gas. Condones some nausea and 1x episode of emesis 2 days ago, NBNB. Of note, patient states she has had chronic issues with constipation and follows with her GI doctor Dr. Cason. Last colonscopy 2 years ago with removal of benign polyps, last endoscopy 2 years ago revealing a single duodenal ulcer for which the patient takes omeprazole. Of note, patient has recently stopped taking her miralax since her operation. CTAP revealing 4 cm multiloculated fluid collection superolateral to the uterine fundus. Labs significant for lipase of 528, LFTs wnl.    Plan    No acute surgical intervention  Patient with hx of pancreatitis, no epigastric tenderness, tolerating diet, LFTs wnl, no cholelithiasis or biliary ductal dilation on CTAP  Patient should follow with her GI doctor Dr. Cason    [id number]

## 2022-12-18 NOTE — ED PROVIDER NOTE - NS ED ROS FT
Constitutional: (+) subjective fever  Eyes/ENT: (-) blurry vision, (-) epistaxis  Cardiovascular: (-) chest pain, (-) syncope  Respiratory: (-) cough, (-) shortness of breath  Gastrointestinal: (+) abdominal pain (+) nausea (-) vomiting, (-) diarrhea  Musculoskeletal: (-) neck pain, (-) back pain, (-) joint pain  Integumentary: (-) rash, (-) edema  Neurological: (-) headache, (-) altered mental status  Psychiatric: (-) hallucinations  Allergic/Immunologic: (-) pruritus

## 2022-12-18 NOTE — H&P ADULT - NSHPPHYSICALEXAM_GEN_ALL_CORE
GENERAL: NAD, lying in bed comfortably  NECK: Supple  CHEST/LUNG: Clear to auscultation bilaterally; No wheezing/crackles  HEART: Regular rate and rhythm; No murmurs  ABDOMEN: Bowel sounds present; Soft, Nontender, Nondistended  EXTREMITIES:  + Peripheral Pulses, no edema  NERVOUS SYSTEM:  Alert & Oriented X3, no new focal deficits  SKIN: No rashes or lesions GENERAL: NAD, lying in bed in pain  NECK: Supple  CHEST/LUNG: Clear to auscultation bilaterally; No wheezing/crackles  HEART: Regular rate and rhythm; No murmurs  ABDOMEN: Bowel sounds present; Soft, Mildly tender on palpation  EXTREMITIES:  + Peripheral Pulses, no edema  NERVOUS SYSTEM:  Alert & Oriented X3, no new focal deficits  SKIN: No rashes or lesions

## 2022-12-18 NOTE — ED PROVIDER NOTE - PROGRESS NOTE DETAILS
lipase 528, however, pancreas is unremarkable on CT    gyn consulted and recommended surg consult due to lipase elevation.

## 2022-12-18 NOTE — CONSULT NOTE ADULT - ASSESSMENT
43yo P2 POD 12 s/p abdominal myomectomy by Dr. Phillip at [facility] with postoperative pain, 4.1cm seroma without evidence of infection, elevated lipase r/o pancreatitis, otherwise clinically and hemodynamically stable.    -no acute intervention   -recommend general surgery consult  -pain management PRN  -GYN to follow    Dr. Díaz and Dr. Contreras aware

## 2022-12-18 NOTE — ED PROVIDER NOTE - OBJECTIVE STATEMENT
42-year-old female with past medical history of peptic ulcers, hyperthyroidism, asthma, ovarian cysts, and uterine fibroids status post myomectomy on 12/06 at [facility] by Dr. Phillip presents the ED with abdominal pain x3 days.  Patient states that the pain is located in her low abdomen 10/10 intensity radiating to her vagina associated with nausea.  Admits to vomiting 2 days ago and subjective fevers but denies shortness of breath, chest pain, diarrhea, dysuria, leg swelling, vaginal bleeding.

## 2022-12-18 NOTE — ED PROVIDER NOTE - ATTENDING APP SHARED VISIT CONTRIBUTION OF CARE
43 yo f with pmh of asthma, pud, ovarian cysts and fibroids presents with c/o lower abd pain since abdominal myomectomy/cystectomy on 12/6.  pt says was done by dr. escobar at [facility].  pt came to [facility] because  works here.  says called dr. escobar a few days ago and was told by office to go to ED for pain.  pt admits mildly constipated since surgery.  feels nausea and no appetite.  no fever or chills.  urinating normally.  no cp or sob.  exam: nad, ncat, perrl, eomi, mmm, rrr, ctab, abd soft, diffusely ttp, mostly at b/l lower quadrants, surgical incision well-healed imp: pt with lower abd pain since abdominal myomectomy/cystectomy, will check labs and ct a/p, gyn consult

## 2022-12-18 NOTE — H&P ADULT - NSHPLABSRESULTS_GEN_ALL_CORE
11.7   6.64  )-----------( 421      ( 18 Dec 2022 18:04 )             34.4       12-18    139  |  101  |  10  ----------------------------<  101<H>  4.0   |  27  |  0.5<L>    Ca    9.3      18 Dec 2022 18:04    TPro  7.1  /  Alb  4.1  /  TBili  <0.2  /  DBili  x   /  AST  11  /  ALT  14  /  AlkPhos  74  12-18                  PT/INR - ( 18 Dec 2022 18:04 )   PT: 12.10 sec;   INR: 1.06 ratio         PTT - ( 18 Dec 2022 18:04 )  PTT:35.0 sec    Lactate Trend  12-18 @ 18:04 Lactate:0.8             CAPILLARY BLOOD GLUCOSE

## 2022-12-18 NOTE — H&P ADULT - NSHPREVIEWOFSYSTEMS_GEN_ALL_CORE
CONSTITUTIONAL - No fever, No diaphoresis, No weight change  SKIN - No rash  HEMATOLOGIC - No abnormal bleeding or bruising  EYES - No eye pain, No blurred vision  ENT - No change in hearing, No sore throat, No neck pain, No rhinorrhea, No ear pain  RESPIRATORY - No shortness of breath, No cough  CARDIAC -No chest pain, No palpitations  GI - No abdominal pain, No nausea, No vomiting, No diarrhea, No constipation, No bright red blood per rectum or melena. No flank pain  - No dysuria, frequency, hematuria.   ENDO - No polydypsia, No polyuria, No heat/cold intolerance  MUSCULOSKELETAL - No joint paint, No swelling, No back pain  NEUROLOGIC - No numbness, No focal weakness, No headache, No dizziness  All other systems negative, unless specified in HPI CONSTITUTIONAL - No fever, No diaphoresis, No weight change  SKIN - No rash  HEMATOLOGIC - No abnormal bleeding or bruising  EYES - No eye pain, No blurred vision  ENT - No change in hearing, No sore throat, No neck pain, No rhinorrhea, No ear pain  RESPIRATORY - No shortness of breath, No cough  CARDIAC -No chest pain, No palpitations  GI -see HPI  - No dysuria, frequency, hematuria.   ENDO - No polydypsia, No polyuria, No heat/cold intolerance  MUSCULOSKELETAL - No joint paint, No swelling, No back pain  NEUROLOGIC - No numbness, No focal weakness, No headache, No dizziness  All other systems negative, unless specified in HPI

## 2022-12-18 NOTE — ED PROVIDER NOTE - PHYSICAL EXAMINATION
Physical Exam    Constitutional: No acute distress.   Eyes: Conjunctiva pink, Sclera clear, PERRLA, EOMI.  ENT: No sinus tenderness. No nasal discharge. No oropharyngeal erythema, edema, or exudates. Uvula midline.   Cardiovascular: Regular rate, regular rhythm. No noted murmurs rubs or gallops.  Respiratory: unlabored respiratory effort, clear to auscultation bilaterally no wheezing, rales or rhonchi  Gastrointestinal: Normal bowel sounds. soft, non distended, diffuse abdominal tenderness.  Musculoskeletal: supple neck, no midline tenderness. No joint or bony deformity.   Integumentary: warm, dry, no rash  Neurologic: awake, alert, cranial nerves II-XII grossly intact, extremities’ motor and sensory functions grossly intact

## 2022-12-18 NOTE — ED PROVIDER NOTE - DIFFERENTIAL DIAGNOSIS
Pancreatitis,   UTI, Differential Diagnosis Pancreatitis,   UTI,  intraabdominal abscess,   post-op complications,   UTI.  Peptic ulcer disease.

## 2022-12-18 NOTE — ED PROVIDER NOTE - CARE PLAN
1 Principal Discharge DX:	Elevated lipase  Secondary Diagnosis:	Abdominal pain  Secondary Diagnosis:	S/P myomectomy

## 2022-12-18 NOTE — H&P ADULT - HISTORY OF PRESENT ILLNESS
42 year old woman with pmh of uterine fibroids s/p myomectomy/cystectomy at [facility] with Dr. Phillip on 22, pancreatitis, hyperthyroidism, and peptic ulcer disease presents to the ED with 3 days of lower abdominal pain. Patient describes pain as 10/10 constant and feeling like "pressure". Patient describes feeling pressure when trying to pass stool, fevers/chills, denies any urinary symptoms, last BM last Friday, passing gas. Condones some nausea and 1x episode of emesis 2 days ago, NBNB. Of note, patient states she has had chronic issues with constipation and follows with her GI doctor Dr. Cason. Last colonscopy 2 years ago with removal of benign polyps, last endoscopy 2 years ago revealing a single duodenal ulcer for which the patient takes omeprazole. Of note, patient has recently stopped taking her miralax since her operation. CTAP revealing 4 cm multiloculated fluid collection superolateral to the uterine fundus. Labs significant for lipase of 528, LFTs wnl. General surgery consulted for elevated lipase.  41yo P2 POD 12 s/p abdominal myomectomy by Dr. Phillip at [facility] presents for worsening abdominal pain since the surgery. Patient states the pain is across entire lower abdomen, however it is worse on the LLQ. Per patient, she was discharged on  from [facility] with an oxycodone prescription which she finished. She missed her post-op appointment on  due to pain. Today the pain worsened to 10/10 pain prompted her ED visit. She received 4mg IV morphine push and pain improved to 7-8/10. Patient denies fevers or chills. Patient endorses nausea without vomiting. patient is passing gas and having bowel movements without difficulty.    In the ED pts VS were T(C): 35.7  T(F): 96.3  HR: 92  BP: 137/73  RR: 17  SpO2: 98%  Labs show: H.7   WBC:6.64  Plt:421  Creatinine: 0.5, Lipase 528  CT Abdomen and Pelvis postoperative inflammatory changes to the lower ventral subcutaneous abdominal wall. Multiloculated fluid collection superolateral to the uterine fundus measuring up to 4.1 cm likely represents a postoperative seroma associated with postoperative inflammatory fat stranding and trace pelvic ascites.    Pt is admitted for further workup and management of Abdominal pain   42 year old woman with pmhx of uterine fibroids s/p myomectomy/cystectomy at [facility] with Dr. Phillip on 22, pancreatitis, hyperthyroidism, and peptic ulcer disease presents to the ED with 3 days of lower abdominal pain. Pt says this pain started after her surgery and has been getting worse. She had a post op visit and her OBGYN told her if it does not get better she should go to the ED. Patient describes pain as 10/10 constant and feeling like "pressure" and shooting in nature. Patient describes feeling pressure when trying to pass stool. Pt says her last BM was on Friday, is passing gas. Pt endorses nausea and 1x episode of emesis 2 days ago, NBNB. Of note, patient states she has had chronic issues with constipation and follows with her GI doctor Dr. Cason. Last colposcopy 2 years ago with removal of benign polyps, last endoscopy 2 years ago revealing a single duodenal ulcer for which the patient takes omeprazole. Of note, patient has recently stopped taking her Miralax since her operation. She received 4mg IV morphine push and pain improved to 7-8/10.     In the ED pts VS were T(C): 35.7  T(F): 96.3  HR: 92  BP: 137/73  RR: 17  SpO2: 98%  Labs show: H.7   WBC:6.64  Plt:421  Creatinine: 0.5, Lipase 528  CT Abdomen and Pelvis postoperative inflammatory changes to the lower ventral subcutaneous abdominal wall. Multiloculated fluid collection superolateral to the uterine fundus measuring up to 4.1 cm likely represents a postoperative seroma associated with postoperative inflammatory fat stranding and trace pelvic ascites.    Pt is admitted for further workup and management of Abdominal pain

## 2022-12-19 LAB
A1C WITH ESTIMATED AVERAGE GLUCOSE RESULT: 5.8 % — HIGH (ref 4–5.6)
ALBUMIN SERPL ELPH-MCNC: 4.2 G/DL — SIGNIFICANT CHANGE UP (ref 3.5–5.2)
ALP SERPL-CCNC: 71 U/L — SIGNIFICANT CHANGE UP (ref 30–115)
ALT FLD-CCNC: 13 U/L — SIGNIFICANT CHANGE UP (ref 0–41)
ANION GAP SERPL CALC-SCNC: 12 MMOL/L — SIGNIFICANT CHANGE UP (ref 7–14)
AST SERPL-CCNC: 9 U/L — SIGNIFICANT CHANGE UP (ref 0–41)
BASOPHILS # BLD AUTO: 0.02 K/UL — SIGNIFICANT CHANGE UP (ref 0–0.2)
BASOPHILS NFR BLD AUTO: 0.2 % — SIGNIFICANT CHANGE UP (ref 0–1)
BILIRUB SERPL-MCNC: <0.2 MG/DL — SIGNIFICANT CHANGE UP (ref 0.2–1.2)
BUN SERPL-MCNC: 8 MG/DL — LOW (ref 10–20)
CALCIUM SERPL-MCNC: 9.7 MG/DL — SIGNIFICANT CHANGE UP (ref 8.4–10.5)
CHLORIDE SERPL-SCNC: 101 MMOL/L — SIGNIFICANT CHANGE UP (ref 98–110)
CHOLEST SERPL-MCNC: 160 MG/DL — SIGNIFICANT CHANGE UP
CO2 SERPL-SCNC: 23 MMOL/L — SIGNIFICANT CHANGE UP (ref 17–32)
CREAT SERPL-MCNC: 0.5 MG/DL — LOW (ref 0.7–1.5)
EGFR: 122 ML/MIN/1.73M2 — SIGNIFICANT CHANGE UP
EOSINOPHIL # BLD AUTO: 0.01 K/UL — SIGNIFICANT CHANGE UP (ref 0–0.7)
EOSINOPHIL NFR BLD AUTO: 0.1 % — SIGNIFICANT CHANGE UP (ref 0–8)
ESTIMATED AVERAGE GLUCOSE: 120 MG/DL — HIGH (ref 68–114)
GLUCOSE SERPL-MCNC: 112 MG/DL — HIGH (ref 70–99)
HCT VFR BLD CALC: 35.5 % — LOW (ref 37–47)
HDLC SERPL-MCNC: 62 MG/DL — SIGNIFICANT CHANGE UP
HGB BLD-MCNC: 11.6 G/DL — LOW (ref 12–16)
IMM GRANULOCYTES NFR BLD AUTO: 0.4 % — HIGH (ref 0.1–0.3)
LIPID PNL WITH DIRECT LDL SERPL: 88 MG/DL — SIGNIFICANT CHANGE UP
LYMPHOCYTES # BLD AUTO: 1.92 K/UL — SIGNIFICANT CHANGE UP (ref 1.2–3.4)
LYMPHOCYTES # BLD AUTO: 18.9 % — LOW (ref 20.5–51.1)
MAGNESIUM SERPL-MCNC: 2.1 MG/DL — SIGNIFICANT CHANGE UP (ref 1.8–2.4)
MCHC RBC-ENTMCNC: 27.6 PG — SIGNIFICANT CHANGE UP (ref 27–31)
MCHC RBC-ENTMCNC: 32.7 G/DL — SIGNIFICANT CHANGE UP (ref 32–37)
MCV RBC AUTO: 84.3 FL — SIGNIFICANT CHANGE UP (ref 81–99)
MONOCYTES # BLD AUTO: 0.41 K/UL — SIGNIFICANT CHANGE UP (ref 0.1–0.6)
MONOCYTES NFR BLD AUTO: 4 % — SIGNIFICANT CHANGE UP (ref 1.7–9.3)
NEUTROPHILS # BLD AUTO: 7.74 K/UL — HIGH (ref 1.4–6.5)
NEUTROPHILS NFR BLD AUTO: 76.4 % — HIGH (ref 42.2–75.2)
NON HDL CHOLESTEROL: 98 MG/DL — SIGNIFICANT CHANGE UP
NRBC # BLD: 0 /100 WBCS — SIGNIFICANT CHANGE UP (ref 0–0)
PLATELET # BLD AUTO: 464 K/UL — HIGH (ref 130–400)
POTASSIUM SERPL-MCNC: 4.3 MMOL/L — SIGNIFICANT CHANGE UP (ref 3.5–5)
POTASSIUM SERPL-SCNC: 4.3 MMOL/L — SIGNIFICANT CHANGE UP (ref 3.5–5)
PROT SERPL-MCNC: 6.8 G/DL — SIGNIFICANT CHANGE UP (ref 6–8)
RBC # BLD: 4.21 M/UL — SIGNIFICANT CHANGE UP (ref 4.2–5.4)
RBC # FLD: 13.8 % — SIGNIFICANT CHANGE UP (ref 11.5–14.5)
SARS-COV-2 RNA SPEC QL NAA+PROBE: SIGNIFICANT CHANGE UP
SODIUM SERPL-SCNC: 136 MMOL/L — SIGNIFICANT CHANGE UP (ref 135–146)
TRIGL SERPL-MCNC: 51 MG/DL — SIGNIFICANT CHANGE UP
TSH SERPL-MCNC: 0.11 UIU/ML — LOW (ref 0.27–4.2)
WBC # BLD: 10.14 K/UL — SIGNIFICANT CHANGE UP (ref 4.8–10.8)
WBC # FLD AUTO: 10.14 K/UL — SIGNIFICANT CHANGE UP (ref 4.8–10.8)

## 2022-12-19 PROCEDURE — 76705 ECHO EXAM OF ABDOMEN: CPT | Mod: 26

## 2022-12-19 PROCEDURE — 99223 1ST HOSP IP/OBS HIGH 75: CPT

## 2022-12-19 RX ORDER — MORPHINE SULFATE 50 MG/1
2 CAPSULE, EXTENDED RELEASE ORAL EVERY 4 HOURS
Refills: 0 | Status: DISCONTINUED | OUTPATIENT
Start: 2022-12-19 | End: 2022-12-22

## 2022-12-19 RX ORDER — SODIUM CHLORIDE 9 MG/ML
1000 INJECTION, SOLUTION INTRAVENOUS
Refills: 0 | Status: DISCONTINUED | OUTPATIENT
Start: 2022-12-19 | End: 2022-12-19

## 2022-12-19 RX ORDER — ALBUTEROL 90 UG/1
2 AEROSOL, METERED ORAL EVERY 6 HOURS
Refills: 0 | Status: DISCONTINUED | OUTPATIENT
Start: 2022-12-19 | End: 2022-12-22

## 2022-12-19 RX ORDER — SODIUM CHLORIDE 9 MG/ML
1000 INJECTION, SOLUTION INTRAVENOUS
Refills: 0 | Status: DISCONTINUED | OUTPATIENT
Start: 2022-12-19 | End: 2022-12-22

## 2022-12-19 RX ORDER — ATENOLOL 25 MG/1
25 TABLET ORAL DAILY
Refills: 0 | Status: DISCONTINUED | OUTPATIENT
Start: 2022-12-19 | End: 2022-12-22

## 2022-12-19 RX ORDER — ENOXAPARIN SODIUM 100 MG/ML
40 INJECTION SUBCUTANEOUS EVERY 24 HOURS
Refills: 0 | Status: DISCONTINUED | OUTPATIENT
Start: 2022-12-19 | End: 2022-12-22

## 2022-12-19 RX ORDER — ACETAMINOPHEN 500 MG
650 TABLET ORAL EVERY 6 HOURS
Refills: 0 | Status: DISCONTINUED | OUTPATIENT
Start: 2022-12-19 | End: 2022-12-22

## 2022-12-19 RX ORDER — ONDANSETRON 8 MG/1
4 TABLET, FILM COATED ORAL EVERY 8 HOURS
Refills: 0 | Status: DISCONTINUED | OUTPATIENT
Start: 2022-12-19 | End: 2022-12-22

## 2022-12-19 RX ORDER — PANTOPRAZOLE SODIUM 20 MG/1
40 TABLET, DELAYED RELEASE ORAL DAILY
Refills: 0 | Status: DISCONTINUED | OUTPATIENT
Start: 2022-12-19 | End: 2022-12-20

## 2022-12-19 RX ADMIN — ENOXAPARIN SODIUM 40 MILLIGRAM(S): 100 INJECTION SUBCUTANEOUS at 17:50

## 2022-12-19 RX ADMIN — MORPHINE SULFATE 4 MILLIGRAM(S): 50 CAPSULE, EXTENDED RELEASE ORAL at 00:06

## 2022-12-19 RX ADMIN — MORPHINE SULFATE 2 MILLIGRAM(S): 50 CAPSULE, EXTENDED RELEASE ORAL at 22:29

## 2022-12-19 RX ADMIN — SODIUM CHLORIDE 75 MILLILITER(S): 9 INJECTION, SOLUTION INTRAVENOUS at 02:05

## 2022-12-19 RX ADMIN — SODIUM CHLORIDE 200 MILLILITER(S): 9 INJECTION, SOLUTION INTRAVENOUS at 21:33

## 2022-12-19 RX ADMIN — MORPHINE SULFATE 2 MILLIGRAM(S): 50 CAPSULE, EXTENDED RELEASE ORAL at 23:00

## 2022-12-19 RX ADMIN — ATENOLOL 25 MILLIGRAM(S): 25 TABLET ORAL at 05:15

## 2022-12-19 RX ADMIN — MORPHINE SULFATE 2 MILLIGRAM(S): 50 CAPSULE, EXTENDED RELEASE ORAL at 09:31

## 2022-12-19 RX ADMIN — MORPHINE SULFATE 4 MILLIGRAM(S): 50 CAPSULE, EXTENDED RELEASE ORAL at 00:45

## 2022-12-19 RX ADMIN — MORPHINE SULFATE 2 MILLIGRAM(S): 50 CAPSULE, EXTENDED RELEASE ORAL at 17:48

## 2022-12-19 RX ADMIN — PANTOPRAZOLE SODIUM 40 MILLIGRAM(S): 20 TABLET, DELAYED RELEASE ORAL at 14:52

## 2022-12-19 RX ADMIN — MORPHINE SULFATE 2 MILLIGRAM(S): 50 CAPSULE, EXTENDED RELEASE ORAL at 03:43

## 2022-12-19 NOTE — CONSULT NOTE ADULT - ASSESSMENT
42 year old woman with pmhx of uterine fibroids s/p myomectomy/cystectomy at [facility] with Dr. Phillip on 12/6/22, pancreatitis, hyperthyroidism, and peptic ulcer disease presents to the ED with 3 days of lower abdominal pain. GI consulted for pancreatitis.     -lipase 528  -CT abd with unremarkable pancreas  -RUQ sono unremarkable   -incomplete note  42 year old woman with pmhx of uterine fibroids s/p myomectomy/cystectomy at [facility] with Dr. Phillip on 12/6/22, pancreatitis, hyperthyroidism, and peptic ulcer disease presents to the ED with 3 days of lower abdominal pain. GI consulted for pancreatitis.     #Acute pancreatitis vs PUD vs postop pain vs other  -lipase 528, epigastric pain (2/3 criteria)  -CT abd with unremarkable pancreas, post op findings from recent fibroid surgery   -RUQ sono unremarkable   -Of note pt has never had imaging findings of pancreatitis despite numerous hospitalizations for pancreatitis including two this year   -Hx of PUD, last egd in 2019 showing complete healing of prior ulcer at GJ junction   -Recent fibroid surgery last week    Plan:  -increase fluids to rate of 200-250cc/hr  -Nausea and Pain control  -Send IgG4  -PPI BID   -Will tentatively plan for EGD on Wednesday

## 2022-12-19 NOTE — PATIENT PROFILE ADULT - TOBACCO USE
called to bedside after pt states HA resolved and does not want meds or CT done.  pt A&Ox3.  pt understands the risks of AMA including but not limited to death.  states will f/u.
Never smoker

## 2022-12-19 NOTE — CONSULT NOTE ADULT - SUBJECTIVE AND OBJECTIVE BOX
Gastroenterology Consultation:    Patient is a 42y old  Female who presents with a chief complaint of Abdominal pain (19 Dec 2022 06:29)        Admitted on: 22      HPI:  42 year old woman with pmhx of uterine fibroids s/p myomectomy/cystectomy at [facility] with Dr. Phillip on 22, pancreatitis, hyperthyroidism, and peptic ulcer disease presents to the ED with 3 days of lower abdominal pain. Pt says this pain started after her surgery and has been getting worse. She had a post op visit and her OBGYN told her if it does not get better she should go to the ED. Patient describes pain as 10/10 constant and feeling like "pressure" and shooting in nature. Patient describes feeling pressure when trying to pass stool. Pt says her last BM was on Friday, is passing gas. Pt endorses nausea and 1x episode of emesis 2 days ago, NBNB. Of note, patient states she has had chronic issues with constipation and follows with her GI doctor Dr. Cason. Last colposcopy 2 years ago with removal of benign polyps, last endoscopy 2 years ago revealing a single duodenal ulcer for which the patient takes omeprazole. Of note, patient has recently stopped taking her Miralax since her operation. She received 4mg IV morphine push and pain improved to 7-8/10.     In the ED pts VS were T(C): 35.7  T(F): 96.3  HR: 92  BP: 137/73  RR: 17  SpO2: 98%  Labs show: H.7   WBC:6.64  Plt:421  Creatinine: 0.5, Lipase 528  CT Abdomen and Pelvis postoperative inflammatory changes to the lower ventral subcutaneous abdominal wall. Multiloculated fluid collection superolateral to the uterine fundus measuring up to 4.1 cm likely represents a postoperative seroma associated with postoperative inflammatory fat stranding and trace pelvic ascites.    Pt is admitted for further workup and management of Abdominal pain   (18 Dec 2022 23:22)        Prior EGD:    Prior Colonoscopy:      PAST MEDICAL & SURGICAL HISTORY:  Abscess  vaginal      Fibroid, uterine      Peptic ulcer      Ovarian cyst      Pancreatitis      Delivery with history of       Fibroid  uterine surgery            FAMILY HISTORY:  FH: colon cancer        Social History:  Tobacco:  Alcohol:  Drugs:    Home Medications:  atenolol 25 mg oral tablet: 1 tab(s) orally once a day (19 Dec 2022 00:55)  methIMAzole 5 mg oral tablet: 1 tab(s) orally once a day (19 Dec 2022 00:55)        MEDICATIONS  (STANDING):  atenolol  Tablet 25 milliGRAM(s) Oral daily  enoxaparin Injectable 40 milliGRAM(s) SubCutaneous every 24 hours  lactated ringers. 1000 milliLiter(s) (75 mL/Hr) IV Continuous <Continuous>  methimazole 5 milliGRAM(s) Oral daily  pantoprazole  Injectable 40 milliGRAM(s) IV Push daily    MEDICATIONS  (PRN):  acetaminophen     Tablet .. 650 milliGRAM(s) Oral every 6 hours PRN Temp greater or equal to 38C (100.4F), Mild Pain (1 - 3)  albuterol    90 MICROgram(s) HFA Inhaler 2 Puff(s) Inhalation every 6 hours PRN Bronchospasm  morphine  - Injectable 2 milliGRAM(s) IV Push every 4 hours PRN Moderate Pain (4 - 6)  ondansetron Injectable 4 milliGRAM(s) IV Push every 8 hours PRN Nausea and/or Vomiting      Allergies  iodine containing compounds (Unknown)  IV CONTRAST (Anaphylaxis)  shellfish (Anaphylaxis)      Review of Systems:   Constitutional:  No Fever, No Chills  ENT/Mouth:  No Hearing Changes,  No Difficulty Swallowing  Eyes:  No Eye Pain, No Vision Changes  Cardiovascular:  No Chest Pain, No Palpitations  Respiratory:  No Cough, No Dyspnea  Gastrointestinal:  As described in HPI          Physical Examination:  T(C): 36.1 (22 @ 04:28), Max: 36.8 (22 @ 23:59)  HR: 72 (22 @ 04:28) (70 - 92)  BP: 119/60 (22 @ 04:28) (119/60 - 137/73)  RR: 18 (22 @ 04:28) (17 - 18)  SpO2: 99% (22 @ 04:28) (98% - 99%)  Height (cm): 152.4 (22 @ 04:28)  Weight (kg): 88.9 (22 @ 04:28)        GENERAL: AAOx3, no acute distress.  HEAD:  Atraumatic, Normocephalic  EYES: conjunctiva and sclera clear  CHEST/LUNG: Clear to auscultation bilaterally; No wheeze, rhonchi, or rales  HEART: Regular rate and rhythm; normal S1, S2, No murmurs.  ABDOMEN: Soft, nontender, nondistended; Bowel sounds present  SKIN: Intact, no jaundice        Data:                        11.7   6.64  )-----------( 421      ( 18 Dec 2022 18:04 )             34.4     Hgb Trend:  11.7  -22 @ 18:04          139  |  101  |  10  ----------------------------<  101<H>  4.0   |  27  |  0.5<L>    Ca    9.3      18 Dec 2022 18:04    TPro  7.1  /  Alb  4.1  /  TBili  <0.2  /  DBili  x   /  AST  11  /  ALT  14  /  AlkPhos  74      Liver panel trend:  TBili <0.2   /   AST 11   /   ALT 14   /   AlkP 74   /   Tptn 7.1   /   Alb 4.1    /   DBili --            PT/INR - ( 18 Dec 2022 18:04 )   PT: 12.10 sec;   INR: 1.06 ratio         PTT - ( 18 Dec 2022 18:04 )  PTT:35.0 sec        Radiology:  CT Abdomen and Pelvis w/ IV Cont:   ACC: [id number] EXAM:  CT ABDOMEN AND PELVIS IC                          PROCEDURE DATE:  2022          INTERPRETATION:  CLINICAL STATEMENT: Post myomectomy presenting with pain.    TECHNIQUE: Contiguous axial CT images were obtained from the lower chest   to the pubic symphysis following the administration of 95 cc Omnipaque   350 intravenous contrast. Oral contrast was not administered. Reformatted   images in the coronal and sagittal planes were acquired.    COMPARISON CT: Abdomen/pelvis2022.      FINDINGS:  LOWER CHEST: Unchanged are millimeters solid right middle lobe pulmonary   nodule.    HEPATOBILIARY: No focal liver lesion. Patent portal vein. No biliary   ductal dilatation.    SPLEEN: Unremarkable.    PANCREAS: Unremarkable.    ADRENAL GLANDS: Unremarkable.    KIDNEYS: Symmetric renal enhancement. No hydronephrosis.    ABDOMINOPELVIC NODES: No enlarged abdominal or pelvic lymph nodes.    PELVIC ORGANS: Patient is noted to be status post lower ventral abdominal   wallapproach myomectomy with postoperative inflammatory changes. There   is a left-sided multiloculated fluid collection superolateral to the   uterine fundus measuring 4.1 x 4.1 x 3.5 which likely represents a   postoperative seroma (HU 10 on series 4 image 243) associated with   inflammatory fat stranding and trace pelvic ascites. Hematoma formation   is less likely.    PERITONEUM/MESENTERY/BOWEL: No free air, ascites, or bowel obstruction.   Inflammatory fat stranding surrounding the sigmoid, likely on the basis   of postoperative changes.    BONES/SOFT TISSUES: No acute osseous abnormality. Lower ventral   subcutaneous inflammatory changes as above.    VASCULAR: Normal caliber aorta.      IMPRESSION:  1.  Status post lower ventral abdominal wall approach for myomectomy with   postoperative inflammatory changes to the lower ventral subcutaneous   abdominal wall.  2.  Multiloculated fluid collection superolateral to the uterine fundus   measuring up to 4.1 cm likely represents a postoperative seroma   associated with postoperative inflammatory fat stranding and trace pelvic   ascites.    --- End of Report ---          RAMA MILLER MD; Resident Radiologist  This document has been electronically signed.  CHARLEE CHENG MD; Attending Radiologist  This document has been electronically signed. Dec 18 2022  8:15PM (22 @ 19:01)    US Abdomen Upper Quadrant Right:   ACC: [id number] EXAM:  US ABDOMEN RT UPR QUADRANT                          PROCEDURE DATE:  2022          INTERPRETATION:  CLINICAL INFORMATION: Abdominal pain.    Comparison made with CT abdomen and pelvis 2022, ultrasound   2019.    TECHNIQUE: Sonography of the right upper quadrant.    FINDINGS:  Liver: Within normal limits.  Bile ducts: Normal caliber. Common bile duct measures 3 mm.  Gallbladder: Within normal limits.  Pancreas: Visualized portions are within normal limits.  Right kidney: 9.3 cm. No hydronephrosis.  Ascites: None.  IVC: Visualized portions are within normal limits.    IMPRESSION:  Unremarkable right upper quadrant abdominal ultrasound.        --- End of Report ---            RYLEE SPEARS MD; Attending Radiologist  This document has been electronically signed. Dec 19 2022  2:19AM (22 @ 02:09)     Gastroenterology Consultation:    Patient is a 42y old  Female who presents with a chief complaint of Abdominal pain (19 Dec 2022 06:29)        Admitted on: 22      HPI:  42 year old woman with pmhx of uterine fibroids s/p myomectomy/cystectomy at [facility] with Dr. Phillip on 22, pancreatitis, hyperthyroidism, and peptic ulcer disease presents to the ED with 3 days of lower abdominal pain. Pt says this pain started after her surgery and has been getting worse. She had a post op visit and her OBGYN told her if it does not get better she should go to the ED. Patient describes pain as 10/10 constant and feeling like "pressure" and shooting in nature. Patient describes feeling pressure when trying to pass stool. Pt says her last BM was on Friday, is passing gas. Pt endorses nausea and 1x episode of emesis 2 days ago, NBNB. Of note, patient states she has had chronic issues with constipation and follows with her GI doctor Dr. Cason. Last colposcopy 2 years ago with removal of benign polyps, last endoscopy 2 years ago revealing a single duodenal ulcer for which the patient takes omeprazole. Of note, patient has recently stopped taking her Miralax since her operation. She received 4mg IV morphine push and pain improved to 7-8/10.     In the ED pts VS were T(C): 35.7  T(F): 96.3  HR: 92  BP: 137/73  RR: 17  SpO2: 98%  Labs show: H.7   WBC:6.64  Plt:421  Creatinine: 0.5, Lipase 528  CT Abdomen and Pelvis postoperative inflammatory changes to the lower ventral subcutaneous abdominal wall. Multiloculated fluid collection superolateral to the uterine fundus measuring up to 4.1 cm likely represents a postoperative seroma associated with postoperative inflammatory fat stranding and trace pelvic ascites.    Pt is admitted for further workup and management of Abdominal pain   (18 Dec 2022 23:22)        Prior EGD: < from: EGD (19 @ 10:00) >  Other Interventions:   Impressions:    The GEJ ulcer previously seen has completely healed.    Erythema in the stomach. (Biopsy).    Normal mucosa in the whole examined duodenum.     < end of copied text >      Prior Colonoscopy:  < from: Colonoscopy (19 @ 11:45) >  Impressions:    Polyp (5 mm) in the rectum. (Biopsy).    Otherwise normal colon and terminal ileum. Biopsies taken of Ascending,  transverse, sigmoid colon to rule out microscopic colitis. (Biopsy).     < end of copied text >        PAST MEDICAL & SURGICAL HISTORY:  Abscess  vaginal      Fibroid, uterine      Peptic ulcer      Ovarian cyst      Pancreatitis      Delivery with history of       Fibroid  uterine surgery            FAMILY HISTORY:  FH: colon cancer        Social History:  Tobacco:no  Alcohol:no  Drugs:no    Home Medications:  atenolol 25 mg oral tablet: 1 tab(s) orally once a day (19 Dec 2022 00:55)  methIMAzole 5 mg oral tablet: 1 tab(s) orally once a day (19 Dec 2022 00:55)        MEDICATIONS  (STANDING):  atenolol  Tablet 25 milliGRAM(s) Oral daily  enoxaparin Injectable 40 milliGRAM(s) SubCutaneous every 24 hours  lactated ringers. 1000 milliLiter(s) (75 mL/Hr) IV Continuous <Continuous>  methimazole 5 milliGRAM(s) Oral daily  pantoprazole  Injectable 40 milliGRAM(s) IV Push daily    MEDICATIONS  (PRN):  acetaminophen     Tablet .. 650 milliGRAM(s) Oral every 6 hours PRN Temp greater or equal to 38C (100.4F), Mild Pain (1 - 3)  albuterol    90 MICROgram(s) HFA Inhaler 2 Puff(s) Inhalation every 6 hours PRN Bronchospasm  morphine  - Injectable 2 milliGRAM(s) IV Push every 4 hours PRN Moderate Pain (4 - 6)  ondansetron Injectable 4 milliGRAM(s) IV Push every 8 hours PRN Nausea and/or Vomiting      Allergies  iodine containing compounds (Unknown)  IV CONTRAST (Anaphylaxis)  shellfish (Anaphylaxis)      Review of Systems:   Constitutional:  No Fever, No Chills  ENT/Mouth:  No Hearing Changes,  No Difficulty Swallowing  Eyes:  No Eye Pain, No Vision Changes  Cardiovascular:  No Chest Pain, No Palpitations  Respiratory:  No Cough, No Dyspnea  Gastrointestinal:  As described in HPI          Physical Examination:  T(C): 36.1 (22 @ 04:28), Max: 36.8 (22 @ 23:59)  HR: 72 (22 @ 04:28) (70 - 92)  BP: 119/60 (22 @ 04:28) (119/60 - 137/73)  RR: 18 (22 @ 04:28) (17 - 18)  SpO2: 99% (22 @ 04:28) (98% - 99%)  Height (cm): 152.4 (22 @ 04:28)  Weight (kg): 88.9 (22 @ 04:28)        GENERAL: AAOx3, no acute distress.  HEAD:  Atraumatic, Normocephalic  EYES: conjunctiva and sclera clear  CHEST/LUNG: Clear to auscultation bilaterally; No wheeze, rhonchi, or rales  HEART: Regular rate and rhythm; normal S1, S2, No murmurs.  ABDOMEN: Soft, nontender, nondistended; Bowel sounds present  SKIN: Intact, no jaundice        Data:                        11.7   6.64  )-----------( 421      ( 18 Dec 2022 18:04 )             34.4     Hgb Trend:  11.7  22 @ 18:04          139  |  101  |  10  ----------------------------<  101<H>  4.0   |  27  |  0.5<L>    Ca    9.3      18 Dec 2022 18:04    TPro  7.1  /  Alb  4.1  /  TBili  <0.2  /  DBili  x   /  AST  11  /  ALT  14  /  AlkPhos  74      Liver panel trend:  TBili <0.2   /   AST 11   /   ALT 14   /   AlkP 74   /   Tptn 7.1   /   Alb 4.1    /   DBili --            PT/INR - ( 18 Dec 2022 18:04 )   PT: 12.10 sec;   INR: 1.06 ratio         PTT - ( 18 Dec 2022 18:04 )  PTT:35.0 sec        Radiology:  CT Abdomen and Pelvis w/ IV Cont:   ACC: [id number] EXAM:  CT ABDOMEN AND PELVIS IC                          PROCEDURE DATE:  2022          INTERPRETATION:  CLINICAL STATEMENT: Post myomectomy presenting with pain.    TECHNIQUE: Contiguous axial CT images were obtained from the lower chest   to the pubic symphysis following the administration of 95 cc Omnipaque   350 intravenous contrast. Oral contrast was not administered. Reformatted   images in the coronal and sagittal planes were acquired.    COMPARISON CT: Abdomen/pelvis2022.      FINDINGS:  LOWER CHEST: Unchanged are millimeters solid right middle lobe pulmonary   nodule.    HEPATOBILIARY: No focal liver lesion. Patent portal vein. No biliary   ductal dilatation.    SPLEEN: Unremarkable.    PANCREAS: Unremarkable.    ADRENAL GLANDS: Unremarkable.    KIDNEYS: Symmetric renal enhancement. No hydronephrosis.    ABDOMINOPELVIC NODES: No enlarged abdominal or pelvic lymph nodes.    PELVIC ORGANS: Patient is noted to be status post lower ventral abdominal   wallapproach myomectomy with postoperative inflammatory changes. There   is a left-sided multiloculated fluid collection superolateral to the   uterine fundus measuring 4.1 x 4.1 x 3.5 which likely represents a   postoperative seroma (HU 10 on series 4 image 243) associated with   inflammatory fat stranding and trace pelvic ascites. Hematoma formation   is less likely.    PERITONEUM/MESENTERY/BOWEL: No free air, ascites, or bowel obstruction.   Inflammatory fat stranding surrounding the sigmoid, likely on the basis   of postoperative changes.    BONES/SOFT TISSUES: No acute osseous abnormality. Lower ventral   subcutaneous inflammatory changes as above.    VASCULAR: Normal caliber aorta.      IMPRESSION:  1.  Status post lower ventral abdominal wall approach for myomectomy with   postoperative inflammatory changes to the lower ventral subcutaneous   abdominal wall.  2.  Multiloculated fluid collection superolateral to the uterine fundus   measuring up to 4.1 cm likely represents a postoperative seroma   associated with postoperative inflammatory fat stranding and trace pelvic   ascites.    --- End of Report ---          RAMA MILLER MD; Resident Radiologist  This document has been electronically signed.  CHARLEE CHENG MD; Attending Radiologist  This document has been electronically signed. Dec 18 2022  8:15PM (22 @ 19:01)    US Abdomen Upper Quadrant Right:   ACC: [id number] EXAM:  US ABDOMEN RT UPR QUADRANT                          PROCEDURE DATE:  2022          INTERPRETATION:  CLINICAL INFORMATION: Abdominal pain.    Comparison made with CT abdomen and pelvis 2022, ultrasound   2019.    TECHNIQUE: Sonography of the right upper quadrant.    FINDINGS:  Liver: Within normal limits.  Bile ducts: Normal caliber. Common bile duct measures 3 mm.  Gallbladder: Within normal limits.  Pancreas: Visualized portions are within normal limits.  Right kidney: 9.3 cm. No hydronephrosis.  Ascites: None.  IVC: Visualized portions are within normal limits.    IMPRESSION:  Unremarkable right upper quadrant abdominal ultrasound.        --- End of Report ---            RYLEE SPEARS MD; Attending Radiologist  This document has been electronically signed. Dec 19 2022  2:19AM (22 @ 02:09)

## 2022-12-19 NOTE — PROGRESS NOTE ADULT - ASSESSMENT
43yo s/p abdominal myomectomy by Dr. Phillip at [facility], POD #13, with postoperative pain, 4.1cm seroma without evidence of infection, elevated lipase r/o pancreatitis, currently admitted to medicine  -no acute GYN intervention   -pain management PRN  -continue management per primary team  -f/u GI consult  -GYN to follow    Dr. Gray and Dr. Bui to be made aware

## 2022-12-19 NOTE — CONSULT NOTE ADULT - ATTENDING COMMENTS
41 y/o P2 POD#12 s/p abdominal myomectomy with post operative pain, no evidence of infection. Lipase elevated, obtain general surgery consult. GYN will follow.
ACS Attending Note Attestation    Patient is examined and evaluated at the bedside with the residents/PAs. Treatment plan discussed with the team, nurses, and consulting physicians and consulting teams. Medications, radiological studies and all other relevant studies reviewed. I reviewed the resident/PA note and agreed with above assessment and plan with following additions and corrections.    MU OLVERA Patient is a 42y old  Female who presents with a chief complaint of Lower Abdominal pain post-OP, elevated lipase      Allergies  iodine containing compounds (Unknown)  IV CONTRAST (Anaphylaxis)  shellfish (Anaphylaxis)  Intolerances    PAST MEDICAL & SURGICAL HISTORY:  Abscess vaginal  Fibroid, uterine  Peptic ulcer  Ovarian cyst  Pancreatitis  Delivery with history of   Fibroid uterine surgery        Vital Signs Last 24 Hrs  T(C): 36.8 (18 Dec 2022 23:59), Max: 36.8 (18 Dec 2022 23:59)  T(F): 98.3 (18 Dec 2022 23:59), Max: 98.3 (18 Dec 2022 23:59)  HR: 70 (18 Dec 2022 23:59) (70 - 92)  BP: 125/71 (18 Dec 2022 23:59) (125/71 - 137/73)  BP(mean): --  RR: 17 (18 Dec 2022 16:31) (17 - 17)  SpO2: 99% (18 Dec 2022 23:59) (98% - 99%)    Parameters below as of 18 Dec 2022 16:31  Patient On (Oxygen Delivery Method): room air                            11.7   6.64  )-----------( 421      ( 18 Dec 2022 18:04 )             34.4     12-18    139  |  101  |  10  ----------------------------<  101<H>  4.0   |  27  |  0.5<L>    Ca    9.3      18 Dec 2022 18:04    TPro  7.1  /  Alb  4.1  /  TBili  <0.2  /  DBili  x   /  AST  11  /  ALT  14  /  AlkPhos  74  12-18      Diagnosis: post-OP pain    Plan:	  - no inytervention from surgery stand point, please refer for further management to GYN team  - supportive care  - GI/DVT prophylaxis  - pain management  - follow up consults  - repeat studies as needed    Kristal Mcrae MD, FACS  Trauma/ACS/Surcical Critical care Attending
43yo female presents for evaluation of abdominal pain and nausea. CT showing post operative changes following recent fibroid surgery/myomectomy otherwise no acute findings. Elevated lipase/clinical presentation may be secondary to pancreatitis (?recurrent unclear cause) though cannot exclude underlying ulcer disease as potential etiology. Recommend EGD to further evaluate. Pending findings recommend obtain IGG4 and consider EUS.

## 2022-12-20 LAB
ALBUMIN SERPL ELPH-MCNC: 3.7 G/DL — SIGNIFICANT CHANGE UP (ref 3.5–5.2)
ALP SERPL-CCNC: 60 U/L — SIGNIFICANT CHANGE UP (ref 30–115)
ALT FLD-CCNC: 9 U/L — SIGNIFICANT CHANGE UP (ref 0–41)
ANION GAP SERPL CALC-SCNC: 11 MMOL/L — SIGNIFICANT CHANGE UP (ref 7–14)
APTT BLD: 32 SEC — SIGNIFICANT CHANGE UP (ref 27–39.2)
AST SERPL-CCNC: 9 U/L — SIGNIFICANT CHANGE UP (ref 0–41)
BASOPHILS # BLD AUTO: 0.06 K/UL — SIGNIFICANT CHANGE UP (ref 0–0.2)
BASOPHILS NFR BLD AUTO: 0.6 % — SIGNIFICANT CHANGE UP (ref 0–1)
BILIRUB SERPL-MCNC: <0.2 MG/DL — SIGNIFICANT CHANGE UP (ref 0.2–1.2)
BLD GP AB SCN SERPL QL: SIGNIFICANT CHANGE UP
BUN SERPL-MCNC: 9 MG/DL — LOW (ref 10–20)
CALCIUM SERPL-MCNC: 8.7 MG/DL — SIGNIFICANT CHANGE UP (ref 8.4–10.5)
CHLORIDE SERPL-SCNC: 102 MMOL/L — SIGNIFICANT CHANGE UP (ref 98–110)
CO2 SERPL-SCNC: 25 MMOL/L — SIGNIFICANT CHANGE UP (ref 17–32)
CREAT SERPL-MCNC: 0.6 MG/DL — LOW (ref 0.7–1.5)
EGFR: 116 ML/MIN/1.73M2 — SIGNIFICANT CHANGE UP
EOSINOPHIL # BLD AUTO: 0.62 K/UL — SIGNIFICANT CHANGE UP (ref 0–0.7)
EOSINOPHIL NFR BLD AUTO: 6 % — SIGNIFICANT CHANGE UP (ref 0–8)
GLUCOSE SERPL-MCNC: 85 MG/DL — SIGNIFICANT CHANGE UP (ref 70–99)
HCT VFR BLD CALC: 31.9 % — LOW (ref 37–47)
HGB BLD-MCNC: 10.4 G/DL — LOW (ref 12–16)
IMM GRANULOCYTES NFR BLD AUTO: 0.3 % — SIGNIFICANT CHANGE UP (ref 0.1–0.3)
INR BLD: 1.04 RATIO — SIGNIFICANT CHANGE UP (ref 0.65–1.3)
LYMPHOCYTES # BLD AUTO: 3.41 K/UL — HIGH (ref 1.2–3.4)
LYMPHOCYTES # BLD AUTO: 33.1 % — SIGNIFICANT CHANGE UP (ref 20.5–51.1)
MAGNESIUM SERPL-MCNC: 1.9 MG/DL — SIGNIFICANT CHANGE UP (ref 1.8–2.4)
MCHC RBC-ENTMCNC: 27.6 PG — SIGNIFICANT CHANGE UP (ref 27–31)
MCHC RBC-ENTMCNC: 32.6 G/DL — SIGNIFICANT CHANGE UP (ref 32–37)
MCV RBC AUTO: 84.6 FL — SIGNIFICANT CHANGE UP (ref 81–99)
MONOCYTES # BLD AUTO: 0.7 K/UL — HIGH (ref 0.1–0.6)
MONOCYTES NFR BLD AUTO: 6.8 % — SIGNIFICANT CHANGE UP (ref 1.7–9.3)
NEUTROPHILS # BLD AUTO: 5.49 K/UL — SIGNIFICANT CHANGE UP (ref 1.4–6.5)
NEUTROPHILS NFR BLD AUTO: 53.2 % — SIGNIFICANT CHANGE UP (ref 42.2–75.2)
NRBC # BLD: 0 /100 WBCS — SIGNIFICANT CHANGE UP (ref 0–0)
PLATELET # BLD AUTO: 424 K/UL — HIGH (ref 130–400)
POTASSIUM SERPL-MCNC: 3.7 MMOL/L — SIGNIFICANT CHANGE UP (ref 3.5–5)
POTASSIUM SERPL-SCNC: 3.7 MMOL/L — SIGNIFICANT CHANGE UP (ref 3.5–5)
PROT SERPL-MCNC: 5.8 G/DL — LOW (ref 6–8)
PROTHROM AB SERPL-ACNC: 11.9 SEC — SIGNIFICANT CHANGE UP (ref 9.95–12.87)
RBC # BLD: 3.77 M/UL — LOW (ref 4.2–5.4)
RBC # FLD: 14.1 % — SIGNIFICANT CHANGE UP (ref 11.5–14.5)
SARS-COV-2 RNA SPEC QL NAA+PROBE: SIGNIFICANT CHANGE UP
SODIUM SERPL-SCNC: 138 MMOL/L — SIGNIFICANT CHANGE UP (ref 135–146)
T3 SERPL-MCNC: 81 NG/DL — SIGNIFICANT CHANGE UP (ref 80–200)
T4 FREE SERPL-MCNC: 0.9 NG/DL — SIGNIFICANT CHANGE UP (ref 0.9–1.8)
WBC # BLD: 10.31 K/UL — SIGNIFICANT CHANGE UP (ref 4.8–10.8)
WBC # FLD AUTO: 10.31 K/UL — SIGNIFICANT CHANGE UP (ref 4.8–10.8)

## 2022-12-20 PROCEDURE — 99233 SBSQ HOSP IP/OBS HIGH 50: CPT

## 2022-12-20 RX ORDER — PANTOPRAZOLE SODIUM 20 MG/1
40 TABLET, DELAYED RELEASE ORAL
Refills: 0 | Status: DISCONTINUED | OUTPATIENT
Start: 2022-12-20 | End: 2022-12-22

## 2022-12-20 RX ADMIN — MORPHINE SULFATE 2 MILLIGRAM(S): 50 CAPSULE, EXTENDED RELEASE ORAL at 13:10

## 2022-12-20 RX ADMIN — MORPHINE SULFATE 2 MILLIGRAM(S): 50 CAPSULE, EXTENDED RELEASE ORAL at 13:30

## 2022-12-20 RX ADMIN — MORPHINE SULFATE 2 MILLIGRAM(S): 50 CAPSULE, EXTENDED RELEASE ORAL at 18:20

## 2022-12-20 RX ADMIN — MORPHINE SULFATE 2 MILLIGRAM(S): 50 CAPSULE, EXTENDED RELEASE ORAL at 08:21

## 2022-12-20 RX ADMIN — SODIUM CHLORIDE 200 MILLILITER(S): 9 INJECTION, SOLUTION INTRAVENOUS at 05:21

## 2022-12-20 RX ADMIN — MORPHINE SULFATE 2 MILLIGRAM(S): 50 CAPSULE, EXTENDED RELEASE ORAL at 08:40

## 2022-12-20 RX ADMIN — MORPHINE SULFATE 2 MILLIGRAM(S): 50 CAPSULE, EXTENDED RELEASE ORAL at 23:19

## 2022-12-20 RX ADMIN — MORPHINE SULFATE 2 MILLIGRAM(S): 50 CAPSULE, EXTENDED RELEASE ORAL at 23:50

## 2022-12-20 RX ADMIN — MORPHINE SULFATE 2 MILLIGRAM(S): 50 CAPSULE, EXTENDED RELEASE ORAL at 05:00

## 2022-12-20 RX ADMIN — PANTOPRAZOLE SODIUM 40 MILLIGRAM(S): 20 TABLET, DELAYED RELEASE ORAL at 11:53

## 2022-12-20 RX ADMIN — MORPHINE SULFATE 2 MILLIGRAM(S): 50 CAPSULE, EXTENDED RELEASE ORAL at 18:05

## 2022-12-20 RX ADMIN — ATENOLOL 25 MILLIGRAM(S): 25 TABLET ORAL at 05:21

## 2022-12-20 RX ADMIN — ENOXAPARIN SODIUM 40 MILLIGRAM(S): 100 INJECTION SUBCUTANEOUS at 18:04

## 2022-12-20 RX ADMIN — MORPHINE SULFATE 2 MILLIGRAM(S): 50 CAPSULE, EXTENDED RELEASE ORAL at 04:36

## 2022-12-20 RX ADMIN — PANTOPRAZOLE SODIUM 40 MILLIGRAM(S): 20 TABLET, DELAYED RELEASE ORAL at 18:04

## 2022-12-20 NOTE — PROGRESS NOTE ADULT - ASSESSMENT
42 year old woman with pmhx of uterine fibroids s/p myomectomy/cystectomy at [facility] with Dr. Phillip on 12/6/22, pancreatitis, hyperthyroidism, and peptic ulcer disease presents to the ED with 3 days of lower abdominal pain. GI consulted for pancreatitis.     #)Acute pancreatitis vs PUD vs postop pain vs other  -lipase 528, epigastric pain (2/3 criteria)  -CT abd with unremarkable pancreas, post op findings from recent fibroid surgery   -RUQ sono unremarkable   -Of note pt has never had imaging findings of pancreatitis despite numerous hospitalizations for pancreatitis including two this year   -Hx of PUD, last egd in 2019 showing complete healing of prior ulcer at GJ junction   -Recent fibroid surgery last week    Plan:  -c/w /hour  -liquid diet today   -Nausea and Pain control  -IgG 4 pending   -PPI BID   -Will plan for EGD tomorrow 42 year old woman with pmhx of uterine fibroids s/p myomectomy/cystectomy at [facility] with Dr. Phillip on 12/6/22, pancreatitis, hyperthyroidism, and peptic ulcer disease presents to the ED with 3 days of abdominal pain. GI consulted for pancreatitis.     #)Acute pancreatitis vs PUD vs postop pain vs other  -lipase 528, epigastric pain (2/3 criteria)  -CT abd with unremarkable pancreas, post op findings from recent fibroid surgery   -RUQ sono unremarkable   -Of note pt has never had imaging findings of pancreatitis despite numerous hospitalizations for pancreatitis including two this year   -Hx of PUD, last egd in 2019 showing complete healing of prior ulcer at GJ junction   -Recent fibroid surgery last week    Plan:  -c/w /hour  -liquid diet today   -Nausea and Pain control  -IgG 4 pending   -PPI BID   -Will plan for EGD tomorrow

## 2022-12-20 NOTE — PROGRESS NOTE ADULT - ASSESSMENT
42 year old woman with pmhx of uterine fibroids s/p myomectomy/cystectomy at [facility] with Dr. Phillip on 12/6/22, pancreatitis, hyperthyroidism, and peptic ulcer disease presents to the ED with 3 days of lower abdominal pain.     #Acute pancreatitis vs PUD vs postop pain vs other  -lipase 528, epigastric pain (2/3 criteria); nothing on imaging   -CT abd with unremarkable pancreas, post op findings from recent fibroid surgery   -RUQ sono unremarkable   -Of note pt has never had imaging findings of pancreatitis despite numerous hospitalizations for pancreatitis including two this year   -Hx of PUD, last egd in 2019 showing complete healing of prior ulcer at GJ junction   -Recent fibroid surgery last week  -c/w /hour  -liquid diet today   -Nausea and Pain control  -IgG 4 pending   -PPI BID   -Will plan for EGD tomorrow    #S/p abdominal myomectomy by Dr. Phillip at [facility], POD #13  -no GYN intervention    #Hyperthyroidism (what kind?)   -tried calling patient's most recent endocrinologist, Dr. Priyank Castillo, at [phone], no response  -also tried calling patient's PMD Dr. Bay Joe at [phone], no response  -TSH is 0.11; sent fT4 and tT3; we need to know what kind of hyperthyroidism patient is being treated for prior to changing dose of Methimazole, and patient does not know; recommend trying to call above physicians again tomorrow; if again not answering, and we have fT4 and tT3 back, then consult endocrine to help with medication changes   -c/w methimazole 5mg po qd for now until above rectified     #DVT PPx  -LVX 40mg sq qhs    #Progress Note Handoff  Pending (specify):  EGD Wednesday, f/u IgG subsets, pain control, IVF   Family discussion: Plan of care discussed with patient, aware and agreeable. , Wade, at bedside.    Disposition: Home when stable

## 2022-12-21 ENCOUNTER — RESULT REVIEW (OUTPATIENT)
Age: 42
End: 2022-12-21

## 2022-12-21 ENCOUNTER — TRANSCRIPTION ENCOUNTER (OUTPATIENT)
Age: 42
End: 2022-12-21

## 2022-12-21 LAB
ALBUMIN SERPL ELPH-MCNC: 3.5 G/DL — SIGNIFICANT CHANGE UP (ref 3.5–5.2)
ALP SERPL-CCNC: 59 U/L — SIGNIFICANT CHANGE UP (ref 30–115)
ALT FLD-CCNC: 5 U/L — SIGNIFICANT CHANGE UP (ref 0–41)
ANION GAP SERPL CALC-SCNC: 10 MMOL/L — SIGNIFICANT CHANGE UP (ref 7–14)
APPEARANCE UR: CLEAR — SIGNIFICANT CHANGE UP
AST SERPL-CCNC: 7 U/L — SIGNIFICANT CHANGE UP (ref 0–41)
BASOPHILS # BLD AUTO: 0.02 K/UL — SIGNIFICANT CHANGE UP (ref 0–0.2)
BASOPHILS NFR BLD AUTO: 0.3 % — SIGNIFICANT CHANGE UP (ref 0–1)
BILIRUB SERPL-MCNC: 0.2 MG/DL — SIGNIFICANT CHANGE UP (ref 0.2–1.2)
BILIRUB UR-MCNC: NEGATIVE — SIGNIFICANT CHANGE UP
BUN SERPL-MCNC: 5 MG/DL — LOW (ref 10–20)
CALCIUM SERPL-MCNC: 8.6 MG/DL — SIGNIFICANT CHANGE UP (ref 8.4–10.5)
CHLORIDE SERPL-SCNC: 102 MMOL/L — SIGNIFICANT CHANGE UP (ref 98–110)
CO2 SERPL-SCNC: 28 MMOL/L — SIGNIFICANT CHANGE UP (ref 17–32)
COLOR SPEC: SIGNIFICANT CHANGE UP
CREAT SERPL-MCNC: 0.6 MG/DL — LOW (ref 0.7–1.5)
CRP SERPL-MCNC: 30.9 MG/L — HIGH
DIFF PNL FLD: NEGATIVE — SIGNIFICANT CHANGE UP
EGFR: 116 ML/MIN/1.73M2 — SIGNIFICANT CHANGE UP
EOSINOPHIL # BLD AUTO: 0.45 K/UL — SIGNIFICANT CHANGE UP (ref 0–0.7)
EOSINOPHIL NFR BLD AUTO: 6.4 % — SIGNIFICANT CHANGE UP (ref 0–8)
GLUCOSE SERPL-MCNC: 87 MG/DL — SIGNIFICANT CHANGE UP (ref 70–99)
GLUCOSE UR QL: NEGATIVE — SIGNIFICANT CHANGE UP
HCT VFR BLD CALC: 33.4 % — LOW (ref 37–47)
HGB BLD-MCNC: 10.8 G/DL — LOW (ref 12–16)
IMM GRANULOCYTES NFR BLD AUTO: 0.3 % — SIGNIFICANT CHANGE UP (ref 0.1–0.3)
KETONES UR-MCNC: SIGNIFICANT CHANGE UP
LEUKOCYTE ESTERASE UR-ACNC: NEGATIVE — SIGNIFICANT CHANGE UP
LYMPHOCYTES # BLD AUTO: 2.3 K/UL — SIGNIFICANT CHANGE UP (ref 1.2–3.4)
LYMPHOCYTES # BLD AUTO: 32.8 % — SIGNIFICANT CHANGE UP (ref 20.5–51.1)
MCHC RBC-ENTMCNC: 27.4 PG — SIGNIFICANT CHANGE UP (ref 27–31)
MCHC RBC-ENTMCNC: 32.3 G/DL — SIGNIFICANT CHANGE UP (ref 32–37)
MCV RBC AUTO: 84.8 FL — SIGNIFICANT CHANGE UP (ref 81–99)
MONOCYTES # BLD AUTO: 0.58 K/UL — SIGNIFICANT CHANGE UP (ref 0.1–0.6)
MONOCYTES NFR BLD AUTO: 8.3 % — SIGNIFICANT CHANGE UP (ref 1.7–9.3)
NEUTROPHILS # BLD AUTO: 3.65 K/UL — SIGNIFICANT CHANGE UP (ref 1.4–6.5)
NEUTROPHILS NFR BLD AUTO: 51.9 % — SIGNIFICANT CHANGE UP (ref 42.2–75.2)
NITRITE UR-MCNC: NEGATIVE — SIGNIFICANT CHANGE UP
NRBC # BLD: 0 /100 WBCS — SIGNIFICANT CHANGE UP (ref 0–0)
PH UR: 6.5 — SIGNIFICANT CHANGE UP (ref 5–8)
PLATELET # BLD AUTO: 411 K/UL — HIGH (ref 130–400)
POTASSIUM SERPL-MCNC: 3.8 MMOL/L — SIGNIFICANT CHANGE UP (ref 3.5–5)
POTASSIUM SERPL-SCNC: 3.8 MMOL/L — SIGNIFICANT CHANGE UP (ref 3.5–5)
PROT SERPL-MCNC: 5.7 G/DL — LOW (ref 6–8)
PROT UR-MCNC: NEGATIVE — SIGNIFICANT CHANGE UP
RBC # BLD: 3.94 M/UL — LOW (ref 4.2–5.4)
RBC # FLD: 14 % — SIGNIFICANT CHANGE UP (ref 11.5–14.5)
SODIUM SERPL-SCNC: 140 MMOL/L — SIGNIFICANT CHANGE UP (ref 135–146)
SP GR SPEC: 1.01 — SIGNIFICANT CHANGE UP (ref 1.01–1.03)
UROBILINOGEN FLD QL: SIGNIFICANT CHANGE UP
WBC # BLD: 7.02 K/UL — SIGNIFICANT CHANGE UP (ref 4.8–10.8)
WBC # FLD AUTO: 7.02 K/UL — SIGNIFICANT CHANGE UP (ref 4.8–10.8)

## 2022-12-21 PROCEDURE — 43239 EGD BIOPSY SINGLE/MULTIPLE: CPT

## 2022-12-21 PROCEDURE — 88305 TISSUE EXAM BY PATHOLOGIST: CPT | Mod: 26

## 2022-12-21 PROCEDURE — 88312 SPECIAL STAINS GROUP 1: CPT | Mod: 26

## 2022-12-21 PROCEDURE — 99233 SBSQ HOSP IP/OBS HIGH 50: CPT

## 2022-12-21 RX ORDER — SUCRALFATE 1 G
1 TABLET ORAL
Refills: 0 | Status: DISCONTINUED | OUTPATIENT
Start: 2022-12-21 | End: 2022-12-22

## 2022-12-21 RX ADMIN — ENOXAPARIN SODIUM 40 MILLIGRAM(S): 100 INJECTION SUBCUTANEOUS at 17:34

## 2022-12-21 RX ADMIN — ATENOLOL 25 MILLIGRAM(S): 25 TABLET ORAL at 05:31

## 2022-12-21 RX ADMIN — MORPHINE SULFATE 2 MILLIGRAM(S): 50 CAPSULE, EXTENDED RELEASE ORAL at 11:10

## 2022-12-21 RX ADMIN — ONDANSETRON 4 MILLIGRAM(S): 8 TABLET, FILM COATED ORAL at 16:16

## 2022-12-21 RX ADMIN — MORPHINE SULFATE 2 MILLIGRAM(S): 50 CAPSULE, EXTENDED RELEASE ORAL at 06:03

## 2022-12-21 RX ADMIN — PANTOPRAZOLE SODIUM 40 MILLIGRAM(S): 20 TABLET, DELAYED RELEASE ORAL at 05:31

## 2022-12-21 RX ADMIN — MORPHINE SULFATE 2 MILLIGRAM(S): 50 CAPSULE, EXTENDED RELEASE ORAL at 11:25

## 2022-12-21 RX ADMIN — Medication 650 MILLIGRAM(S): at 21:20

## 2022-12-21 RX ADMIN — Medication 650 MILLIGRAM(S): at 20:50

## 2022-12-21 RX ADMIN — MORPHINE SULFATE 2 MILLIGRAM(S): 50 CAPSULE, EXTENDED RELEASE ORAL at 06:18

## 2022-12-21 RX ADMIN — Medication 1 GRAM(S): at 23:23

## 2022-12-21 RX ADMIN — PANTOPRAZOLE SODIUM 40 MILLIGRAM(S): 20 TABLET, DELAYED RELEASE ORAL at 17:34

## 2022-12-21 RX ADMIN — MORPHINE SULFATE 2 MILLIGRAM(S): 50 CAPSULE, EXTENDED RELEASE ORAL at 14:58

## 2022-12-21 NOTE — CHART NOTE - NSCHARTNOTEFT_GEN_A_CORE
EGD findings:   Erythema in the stomach compatible with non-erosive gastritis. (Biopsy).  	Normal mucosa in the whole examined duodenum.  	Ulcer in the gastroesophageal junction.  	Esophageal hiatal hernia.  Plan:	  Diet as tolerated  Await pathology results  PPI BID  Repeat EGD in 2 months  Avoid NSAID

## 2022-12-21 NOTE — PROGRESS NOTE ADULT - ASSESSMENT
42 year old woman with pmhx of uterine fibroids s/p myomectomy/cystectomy at [facility] with Dr. Phillip on 12/6/22, pancreatitis, hyperthyroidism, and peptic ulcer disease presents to the ED with 3 days of lower abdominal pain.     #Acute pancreatitis vs PUD vs postop pain vs other  -lipase 528, epigastric pain (2/3 criteria); nothing on imaging   -CT abd with unremarkable pancreas, post op findings from recent fibroid surgery   -RUQ sono unremarkable   -Of note pt has never had imaging findings of pancreatitis despite numerous hospitalizations for pancreatitis including two this year   -Hx of PUD, last egd in 2019 showing complete healing of prior ulcer at GJ junction   -Recent fibroid surgery last week  -c/w /hour can decrease/discontinue once patient tolerating PO  -liquid diet today   -Nausea and Pain control  -IgG 4 pending   -PPI BID   -EGD Ulcer in the gastroesophageal junction - c/w PPI, Await pathology results, repeat EGD in 2 months - GI recs appreciated    #S/p abdominal myomectomy by Dr. Phillip at [facility], POD #13  -no GYN intervention    #Hyperthyroidism (what kind?)   -tried calling patient's most recent endocrinologist, Dr. Priyank Castillo, at [phone], no response  -also tried calling patient's PMD Dr. Bay Joe at [phone], no response  -TSH is 0.11,  free T4 0.9 and T3 81   -c/w methimazole 5mg po qd for now   - outpatient Endocrine f/u     #DVT PPx  -LVX 40mg sq qhs    #Progress Note Handoff  Pending (specify): f/u IgG subsets, pain control, IVF, Family discussion: Plan of care discussed with patient, aware and agreeable. , Wade, at bedside.    Disposition: Home when stable

## 2022-12-22 ENCOUNTER — TRANSCRIPTION ENCOUNTER (OUTPATIENT)
Age: 42
End: 2022-12-22

## 2022-12-22 VITALS
RESPIRATION RATE: 18 BRPM | DIASTOLIC BLOOD PRESSURE: 67 MMHG | TEMPERATURE: 98 F | HEART RATE: 75 BPM | OXYGEN SATURATION: 100 % | SYSTOLIC BLOOD PRESSURE: 116 MMHG

## 2022-12-22 LAB
ALBUMIN SERPL ELPH-MCNC: 3.5 G/DL — SIGNIFICANT CHANGE UP (ref 3.5–5.2)
ALP SERPL-CCNC: 58 U/L — SIGNIFICANT CHANGE UP (ref 30–115)
ALT FLD-CCNC: 7 U/L — SIGNIFICANT CHANGE UP (ref 0–41)
ANION GAP SERPL CALC-SCNC: 12 MMOL/L — SIGNIFICANT CHANGE UP (ref 7–14)
AST SERPL-CCNC: 9 U/L — SIGNIFICANT CHANGE UP (ref 0–41)
BASOPHILS # BLD AUTO: 0.03 K/UL — SIGNIFICANT CHANGE UP (ref 0–0.2)
BASOPHILS NFR BLD AUTO: 0.3 % — SIGNIFICANT CHANGE UP (ref 0–1)
BILIRUB SERPL-MCNC: 0.3 MG/DL — SIGNIFICANT CHANGE UP (ref 0.2–1.2)
BUN SERPL-MCNC: 4 MG/DL — LOW (ref 10–20)
CALCIUM SERPL-MCNC: 8.7 MG/DL — SIGNIFICANT CHANGE UP (ref 8.4–10.5)
CHLORIDE SERPL-SCNC: 100 MMOL/L — SIGNIFICANT CHANGE UP (ref 98–110)
CO2 SERPL-SCNC: 27 MMOL/L — SIGNIFICANT CHANGE UP (ref 17–32)
CREAT SERPL-MCNC: 0.6 MG/DL — LOW (ref 0.7–1.5)
EGFR: 116 ML/MIN/1.73M2 — SIGNIFICANT CHANGE UP
EOSINOPHIL # BLD AUTO: 0.58 K/UL — SIGNIFICANT CHANGE UP (ref 0–0.7)
EOSINOPHIL NFR BLD AUTO: 6 % — SIGNIFICANT CHANGE UP (ref 0–8)
GLUCOSE SERPL-MCNC: 82 MG/DL — SIGNIFICANT CHANGE UP (ref 70–99)
HCT VFR BLD CALC: 31.9 % — LOW (ref 37–47)
HGB BLD-MCNC: 10.7 G/DL — LOW (ref 12–16)
IMM GRANULOCYTES NFR BLD AUTO: 0.2 % — SIGNIFICANT CHANGE UP (ref 0.1–0.3)
LYMPHOCYTES # BLD AUTO: 2.4 K/UL — SIGNIFICANT CHANGE UP (ref 1.2–3.4)
LYMPHOCYTES # BLD AUTO: 24.9 % — SIGNIFICANT CHANGE UP (ref 20.5–51.1)
MAGNESIUM SERPL-MCNC: 1.9 MG/DL — SIGNIFICANT CHANGE UP (ref 1.8–2.4)
MCHC RBC-ENTMCNC: 27.7 PG — SIGNIFICANT CHANGE UP (ref 27–31)
MCHC RBC-ENTMCNC: 33.5 G/DL — SIGNIFICANT CHANGE UP (ref 32–37)
MCV RBC AUTO: 82.6 FL — SIGNIFICANT CHANGE UP (ref 81–99)
MONOCYTES # BLD AUTO: 0.51 K/UL — SIGNIFICANT CHANGE UP (ref 0.1–0.6)
MONOCYTES NFR BLD AUTO: 5.3 % — SIGNIFICANT CHANGE UP (ref 1.7–9.3)
NEUTROPHILS # BLD AUTO: 6.1 K/UL — SIGNIFICANT CHANGE UP (ref 1.4–6.5)
NEUTROPHILS NFR BLD AUTO: 63.3 % — SIGNIFICANT CHANGE UP (ref 42.2–75.2)
NRBC # BLD: 0 /100 WBCS — SIGNIFICANT CHANGE UP (ref 0–0)
PLATELET # BLD AUTO: 393 K/UL — SIGNIFICANT CHANGE UP (ref 130–400)
POTASSIUM SERPL-MCNC: 3.7 MMOL/L — SIGNIFICANT CHANGE UP (ref 3.5–5)
POTASSIUM SERPL-SCNC: 3.7 MMOL/L — SIGNIFICANT CHANGE UP (ref 3.5–5)
PROT SERPL-MCNC: 5.9 G/DL — LOW (ref 6–8)
RBC # BLD: 3.86 M/UL — LOW (ref 4.2–5.4)
RBC # FLD: 14.1 % — SIGNIFICANT CHANGE UP (ref 11.5–14.5)
SODIUM SERPL-SCNC: 139 MMOL/L — SIGNIFICANT CHANGE UP (ref 135–146)
SURGICAL PATHOLOGY STUDY: SIGNIFICANT CHANGE UP
T3FREE SERPL-MCNC: 1.95 PG/ML — LOW (ref 2–4.4)
T4 FREE SERPL-MCNC: 0.9 NG/DL — SIGNIFICANT CHANGE UP (ref 0.9–1.8)
WBC # BLD: 9.64 K/UL — SIGNIFICANT CHANGE UP (ref 4.8–10.8)
WBC # FLD AUTO: 9.64 K/UL — SIGNIFICANT CHANGE UP (ref 4.8–10.8)

## 2022-12-22 PROCEDURE — 99239 HOSP IP/OBS DSCHRG MGMT >30: CPT

## 2022-12-22 RX ORDER — OXYCODONE HYDROCHLORIDE 5 MG/1
10 TABLET ORAL EVERY 6 HOURS
Refills: 0 | Status: DISCONTINUED | OUTPATIENT
Start: 2022-12-22 | End: 2022-12-22

## 2022-12-22 RX ORDER — PANTOPRAZOLE SODIUM 20 MG/1
1 TABLET, DELAYED RELEASE ORAL
Qty: 30 | Refills: 0
Start: 2022-12-22 | End: 2023-01-20

## 2022-12-22 RX ORDER — SUCRALFATE 1 G
1 TABLET ORAL
Qty: 84 | Refills: 0
Start: 2022-12-22 | End: 2023-01-11

## 2022-12-22 RX ORDER — OXYCODONE HYDROCHLORIDE 5 MG/1
1 TABLET ORAL
Qty: 12 | Refills: 0
Start: 2022-12-22 | End: 2022-12-24

## 2022-12-22 RX ADMIN — Medication 1 GRAM(S): at 11:33

## 2022-12-22 RX ADMIN — Medication 650 MILLIGRAM(S): at 08:06

## 2022-12-22 RX ADMIN — ATENOLOL 25 MILLIGRAM(S): 25 TABLET ORAL at 05:17

## 2022-12-22 RX ADMIN — PANTOPRAZOLE SODIUM 40 MILLIGRAM(S): 20 TABLET, DELAYED RELEASE ORAL at 05:17

## 2022-12-22 RX ADMIN — Medication 1 GRAM(S): at 05:17

## 2022-12-22 RX ADMIN — Medication 650 MILLIGRAM(S): at 08:36

## 2022-12-22 NOTE — DISCHARGE NOTE PROVIDER - CARE PROVIDERS DIRECT ADDRESSES
,[email or web address]

## 2022-12-22 NOTE — DISCHARGE NOTE PROVIDER - HOSPITAL COURSE
42 year old woman with pmhx of uterine fibroids s/p myomectomy/cystectomy at [facility] with Dr. Phillip on 12/6/22, pancreatitis, hyperthyroidism, and peptic ulcer disease presents to the ED with 3 days of lower abdominal pain.     Admitted for Acute pancreatitis vs PUD vs postop pain vs other. Lipase 528, epigastric pain (2/3 criteria); nothing on imaging. CT abd with unremarkable pancreas, post op findings from recent fibroid surgery. RUQ sono unremarkable. Of note pt has never had imaging findings of pancreatitis despite numerous hospitalizations for pancreatitis including two this year. Hx of PUD, last egd in 2019 showing complete healing of prior ulcer at GJ junction. Recent fibroid surgery last week. Treated with /hour and decreased once patient tolerating PO. EGD showed Ulcer in the gastroesophageal junction - c/w PPI, Await pathology results, repeat EGD in 2 months. Patient was tolerating diet well and pain was better. Patient d/makayla on protonix bid and advise to Follow up with GI for repeat EGD in 8 weeks and for biopsy results.       Hyperthyroidism (what kind?) tried calling patient's most recent endocrinologist, Dr. Priyank Castillo, at [phone], no response. also tried calling patient's PMD Dr. Bay Joe at [phone], no response. TSH is 0.11,  free T4 0.9 and T3 81. c/w methimazole 5mg po qd for now. Patient to Follow up outpatient Endocrine.

## 2022-12-22 NOTE — DISCHARGE NOTE PROVIDER - NSDCCPCAREPLAN_GEN_ALL_CORE_FT
PRINCIPAL DISCHARGE DIAGNOSIS  Diagnosis: Acute pancreatitis  Assessment and Plan of Treatment: You presented to us with complaints of abdominal pain. Imaging of abdomena and blood work was performed which revealed acute pancreatitis as likely cause of abdominal pain. You were seen by gastroenterology team in the hospital and managed with IV fluids. Upper GI Endoscopy was performed which showed ulcer in the junction of esophagus and stomach. Your pain improved and you were tolerating food well. You were discharged with advise to follow-up with gastroenterology as outpatient and use medications as prescribed.      SECONDARY DISCHARGE DIAGNOSES  Diagnosis: Abdominal pain  Assessment and Plan of Treatment:     Diagnosis: S/P myomectomy  Assessment and Plan of Treatment:

## 2022-12-22 NOTE — DISCHARGE NOTE NURSING/CASE MANAGEMENT/SOCIAL WORK - PATIENT PORTAL LINK FT
You can access the FollowMyHealth Patient Portal offered by [facility] by registering at the following website: [email or web address]. By joining [facility]’s FollowMyHealth portal, you will also be able to view your health information using other applications (apps) compatible with our system.

## 2022-12-22 NOTE — DISCHARGE NOTE PROVIDER - CARE PROVIDER_API CALL
Tank Blanco)  [address] [address]  [address] [facility], [address]  [place] [address]  Phone: [phone]  Fax: [phone]  Follow Up Time: 2 weeks   Tank Blanco)  [address]  [address], [address]  [place] [address]  Phone: [phone]  Fax: [phone]  Follow Up Time: 2 weeks    Gastroenterology,   Follow-up at gastroenterology [facility] in 2 weeks, call [phone] to make an appointment  Phone: (   )    -  Fax: (   )    -  Follow Up Time:

## 2022-12-22 NOTE — DISCHARGE NOTE PROVIDER - NSDCPNSUBOBJ_GEN_ALL_CORE
Pt was seen and examined at the bedside.  Pt reports feeling much better and tolerating PO intake.  Pt to continue protonix and follow up outpatient.      Vital Signs Last 24 Hrs  T(C): 36.6 (22 Dec 2022 07:57), Max: 37.5 (21 Dec 2022 16:55)  T(F): 97.8 (22 Dec 2022 07:57), Max: 99.5 (21 Dec 2022 16:55)  HR: 75 (22 Dec 2022 07:57) (68 - 95)  BP: 116/67 (22 Dec 2022 07:57) (108/58 - 135/79)  BP(mean): --  RR: 18 (22 Dec 2022 07:57) (18 - 18)  SpO2: 100% (22 Dec 2022 07:57) (94% - 100%)    Parameters below as of 22 Dec 2022 07:57  Patient On (Oxygen Delivery Method): room air

## 2022-12-22 NOTE — PROGRESS NOTE ADULT - ATTENDING COMMENTS
Please also refer to initial GI note. Recommend IVF and PPI. Plan for EGD to r/o ulcer.
EGD performed yesterday revealing GEJ ulcer. Pt feeling better. Recommend PPI bid and repeat EGD in 2 months to assess for healing.

## 2022-12-22 NOTE — PROGRESS NOTE ADULT - TIME BILLING
Coordination of care
Coordination of care
Total time spent to complete patient's bedside assessment, physical examination, review medical chart including labs & imaging, discuss medical plan of care with housestaff was more than 35 minutes

## 2022-12-22 NOTE — DISCHARGE NOTE PROVIDER - NSDCMRMEDTOKEN_GEN_ALL_CORE_FT
Albuterol (Eqv-ProAir HFA) 90 mcg/inh inhalation aerosol: 2 puff(s) inhaled 3 times a day   atenolol 25 mg oral tablet: 1 tab(s) orally once a day  methIMAzole 5 mg oral tablet: 1 tab(s) orally once a day   Albuterol (Eqv-ProAir HFA) 90 mcg/inh inhalation aerosol: 2 puff(s) inhaled 3 times a day   atenolol 25 mg oral tablet: 1 tab(s) orally once a day  Carafate 1 g oral tablet: 1 tab(s) orally 4 times a day  methIMAzole 5 mg oral tablet: 1 tab(s) orally once a day  oxyCODONE 10 mg oral tablet: 1 tab(s) orally every 6 hours MDD:4 tab  Protonix 40 mg oral delayed release tablet: 1 tab(s) orally once a day

## 2022-12-22 NOTE — PROGRESS NOTE ADULT - ASSESSMENT
42 year old woman with pmhx of uterine fibroids s/p myomectomy/cystectomy at [facility] with Dr. Phillip on 12/6/22, pancreatitis, hyperthyroidism, and peptic ulcer disease presents to the ED with 3 days of abdominal pain. GI consulted for pancreatitis.     #)Abdominal pain improving   DD: ?pancreatitis but not typical (lipase 528, epigastric pain) vs post surgical vs r/o PUD  -CT abd with unremarkable pancreas, post op findings from recent fibroid surgery   -RUQ sono unremarkable   -Of note pt has never had imaging findings of pancreatitis despite numerous hospitalizations for pancreatitis including two this year   -Hx of PUD, last egd in 2019 showing complete healing of prior ulcer at GJ junction   -Recent fibroid surgery last week  -s/p EGD 12/21noted to have GE junction ulcer, non erosive gastritis     Plan:  -taper IVF and diet as tolerated   -Nausea and Pain control  -PPI BID   -EGD in 2 months   -Avoid NSAID   -Follow up with GI as an OP 42 year old woman with pmhx of uterine fibroids s/p myomectomy/cystectomy at [facility] with Dr. Phillip on 12/6/22, pancreatitis, hyperthyroidism, and peptic ulcer disease presents to the ED with 3 days of abdominal pain. GI consulted for pancreatitis.     #)Abdominal pain improving   DD: ?pancreatitis but not typical (lipase 528, epigastric pain) vs post surgical vs r/o PUD  -CT abd with unremarkable pancreas, post op findings from recent fibroid surgery   -RUQ sono unremarkable   -Of note pt has never had imaging findings of pancreatitis despite numerous hospitalizations for pancreatitis including two this year   -Hx of PUD, last egd in 2019 showing complete healing of prior ulcer at GJ junction   -Recent fibroid surgery last week  -s/p EGD 12/21 noted to have GE junction ulcer, non erosive gastritis     Plan:  -taper IVF and diet as tolerated   -Nausea and Pain control  -PPI BID   -EGD in 2 months   -Avoid NSAID   -Follow up with GI as an OP

## 2022-12-22 NOTE — DISCHARGE NOTE PROVIDER - NSDCFUSCHEDAPPT_GEN_ALL_CORE_FT
Amauri Galeano Physician [facility]  [address]ULED [address]  Scheduled Appointment: 01/09/2023    Amauri Galeano Physician [facility]  TIMOTEO [address]  Scheduled Appointment: 01/12/2023

## 2022-12-22 NOTE — PROGRESS NOTE ADULT - SUBJECTIVE AND OBJECTIVE BOX
Gastroenterology progress note:     Patient is a 42y old  Female who presents with a chief complaint of Abdominal pain (19 Dec 2022 09:29)       Admitted on: 22    We are following the patient for abdominal pain     Interval History:  still c/o abdominal pain  no nausea, vomiting   passing gas, No BM yet        PAST MEDICAL & SURGICAL HISTORY:  Abscess  vaginal      Fibroid, uterine      Peptic ulcer      Ovarian cyst      Pancreatitis      Delivery with history of       Fibroid  uterine surgery          MEDICATIONS  (STANDING):  atenolol  Tablet 25 milliGRAM(s) Oral daily  enoxaparin Injectable 40 milliGRAM(s) SubCutaneous every 24 hours  lactated ringers. 1000 milliLiter(s) (200 mL/Hr) IV Continuous <Continuous>  methimazole 5 milliGRAM(s) Oral daily  pantoprazole  Injectable 40 milliGRAM(s) IV Push daily    MEDICATIONS  (PRN):  acetaminophen     Tablet .. 650 milliGRAM(s) Oral every 6 hours PRN Temp greater or equal to 38C (100.4F), Mild Pain (1 - 3)  albuterol    90 MICROgram(s) HFA Inhaler 2 Puff(s) Inhalation every 6 hours PRN Bronchospasm  morphine  - Injectable 2 milliGRAM(s) IV Push every 4 hours PRN Moderate Pain (4 - 6)  ondansetron Injectable 4 milliGRAM(s) IV Push every 8 hours PRN Nausea and/or Vomiting      Allergies  iodine containing compounds (Unknown)  IV CONTRAST (Anaphylaxis)  shellfish (Anaphylaxis)      Review of Systems:   Cardiovascular:  No Chest Pain, No Palpitations  Respiratory:  No Cough, No Dyspnea  Gastrointestinal:  As described in HPI    Physical Examination:  T(C): 36.5 (22 @ 08:41), Max: 36.8 (22 @ 22:37)  HR: 76 (22 @ 08:41) (60 - 79)  BP: 133/63 (22 @ 08:41) (106/57 - 133/63)  RR: 18 (22 @ 08:41) (18 - 18)  SpO2: 96% (22 @ 08:41) (96% - 100%)      22 @ 07:01  -  22 @ 07:00  --------------------------------------------------------  IN: 2600 mL / OUT: 700 mL / NET: 1900 mL      Constitutional: No acute distress.  Respiratory:  No signs of respiratory distress. Lung sounds are clear bilaterally.  Cardiovascular:  S1 S2, Regular rate and rhythm.  Abdominal: Abdomen is soft, symmetric, and non-tender without distention.   Skin: No rashes, No Jaundice.        Data:                        10.4   10.31 )-----------( 424      ( 20 Dec 2022 05:25 )             31.9     Hgb trend:  10.4  22 @ 05:25  11.6  22 @ 09:44  11.7  22 @ 18:04        12-    138  |  102  |  9<L>  ----------------------------<  85  3.7   |  25  |  0.6<L>    Ca    8.7      20 Dec 2022 05:25  Mg     1.9         TPro  5.8<L>  /  Alb  3.7  /  TBili  <0.2  /  DBili  x   /  AST  9   /  ALT  9   /  AlkPhos  60      Liver panel trend:  TBili <0.2   /   AST 9   /   ALT 9   /   AlkP 60   /   Tptn 5.8   /   Alb 3.7    /   DBili --        TBili <0.2   /   AST 9   /   ALT 13   /   AlkP 71   /   Tptn 6.8   /   Alb 4.2    /   DBili --        TBili <0.2   /   AST 11   /   ALT 14   /   AlkP 74   /   Tptn 7.1   /   Alb 4.1    /   DBili --            PT/INR - ( 18 Dec 2022 18:04 )   PT: 12.10 sec;   INR: 1.06 ratio         PTT - ( 18 Dec 2022 18:04 )  PTT:35.0 sec       Radiology:    US Abdomen Upper Quadrant Right:   ACC: [id number] EXAM:  US ABDOMEN RT UPR QUADRANT                          PROCEDURE DATE:  2022          INTERPRETATION:  CLINICAL INFORMATION: Abdominal pain.    Comparison made with CT abdomen and pelvis 2022, ultrasound   2019.    TECHNIQUE: Sonography of the right upper quadrant.    FINDINGS:  Liver: Within normal limits.  Bile ducts: Normal caliber. Common bile duct measures 3 mm.  Gallbladder: Within normal limits.  Pancreas: Visualized portions are within normal limits.  Right kidney: 9.3 cm. No hydronephrosis.  Ascites: None.  IVC: Visualized portions are within normal limits.    IMPRESSION:  Unremarkable right upper quadrant abdominal ultrasound.        --- End of Report ---            RYLEE SPEARS MD; Attending Radiologist  This document has been electronically signed. Dec 19 2022  2:19AM (22 @ 02:09)    
JENSEN OLVERALLIAN  42y, Female  Allergy: iodine containing compounds (Unknown)  IV CONTRAST (Anaphylaxis)  shellfish (Anaphylaxis)    Hospital Day: 3d    Patient seen and examined earlier today.  No acute events overnight.  Pt reports continued abd pain.     ROS: negative except as noted above     PMH/PSH:  PAST MEDICAL & SURGICAL HISTORY:  Abscess  vaginal      Fibroid, uterine      Peptic ulcer      Ovarian cyst      Pancreatitis      Delivery with history of       Fibroid  uterine surgery          LAST 24-Hr EVENTS:    VITALS:  T(F): 98.9 (22 @ 14:50), Max: 98.9 (22 @ 14:50)  HR: 73 (22 @ 14:50)  BP: 127/72 (22 @ 14:50) (114/75 - 141/92)  RR: 18 (22 @ 14:50)  SpO2: 97% (22 @ 14:50)          TESTS & MEASUREMENTS:  Weight/BMI  88 (22 @ 11:33)  38.1 (22 @ 11:33)    22 @ 07:01  -  22 @ 07:00  --------------------------------------------------------  IN: 2600 mL / OUT: 700 mL / NET: 1900 mL                            10.8   7.02  )-----------( 411      ( 21 Dec 2022 05:17 )             33.4     PT/INR - ( 20 Dec 2022 12:54 )   PT: 11.90 sec;   INR: 1.04 ratio         PTT - ( 20 Dec 2022 12:54 )  PTT:32.0 sec  INR: 1.04 ratio (22 @ 12:54)  INR: 1.06 ratio (22 @ 18:04)        140  |  102  |  5<L>  ----------------------------<  87  3.8   |  28  |  0.6<L>    Ca    8.6      21 Dec 2022 05:17  Mg     1.9         TPro  5.7<L>  /  Alb  3.5  /  TBili  0.2  /  DBili  x   /  AST  7   /  ALT  5   /  AlkPhos  59      LIVER FUNCTIONS - ( 21 Dec 2022 05:17 )  Alb: 3.5 g/dL / Pro: 5.7 g/dL / ALK PHOS: 59 U/L / ALT: 5 U/L / AST: 7 U/L / GGT: x                 Urinalysis Basic - ( 21 Dec 2022 04:25 )    Color: Light Yellow / Appearance: Clear / S.011 / pH: x  Gluc: x / Ketone: Trace  / Bili: Negative / Urobili: <2 mg/dL   Blood: x / Protein: Negative / Nitrite: Negative   Leuk Esterase: Negative / RBC: x / WBC x   Sq Epi: x / Non Sq Epi: x / Bacteria: x              COVID- PCR: NotDetec (22 @ 15:31)  COVID-19 PCR: NotDetec (22 @ 23:45)        A1C with Estimated Average Glucose Result: 5.8 % (22 @ 09:44)          RADIOLOGY, ECG, & ADDITIONAL TESTS:  12 Lead ECG:   Ventricular Rate 105 BPM    Atrial Rate 105 BPM    P-R Interval 140 ms    QRS Duration 86 ms    Q-T Interval 342 ms    QTC Calculation(Bazett) 452 ms    P Axis 41 degrees    R Axis 33 degrees    T Axis 13 degrees    Diagnosis Line Sinus tachycardia  Otherwise normal ECG    Confirmed by Ke Urbina ([id number]) on 2022 8:28:05 AM (22 @ 19:33)      RECENT DIAGNOSTIC ORDERS:  Surgical Pathology Report: 12:35 (22 @ 15:26)  Magnesium, Serum: AM Sched. Collection: 22-Dec-2022 04:30 (22 @ 15:24)  Comprehensive Metabolic Panel: AM Sched. Collection: 22-Dec-2022 04:30 (22 @ 15:24)  Complete Blood Count + Automated Diff: AM Sched. Collection: 22-Dec-2022 04:30 (22 @ 15:24)  C-Reactive Protein, Serum: 16:00 (22 @ 14:29)  Diet, Full Liquid (22 @ 14:28)      MEDICATIONS:  MEDICATIONS  (STANDING):  atenolol  Tablet 25 milliGRAM(s) Oral daily  enoxaparin Injectable 40 milliGRAM(s) SubCutaneous every 24 hours  lactated ringers. 1000 milliLiter(s) (200 mL/Hr) IV Continuous <Continuous>  methimazole 5 milliGRAM(s) Oral daily  pantoprazole  Injectable 40 milliGRAM(s) IV Push two times a day    MEDICATIONS  (PRN):  acetaminophen     Tablet .. 650 milliGRAM(s) Oral every 6 hours PRN Temp greater or equal to 38C (100.4F), Mild Pain (1 - 3)  albuterol    90 MICROgram(s) HFA Inhaler 2 Puff(s) Inhalation every 6 hours PRN Bronchospasm  morphine  - Injectable 2 milliGRAM(s) IV Push every 4 hours PRN Moderate Pain (4 - 6)  ondansetron Injectable 4 milliGRAM(s) IV Push every 8 hours PRN Nausea and/or Vomiting      HOME MEDICATIONS:  Albuterol (Eqv-ProAir HFA) 90 mcg/inh inhalation aerosol ()  atenolol 25 mg oral tablet ()  methIMAzole 5 mg oral tablet (-)      PHYSICAL EXAM:  GENERAL: NAD, lying in bed in pain  NECK: Supple  CHEST/LUNG: Clear to auscultation bilaterally; No wheezing/crackles  HEART: Regular rate and rhythm; No murmurs  ABDOMEN: Bowel sounds present; Soft, diffuse abdominal tenderness on palpation most prominent in epigastric region.   EXTREMITIES:  + Peripheral Pulses, no edema  NERVOUS SYSTEM:  Alert & Oriented X3, no new focal deficits  SKIN: No rashes or lesions       
Reason for admission: abdominal pain    HPI: At patient bedside for evaluation. Patient resting comfortably. Continues to report lower abdominal pain, stabbing in quality. Reports the pain is relieved by pain medication and is currently 6/10 pain. Denies nausea and vomiting, last episode was . Denies fevers or chills but reports being febrile  at home with a temperature of 101 F. Reports constipation, last bowel movement . Patient ambulating and voiding without difficulty. Denies urinary symptoms. Denies lightheadedness, dizziness, chest pain, shortness of breath. Denies urinary symptoms.     Denies the following: constitutional symptoms, visual symptoms, cardiovascular symptoms, respiratory symptoms, GI symptoms, musculoskeletal symptoms, skin symptoms, neurologic symptoms, hematologic symptoms, allergic symptoms, psychiatric symptoms  Except any pertinent positives listed.     Home Medications:  atenolol 25 mg oral tablet: 1 tab(s) orally once a day (19 Dec 2022 00:55)  methIMAzole 5 mg oral tablet: 1 tab(s) orally once a day (19 Dec 2022 00:55)    Allergies    iodine containing compounds (Unknown)  IV CONTRAST (Anaphylaxis)  shellfish (Anaphylaxis)    Intolerances      PAST MEDICAL & SURGICAL HISTORY:  Abscess  vaginal  Fibroid, uterine  Peptic ulcer  Ovarian cyst  Pancreatitis  Delivery with history of   Fibroid  uterine surgery      FAMILY HISTORY:  FH: colon cancer      SOCIAL HISTORY: Denies cigarette use, alcohol use, or illicit drug use    Vital Signs Last 24 Hrs  T(F): 97 (19 Dec 2022 04:28), Max: 98.3 (18 Dec 2022 23:59)  HR: 72 (19 Dec 2022 04:28) (70 - 92)  BP: 119/60 (19 Dec 2022 04:28) (119/60 - 137/73)  RR: 18 (19 Dec 2022 04:28) (17 - 18)    General Appearance - AAOx3, NAD  Heart - S1S2 regular rate and rhythm  Lung - CTA Bilaterally  Abdomen - Soft, mild LLQ tenderness on deep palpation, nondistended, no rebound, no rigidity, no guarding, bowel sounds present    GYN/Pelvis: deferred    LABS:                        11.7   6.64  )-----------( 421      ( 18 Dec 2022 18:04 )             34.4         12-    139  |  101  |  10  ----------------------------<  101<H>  4.0   |  27  |  0.5<L>    Ca    9.3      18 Dec 2022 18:04    TPro  7.1  /  Alb  4.1  /  TBili  <0.2  /  DBili  x   /  AST  11  /  ALT  14  /  AlkPhos  74  12-18    PT/INR - ( 18 Dec 2022 18:04 )   PT: 12.10 sec;   INR: 1.06 ratio         PTT - ( 18 Dec 2022 18:04 )  PTT:35.0 sec        RADIOLOGY & ADDITIONAL STUDIES:  < from: US Abdomen Upper Quadrant Right (22 @ 02:09) >    ACC: [id number] EXAM:  US ABDOMEN RT UPR QUADRANT                          PROCEDURE DATE:  2022          INTERPRETATION:  CLINICAL INFORMATION: Abdominal pain.    Comparison made with CT abdomen and pelvis 2022, ultrasound   2019.    TECHNIQUE: Sonography of the right upper quadrant.    FINDINGS:  Liver: Within normal limits.  Bile ducts: Normal caliber. Common bile duct measures 3 mm.  Gallbladder: Within normal limits.  Pancreas: Visualized portions are within normal limits.  Right kidney: 9.3 cm. No hydronephrosis.  Ascites: None.  IVC: Visualized portions are within normal limits.    IMPRESSION:  Unremarkable right upper quadrant abdominal ultrasound.        --- End of Report ---            RYLEE SPEARS MD; Attending Radiologist  This document has been electronically signed. Dec 19 2022  2:19AM    < end of copied text >  < from: CT Abdomen and Pelvis w/ IV Cont (22 @ 19:01) >    ACC: [id number] EXAM:  CT ABDOMEN AND PELVIS IC                          PROCEDURE DATE:  2022          INTERPRETATION:  CLINICAL STATEMENT: Post myomectomy presenting with pain.    TECHNIQUE: Contiguous axial CT images were obtained from the lower chest   to the pubic symphysis following the administration of 95 cc Omnipaque   350 intravenous contrast. Oral contrast was not administered. Reformatted   images in the coronal and sagittal planes were acquired.    COMPARISON CT: Abdomen/pelvis2022.      FINDINGS:  LOWER CHEST: Unchanged are millimeters solid right middle lobe pulmonary   nodule.    HEPATOBILIARY: No focal liver lesion. Patent portal vein. No biliary   ductal dilatation.    SPLEEN: Unremarkable.    PANCREAS: Unremarkable.    ADRENAL GLANDS: Unremarkable.    KIDNEYS: Symmetric renal enhancement. No hydronephrosis.    ABDOMINOPELVIC NODES: No enlarged abdominal or pelvic lymph nodes.    PELVIC ORGANS: Patient is noted to be status post lower ventral abdominal   wallapproach myomectomy with postoperative inflammatory changes. There   is a left-sided multiloculated fluid collection superolateral to the   uterine fundus measuring 4.1 x 4.1 x 3.5 which likely represents a   postoperative seroma (HU 10 on series 4 image 243) associated with   inflammatory fat stranding and trace pelvic ascites. Hematoma formation   is less likely.    PERITONEUM/MESENTERY/BOWEL: No free air, ascites, or bowel obstruction.   Inflammatory fat stranding surrounding the sigmoid, likely on the basis   of postoperative changes.    BONES/SOFT TISSUES: No acute osseous abnormality. Lower ventral   subcutaneous inflammatory changes as above.    VASCULAR: Normal caliber aorta.      IMPRESSION:  1.  Status post lower ventral abdominal wall approach for myomectomy with   postoperative inflammatory changes to the lower ventral subcutaneous   abdominal wall.  2.  Multiloculated fluid collection superolateral to the uterine fundus   measuring up to 4.1 cm likely represents a postoperative seroma   associated with postoperative inflammatory fat stranding and trace pelvic   ascites.    --- End of Report ---          RAMA MILLER MD; Resident Radiologist  This document has been electronically signed.  CHARLEE CHENG MD; Attending Radiologist  This document has been electronically signed. Dec 18 2022  8:15PM    < end of copied text >    
  MU OLVERA  [facility] [address] ([facility])      Patient is a 42y old  Female who presents with a chief complaint of Abdominal pain (20 Dec 2022 10:02)        Interval events:  Patient seen and examined at bedside. No acute events overnight. Patient still having abdominal pain, same as yesterday. No nausea or vomiting. No BM's yet, but is passing gas. No fevers.     -PMHx: No pertinent past medical history    Abscess    Fibroid, uterine    Peptic ulcer    Ovarian cyst    Pancreatitis      -PSHx:Delivery with history of      Fibroid             REVIEW OF SYSTEMS:  CONSTITUTIONAL: No fever, weight loss, or fatigue  RESPIRATORY: No cough, wheezing, chills or hemoptysis; No shortness of breath  CARDIOVASCULAR: No chest pain, palpitations, dizziness, or leg swelling  GASTROINTESTINAL: +abd pain   NEUROLOGICAL: No headaches  LYMPH NODES: No enlarged glands  MUSCULOSKELETAL: No joint pain or swelling; No muscle, back, or extremity pain      T(C): , Max: 36.8 (22 @ 22:37)  HR: 65 (22 @ 14:06)  BP: 111/62 (22 @ 14:06)  RR: 18 (22 @ 14:06)  SpO2: 97% (22 @ 14:06)  CAPILLARY BLOOD GLUCOSE      PHYSICAL EXAM:   GENERAL: NAD, lying in bed in pain  NECK: Supple  CHEST/LUNG: Clear to auscultation bilaterally; No wheezing/crackles  HEART: Regular rate and rhythm; No murmurs  ABDOMEN: Bowel sounds present; Soft, Mildly tender on palpation in epigastric region.   EXTREMITIES:  + Peripheral Pulses, no edema  NERVOUS SYSTEM:  Alert & Oriented X3, no new focal deficits  SKIN: No rashes or lesions       LABS:          10.4  10.31  )-------(424          31.9  N=53.2  L=33.1  MCV=84.6    138|102|9<L>  ------------------<85  3.7|25|0.6<L>  eGFR:--  Ca:8.7      PT/INR - ( 20 Dec 2022 12:54 )   PT: 11.90 sec;   INR: 1.04 ratio         PTT - ( 20 Dec 2022 12:54 )  PTT:32.0 sec      Microbiology:      RADIOLOGY & ADDITIONAL TESTS:  < from: US Abdomen Upper Quadrant Right (22 @ 02:09) >  IMPRESSION:  Unremarkable right upper quadrant abdominal ultrasound.    < end of copied text >    < from: CT Abdomen and Pelvis w/ IV Cont (22 @ 19:01) >  IMPRESSION:  1.  Status post lower ventral abdominal wall approach for myomectomy with   postoperative inflammatory changes to the lower ventral subcutaneous   abdominal wall.  2.  Multiloculated fluid collection superolateral to the uterine fundus   measuring up to 4.1 cm likely represents a postoperative seroma   associated with postoperative inflammatory fat stranding and trace pelvic   ascites.    < end of copied text >        Medications:  acetaminophen     Tablet .. 650 milliGRAM(s) Oral every 6 hours PRN  albuterol    90 MICROgram(s) HFA Inhaler 2 Puff(s) Inhalation every 6 hours PRN  atenolol  Tablet 25 milliGRAM(s) Oral daily  enoxaparin Injectable 40 milliGRAM(s) SubCutaneous every 24 hours  lactated ringers. 1000 milliLiter(s) IV Continuous <Continuous>  methimazole 5 milliGRAM(s) Oral daily  morphine  - Injectable 2 milliGRAM(s) IV Push every 4 hours PRN  ondansetron Injectable 4 milliGRAM(s) IV Push every 8 hours PRN  pantoprazole  Injectable 40 milliGRAM(s) IV Push two times a day      
Gastroenterology progress note:     Patient is a 42y old  Female who presents with a chief complaint of Abdominal pain (21 Dec 2022 15:39)       Admitted on: 22    We are following the patient for abdominal pain      Interval History:  abdominal pain is better 50%, would like to eat   denies any nausea, vomiting        PAST MEDICAL & SURGICAL HISTORY:  Abscess  vaginal      Fibroid, uterine      Peptic ulcer      Ovarian cyst      Pancreatitis      Delivery with history of       Fibroid  uterine surgery          MEDICATIONS  (STANDING):  atenolol  Tablet 25 milliGRAM(s) Oral daily  enoxaparin Injectable 40 milliGRAM(s) SubCutaneous every 24 hours  methimazole 5 milliGRAM(s) Oral daily  pantoprazole  Injectable 40 milliGRAM(s) IV Push two times a day  sucralfate 1 Gram(s) Oral four times a day    MEDICATIONS  (PRN):  acetaminophen     Tablet .. 650 milliGRAM(s) Oral every 6 hours PRN Temp greater or equal to 38C (100.4F), Mild Pain (1 - 3)  albuterol    90 MICROgram(s) HFA Inhaler 2 Puff(s) Inhalation every 6 hours PRN Bronchospasm  morphine  - Injectable 2 milliGRAM(s) IV Push every 4 hours PRN Moderate Pain (4 - 6)  ondansetron Injectable 4 milliGRAM(s) IV Push every 8 hours PRN Nausea and/or Vomiting      Allergies  iodine containing compounds (Unknown)  IV CONTRAST (Anaphylaxis)  shellfish (Anaphylaxis)      Review of Systems:   Cardiovascular:  No Chest Pain, No Palpitations  Respiratory:  No Cough, No Dyspnea  Gastrointestinal:  As described in HPI    Physical Examination:  T(C): 36.6 (22 @ 07:57), Max: 37.5 (22 @ 16:55)  HR: 75 (22 @ 07:57) (63 - 95)  BP: 116/67 (22 @ 07:57) (108/58 - 141/92)  RR: 18 (22 @ 07:57) (18 - 18)  SpO2: 100% (22 @ 07:57) (94% - 100%)  Weight (kg): 88 (22 @ 11:33)    22 @ 07:01  -  22 @ 10:15  --------------------------------------------------------  IN: 360 mL / OUT: 0 mL / NET: 360 mL      Constitutional: No acute distress.  Respiratory:  No signs of respiratory distress. Lung sounds are clear bilaterally.  Cardiovascular:  S1 S2, Regular rate and rhythm.  Abdominal: Abdomen is soft, symmetric, and non-tender without distention.   Skin: No rashes, No Jaundice.        Data:                        10.7   9.64  )-----------( 393      ( 22 Dec 2022 06:28 )             31.9     Hgb trend:  10.7  22 @ 06:28  10.8  22 @ 05:17  10.4  22 @ 05:25        12-22    139  |  100  |  4<L>  ----------------------------<  82  3.7   |  27  |  0.6<L>    Ca    8.7      22 Dec 2022 06:28  Mg     1.9         TPro  5.9<L>  /  Alb  3.5  /  TBili  0.3  /  DBili  x   /  AST  9   /  ALT  7   /  AlkPhos  58      Liver panel trend:  TBili 0.3   /   AST 9   /   ALT 7   /   AlkP 58   /   Tptn 5.9   /   Alb 3.5    /   DBili --        TBili 0.2   /   AST 7   /   ALT 5   /   AlkP 59   /   Tptn 5.7   /   Alb 3.5    /   DBili --        TBili <0.2   /   AST 9   /   ALT 9   /   AlkP 60   /   Tptn 5.8   /   Alb 3.7    /   DBili --        TBili <0.2   /   AST 9   /   ALT 13   /   AlkP 71   /   Tptn 6.8   /   Alb 4.2    /   DBili --        TBili <0.2   /   AST 11   /   ALT 14   /   AlkP 74   /   Tptn 7.1   /   Alb 4.1    /   DBili --            PT/INR - ( 20 Dec 2022 12:54 )   PT: 11.90 sec;   INR: 1.04 ratio         PTT - ( 20 Dec 2022 12:54 )  PTT:32.0 sec       Radiology:

## 2022-12-22 NOTE — DISCHARGE NOTE PROVIDER - PROVIDER TOKENS
PROVIDER:[TOKEN:[[id number]],FOLLOWUP:[2 weeks]] PROVIDER:[TOKEN:[[id number]],FOLLOWUP:[2 weeks]],FREE:[LAST:[Gastroenterology],PHONE:[(   )    -],FAX:[(   )    -],ADDRESS:[Follow-up at [facility] in 2 weeks, call [phone] to make an appointment]]

## 2022-12-22 NOTE — DISCHARGE NOTE NURSING/CASE MANAGEMENT/SOCIAL WORK - NSDCPEFALRISK_GEN_ALL_CORE
For information on Fall & Injury Prevention, visit: [email or web address] OR  [email or web address] OR  https://www.cdc.gov/steadi/patient.html

## 2022-12-28 DIAGNOSIS — Z91.013 ALLERGY TO SEAFOOD: ICD-10-CM

## 2022-12-28 DIAGNOSIS — R18.8 OTHER ASCITES: ICD-10-CM

## 2022-12-28 DIAGNOSIS — K29.70 GASTRITIS, UNSPECIFIED, WITHOUT BLEEDING: ICD-10-CM

## 2022-12-28 DIAGNOSIS — K25.9 GASTRIC ULCER, UNSPECIFIED AS ACUTE OR CHRONIC, WITHOUT HEMORRHAGE OR PERFORATION: ICD-10-CM

## 2022-12-28 DIAGNOSIS — E05.90 THYROTOXICOSIS, UNSPECIFIED WITHOUT THYROTOXIC CRISIS OR STORM: ICD-10-CM

## 2022-12-28 DIAGNOSIS — R10.9 UNSPECIFIED ABDOMINAL PAIN: ICD-10-CM

## 2022-12-28 DIAGNOSIS — K85.90 ACUTE PANCREATITIS WITHOUT NECROSIS OR INFECTION, UNSPECIFIED: ICD-10-CM

## 2022-12-28 DIAGNOSIS — N99.842 POSTPROCEDURAL SEROMA OF A GENITOURINARY SYSTEM ORGAN OR STRUCTURE FOLLOWING A GENITOURINARY SYSTEM PROCEDURE: ICD-10-CM
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
## 2022-12-28 DIAGNOSIS — K44.9 DIAPHRAGMATIC HERNIA WITHOUT OBSTRUCTION OR GANGRENE: ICD-10-CM

## 2022-12-28 DIAGNOSIS — Z91.041 RADIOGRAPHIC DYE ALLERGY STATUS: ICD-10-CM

## 2023-01-09 ENCOUNTER — APPOINTMENT (OUTPATIENT)
Dept: PULMONOLOGY | Facility: CLINIC | Age: 43
End: 2023-01-09

## 2023-01-12 ENCOUNTER — APPOINTMENT (OUTPATIENT)
Dept: PULMONOLOGY | Facility: CLINIC | Age: 43
End: 2023-01-12

## 2023-02-20 ENCOUNTER — NON-APPOINTMENT (OUTPATIENT)
Age: 43
End: 2023-02-20

## 2023-02-23 ENCOUNTER — TRANSCRIPTION ENCOUNTER (OUTPATIENT)
Age: 43
End: 2023-02-23

## 2023-02-23 ENCOUNTER — OUTPATIENT (OUTPATIENT)
Dept: INPATIENT UNIT | Facility: HOSPITAL | Age: 43
LOS: 1 days | Discharge: HOME | End: 2023-02-23
Payer: MEDICAID

## 2023-02-23 ENCOUNTER — RESULT REVIEW (OUTPATIENT)
Age: 43
End: 2023-02-23

## 2023-02-23 VITALS
SYSTOLIC BLOOD PRESSURE: 143 MMHG | RESPIRATION RATE: 18 BRPM | HEIGHT: 66 IN | WEIGHT: 212.97 LBS | DIASTOLIC BLOOD PRESSURE: 77 MMHG | HEART RATE: 73 BPM | TEMPERATURE: 97 F

## 2023-02-23 VITALS — SYSTOLIC BLOOD PRESSURE: 158 MMHG | HEART RATE: 60 BPM | RESPIRATION RATE: 18 BRPM | DIASTOLIC BLOOD PRESSURE: 78 MMHG

## 2023-02-23 DIAGNOSIS — R10.9 UNSPECIFIED ABDOMINAL PAIN: ICD-10-CM

## 2023-02-23 DIAGNOSIS — Z91.041 RADIOGRAPHIC DYE ALLERGY STATUS: ICD-10-CM

## 2023-02-23 DIAGNOSIS — K25.7 CHRONIC GASTRIC ULCER WITHOUT HEMORRHAGE OR PERFORATION: ICD-10-CM

## 2023-02-23 DIAGNOSIS — Z91.013 ALLERGY TO SEAFOOD: ICD-10-CM

## 2023-02-23 DIAGNOSIS — O34.219 MATERNAL CARE FOR UNSPECIFIED TYPE SCAR FROM PREVIOUS CESAREAN DELIVERY: Chronic | ICD-10-CM

## 2023-02-23 DIAGNOSIS — R10.13 EPIGASTRIC PAIN: ICD-10-CM

## 2023-02-23 DIAGNOSIS — K29.80 DUODENITIS WITHOUT BLEEDING: ICD-10-CM

## 2023-02-23 DIAGNOSIS — Z80.0 FAMILY HISTORY OF MALIGNANT NEOPLASM OF DIGESTIVE ORGANS: ICD-10-CM

## 2023-02-23 DIAGNOSIS — D25.9 LEIOMYOMA OF UTERUS, UNSPECIFIED: Chronic | ICD-10-CM

## 2023-02-23 DIAGNOSIS — K22.89 OTHER SPECIFIED DISEASE OF ESOPHAGUS: ICD-10-CM

## 2023-02-23 DIAGNOSIS — Z87.11 PERSONAL HISTORY OF PEPTIC ULCER DISEASE: ICD-10-CM

## 2023-02-23 DIAGNOSIS — K22.82 ESOPHAGOGASTRIC JUNCTION POLYP: ICD-10-CM

## 2023-02-23 DIAGNOSIS — K29.50 UNSPECIFIED CHRONIC GASTRITIS WITHOUT BLEEDING: ICD-10-CM

## 2023-02-23 PROCEDURE — 43239 EGD BIOPSY SINGLE/MULTIPLE: CPT

## 2023-02-23 PROCEDURE — 88342 IMHCHEM/IMCYTCHM 1ST ANTB: CPT

## 2023-02-23 PROCEDURE — 88342 IMHCHEM/IMCYTCHM 1ST ANTB: CPT | Mod: 26

## 2023-02-23 PROCEDURE — 88305 TISSUE EXAM BY PATHOLOGIST: CPT | Mod: 26

## 2023-02-23 PROCEDURE — 81025 URINE PREGNANCY TEST: CPT

## 2023-02-23 PROCEDURE — 88313 SPECIAL STAINS GROUP 2: CPT | Mod: 26

## 2023-02-23 PROCEDURE — 88312 SPECIAL STAINS GROUP 1: CPT | Mod: 26

## 2023-02-23 PROCEDURE — 88305 TISSUE EXAM BY PATHOLOGIST: CPT

## 2023-02-23 PROCEDURE — 88312 SPECIAL STAINS GROUP 1: CPT

## 2023-02-23 PROCEDURE — 88313 SPECIAL STAINS GROUP 2: CPT

## 2023-02-23 RX ORDER — SUCRALFATE 1 G
1 TABLET ORAL
Qty: 240 | Refills: 0
Start: 2023-02-23 | End: 2023-04-23

## 2023-02-23 RX ORDER — PANTOPRAZOLE SODIUM 20 MG/1
1 TABLET, DELAYED RELEASE ORAL
Qty: 120 | Refills: 0
Start: 2023-02-23 | End: 2023-04-23

## 2023-02-23 NOTE — ASU PREOP CHECKLIST - HEART RATE (BEATS/MIN)
Pt reports intermittent midsternal chest pain for two days which increased in severity and began radiating to left jaw today. 73

## 2023-02-23 NOTE — CHART NOTE - NSCHARTNOTEFT_GEN_A_CORE
PACU ANESTHESIA ADMISSION NOTE      Procedure: EGD  Post op diagnosis:          __x__  Patent Airway    __x__  Full return of protective reflexes    _x___  Full recovery from anesthesia / back to baseline     Vitals:   T:     97.2      R:      12            BP:    102/57              Sat:    100%               P: 62      Mental Status:  ___x_ Awake   __x___ Alert   _____ Drowsy   _____ Sedated    Nausea/Vomiting:  ____ NO  ______Yes,   x   See Post - Op Orders          Pain Scale (0-10):  _____    Treatment: ____ None    _x___ See Post - Op/PCA Orders    Post - Operative Fluids:   ____ Oral   ___x_ See Post - Op Orders    Plan: Discharge:   ___x_Home       _____Floor     _____Critical Care    _____  Other:_________________    Comments:    Pt tolerated procedure well, no anesthesia related complications. Care of pt endorsed to PACU, report given to PACU RN. Discharge when criteria are met.

## 2023-02-23 NOTE — H&P PST ADULT - PATIENT'S SEXUAL ORIENTATION
Cryotherapy Text: The wound bed was treated with cryotherapy after the biopsy was performed. Heterosexual

## 2023-02-23 NOTE — ASU DISCHARGE PLAN (ADULT/PEDIATRIC) - NS MD DC FALL RISK RISK
For information on Fall & Injury Prevention, visit: [email or web address] OR  [email or web address] OR  https://www.cdc.gov/steadi/patient.html

## 2023-02-28 ENCOUNTER — LABORATORY RESULT (OUTPATIENT)
Age: 43
End: 2023-02-28

## 2023-02-28 LAB — AMYLASE/CREAT SERPL: 77 U/L

## 2023-03-01 LAB
BASOPHILS # BLD AUTO: 0.03 K/UL
BASOPHILS NFR BLD AUTO: 0.5 %
EOSINOPHIL # BLD AUTO: 0.22 K/UL
EOSINOPHIL NFR BLD AUTO: 3.8 %
HCT VFR BLD CALC: 39.1 %
HGB BLD-MCNC: 12.5 G/DL
IMM GRANULOCYTES NFR BLD AUTO: 0.3 %
LYMPHOCYTES # BLD AUTO: 2.31 K/UL
LYMPHOCYTES NFR BLD AUTO: 39.4 %
MAN DIFF?: NORMAL
MCHC RBC-ENTMCNC: 27.4 PG
MCHC RBC-ENTMCNC: 32 G/DL
MCV RBC AUTO: 85.7 FL
MONOCYTES # BLD AUTO: 0.59 K/UL
MONOCYTES NFR BLD AUTO: 10.1 %
NEUTROPHILS # BLD AUTO: 2.69 K/UL
NEUTROPHILS NFR BLD AUTO: 45.9 %
PLATELET # BLD AUTO: 378 K/UL
RBC # BLD: 4.56 M/UL
RBC # FLD: 13.5 %
WBC # FLD AUTO: 5.86 K/UL

## 2023-03-02 LAB
LPL SERPL-CCNC: 87 U/L
SURGICAL PATHOLOGY STUDY: SIGNIFICANT CHANGE UP

## 2023-03-06 NOTE — PRE-ANESTHESIA EVALUATION ADULT - NSDENTALSD_ENT_ALL_CORE
Patient was told to schedule an appt for a breast density US. Needs an order. Patient is also having discomfort on her left breast.   Please advise. caps/bridge/implants

## 2023-03-08 LAB
A ALTERNATA IGE QN: <0.1 KUA/L
A FUMIGATUS IGE QN: <0.1 KUA/L
C ALBICANS IGE QN: <0.1 KUA/L
C HERBARUM IGE QN: <0.1 KUA/L
CAT DANDER IGE QN: <0.1 KUA/L
COMMON RAGWEED IGE QN: 2.57 KUA/L
D FARINAE IGE QN: 39.5 KUA/L
D PTERONYSS IGE QN: 26.3 KUA/L
DEPRECATED A ALTERNATA IGE RAST QL: 0
DEPRECATED A FUMIGATUS IGE RAST QL: 0
DEPRECATED C ALBICANS IGE RAST QL: 0
DEPRECATED C HERBARUM IGE RAST QL: 0
DEPRECATED CAT DANDER IGE RAST QL: 0
DEPRECATED COMMON RAGWEED IGE RAST QL: 2
DEPRECATED D FARINAE IGE RAST QL: 4
DEPRECATED D PTERONYSS IGE RAST QL: 4
DEPRECATED DOG DANDER IGE RAST QL: NORMAL
DEPRECATED M RACEMOSUS IGE RAST QL: 0
DEPRECATED ROACH IGE RAST QL: 4
DEPRECATED TIMOTHY IGE RAST QL: 1
DEPRECATED WHITE OAK IGE RAST QL: NORMAL
DOG DANDER IGE QN: 0.16 KUA/L
M RACEMOSUS IGE QN: <0.1 KUA/L
ROACH IGE QN: 45 KUA/L
TIMOTHY IGE QN: 0.65 KUA/L
TOTAL IGE SMQN RAST: 289 KU/L
WHITE OAK IGE QN: 0.11 KUA/L

## 2023-03-28 ENCOUNTER — APPOINTMENT (OUTPATIENT)
Dept: GASTROENTEROLOGY | Facility: CLINIC | Age: 43
End: 2023-03-28
Payer: MEDICAID

## 2023-03-28 DIAGNOSIS — A04.8 OTHER SPECIFIED BACTERIAL INTESTINAL INFECTIONS: ICD-10-CM

## 2023-03-28 PROCEDURE — 99214 OFFICE O/P EST MOD 30 MIN: CPT | Mod: 95

## 2023-03-28 NOTE — ASSESSMENT
[FreeTextEntry1] : 43 yo f with persistent esophageal ulcer\par \par Rec\par Carafate tabs, dissolve one tab in water four times daily\par Pantoprazole, increase to bid\par Repeat egd in 8 wks.\par Avoid nsaids\par Check cbc in view of intermittent dark stools

## 2023-03-28 NOTE — HISTORY OF PRESENT ILLNESS
[Home] : at home, [unfilled] , at the time of the visit. [Medical Office: ([facility])___] : at the medical office located in  [Verbal consent obtained from patient] : the patient, [unfilled] [FreeTextEntry4] : Martha Conklin  [de-identified] : EGD 7/2019  EG Junction ulcer\par EGD 5/2019 healiing of GE junction ulcer\par EGD 12/22 revealed Ulcer at GE junction, path benign.\par EGD 2/23 revealed persistent ulcer with nodularity, biopsies reveal inflammation and foveolar hyperplasia, some minute glandular tissue [FreeTextEntry1] : Colon 2019 hyperplastic polyp

## 2023-05-15 NOTE — ED ADULT NURSE NOTE - NS ED NOTE  TALK SOMEONE YN
Pt called requesting a refill on Bupropion  MG, please send script to [employer] [facility] [employer] order.
No

## 2023-06-01 ENCOUNTER — OUTPATIENT (OUTPATIENT)
Dept: INPATIENT UNIT | Facility: HOSPITAL | Age: 43
LOS: 1 days | Discharge: HOME | End: 2023-06-01
Payer: MEDICAID

## 2023-06-01 ENCOUNTER — TRANSCRIPTION ENCOUNTER (OUTPATIENT)
Age: 43
End: 2023-06-01

## 2023-06-01 ENCOUNTER — RESULT REVIEW (OUTPATIENT)
Age: 43
End: 2023-06-01

## 2023-06-01 VITALS
DIASTOLIC BLOOD PRESSURE: 72 MMHG | HEART RATE: 67 BPM | WEIGHT: 190.04 LBS | HEIGHT: 60 IN | RESPIRATION RATE: 18 BRPM | TEMPERATURE: 99 F | SYSTOLIC BLOOD PRESSURE: 109 MMHG

## 2023-06-01 VITALS
RESPIRATION RATE: 18 BRPM | OXYGEN SATURATION: 100 % | DIASTOLIC BLOOD PRESSURE: 71 MMHG | SYSTOLIC BLOOD PRESSURE: 117 MMHG | HEART RATE: 60 BPM

## 2023-06-01 DIAGNOSIS — K22.10 ULCER OF ESOPHAGUS WITHOUT BLEEDING: ICD-10-CM

## 2023-06-01 DIAGNOSIS — R10.9 UNSPECIFIED ABDOMINAL PAIN: ICD-10-CM

## 2023-06-01 DIAGNOSIS — D25.9 LEIOMYOMA OF UTERUS, UNSPECIFIED: Chronic | ICD-10-CM

## 2023-06-01 DIAGNOSIS — O34.219 MATERNAL CARE FOR UNSPECIFIED TYPE SCAR FROM PREVIOUS CESAREAN DELIVERY: Chronic | ICD-10-CM

## 2023-06-01 PROCEDURE — 88305 TISSUE EXAM BY PATHOLOGIST: CPT

## 2023-06-01 PROCEDURE — 81025 URINE PREGNANCY TEST: CPT

## 2023-06-01 PROCEDURE — 88305 TISSUE EXAM BY PATHOLOGIST: CPT | Mod: 26

## 2023-06-01 PROCEDURE — 43239 EGD BIOPSY SINGLE/MULTIPLE: CPT

## 2023-06-01 RX ORDER — METHIMAZOLE 10 MG/1
1 TABLET ORAL
Qty: 0 | Refills: 0 | DISCHARGE

## 2023-06-01 RX ORDER — ATENOLOL 25 MG/1
1 TABLET ORAL
Qty: 0 | Refills: 0 | DISCHARGE

## 2023-06-01 NOTE — ASU DISCHARGE PLAN (ADULT/PEDIATRIC) - NS MD DC FALL RISK RISK
For information on Fall & Injury Prevention, visit: [email or web address] OR  [email or web address] OR  https://www.cdc.gov/steadi/patient.html

## 2023-06-01 NOTE — CHART NOTE - NSCHARTNOTEFT_GEN_A_CORE
PACU ANESTHESIA ADMISSION NOTE      Procedure:   Post op diagnosis:      ____  Intubated  TV:______       Rate: ______      FiO2: ______    _x___  Patent Airway    _x___  Full return of protective reflexes    _x___  Full recovery from anesthesia / back to baseline status    Vitals:    BP  123/67  P  65  R  14  Sat  98      Mental Status:  _x___ Awake   _____ Alert   _____ Drowsy   _____ Sedated    Nausea/Vomiting:  _x___  NO       ______Yes,   See Post - Op Orders         Pain Scale (0-10):  __0___    Treatment: _x___ None    ____ See Post - Op/PCA Orders    Post - Operative Fluids:   __x__ Oral   ____ See Post - Op Orders    Plan: Discharge:   _x___Home       _____Floor     _____Critical Care    _____  Other:_________________    Comments:  No anesthesia issues or complications noted.  Discharge when criteria met.

## 2023-06-01 NOTE — PRE-ANESTHESIA EVALUATION ADULT - NSANTHTOBACCOSD_GEN_ALL_CORE
OUTPATIENT PSYCHOTHERAPY PROGRESS NOTE    Client Name: Dariusz Leyva   YOB: 1990  Time of Service: 64 minutes   Service Type(s): Individual psychotherapy    Video- Visit Details  Type of service:  video visit for psychotherapy  Time of service:    Date:  12/08/2021    Video Start Time:  8:00am        Video End Time:  9:04am    Reason for video visit:  Patient unable to travel due to Covid-19  Originating Site (patient location):  [facility]   Location- Patient's home  Distant Site (provider location):  Remote location  Mode of Communication:  Video Conference via Day[employer]  Consent:  Patient has given verbal consent for video visit?: Yes    Diagnoses:   Unspecified depressive disorder and unspecified anxiety    Goals of therapy: Elevate mood and show evidence of usual energy levels, and activities. Manage stress from planned move and transition into [facility] program      Individuals Present:   Psychotherapy services during this visit included myself and Dariusz Leyva.    Narrative:  Dariusz reported her mood is  up and down, but mostly in the middle.  She reported she was tired and had a hard time falling asleep, she is only eating one main meal a day.      She shared some stressors from her current job. She discussed her previous work experience and the painful things that occurred while she was at that job. She had to stay in that previous job for three years and she able to deal partly by telling herself  This is not where I belong.  Comparably her current work environment is better, but it is not that organized.     Mental Status:  Appearance:  Casually groomed   Behavior/relationship to examiner/demeanor:  cooperative and seemed distant at times  Motor activity/EPS:  Within normal limits  Speech rate:  Within normal limits  Speech volume:  Within normal limits  Speech articulation:  Within normal limits  Speech coherence: Within normal limits  Speech spontaneity: Within normal limits  Mood  "(subjective): \"up and down\"  Affect (objective appearance): Generally euthymic  Thought Process (Associations):  Logical and Linear   Thought process (Rate):  Within normal limits   Thought content: Within normal limits  Abnormal Perception:  None   Insight:  Good   Judgment:  Good     Plan: Continue with goals as outlined above    Andreea Vásquez Psy.D.,L.P  " No

## 2023-06-01 NOTE — PRE-ANESTHESIA EVALUATION ADULT - NSATTENDATTESTRD_GEN_ALL_CORE
[facility]. Received mammogram results.  Per  test is normal, repeat in a year The patient has been re-examined and I agree with the above assessment or I updated with my findings.

## 2023-06-01 NOTE — H&P PST ADULT - PRO INTERPRETER NEED 2
tachypnea  sitting up in stretcher.  coarse BS b/l  CV nl  abd nl  skin warm and moist to touch. English

## 2023-06-01 NOTE — ASU PATIENT PROFILE, ADULT - INTERNATIONAL TRAVEL
5/15/23 fax received from [employer]. [employer] renewal periods 5/12/23 to 5/11/24. Needing providers signature.     Sent via email to Remi to have provider sign.    No

## 2023-06-02 LAB — SURGICAL PATHOLOGY STUDY: SIGNIFICANT CHANGE UP

## 2023-06-05 DIAGNOSIS — K44.9 DIAPHRAGMATIC HERNIA WITHOUT OBSTRUCTION OR GANGRENE: ICD-10-CM

## 2023-06-05 DIAGNOSIS — Z87.11 PERSONAL HISTORY OF PEPTIC ULCER DISEASE: ICD-10-CM

## 2023-06-05 DIAGNOSIS — Z80.0 FAMILY HISTORY OF MALIGNANT NEOPLASM OF DIGESTIVE ORGANS: ICD-10-CM

## 2023-06-05 DIAGNOSIS — K21.9 GASTRO-ESOPHAGEAL REFLUX DISEASE WITHOUT ESOPHAGITIS: ICD-10-CM

## 2023-06-05 DIAGNOSIS — Z87.19 PERSONAL HISTORY OF OTHER DISEASES OF THE DIGESTIVE SYSTEM: ICD-10-CM

## 2023-06-05 DIAGNOSIS — K22.10 ULCER OF ESOPHAGUS WITHOUT BLEEDING: ICD-10-CM

## 2023-06-05 DIAGNOSIS — Z91.013 ALLERGY TO SEAFOOD: ICD-10-CM

## 2023-06-05 DIAGNOSIS — K31.7 POLYP OF STOMACH AND DUODENUM: ICD-10-CM

## 2023-06-05 DIAGNOSIS — Z91.041 RADIOGRAPHIC DYE ALLERGY STATUS: ICD-10-CM

## 2023-06-06 ENCOUNTER — APPOINTMENT (OUTPATIENT)
Dept: GASTROENTEROLOGY | Facility: CLINIC | Age: 43
End: 2023-06-06
Payer: MEDICAID

## 2023-06-06 VITALS — WEIGHT: 172 LBS | BODY MASS INDEX: 33.77 KG/M2 | HEIGHT: 60 IN

## 2023-06-06 DIAGNOSIS — K22.10 ULCER OF ESOPHAGUS W/OUT BLEEDING: ICD-10-CM

## 2023-06-06 DIAGNOSIS — K31.7 POLYP OF STOMACH AND DUODENUM: ICD-10-CM

## 2023-06-06 PROCEDURE — 99214 OFFICE O/P EST MOD 30 MIN: CPT

## 2023-06-06 NOTE — REVIEW OF SYSTEMS
[Negative] : Heme/Lymph [Heartburn] : heartburn [Abdominal Pain] : no abdominal pain [Vomiting] : no vomiting [Constipation] : no constipation [Diarrhea] : no diarrhea [Melena (black stool)] : no melena [Bleeding] : no bleeding [Fecal Incontinence (soiling)] : no fecal incontinence [Bloating (gassiness)] : no bloating

## 2023-06-06 NOTE — PHYSICAL EXAM
[Alert] : alert [Normal Voice/Communication] : normal voice/communication [No Acute Distress] : no acute distress [Obese (BMI >= 30)] : obese (BMI >= 30) [Sclera] : the sclera and conjunctiva were normal [No Respiratory Distress] : no respiratory distress [No Acc Muscle Use] : no accessory muscle use [Respiration, Rhythm And Depth] : normal respiratory rhythm and effort [Auscultation Breath Sounds / Voice Sounds] : lungs were clear to auscultation bilaterally [Heart Rate And Rhythm] : heart rate was normal and rhythm regular [Normal S1, S2] : normal S1 and S2 [Murmurs] : no murmurs [None] : no edema [Normal] : normal bowel sounds, non-tender, no masses, soft, no no hepato-splenomegaly [Abnormal Walk] : normal gait [Normal Color / Pigmentation] : normal skin color and pigmentation [Oriented To Time, Place, And Person] : oriented to person, place, and time

## 2023-06-06 NOTE — HISTORY OF PRESENT ILLNESS
[FreeTextEntry1] : 42 yr old female with H/O Esophageal ulcer, H Pylori,  black stools, and abdominal pain here for F/U post repeat EGD. Repeat EGD done 6/1/23 showed healed esophageal ulcer with nodularity at the GEJ; biopsies showed a hyperplastic gastric polyp without dysplasia or intestinal metaplasia. She denied heartburn unless she eats certain foods such as tomato sauce or pineapple juice; she has changed her diet accordingly. She denied dysphagia, abdominal pain, melena, blood in the stool, unintentional weight loss, constipation, or diarrhea. She has been taking the PPI bid and Carafate q 6h.  [de-identified] : 6/1/23

## 2023-10-01 PROBLEM — K52.9 ENTERITIS: Status: RESOLVED | Noted: 2020-01-14 | Resolved: 2023-10-01

## 2023-10-04 ENCOUNTER — EMERGENCY (EMERGENCY)
Facility: HOSPITAL | Age: 43
LOS: 0 days | Discharge: HOME | End: 2023-10-04
Attending: EMERGENCY MEDICINE
Payer: MEDICAID

## 2023-10-04 VITALS
HEIGHT: 60 IN | OXYGEN SATURATION: 100 % | SYSTOLIC BLOOD PRESSURE: 119 MMHG | RESPIRATION RATE: 20 BRPM | TEMPERATURE: 98 F | DIASTOLIC BLOOD PRESSURE: 68 MMHG | WEIGHT: 212.97 LBS | HEART RATE: 80 BPM

## 2023-10-04 VITALS
OXYGEN SATURATION: 98 % | TEMPERATURE: 98 F | HEART RATE: 64 BPM | SYSTOLIC BLOOD PRESSURE: 100 MMHG | RESPIRATION RATE: 18 BRPM | DIASTOLIC BLOOD PRESSURE: 57 MMHG

## 2023-10-04 DIAGNOSIS — Z87.42 PERSONAL HISTORY OF OTHER DISEASES OF THE FEMALE GENITAL TRACT: ICD-10-CM

## 2023-10-04 DIAGNOSIS — Z88.8 ALLERGY STATUS TO OTHER DRUGS, MEDICAMENTS AND BIOLOGICAL SUBSTANCES: ICD-10-CM

## 2023-10-04 DIAGNOSIS — O34.219 MATERNAL CARE FOR UNSPECIFIED TYPE SCAR FROM PREVIOUS CESAREAN DELIVERY: Chronic | ICD-10-CM

## 2023-10-04 DIAGNOSIS — Z91.013 ALLERGY TO SEAFOOD: ICD-10-CM

## 2023-10-04 DIAGNOSIS — Z91.041 RADIOGRAPHIC DYE ALLERGY STATUS: ICD-10-CM

## 2023-10-04 DIAGNOSIS — Z98.890 OTHER SPECIFIED POSTPROCEDURAL STATES: ICD-10-CM

## 2023-10-04 DIAGNOSIS — D25.9 LEIOMYOMA OF UTERUS, UNSPECIFIED: Chronic | ICD-10-CM

## 2023-10-04 DIAGNOSIS — R11.0 NAUSEA: ICD-10-CM

## 2023-10-04 DIAGNOSIS — R10.2 PELVIC AND PERINEAL PAIN: ICD-10-CM

## 2023-10-04 DIAGNOSIS — N83.202 UNSPECIFIED OVARIAN CYST, LEFT SIDE: ICD-10-CM

## 2023-10-04 LAB
ALBUMIN SERPL ELPH-MCNC: 4.2 G/DL — SIGNIFICANT CHANGE UP (ref 3.5–5.2)
ALP SERPL-CCNC: 71 U/L — SIGNIFICANT CHANGE UP (ref 30–115)
ALT FLD-CCNC: 10 U/L — SIGNIFICANT CHANGE UP (ref 0–41)
ANION GAP SERPL CALC-SCNC: 11 MMOL/L — SIGNIFICANT CHANGE UP (ref 7–14)
APPEARANCE UR: CLEAR — SIGNIFICANT CHANGE UP
AST SERPL-CCNC: 21 U/L — SIGNIFICANT CHANGE UP (ref 0–41)
BASOPHILS # BLD AUTO: 0.04 K/UL — SIGNIFICANT CHANGE UP (ref 0–0.2)
BASOPHILS NFR BLD AUTO: 0.6 % — SIGNIFICANT CHANGE UP (ref 0–1)
BILIRUB SERPL-MCNC: <0.2 MG/DL — SIGNIFICANT CHANGE UP (ref 0.2–1.2)
BILIRUB UR-MCNC: NEGATIVE — SIGNIFICANT CHANGE UP
BUN SERPL-MCNC: 13 MG/DL — SIGNIFICANT CHANGE UP (ref 10–20)
CALCIUM SERPL-MCNC: 8.9 MG/DL — SIGNIFICANT CHANGE UP (ref 8.4–10.5)
CHLORIDE SERPL-SCNC: 100 MMOL/L — SIGNIFICANT CHANGE UP (ref 98–110)
CO2 SERPL-SCNC: 23 MMOL/L — SIGNIFICANT CHANGE UP (ref 17–32)
COLOR SPEC: YELLOW — SIGNIFICANT CHANGE UP
CREAT SERPL-MCNC: 0.7 MG/DL — SIGNIFICANT CHANGE UP (ref 0.7–1.5)
DIFF PNL FLD: NEGATIVE — SIGNIFICANT CHANGE UP
EGFR: 111 ML/MIN/1.73M2 — SIGNIFICANT CHANGE UP
EOSINOPHIL # BLD AUTO: 0.28 K/UL — SIGNIFICANT CHANGE UP (ref 0–0.7)
EOSINOPHIL NFR BLD AUTO: 3.9 % — SIGNIFICANT CHANGE UP (ref 0–8)
GLUCOSE SERPL-MCNC: 99 MG/DL — SIGNIFICANT CHANGE UP (ref 70–99)
GLUCOSE UR QL: NEGATIVE MG/DL — SIGNIFICANT CHANGE UP
HCG SERPL QL: NEGATIVE — SIGNIFICANT CHANGE UP
HCT VFR BLD CALC: 37.9 % — SIGNIFICANT CHANGE UP (ref 37–47)
HGB BLD-MCNC: 12.5 G/DL — SIGNIFICANT CHANGE UP (ref 12–16)
IMM GRANULOCYTES NFR BLD AUTO: 0.3 % — SIGNIFICANT CHANGE UP (ref 0.1–0.3)
KETONES UR-MCNC: NEGATIVE MG/DL — SIGNIFICANT CHANGE UP
LEUKOCYTE ESTERASE UR-ACNC: NEGATIVE — SIGNIFICANT CHANGE UP
LIDOCAIN IGE QN: 72 U/L — HIGH (ref 7–60)
LYMPHOCYTES # BLD AUTO: 2.6 K/UL — SIGNIFICANT CHANGE UP (ref 1.2–3.4)
LYMPHOCYTES # BLD AUTO: 35.8 % — SIGNIFICANT CHANGE UP (ref 20.5–51.1)
MCHC RBC-ENTMCNC: 28.5 PG — SIGNIFICANT CHANGE UP (ref 27–31)
MCHC RBC-ENTMCNC: 33 G/DL — SIGNIFICANT CHANGE UP (ref 32–37)
MCV RBC AUTO: 86.5 FL — SIGNIFICANT CHANGE UP (ref 81–99)
MONOCYTES # BLD AUTO: 0.66 K/UL — HIGH (ref 0.1–0.6)
MONOCYTES NFR BLD AUTO: 9.1 % — SIGNIFICANT CHANGE UP (ref 1.7–9.3)
NEUTROPHILS # BLD AUTO: 3.66 K/UL — SIGNIFICANT CHANGE UP (ref 1.4–6.5)
NEUTROPHILS NFR BLD AUTO: 50.3 % — SIGNIFICANT CHANGE UP (ref 42.2–75.2)
NITRITE UR-MCNC: NEGATIVE — SIGNIFICANT CHANGE UP
NRBC # BLD: 0 /100 WBCS — SIGNIFICANT CHANGE UP (ref 0–0)
PH UR: 5.5 — SIGNIFICANT CHANGE UP (ref 5–8)
PLATELET # BLD AUTO: 401 K/UL — HIGH (ref 130–400)
PMV BLD: 9.2 FL — SIGNIFICANT CHANGE UP (ref 7.4–10.4)
POTASSIUM SERPL-MCNC: 5.2 MMOL/L — HIGH (ref 3.5–5)
POTASSIUM SERPL-SCNC: 5.2 MMOL/L — HIGH (ref 3.5–5)
PROT SERPL-MCNC: 7 G/DL — SIGNIFICANT CHANGE UP (ref 6–8)
PROT UR-MCNC: SIGNIFICANT CHANGE UP MG/DL
RBC # BLD: 4.38 M/UL — SIGNIFICANT CHANGE UP (ref 4.2–5.4)
RBC # FLD: 13 % — SIGNIFICANT CHANGE UP (ref 11.5–14.5)
SODIUM SERPL-SCNC: 134 MMOL/L — LOW (ref 135–146)
SP GR SPEC: >1.03 — HIGH (ref 1–1.03)
UROBILINOGEN FLD QL: 0.2 MG/DL — SIGNIFICANT CHANGE UP (ref 0.2–1)
WBC # BLD: 7.26 K/UL — SIGNIFICANT CHANGE UP (ref 4.8–10.8)
WBC # FLD AUTO: 7.26 K/UL — SIGNIFICANT CHANGE UP (ref 4.8–10.8)

## 2023-10-04 PROCEDURE — 87086 URINE CULTURE/COLONY COUNT: CPT

## 2023-10-04 PROCEDURE — 96375 TX/PRO/DX INJ NEW DRUG ADDON: CPT

## 2023-10-04 PROCEDURE — 76830 TRANSVAGINAL US NON-OB: CPT | Mod: 26

## 2023-10-04 PROCEDURE — 84703 CHORIONIC GONADOTROPIN ASSAY: CPT

## 2023-10-04 PROCEDURE — 87186 SC STD MICRODIL/AGAR DIL: CPT

## 2023-10-04 PROCEDURE — 99285 EMERGENCY DEPT VISIT HI MDM: CPT

## 2023-10-04 PROCEDURE — 99284 EMERGENCY DEPT VISIT MOD MDM: CPT | Mod: 25

## 2023-10-04 PROCEDURE — 83690 ASSAY OF LIPASE: CPT

## 2023-10-04 PROCEDURE — 36415 COLL VENOUS BLD VENIPUNCTURE: CPT

## 2023-10-04 PROCEDURE — 80053 COMPREHEN METABOLIC PANEL: CPT

## 2023-10-04 PROCEDURE — 96374 THER/PROPH/DIAG INJ IV PUSH: CPT

## 2023-10-04 PROCEDURE — 76830 TRANSVAGINAL US NON-OB: CPT

## 2023-10-04 PROCEDURE — 81003 URINALYSIS AUTO W/O SCOPE: CPT

## 2023-10-04 PROCEDURE — 85025 COMPLETE CBC W/AUTO DIFF WBC: CPT

## 2023-10-04 RX ORDER — SODIUM CHLORIDE 9 MG/ML
1000 INJECTION INTRAMUSCULAR; INTRAVENOUS; SUBCUTANEOUS ONCE
Refills: 0 | Status: COMPLETED | OUTPATIENT
Start: 2023-10-04 | End: 2023-10-04

## 2023-10-04 RX ORDER — ONDANSETRON 8 MG/1
4 TABLET, FILM COATED ORAL ONCE
Refills: 0 | Status: COMPLETED | OUTPATIENT
Start: 2023-10-04 | End: 2023-10-04

## 2023-10-04 RX ORDER — MORPHINE SULFATE 50 MG/1
4 CAPSULE, EXTENDED RELEASE ORAL ONCE
Refills: 0 | Status: DISCONTINUED | OUTPATIENT
Start: 2023-10-04 | End: 2023-10-04

## 2023-10-04 RX ORDER — KETOROLAC TROMETHAMINE 30 MG/ML
15 SYRINGE (ML) INJECTION ONCE
Refills: 0 | Status: DISCONTINUED | OUTPATIENT
Start: 2023-10-04 | End: 2023-10-04

## 2023-10-04 RX ADMIN — Medication 15 MILLIGRAM(S): at 06:31

## 2023-10-04 RX ADMIN — ONDANSETRON 4 MILLIGRAM(S): 8 TABLET, FILM COATED ORAL at 06:32

## 2023-10-04 RX ADMIN — MORPHINE SULFATE 4 MILLIGRAM(S): 50 CAPSULE, EXTENDED RELEASE ORAL at 09:15

## 2023-10-04 NOTE — ED PROVIDER NOTE - OBJECTIVE STATEMENT
44 yo F, hx of recurrent ovarian cysts following with Dr. Phillip at [facility], fibroids sp UAE and then myomectomy 12/22, pancreatitis, here for assessment of R sided pelvic pain with associated nausea.    Pain acute in onset 24 hours, described as sharp, waxing and waning, worse with movement. Associated with nausea, no vomiting, dysuria, hematuria, vaginal discharge.     Patient states this feels similar to when she previously had ruptured ovarian cysts.

## 2023-10-04 NOTE — ED PROVIDER NOTE - CLINICAL SUMMARY MEDICAL DECISION MAKING FREE TEXT BOX
Patient feeling improved.  Abdomen soft nontender nondistended.  Extensive discussion had with patient regarding neck step.  Patient states that she wishes to be discharged to follow-up with her own OB/GYN.  Aware of all results, given a copy of all available results, comfortable with discharge and follow-up outpatient, strict return precautions given. Endorses understanding and aware they can return at any time for further evaluation. No further questions or concerns at this time.

## 2023-10-04 NOTE — ED PROVIDER NOTE - PATIENT PORTAL LINK FT
You can access the FollowMyHealth Patient Portal offered by [facility] by registering at the following website: [email or web address]. By joining [employer]’s FollowMyHealth portal, you will also be able to view your health information using other applications (apps) compatible with our system.

## 2023-10-04 NOTE — ED ADULT NURSE NOTE - NSFALLUNIVINTERV_ED_ALL_ED
Bed/Stretcher in lowest position, wheels locked, appropriate side rails in place/Call bell, personal items and telephone in reach/Instruct patient to call for assistance before getting out of bed/chair/stretcher/Non-slip footwear applied when patient is off stretcher/[place] to call system/Physically safe environment - no spills, clutter or unnecessary equipment/Purposeful proactive rounding/Room/bathroom lighting operational, light cord in reach

## 2023-10-04 NOTE — ED ADULT TRIAGE NOTE - TEMPERATURE IN CELSIUS (DEGREES C)
36.9 [Maximal Pain Intensity: 0/10] : 0/10 [Least Pain Intensity: 0/10] : 0/10 [80: Normal activity with effort; some signs or symptoms of disease.] : 80: Normal activity with effort; some signs or symptoms of disease.  [ECOG Performance Status: 2 - Ambulatory and capable of all self care but unable to carry out any work activities] : Performance Status: 2 - Ambulatory and capable of all self care but unable to carry out any work activities. Up and about more than 50% of waking hours

## 2023-10-04 NOTE — ED ADULT NURSE NOTE - PATIENT'S PREFERRED PRONOUN
Called [facility] to check on status of Billy THOMASON.  Spoke to Sweta who states they did not receive all the documentation that was faxed - they have only 13 of the 17 pages, and there was no PA form attached with what they received.    Re-faxed entire prior authorization request to plan again.  Will call plan in approx. 1 hour to check on status again.   Her/She

## 2023-10-04 NOTE — ED PROVIDER NOTE - NSFOLLOWUPINSTRUCTIONS_ED_ALL_ED_FT
Please follow-up with your OB/GYN.  Return for any new or concerning symptoms.    Ovarian Cyst    An ovarian cyst is a fluid-filled sac that forms on an ovary. The ovaries are small organs that produce eggs in women. Various types of cysts can form on the ovaries. Most are not cancerous. Many do not cause problems, and they often go away on their own. Some may cause symptoms and require treatment. Common types of ovarian cysts include:     Functional cysts—These cysts may occur every month during the menstrual cycle. This is normal. The cysts usually go away with the next menstrual cycle if the woman does not get pregnant. Usually, there are no symptoms with a functional cyst.  Endometrioma cysts—These cysts form from the tissue that lines the uterus. They are also called "chocolate cysts" because they become filled with blood that turns brown. This type of cyst can cause pain in the lower abdomen during intercourse and with your menstrual period.  Cystadenoma cysts—This type develops from the cells on the outside of the ovary. These cysts can get very big and cause lower abdomen pain and pain with intercourse. This type of cyst can twist on itself, cut off its blood supply, and cause severe pain. It can also easily rupture and cause a lot of pain.  Dermoid cysts—This type of cyst is sometimes found in both ovaries. These cysts may contain different kinds of body tissue, such as skin, teeth, hair, or cartilage. They usually do not cause symptoms unless they get very big.   Theca lutein cysts—These cysts occur when too much of a certain hormone (human chorionic gonadotropin) is produced and overstimulates the ovaries to produce an egg. This is most common after procedures used to assist with the conception of a baby (in vitro fertilization).    CAUSES  Fertility drugs can cause a condition in which multiple large cysts are formed on the ovaries. This is called ovarian hyperstimulation syndrome.   A condition called polycystic ovary syndrome can cause hormonal imbalances that can lead to nonfunctional ovarian cysts.     SIGNS AND SYMPTOMS  Many ovarian cysts do not cause symptoms. If symptoms are present, they may include:    Pelvic pain or pressure.  Pain in the lower abdomen.  Pain during sexual intercourse.  Increasing girth (swelling) of the abdomen.  Abnormal menstrual periods.  Increasing pain with menstrual periods.  Stopping having menstrual periods without being pregnant.    DIAGNOSIS  These cysts are commonly found during a routine or annual pelvic exam. Tests may be ordered to find out more about the cyst. These tests may include:    Ultrasound.  X-ray of the pelvis.  CT scan.  MRI.  Blood tests.    TREATMENT  Many ovarian cysts go away on their own without treatment. Your health care provider may want to check your cyst regularly for 2–3 months to see if it changes. For women in menopause, it is particularly important to monitor a cyst closely because of the higher rate of ovarian cancer in menopausal women. When treatment is needed, it may include any of the following:    A procedure to drain the cyst (aspiration). This may be done using a long needle and ultrasound. It can also be done through a laparoscopic procedure. This involves using a thin, lighted tube with a tiny camera on the end (laparoscope) inserted through a small incision.   Surgery to remove the whole cyst. This may be done using laparoscopic surgery or an open surgery involving a larger incision in the lower abdomen.   Hormone treatment or birth control pills. These methods are sometimes used to help dissolve a cyst.    HOME CARE INSTRUCTIONS  Only take over-the-counter or prescription medicines as directed by your health care provider.   Follow up with your health care provider as directed.   Get regular pelvic exams and Pap tests.    SEEK MEDICAL CARE IF:  Your periods are late, irregular, or painful, or they stop.  Your pelvic pain or abdominal pain does not go away.  Your abdomen becomes larger or swollen.  You have pressure on your bladder or trouble emptying your bladder completely.  You have pain during sexual intercourse.  You have feelings of fullness, pressure, or discomfort in your stomach.  You lose weight for no apparent reason.  You feel generally ill.  You become constipated.  You lose your appetite.  You develop acne.  You have an increase in body and facial hair.  You are gaining weight, without changing your exercise and eating habits.  You think you are pregnant.    SEEK IMMEDIATE MEDICAL CARE IF:  You have increasing abdominal pain.  You feel sick to your stomach (nauseous), and you throw up (vomit).  You develop a fever that comes on suddenly.  You have abdominal pain during a bowel movement.  Your menstrual periods become heavier than usual.

## 2023-10-04 NOTE — ED PROVIDER NOTE - PHYSICAL EXAMINATION
VITAL SIGNS: I have reviewed nursing notes and confirm.  CONSTITUTIONAL: Well-developed; well-nourished; in no acute distress, in pain but no distress  SKIN: Skin exam is warm and dry, no acute rash.  HEAD: Normocephalic; atraumatic.  EYES: PERRL, EOM intact; conjunctiva and sclera clear.  ENT: No nasal discharge; airway clear. TMs clear.  NECK: Supple; non tender.  CARD: S1, S2 normal; no murmurs, gallops, or rubs. Regular rate and rhythm.  RESP: No wheezes, rales or rhonchi.  ABD: Normal bowel sounds; soft; non-distended; patient has subjective pain with palpation over R side of pelvis but true ttp, no rebound or guarding  EXT: Normal ROM.   NEURO: Alert, oriented. Grossly unremarkable. No focal deficits.  PSYCH: Cooperative, appropriate.

## 2023-10-04 NOTE — ED ADULT TRIAGE NOTE - CHIEF COMPLAINT QUOTE
Pt c/o RLQ pain started on monday, pain scale 10/10. Took tylenol 650mg at 2000, not much relief. Hx ovarian cysts. No vag bleeding. Regular menstrual cycle.

## 2023-10-08 RX ORDER — CEFPODOXIME PROXETIL 100 MG
1 TABLET ORAL
Qty: 14 | Refills: 0
Start: 2023-10-08 | End: 2023-10-14

## 2023-12-12 ENCOUNTER — APPOINTMENT (OUTPATIENT)
Dept: GASTROENTEROLOGY | Facility: CLINIC | Age: 43
End: 2023-12-12
Payer: MEDICAID

## 2023-12-12 VITALS — BODY MASS INDEX: 33.38 KG/M2 | HEIGHT: 60 IN | WEIGHT: 170 LBS

## 2023-12-12 DIAGNOSIS — K21.9 GASTRO-ESOPHAGEAL REFLUX DISEASE W/OUT ESOPHAGITIS: ICD-10-CM

## 2023-12-12 DIAGNOSIS — R10.9 UNSPECIFIED ABDOMINAL PAIN: ICD-10-CM

## 2023-12-12 DIAGNOSIS — K92.1 MELENA: ICD-10-CM

## 2023-12-12 DIAGNOSIS — R11.2 NAUSEA WITH VOMITING, UNSPECIFIED: ICD-10-CM

## 2023-12-12 DIAGNOSIS — K59.00 CONSTIPATION, UNSPECIFIED: ICD-10-CM

## 2023-12-12 PROCEDURE — 99214 OFFICE O/P EST MOD 30 MIN: CPT

## 2023-12-12 RX ORDER — SUCRALFATE 1 G/1
1 TABLET ORAL
Qty: 360 | Refills: 3 | Status: DISCONTINUED | COMMUNITY
Start: 2023-03-28 | End: 2023-12-12

## 2023-12-12 RX ORDER — PANTOPRAZOLE 40 MG/1
40 TABLET, DELAYED RELEASE ORAL TWICE DAILY
Qty: 180 | Refills: 3 | Status: ACTIVE | COMMUNITY
Start: 2023-03-28 | End: 1900-01-01

## 2023-12-12 RX ORDER — PREDNISONE 50 MG/1
50 TABLET ORAL DAILY
Qty: 5 | Refills: 0 | Status: DISCONTINUED | COMMUNITY
Start: 2022-09-28 | End: 2023-12-12

## 2023-12-12 RX ORDER — SUCRALFATE 1 G/1
1 TABLET ORAL 4 TIMES DAILY
Qty: 120 | Refills: 3 | Status: ACTIVE | COMMUNITY
Start: 2023-12-12 | End: 1900-01-01

## 2023-12-20 NOTE — PHYSICAL EXAM
"Chief Complaint   Patient presents with    Cyst     Botox 6 month follow up         Blood pressure (!) 130/91, pulse 97, height 1.702 m (5' 7\"), weight 82.6 kg (182 lb). Body mass index is 28.51 kg/m .    Patient Active Problem List   Diagnosis    Neurogenic bladder    Gender dysphoria       Allergies   Allergen Reactions    Dust Mites     Shellfish Allergy Nausea and Vomiting and Unknown     Patient states his mother has a \"violent reaction\" to shellfish, and that he has a feeling of nausea when he smells shellfish, so he has never consumed any. He states he has also not been tested.      Shellfish-Derived Products Other (See Comments)     Patient states her mother has a \"violent reaction\" to shellfish, and that she has a feeling of nausea when she smells shellfish, so she has never consumed any. She states she has also not been tested.  Patient states his mother has a \"violent reaction\" to shellfish, and that he has a feeling of nausea when he smells shellfish, so he has never consumed any. He states he has also not been tested.         Current Outpatient Medications   Medication Sig Dispense Refill    ALBUTEROL IN Inhale into the lungs 4 times daily as needed      diazepam (VALIUM) 5 MG tablet Take 5 mg by mouth      estradiol valerate (DELESTROGEN) 20 MG/ML injection estradiol valerate 20 mg/mL intramuscular oil      Insulin Syringe-Needle U-100 (B-D INSULIN SYRINGE) 25G X 1\" 1 ML MISC       progesterone (PROMETRIUM) 200 MG capsule Take 1 capsule by mouth daily      QUEtiapine (SEROQUEL) 25 MG tablet Take 25 mg by mouth      Sharps Container MISC Sharps Container      baclofen (LIORESAL) 10 MG tablet  (Patient not taking: Reported on 12/20/2023)         Social History     Tobacco Use    Smoking status: Never    Smokeless tobacco: Never   Substance Use Topics    Alcohol use: No    Drug use: No       Sally Soto MA  12/20/2023  10:40 AM     " [General Appearance - Alert] : alert [General Appearance - In No Acute Distress] : in no acute distress [Extraocular Movements] : extraocular movements were intact [Sclera] : the sclera and conjunctiva were normal [Oropharynx] : the oropharynx was normal [Outer Ear] : the ears and nose were normal in appearance [Auscultation Breath Sounds / Voice Sounds] : lungs were clear to auscultation bilaterally [] : no respiratory distress [Respiration, Rhythm And Depth] : normal respiratory rhythm and effort [Bowel Sounds] : normal bowel sounds [Edema] : there was no peripheral edema [FreeTextEntry1] : mild l and r lower abdominal tenderness [Abdomen Soft] : soft [Cervical Lymph Nodes Enlarged Posterior Bilaterally] : posterior cervical [Supraclavicular Lymph Nodes Enlarged Bilaterally] : supraclavicular [Cervical Lymph Nodes Enlarged Anterior Bilaterally] : anterior cervical [Oriented To Time, Place, And Person] : oriented to person, place, and time [Axillary Lymph Nodes Enlarged Bilaterally] : axillary [Impaired Insight] : insight and judgment were intact [Affect] : the affect was normal

## 2024-01-02 NOTE — ED PROVIDER NOTE - CLINICAL SUMMARY MEDICAL DECISION MAKING FREE TEXT BOX
pw abd pain, vomiting. E pw abd pain, postprandial, and vomiting. Felt improved with antiacid medication most of all. Labs show no elevation in lipase. normal hgb. CT showing no sign of acute pancreatitis or other acute intraabdominal pathology. Tolerating PO in the ED. Patient to be discharged from ED. Any available test results were discussed with patient and/or family. Verbal instructions given, including instructions to return to ED immediately for any new, worsening, or concerning symptoms. Patient endorsed understanding. Written discharge instructions additionally given, including follow-up plan. Elevated troponin

## 2024-03-19 NOTE — ED ADULT NURSE NOTE - NS PRO PASSIVE SMOKE EXP
I would stay on 40 mg of prednisone (2 tablets) daily over the next week and then keep me posted on your progress.  Will obtain an HRCT scan of the chest and correlate with the findings last month.  Also will order albuterol via nebulizer to use every 4-6 hours as needed.  
No

## 2024-03-28 NOTE — ED ADULT NURSE NOTE - NS_SISCREENINGSR_GEN_ALL_ED
-- DO NOT REPLY / DO NOT REPLY ALL --  -- Message is from Engagement Center Operations (ECO) --    General Patient Message: Patient is returning call from Maria Isabel. Chat was usnuccessful.        Caller Information         Type Contact Phone/Fax    03/28/2024 09:48 AM CDT Phone (Outgoing) PippaKelleni (Self) [phone] (M)    Left Message           Alternative phone number: n/a    Can a detailed message be left? Yes    Message Turnaround:                Negative

## 2024-04-24 ENCOUNTER — INPATIENT (INPATIENT)
Facility: HOSPITAL | Age: 44
LOS: 4 days | Discharge: HOME | DRG: 282 | End: 2024-04-29
Attending: HOSPITALIST | Admitting: STUDENT IN AN ORGANIZED HEALTH CARE EDUCATION/TRAINING PROGRAM
Payer: MEDICAID

## 2024-04-24 ENCOUNTER — OUTPATIENT (OUTPATIENT)
Dept: OUTPATIENT SERVICES | Facility: HOSPITAL | Age: 44
LOS: 1 days | End: 2024-04-24
Payer: MEDICAID

## 2024-04-24 VITALS
RESPIRATION RATE: 20 BRPM | WEIGHT: 207.9 LBS | SYSTOLIC BLOOD PRESSURE: 129 MMHG | TEMPERATURE: 99 F | HEART RATE: 62 BPM | OXYGEN SATURATION: 100 % | HEIGHT: 60 IN | DIASTOLIC BLOOD PRESSURE: 70 MMHG

## 2024-04-24 DIAGNOSIS — K29.00 ACUTE GASTRITIS WITHOUT BLEEDING: ICD-10-CM

## 2024-04-24 DIAGNOSIS — K26.9 DUODENAL ULCER, UNSPECIFIED AS ACUTE OR CHRONIC, WITHOUT HEMORRHAGE OR PERFORATION: ICD-10-CM

## 2024-04-24 DIAGNOSIS — D25.9 LEIOMYOMA OF UTERUS, UNSPECIFIED: Chronic | ICD-10-CM

## 2024-04-24 DIAGNOSIS — K20.90 ESOPHAGITIS, UNSPECIFIED WITHOUT BLEEDING: ICD-10-CM

## 2024-04-24 DIAGNOSIS — Z91.041 RADIOGRAPHIC DYE ALLERGY STATUS: ICD-10-CM

## 2024-04-24 DIAGNOSIS — K21.9 GASTRO-ESOPHAGEAL REFLUX DISEASE WITHOUT ESOPHAGITIS: ICD-10-CM

## 2024-04-24 DIAGNOSIS — E28.2 POLYCYSTIC OVARIAN SYNDROME: ICD-10-CM

## 2024-04-24 DIAGNOSIS — K27.9 PEPTIC ULCER, SITE UNSPECIFIED, UNSPECIFIED AS ACUTE OR CHRONIC, WITHOUT HEMORRHAGE OR PERFORATION: ICD-10-CM

## 2024-04-24 DIAGNOSIS — O34.219 MATERNAL CARE FOR UNSPECIFIED TYPE SCAR FROM PREVIOUS CESAREAN DELIVERY: Chronic | ICD-10-CM

## 2024-04-24 DIAGNOSIS — R11.2 NAUSEA WITH VOMITING, UNSPECIFIED: ICD-10-CM

## 2024-04-24 DIAGNOSIS — K85.90 ACUTE PANCREATITIS WITHOUT NECROSIS OR INFECTION, UNSPECIFIED: ICD-10-CM

## 2024-04-24 DIAGNOSIS — E66.01 MORBID (SEVERE) OBESITY DUE TO EXCESS CALORIES: ICD-10-CM

## 2024-04-24 DIAGNOSIS — Z00.8 ENCOUNTER FOR OTHER GENERAL EXAMINATION: ICD-10-CM

## 2024-04-24 DIAGNOSIS — R10.13 EPIGASTRIC PAIN: ICD-10-CM

## 2024-04-24 DIAGNOSIS — N93.9 ABNORMAL UTERINE AND VAGINAL BLEEDING, UNSPECIFIED: ICD-10-CM

## 2024-04-24 DIAGNOSIS — N80.9 ENDOMETRIOSIS, UNSPECIFIED: ICD-10-CM

## 2024-04-24 DIAGNOSIS — E05.90 THYROTOXICOSIS, UNSPECIFIED WITHOUT THYROTOXIC CRISIS OR STORM: ICD-10-CM

## 2024-04-24 DIAGNOSIS — N70.11 CHRONIC SALPINGITIS: ICD-10-CM

## 2024-04-24 DIAGNOSIS — Z91.013 ALLERGY TO SEAFOOD: ICD-10-CM

## 2024-04-24 LAB
ALBUMIN SERPL ELPH-MCNC: 4.6 G/DL — SIGNIFICANT CHANGE UP (ref 3.5–5.2)
ALP SERPL-CCNC: 70 U/L — SIGNIFICANT CHANGE UP (ref 30–115)
ALT FLD-CCNC: 40 U/L — SIGNIFICANT CHANGE UP (ref 0–41)
ANION GAP SERPL CALC-SCNC: 11 MMOL/L — SIGNIFICANT CHANGE UP (ref 7–14)
APPEARANCE UR: ABNORMAL
AST SERPL-CCNC: 16 U/L — SIGNIFICANT CHANGE UP (ref 0–41)
BACTERIA # UR AUTO: ABNORMAL /HPF
BASOPHILS # BLD AUTO: 0.04 K/UL — SIGNIFICANT CHANGE UP (ref 0–0.2)
BASOPHILS NFR BLD AUTO: 0.6 % — SIGNIFICANT CHANGE UP (ref 0–1)
BILIRUB SERPL-MCNC: 0.3 MG/DL — SIGNIFICANT CHANGE UP (ref 0.2–1.2)
BILIRUB UR-MCNC: ABNORMAL
BLD GP AB SCN SERPL QL: SIGNIFICANT CHANGE UP
BUN SERPL-MCNC: 8 MG/DL — LOW (ref 10–20)
CALCIUM SERPL-MCNC: 9.9 MG/DL — SIGNIFICANT CHANGE UP (ref 8.4–10.5)
CAST: 0 /LPF — SIGNIFICANT CHANGE UP (ref 0–4)
CHLORIDE SERPL-SCNC: 104 MMOL/L — SIGNIFICANT CHANGE UP (ref 98–110)
CO2 SERPL-SCNC: 26 MMOL/L — SIGNIFICANT CHANGE UP (ref 17–32)
COLOR SPEC: ABNORMAL
CREAT SERPL-MCNC: 0.8 MG/DL — SIGNIFICANT CHANGE UP (ref 0.7–1.5)
DIFF PNL FLD: ABNORMAL
EGFR: 95 ML/MIN/1.73M2 — SIGNIFICANT CHANGE UP
EOSINOPHIL # BLD AUTO: 0.21 K/UL — SIGNIFICANT CHANGE UP (ref 0–0.7)
EOSINOPHIL NFR BLD AUTO: 2.9 % — SIGNIFICANT CHANGE UP (ref 0–8)
GLUCOSE SERPL-MCNC: 97 MG/DL — SIGNIFICANT CHANGE UP (ref 70–99)
GLUCOSE UR QL: NEGATIVE MG/DL — SIGNIFICANT CHANGE UP
HCG SERPL QL: NEGATIVE — SIGNIFICANT CHANGE UP
HCT VFR BLD CALC: 41.3 % — SIGNIFICANT CHANGE UP (ref 37–47)
HGB BLD-MCNC: 13.4 G/DL — SIGNIFICANT CHANGE UP (ref 12–16)
IMM GRANULOCYTES NFR BLD AUTO: 0.1 % — SIGNIFICANT CHANGE UP (ref 0.1–0.3)
KETONES UR-MCNC: NEGATIVE MG/DL — SIGNIFICANT CHANGE UP
LEUKOCYTE ESTERASE UR-ACNC: ABNORMAL
LIDOCAIN IGE QN: 551 U/L — HIGH (ref 7–60)
LYMPHOCYTES # BLD AUTO: 2.93 K/UL — SIGNIFICANT CHANGE UP (ref 1.2–3.4)
LYMPHOCYTES # BLD AUTO: 40.9 % — SIGNIFICANT CHANGE UP (ref 20.5–51.1)
MCHC RBC-ENTMCNC: 28.5 PG — SIGNIFICANT CHANGE UP (ref 27–31)
MCHC RBC-ENTMCNC: 32.4 G/DL — SIGNIFICANT CHANGE UP (ref 32–37)
MCV RBC AUTO: 87.7 FL — SIGNIFICANT CHANGE UP (ref 81–99)
MONOCYTES # BLD AUTO: 0.49 K/UL — SIGNIFICANT CHANGE UP (ref 0.1–0.6)
MONOCYTES NFR BLD AUTO: 6.8 % — SIGNIFICANT CHANGE UP (ref 1.7–9.3)
NEUTROPHILS # BLD AUTO: 3.49 K/UL — SIGNIFICANT CHANGE UP (ref 1.4–6.5)
NEUTROPHILS NFR BLD AUTO: 48.7 % — SIGNIFICANT CHANGE UP (ref 42.2–75.2)
NITRITE UR-MCNC: NEGATIVE — SIGNIFICANT CHANGE UP
NRBC # BLD: 0 /100 WBCS — SIGNIFICANT CHANGE UP (ref 0–0)
PH UR: 6 — SIGNIFICANT CHANGE UP (ref 5–8)
PLATELET # BLD AUTO: 395 K/UL — SIGNIFICANT CHANGE UP (ref 130–400)
PMV BLD: 9.5 FL — SIGNIFICANT CHANGE UP (ref 7.4–10.4)
POTASSIUM SERPL-MCNC: 4.8 MMOL/L — SIGNIFICANT CHANGE UP (ref 3.5–5)
POTASSIUM SERPL-SCNC: 4.8 MMOL/L — SIGNIFICANT CHANGE UP (ref 3.5–5)
PROT SERPL-MCNC: 7.7 G/DL — SIGNIFICANT CHANGE UP (ref 6–8)
PROT UR-MCNC: 100 MG/DL
RBC # BLD: 4.71 M/UL — SIGNIFICANT CHANGE UP (ref 4.2–5.4)
RBC # FLD: 13.2 % — SIGNIFICANT CHANGE UP (ref 11.5–14.5)
RBC CASTS # UR COMP ASSIST: >1900 /HPF — HIGH (ref 0–4)
SODIUM SERPL-SCNC: 141 MMOL/L — SIGNIFICANT CHANGE UP (ref 135–146)
SP GR SPEC: 1.01 — SIGNIFICANT CHANGE UP (ref 1–1.03)
SQUAMOUS # UR AUTO: 6 /HPF — HIGH (ref 0–5)
UROBILINOGEN FLD QL: 0.2 MG/DL — SIGNIFICANT CHANGE UP (ref 0.2–1)
WBC # BLD: 7.17 K/UL — SIGNIFICANT CHANGE UP (ref 4.8–10.8)
WBC # FLD AUTO: 7.17 K/UL — SIGNIFICANT CHANGE UP (ref 4.8–10.8)
WBC UR QL: 9 /HPF — HIGH (ref 0–5)

## 2024-04-24 PROCEDURE — A9541: CPT

## 2024-04-24 PROCEDURE — 99285 EMERGENCY DEPT VISIT HI MDM: CPT

## 2024-04-24 PROCEDURE — 76705 ECHO EXAM OF ABDOMEN: CPT | Mod: 26

## 2024-04-24 PROCEDURE — 78264 GASTRIC EMPTYING IMG STUDY: CPT

## 2024-04-24 PROCEDURE — 76830 TRANSVAGINAL US NON-OB: CPT | Mod: 26

## 2024-04-24 PROCEDURE — 74177 CT ABD & PELVIS W/CONTRAST: CPT | Mod: 26,MC

## 2024-04-24 PROCEDURE — 78264 GASTRIC EMPTYING IMG STUDY: CPT | Mod: 26

## 2024-04-24 RX ORDER — MORPHINE SULFATE 50 MG/1
4 CAPSULE, EXTENDED RELEASE ORAL ONCE
Refills: 0 | Status: DISCONTINUED | OUTPATIENT
Start: 2024-04-24 | End: 2024-04-24

## 2024-04-24 RX ORDER — DIPHENHYDRAMINE HCL 50 MG
50 CAPSULE ORAL ONCE
Refills: 0 | Status: COMPLETED | OUTPATIENT
Start: 2024-04-24 | End: 2024-04-24

## 2024-04-24 RX ORDER — METRONIDAZOLE 500 MG
500 TABLET ORAL EVERY 12 HOURS
Refills: 0 | Status: DISCONTINUED | OUTPATIENT
Start: 2024-04-24 | End: 2024-04-29

## 2024-04-24 RX ORDER — ONDANSETRON 8 MG/1
4 TABLET, FILM COATED ORAL ONCE
Refills: 0 | Status: COMPLETED | OUTPATIENT
Start: 2024-04-24 | End: 2024-04-24

## 2024-04-24 RX ORDER — KETOROLAC TROMETHAMINE 30 MG/ML
30 SYRINGE (ML) INJECTION ONCE
Refills: 0 | Status: DISCONTINUED | OUTPATIENT
Start: 2024-04-24 | End: 2024-04-24

## 2024-04-24 RX ORDER — SODIUM CHLORIDE 9 MG/ML
1000 INJECTION INTRAMUSCULAR; INTRAVENOUS; SUBCUTANEOUS ONCE
Refills: 0 | Status: COMPLETED | OUTPATIENT
Start: 2024-04-24 | End: 2024-04-24

## 2024-04-24 RX ORDER — CEFTRIAXONE 500 MG/1
1000 INJECTION, POWDER, FOR SOLUTION INTRAMUSCULAR; INTRAVENOUS ONCE
Refills: 0 | Status: COMPLETED | OUTPATIENT
Start: 2024-04-24 | End: 2024-04-24

## 2024-04-24 RX ORDER — SODIUM CHLORIDE 9 MG/ML
2000 INJECTION INTRAMUSCULAR; INTRAVENOUS; SUBCUTANEOUS ONCE
Refills: 0 | Status: COMPLETED | OUTPATIENT
Start: 2024-04-24 | End: 2024-04-24

## 2024-04-24 RX ADMIN — MORPHINE SULFATE 4 MILLIGRAM(S): 50 CAPSULE, EXTENDED RELEASE ORAL at 22:44

## 2024-04-24 RX ADMIN — CEFTRIAXONE 100 MILLIGRAM(S): 500 INJECTION, POWDER, FOR SOLUTION INTRAMUSCULAR; INTRAVENOUS at 18:45

## 2024-04-24 RX ADMIN — MORPHINE SULFATE 4 MILLIGRAM(S): 50 CAPSULE, EXTENDED RELEASE ORAL at 13:59

## 2024-04-24 RX ADMIN — SODIUM CHLORIDE 1000 MILLILITER(S): 9 INJECTION INTRAMUSCULAR; INTRAVENOUS; SUBCUTANEOUS at 15:23

## 2024-04-24 RX ADMIN — SODIUM CHLORIDE 2000 MILLILITER(S): 9 INJECTION INTRAMUSCULAR; INTRAVENOUS; SUBCUTANEOUS at 14:00

## 2024-04-24 RX ADMIN — ONDANSETRON 4 MILLIGRAM(S): 8 TABLET, FILM COATED ORAL at 13:59

## 2024-04-24 RX ADMIN — Medication 102 MILLIGRAM(S): at 14:29

## 2024-04-24 RX ADMIN — Medication 125 MILLIGRAM(S): at 14:29

## 2024-04-24 RX ADMIN — Medication 100 MILLIGRAM(S): at 22:43

## 2024-04-24 RX ADMIN — Medication 500 MILLIGRAM(S): at 22:44

## 2024-04-24 NOTE — CONSULT NOTE ADULT - ASSESSMENT
44yo  LMP , with PMHx endometriosis, PCOS, fibroids s/p myomectomy (), pancreatitis, with epigastric abdominal pain and incidental hydrosalpinx, pain unlikely gyn in origin.   - No acute gyn intervention at this time  - pain management prn  - Recommend doxycycline 100mg BID and Flagyl 500mg BID x7 days for hydrosalpinx  - Dispo per ED

## 2024-04-24 NOTE — ED PROVIDER NOTE - OBJECTIVE STATEMENT
43-year-old female with past medical history of recurrent ovarian cysts (follows with Dr. Phillip at [facility]), uterine fibroids s/p UAE plus myomectomy 12/2022, pancreatitis, endometriosis, PCOS presents with complaints of pelvic pain and vaginal bleeding.  Reports Saturday she felt a sharp pain to the suprapubic region with subsequent dull pain to the same area that has been progressively increasing to today.  Reports pain is localized to bilateral adnexal regions as well, nonradiating, mild to moderate in intensity.  Also reports vaginal bleeding that began yesterday.  Reports her last period ended 1 week ago.  Endorses nausea and 1 episode of vomiting today at 11 AM as well as multiple episodes of watery (nonbloody) diarrhea for the past week.  Also endorses dysuria described as pressure-like sensation with urination.  Denies subjective fever/chills, chest pain, shortness of breath, lightheadedness/dizziness, focal numbness/weakness, melena/hematochezia.

## 2024-04-24 NOTE — ED PROVIDER NOTE - CLINICAL SUMMARY MEDICAL DECISION MAKING FREE TEXT BOX
pt s/o to me from Dr. Kirby- labs imaging reviewed. sp gyn eval. abx given. will admit for further eval

## 2024-04-24 NOTE — ED PROVIDER NOTE - CARE PLAN
1 Principal Discharge DX:	Pancreatitis  Secondary Diagnosis:	Acute UTI   Principal Discharge DX:	Pancreatitis  Secondary Diagnosis:	Acute UTI  Secondary Diagnosis:	Hydrosalpinx

## 2024-04-24 NOTE — ED PROVIDER NOTE - PHYSICAL EXAMINATION
Vital Signs: I have reviewed the initial vital signs.  Constitutional: appears stated age, no acute distress  Eyes: Sclera clear, EOMI.  Cardiovascular: S1 and S2, regular rate, regular rhythm, well-perfused extremities, radial pulses equal and 2+, pedal pulses 2+ and equal  Respiratory: unlabored respiratory effort, clear to auscultation bilaterally no wheezing, rales, or rhonchi  Gastrointestinal:  abdomen soft, + suprapubic and bilateral adnexal tenderness to palpation  Musculoskeletal: supple neck, no lower extremity edema  Integumentary: warm, dry, no rash  Neurologic: awake, alert, oriented x3, extremities’ motor and sensory functions grossly intact

## 2024-04-24 NOTE — CONSULT NOTE ADULT - SUBJECTIVE AND OBJECTIVE BOX
HPI: 44yo  LMP , with PMHx endometriosis, PCOS, fibroids s/p myomectomy (), pancreatitis, presents for abdominal pain. Pt endorses worsening epigastric and lower abdominal pain for the past 3 weeks. Also complaining of concurrent nausea with vomiting and diarrhea for the past week.  Endorses h/o chronic pelvic pain due to endometriosis but states for the past few week pelvic pain has increased in intensity, mildly relieved with PO tylenol. Also endorses one episode of irregular bleeding last month, bleeding for 3 weeks straight (-), pt typically has regular menstrual cycle lengths. States had another period started on , currently having dark blood clots/staining.  Denies fevers/chills, lightheadedness/dizziness, chest pain, SOB, changes in bowel habits or urinary symptoms. Pt follow with Dr. Phillip ([facility]) for primary gyn care. Also follows with endometriosis specialist (Dr. Urias), currently in endometriosis drug trial, pt unable to recall or provide name of medication.     Ob/Gyn History:                   LMP - 24              - C/S x1  Denies history of abnormal paps, or STIs  - h/o PCOS  - h/o endometriosis  - h/o uterine fibroids s/p myomectomy     Denies the following: constitutional symptoms, visual symptoms, cardiovascular symptoms, respiratory symptoms, GI symptoms, musculoskeletal symptoms, skin symptoms, neurologic symptoms, hematologic symptoms, allergic symptoms, psychiatric symptoms  Except any pertinent positives listed.     Home Medications:  atenolol 25 mg oral tablet: 1 tab(s) orally once a day (2023 11:08)  methIMAzole 5 mg oral tablet: 1 tab(s) orally once a day (2023 11:08)      Allergies  shellfish (Anaphylaxis)  IV CONTRAST (Anaphylaxis)  iodine containing compounds (Unknown)  Intolerances        PAST MEDICAL & SURGICAL HISTORY:  Abscess  vaginal  Fibroid, uterine  Peptic ulcer  Ovarian cyst  Pancreatitis  Delivery with history of   Fibroid  uterine surgery    FAMILY HISTORY:  FH: colon cancer (Father)        SOCIAL HISTORY: Denies cigarette use, alcohol use, or illicit drug use    Vital Signs Last 24 Hrs  T(F): 99 (2024 11:07), Max: 99 (2024 11:07)  HR: 62 (2024 11:07) (62 - 62)  BP: 129/70 (2024 11:07) (129/70 - 129/70)  RR: 20 (2024 11:07) (20 - 20)  Height (cm): 152.4 (24 @ 11:07)  Weight (kg): 94.3 (24 @ 11:07)  BMI (kg/m2): 40.6 (24 @ 11:07)  BSA (m2): 1.9 (24 @ 11:07)    UO:     General Appearance - AAOx3, NAD  Heart - S1S2 regular rate and rhythm  Lung - CTA Bilaterally  Abdomen - Soft, moderate epigastric and lower quadrant tenderness to palpation, nondistended, no rebound, no rigidity, no guarding    GYN/Pelvis:    External Genitalia - Normal  Vagina - Normal, minimal dark blood discharge noted, no active bleeding or lesion noted  Cervix - Normal    Uterus:  Size - Difficult appreciate due to body habitus  Tenderness - None    Adnexa:  Masses - None  Tenderness - None    Meds:   (ADM OVERRIDE) 1 each &lt;see task&gt; GiveOnce  (ADM OVERRIDE) 1 each &lt;see task&gt; GiveOnce  (ADM OVERRIDE) 1 each &lt;see task&gt; GiveOnce  (ADM OVERRIDE) 1 each &lt;see task&gt; GiveOnce  (ADM OVERRIDE) 1 each &lt;see task&gt; GiveOnce  cefTRIAXone   IVPB 1000 milliGRAM(s) IV Intermittent once  diphenhydrAMINE IVPB 50 milliGRAM(s) IV Intermittent Once  ketorolac   Injectable 30 milliGRAM(s) IV Push once  methylPREDNISolone sodium succinate Injectable 125 milliGRAM(s) IV Push Once  morphine  - Injectable 4 milliGRAM(s) IV Push Once  ondansetron Injectable 4 milliGRAM(s) IV Push once  sodium chloride 0.9% Bolus 2000 milliLiter(s) IV Bolus once  sodium chloride 0.9% Bolus 1000 milliLiter(s) IV Bolus once    Height (cm): 152.4 (24 @ 11:07)  Weight (kg): 94.3 (24 @ 11:07)  BMI (kg/m2): 40.6 (24 @ 11:07)  BSA (m2): 1.9 (24 @ 11:07)    LABS:                        13.4   7.17  )-----------( 395      ( 2024 13:24 )             41.3       ABO RH Interpretation: O POS (24 @ 14:00)  Antibody Screen: NEG (24 @ 14:00)        141  |  104  |  8<L>  ----------------------------<  97  4.8   |  26  |  0.8    Ca    9.9      2024 13:24    TPro  7.7  /  Alb  4.6  /  TBili  0.3  /  DBili  x   /  AST  16  /  ALT  40  /  AlkPhos  70        Urinalysis Basic - ( 2024 13:24 )    Color: Red / Appearance: Turbid / S.009 / pH: x  Gluc: 97 mg/dL / Ketone: Negative mg/dL  / Bili: Small / Urobili: 0.2 mg/dL   Blood: x / Protein: 100 mg/dL / Nitrite: Negative   Leuk Esterase: Small / RBC: >1900 /HPF / WBC 9 /HPF   Sq Epi: x / Non Sq Epi: 6 /HPF / Bacteria: Occasional /HPF          RADIOLOGY & ADDITIONAL STUDIES:  < from: US Transvaginal (24 @ 17:55) >  ACC: [id number] EXAM:  US TRANSVAGINAL   ORDERED BY: SANCHO LAUREANO     PROCEDURE DATE:  2024          INTERPRETATION:  CLINICAL INFORMATION: Vaginal bleeding    LMP: 2024    COMPARISON: Pelvic ultrasound 10/4/2023. CT abdomen pelvis 2024.    TECHNIQUE:  Endovaginal and transabdominal pelvic sonogram. Color and Spectral   Doppler was performed.    FINDINGS:  Uterus: 7.1 x 2.9 x 3.9 cm. Within normal limits.  Endometrium: 0.17 cm. Within normal limits.    Right ovary: 2.1 x 1.7 x 2.1 cm. Within normal limits. Vascular flow   noted to the right ovary.  Left ovary: 2.6 x 2.0 x 2.0 cm. Within normal limits. Vascular flow noted   to the left ovary. Tubular fluid-containing left adnexal structure   without Doppler flow.    Fluid: Trace pelvic ascites (1.270-11)    IMPRESSION:  Tubular fluid-containing left adnexal structure without Doppler flow,   which may represent hydrosalpinx.    --- End of Report ---    < end of copied text >  < from: CT Abdomen and Pelvis w/ IV Cont (24 @ 16:00) >  ACC: [id number] EXAM:  CT ABDOMEN AND PELVIS IC   ORDERED BY: TIERRA LEMUS     *** ADDENDUM # 1 ***    The appendix is not identified however no inflammatory changes are noted   in the region of the cecum. The findings were discussed with Dr. Weir.    --- End of Report ---    *** END OF ADDENDUM # 1 ***      PROCEDURE DATE:  2024          INTERPRETATION:  CLINICAL HISTORY / REASON FOR EXAM: Mid and lower   abdominal pain. Elevated lipase..   WBC 7.17  PMHx of recurrent ovarian   cysts (follows with Dr. Phillip at [facility]), uterine fibroids s/p UAE plus   myomectomy 2022, pancreatitis, endometriosis, PCOS presents with   complaints of pelvic pain and vaginal bleeding.    TECHNIQUE: Contiguous axial CT images were obtained from the lower chest   to the pubic symphysis following the administration of 100 mL Omnipaque   350 intravenous contrast. Oral contrast was not administered. Reformatted   images in the coronal and sagittal planes were acquired.    COMPARISON CT: CT abdomen and pelvis from 2022    OTHER STUDIES USED FOR CORRELATION: None.      FINDINGS:    LOWER CHEST: Subsegmental atelectasis..    HEPATOBILIARY: Unremarkable.    SPLEEN: Unremarkable.    PANCREAS: Unremarkable. No evidence of pancreatitis or peripancreatic   inflammatory change.    ADRENAL GLANDS: Unremarkable.    KIDNEYS: Metric renal enhancement. No hydronephrosis..    ABDOMINOPELVIC NODES: Unremarkable.    PELVIC ORGANS: Posterior perivesicular fat stranding (). Post   myomectomy.    PERITONEUM/MESENTERY/BOWEL: No bowel obstruction, ascites or   intraperitoneal free air.    BONES/SOFT TISSUES: No acute osseous abnormality. Degenerative osseous   changes. Postsurgical changes in the lower anterior abdominal wall    VASCULAR: Aorta is normal in caliber.      IMPRESSION:  Posterior perivesicular fat stranding (), which can be seen in   cystitis. Recommend clinical correlation.    No evidence of pancreatitis or peripancreatic inflammatory change.    --- End of Report ---    ***Please see the addendum at the top of this report. It may contain   additional important information or changes.****    < end of copied text >   HPI: 42yo  LMP , with PMHx endometriosis, PCOS, fibroids s/p myomectomy (), pancreatitis, presents for abdominal pain. Pt endorses worsening epigastric and lower abdominal pain for the past 3 weeks. Also complaining of concurrent nausea with vomiting and diarrhea for the past week.  Endorses h/o chronic pelvic pain due to endometriosis but states for the past few week pelvic pain has increased in intensity, mildly relieved with PO tylenol. Also endorses one episode of irregular bleeding last month, bleeding for 3 weeks straight (-), pt typically has regular menstrual cycle lengths. States had another period started on , currently having dark blood clots/staining.  Denies fevers/chills, lightheadedness/dizziness, chest pain, SOB, changes in bowel habits or urinary symptoms. Pt follow with Dr. Phillip ([facility]) for primary gyn care. Also follows with endometriosis specialist (Dr. Urias), currently in endometriosis drug trial, pt unable to recall or provide name of medication.     Ob/Gyn History:                   LMP - 24              - C/S x1  Denies history of abnormal paps, or STIs  - h/o PCOS  - h/o endometriosis  - h/o uterine fibroids s/p myomectomy     Denies the following: constitutional symptoms, visual symptoms, cardiovascular symptoms, respiratory symptoms, GI symptoms, musculoskeletal symptoms, skin symptoms, neurologic symptoms, hematologic symptoms, allergic symptoms, psychiatric symptoms  Except any pertinent positives listed.     Home Medications:  atenolol 25 mg oral tablet: 1 tab(s) orally once a day (2023 11:08)  methIMAzole 5 mg oral tablet: 1 tab(s) orally once a day (2023 11:08)      Allergies  shellfish (Anaphylaxis)  IV CONTRAST (Anaphylaxis)  iodine containing compounds (Unknown)  Intolerances        PAST MEDICAL & SURGICAL HISTORY:  Abscess  vaginal  Fibroid, uterine  Peptic ulcer  Ovarian cyst  Pancreatitis  Delivery with history of   Fibroid  uterine surgery    FAMILY HISTORY:  FH: colon cancer (Father)        SOCIAL HISTORY: Denies cigarette use, alcohol use, or illicit drug use    Vital Signs Last 24 Hrs  T(F): 99 (2024 11:07), Max: 99 (2024 11:07)  HR: 62 (2024 11:07) (62 - 62)  BP: 129/70 (2024 11:07) (129/70 - 129/70)  RR: 20 (2024 11:07) (20 - 20)  Height (cm): 152.4 (24 @ 11:07)  Weight (kg): 94.3 (24 @ 11:07)  BMI (kg/m2): 40.6 (24 @ 11:07)  BSA (m2): 1.9 (24 @ 11:07)    General Appearance - AAOx3, NAD  Heart - S1S2 regular rate and rhythm  Lung - CTA Bilaterally  Abdomen - Soft, moderate epigastric and lower quadrant tenderness to palpation, nondistended, no rebound, no rigidity, no guarding    GYN/Pelvis:    External Genitalia - Normal  Vagina - Normal, minimal dark blood discharge noted, no active bleeding or lesion noted  Cervix - Normal, No CMT    Uterus:  Size - Difficult appreciate due to body habitus  Tenderness - None    Adnexa:  Masses - None  Tenderness - None    Meds:   (ADM OVERRIDE) 1 each &lt;see task&gt; GiveOnce  (ADM OVERRIDE) 1 each &lt;see task&gt; GiveOnce  (ADM OVERRIDE) 1 each &lt;see task&gt; GiveOnce  (ADM OVERRIDE) 1 each &lt;see task&gt; GiveOnce  (ADM OVERRIDE) 1 each &lt;see task&gt; GiveOnce  cefTRIAXone   IVPB 1000 milliGRAM(s) IV Intermittent once  diphenhydrAMINE IVPB 50 milliGRAM(s) IV Intermittent Once  ketorolac   Injectable 30 milliGRAM(s) IV Push once  methylPREDNISolone sodium succinate Injectable 125 milliGRAM(s) IV Push Once  morphine  - Injectable 4 milliGRAM(s) IV Push Once  ondansetron Injectable 4 milliGRAM(s) IV Push once  sodium chloride 0.9% Bolus 2000 milliLiter(s) IV Bolus once  sodium chloride 0.9% Bolus 1000 milliLiter(s) IV Bolus once    Height (cm): 152.4 (24 @ 11:07)  Weight (kg): 94.3 (24 @ 11:07)  BMI (kg/m2): 40.6 (24 @ 11:07)  BSA (m2): 1.9 (24 @ 11:07)    LABS:                        13.4   7.17  )-----------( 395      ( 2024 13:24 )             41.3       ABO RH Interpretation: O POS (24 @ 14:00)  Antibody Screen: NEG (24 @ 14:00)        141  |  104  |  8<L>  ----------------------------<  97  4.8   |  26  |  0.8    Ca    9.9      2024 13:24    TPro  7.7  /  Alb  4.6  /  TBili  0.3  /  DBili  x   /  AST  16  /  ALT  40  /  AlkPhos  70        Urinalysis Basic - ( 2024 13:24 )    Color: Red / Appearance: Turbid / S.009 / pH: x  Gluc: 97 mg/dL / Ketone: Negative mg/dL  / Bili: Small / Urobili: 0.2 mg/dL   Blood: x / Protein: 100 mg/dL / Nitrite: Negative   Leuk Esterase: Small / RBC: >1900 /HPF / WBC 9 /HPF   Sq Epi: x / Non Sq Epi: 6 /HPF / Bacteria: Occasional /HPF          RADIOLOGY & ADDITIONAL STUDIES:  < from: US Transvaginal (24 @ 17:55) >  ACC: [id number] EXAM:  US TRANSVAGINAL   ORDERED BY: SANCHO LAUREANO     PROCEDURE DATE:  2024          INTERPRETATION:  CLINICAL INFORMATION: Vaginal bleeding    LMP: 2024    COMPARISON: Pelvic ultrasound 10/4/2023. CT abdomen pelvis 2024.    TECHNIQUE:  Endovaginal and transabdominal pelvic sonogram. Color and Spectral   Doppler was performed.    FINDINGS:  Uterus: 7.1 x 2.9 x 3.9 cm. Within normal limits.  Endometrium: 0.17 cm. Within normal limits.    Right ovary: 2.1 x 1.7 x 2.1 cm. Within normal limits. Vascular flow   noted to the right ovary.  Left ovary: 2.6 x 2.0 x 2.0 cm. Within normal limits. Vascular flow noted   to the left ovary. Tubular fluid-containing left adnexal structure   without Doppler flow.    Fluid: Trace pelvic ascites (1.270-11)    IMPRESSION:  Tubular fluid-containing left adnexal structure without Doppler flow,   which may represent hydrosalpinx.    --- End of Report ---    < end of copied text >  < from: CT Abdomen and Pelvis w/ IV Cont (24 @ 16:00) >  ACC: [id number] EXAM:  CT ABDOMEN AND PELVIS IC   ORDERED BY: TIERRA LEMUS     *** ADDENDUM # 1 ***    The appendix is not identified however no inflammatory changes are noted   in the region of the cecum. The findings were discussed with Dr. Weir.    --- End of Report ---    *** END OF ADDENDUM # 1 ***      PROCEDURE DATE:  2024          INTERPRETATION:  CLINICAL HISTORY / REASON FOR EXAM: Mid and lower   abdominal pain. Elevated lipase..   WBC 7.17  PMHx of recurrent ovarian   cysts (follows with Dr. Phillip at [facility]), uterine fibroids s/p UAE plus   myomectomy 2022, pancreatitis, endometriosis, PCOS presents with   complaints of pelvic pain and vaginal bleeding.    TECHNIQUE: Contiguous axial CT images were obtained from the lower chest   to the pubic symphysis following the administration of 100 mL Omnipaque   350 intravenous contrast. Oral contrast was not administered. Reformatted   images in the coronal and sagittal planes were acquired.    COMPARISON CT: CT abdomen and pelvis from 2022    OTHER STUDIES USED FOR CORRELATION: None.      FINDINGS:    LOWER CHEST: Subsegmental atelectasis..    HEPATOBILIARY: Unremarkable.    SPLEEN: Unremarkable.    PANCREAS: Unremarkable. No evidence of pancreatitis or peripancreatic   inflammatory change.    ADRENAL GLANDS: Unremarkable.    KIDNEYS: Metric renal enhancement. No hydronephrosis..    ABDOMINOPELVIC NODES: Unremarkable.    PELVIC ORGANS: Posterior perivesicular fat stranding (). Post   myomectomy.    PERITONEUM/MESENTERY/BOWEL: No bowel obstruction, ascites or   intraperitoneal free air.    BONES/SOFT TISSUES: No acute osseous abnormality. Degenerative osseous   changes. Postsurgical changes in the lower anterior abdominal wall    VASCULAR: Aorta is normal in caliber.      IMPRESSION:  Posterior perivesicular fat stranding (), which can be seen in   cystitis. Recommend clinical correlation.    No evidence of pancreatitis or peripancreatic inflammatory change.    --- End of Report ---    ***Please see the addendum at the top of this report. It may contain   additional important information or changes.****    < end of copied text >

## 2024-04-24 NOTE — ED PROVIDER NOTE - IV ALTEPLASE ADMIN OUTSIDE HIDDEN
Injectable Influenza Immunization Documentation    1.  Is the person to be vaccinated sick today?  No    2. Does the person to be vaccinated have an allergy to a component   of the vaccine?   No    3. Has the person to be vaccinated ever had a serious reaction   to influenza vaccine in the past?   No    4. Has the person to be vaccinated ever had Guillain-Barré syndrome?   No    Patient was verbally asked questions prior to flu shot today.  Karla Fenton CMA.              show

## 2024-04-24 NOTE — ED PROVIDER NOTE - PROGRESS NOTE DETAILS
Authored by Dr. Anthony Kirby, emergency medicine attending physician-discussed CT w/ iv contrast w/ pt. pt states she was successfully pretreated w/ steroids/benadryl. offered noncon ct but pt states she is comfortable w/ iv contrast w/ pretreatment. meds ordered CO- pt endorsed to Dr. Crump f/u, imaging, reassess, dispo

## 2024-04-25 DIAGNOSIS — R11.2 NAUSEA WITH VOMITING, UNSPECIFIED: ICD-10-CM

## 2024-04-25 DIAGNOSIS — K85.90 ACUTE PANCREATITIS WITHOUT NECROSIS OR INFECTION, UNSPECIFIED: ICD-10-CM

## 2024-04-25 LAB
ALBUMIN SERPL ELPH-MCNC: 4.2 G/DL — SIGNIFICANT CHANGE UP (ref 3.5–5.2)
ALP SERPL-CCNC: 62 U/L — SIGNIFICANT CHANGE UP (ref 30–115)
ALT FLD-CCNC: 53 U/L — HIGH (ref 0–41)
ANION GAP SERPL CALC-SCNC: 12 MMOL/L — SIGNIFICANT CHANGE UP (ref 7–14)
AST SERPL-CCNC: 26 U/L — SIGNIFICANT CHANGE UP (ref 0–41)
BASOPHILS # BLD AUTO: 0.01 K/UL — SIGNIFICANT CHANGE UP (ref 0–0.2)
BASOPHILS NFR BLD AUTO: 0.1 % — SIGNIFICANT CHANGE UP (ref 0–1)
BILIRUB SERPL-MCNC: 0.2 MG/DL — SIGNIFICANT CHANGE UP (ref 0.2–1.2)
BUN SERPL-MCNC: 8 MG/DL — LOW (ref 10–20)
CALCIUM SERPL-MCNC: 9.3 MG/DL — SIGNIFICANT CHANGE UP (ref 8.4–10.5)
CHLORIDE SERPL-SCNC: 102 MMOL/L — SIGNIFICANT CHANGE UP (ref 98–110)
CO2 SERPL-SCNC: 22 MMOL/L — SIGNIFICANT CHANGE UP (ref 17–32)
CREAT SERPL-MCNC: 0.7 MG/DL — SIGNIFICANT CHANGE UP (ref 0.7–1.5)
EGFR: 111 ML/MIN/1.73M2 — SIGNIFICANT CHANGE UP
EOSINOPHIL # BLD AUTO: 0 K/UL — SIGNIFICANT CHANGE UP (ref 0–0.7)
EOSINOPHIL NFR BLD AUTO: 0 % — SIGNIFICANT CHANGE UP (ref 0–8)
GLUCOSE SERPL-MCNC: 101 MG/DL — HIGH (ref 70–99)
HCT VFR BLD CALC: 38.1 % — SIGNIFICANT CHANGE UP (ref 37–47)
HGB BLD-MCNC: 12.7 G/DL — SIGNIFICANT CHANGE UP (ref 12–16)
IMM GRANULOCYTES NFR BLD AUTO: 0.2 % — SIGNIFICANT CHANGE UP (ref 0.1–0.3)
LYMPHOCYTES # BLD AUTO: 2.2 K/UL — SIGNIFICANT CHANGE UP (ref 1.2–3.4)
LYMPHOCYTES # BLD AUTO: 23.3 % — SIGNIFICANT CHANGE UP (ref 20.5–51.1)
MAGNESIUM SERPL-MCNC: 2 MG/DL — SIGNIFICANT CHANGE UP (ref 1.8–2.4)
MCHC RBC-ENTMCNC: 28.7 PG — SIGNIFICANT CHANGE UP (ref 27–31)
MCHC RBC-ENTMCNC: 33.3 G/DL — SIGNIFICANT CHANGE UP (ref 32–37)
MCV RBC AUTO: 86.2 FL — SIGNIFICANT CHANGE UP (ref 81–99)
MONOCYTES # BLD AUTO: 0.7 K/UL — HIGH (ref 0.1–0.6)
MONOCYTES NFR BLD AUTO: 7.4 % — SIGNIFICANT CHANGE UP (ref 1.7–9.3)
NEUTROPHILS # BLD AUTO: 6.5 K/UL — SIGNIFICANT CHANGE UP (ref 1.4–6.5)
NEUTROPHILS NFR BLD AUTO: 69 % — SIGNIFICANT CHANGE UP (ref 42.2–75.2)
NRBC # BLD: 0 /100 WBCS — SIGNIFICANT CHANGE UP (ref 0–0)
PLATELET # BLD AUTO: 371 K/UL — SIGNIFICANT CHANGE UP (ref 130–400)
PMV BLD: 9.7 FL — SIGNIFICANT CHANGE UP (ref 7.4–10.4)
POTASSIUM SERPL-MCNC: 4 MMOL/L — SIGNIFICANT CHANGE UP (ref 3.5–5)
POTASSIUM SERPL-SCNC: 4 MMOL/L — SIGNIFICANT CHANGE UP (ref 3.5–5)
PROT SERPL-MCNC: 6.8 G/DL — SIGNIFICANT CHANGE UP (ref 6–8)
RBC # BLD: 4.42 M/UL — SIGNIFICANT CHANGE UP (ref 4.2–5.4)
RBC # FLD: 12.9 % — SIGNIFICANT CHANGE UP (ref 11.5–14.5)
SODIUM SERPL-SCNC: 136 MMOL/L — SIGNIFICANT CHANGE UP (ref 135–146)
TRIGL SERPL-MCNC: 55 MG/DL — SIGNIFICANT CHANGE UP
WBC # BLD: 9.43 K/UL — SIGNIFICANT CHANGE UP (ref 4.8–10.8)
WBC # FLD AUTO: 9.43 K/UL — SIGNIFICANT CHANGE UP (ref 4.8–10.8)

## 2024-04-25 PROCEDURE — 85730 THROMBOPLASTIN TIME PARTIAL: CPT

## 2024-04-25 PROCEDURE — 80048 BASIC METABOLIC PNL TOTAL CA: CPT

## 2024-04-25 PROCEDURE — 85025 COMPLETE CBC W/AUTO DIFF WBC: CPT

## 2024-04-25 PROCEDURE — 88305 TISSUE EXAM BY PATHOLOGIST: CPT

## 2024-04-25 PROCEDURE — 86901 BLOOD TYPING SEROLOGIC RH(D): CPT

## 2024-04-25 PROCEDURE — 84478 ASSAY OF TRIGLYCERIDES: CPT

## 2024-04-25 PROCEDURE — 88312 SPECIAL STAINS GROUP 1: CPT

## 2024-04-25 PROCEDURE — 88342 IMHCHEM/IMCYTCHM 1ST ANTB: CPT

## 2024-04-25 PROCEDURE — C9113: CPT

## 2024-04-25 PROCEDURE — 99223 1ST HOSP IP/OBS HIGH 75: CPT

## 2024-04-25 PROCEDURE — 99222 1ST HOSP IP/OBS MODERATE 55: CPT

## 2024-04-25 PROCEDURE — 36415 COLL VENOUS BLD VENIPUNCTURE: CPT

## 2024-04-25 PROCEDURE — 85610 PROTHROMBIN TIME: CPT

## 2024-04-25 PROCEDURE — 80053 COMPREHEN METABOLIC PANEL: CPT

## 2024-04-25 PROCEDURE — 86850 RBC ANTIBODY SCREEN: CPT

## 2024-04-25 PROCEDURE — 82787 IGG 1 2 3 OR 4 EACH: CPT

## 2024-04-25 PROCEDURE — 83735 ASSAY OF MAGNESIUM: CPT

## 2024-04-25 PROCEDURE — 86900 BLOOD TYPING SEROLOGIC ABO: CPT

## 2024-04-25 RX ORDER — PANTOPRAZOLE SODIUM 20 MG/1
40 TABLET, DELAYED RELEASE ORAL EVERY 12 HOURS
Refills: 0 | Status: DISCONTINUED | OUTPATIENT
Start: 2024-04-25 | End: 2024-04-25

## 2024-04-25 RX ORDER — ACETAMINOPHEN 500 MG
650 TABLET ORAL EVERY 6 HOURS
Refills: 0 | Status: DISCONTINUED | OUTPATIENT
Start: 2024-04-25 | End: 2024-04-29

## 2024-04-25 RX ORDER — PANTOPRAZOLE SODIUM 20 MG/1
40 TABLET, DELAYED RELEASE ORAL
Refills: 0 | Status: DISCONTINUED | OUTPATIENT
Start: 2024-04-25 | End: 2024-04-29

## 2024-04-25 RX ORDER — SUCRALFATE 1 G
1 TABLET ORAL
Refills: 0 | Status: DISCONTINUED | OUTPATIENT
Start: 2024-04-25 | End: 2024-04-29

## 2024-04-25 RX ORDER — ENOXAPARIN SODIUM 100 MG/ML
40 INJECTION SUBCUTANEOUS EVERY 24 HOURS
Refills: 0 | Status: DISCONTINUED | OUTPATIENT
Start: 2024-04-25 | End: 2024-04-29

## 2024-04-25 RX ORDER — PANTOPRAZOLE SODIUM 20 MG/1
40 TABLET, DELAYED RELEASE ORAL
Refills: 0 | Status: DISCONTINUED | OUTPATIENT
Start: 2024-04-25 | End: 2024-04-25

## 2024-04-25 RX ORDER — SODIUM CHLORIDE 9 MG/ML
1000 INJECTION, SOLUTION INTRAVENOUS
Refills: 0 | Status: DISCONTINUED | OUTPATIENT
Start: 2024-04-25 | End: 2024-04-27

## 2024-04-25 RX ORDER — CHLORHEXIDINE GLUCONATE 213 G/1000ML
1 SOLUTION TOPICAL
Refills: 0 | Status: DISCONTINUED | OUTPATIENT
Start: 2024-04-25 | End: 2024-04-29

## 2024-04-25 RX ORDER — ONDANSETRON 8 MG/1
4 TABLET, FILM COATED ORAL EVERY 8 HOURS
Refills: 0 | Status: DISCONTINUED | OUTPATIENT
Start: 2024-04-25 | End: 2024-04-29

## 2024-04-25 RX ORDER — ATENOLOL 25 MG/1
25 TABLET ORAL DAILY
Refills: 0 | Status: DISCONTINUED | OUTPATIENT
Start: 2024-04-25 | End: 2024-04-29

## 2024-04-25 RX ORDER — MORPHINE SULFATE 50 MG/1
2 CAPSULE, EXTENDED RELEASE ORAL EVERY 4 HOURS
Refills: 0 | Status: DISCONTINUED | OUTPATIENT
Start: 2024-04-25 | End: 2024-04-29

## 2024-04-25 RX ADMIN — PANTOPRAZOLE SODIUM 40 MILLIGRAM(S): 20 TABLET, DELAYED RELEASE ORAL at 12:04

## 2024-04-25 RX ADMIN — ENOXAPARIN SODIUM 40 MILLIGRAM(S): 100 INJECTION SUBCUTANEOUS at 12:04

## 2024-04-25 RX ADMIN — Medication 500 MILLIGRAM(S): at 05:16

## 2024-04-25 RX ADMIN — MORPHINE SULFATE 2 MILLIGRAM(S): 50 CAPSULE, EXTENDED RELEASE ORAL at 07:24

## 2024-04-25 RX ADMIN — SODIUM CHLORIDE 150 MILLILITER(S): 9 INJECTION, SOLUTION INTRAVENOUS at 07:22

## 2024-04-25 RX ADMIN — Medication 1 GRAM(S): at 17:04

## 2024-04-25 RX ADMIN — MORPHINE SULFATE 2 MILLIGRAM(S): 50 CAPSULE, EXTENDED RELEASE ORAL at 12:04

## 2024-04-25 RX ADMIN — ONDANSETRON 4 MILLIGRAM(S): 8 TABLET, FILM COATED ORAL at 23:55

## 2024-04-25 RX ADMIN — PANTOPRAZOLE SODIUM 40 MILLIGRAM(S): 20 TABLET, DELAYED RELEASE ORAL at 07:20

## 2024-04-25 RX ADMIN — Medication 1 GRAM(S): at 12:07

## 2024-04-25 RX ADMIN — MORPHINE SULFATE 2 MILLIGRAM(S): 50 CAPSULE, EXTENDED RELEASE ORAL at 06:54

## 2024-04-25 RX ADMIN — Medication 100 MILLIGRAM(S): at 05:16

## 2024-04-25 RX ADMIN — SODIUM CHLORIDE 150 MILLILITER(S): 9 INJECTION, SOLUTION INTRAVENOUS at 15:30

## 2024-04-25 RX ADMIN — MORPHINE SULFATE 2 MILLIGRAM(S): 50 CAPSULE, EXTENDED RELEASE ORAL at 23:55

## 2024-04-25 RX ADMIN — MORPHINE SULFATE 2 MILLIGRAM(S): 50 CAPSULE, EXTENDED RELEASE ORAL at 12:34

## 2024-04-25 RX ADMIN — MORPHINE SULFATE 2 MILLIGRAM(S): 50 CAPSULE, EXTENDED RELEASE ORAL at 17:33

## 2024-04-25 RX ADMIN — MORPHINE SULFATE 2 MILLIGRAM(S): 50 CAPSULE, EXTENDED RELEASE ORAL at 17:03

## 2024-04-25 RX ADMIN — Medication 100 MILLIGRAM(S): at 17:04

## 2024-04-25 RX ADMIN — Medication 500 MILLIGRAM(S): at 17:04

## 2024-04-25 RX ADMIN — PANTOPRAZOLE SODIUM 40 MILLIGRAM(S): 20 TABLET, DELAYED RELEASE ORAL at 17:04

## 2024-04-25 NOTE — H&P ADULT - NSHPLABSRESULTS_GEN_ALL_CORE
13.4   7.17  )-----------( 395      ( 2024 13:24 )             41.3       04-24    141  |  104  |  8<L>  ----------------------------<  97  4.8   |  26  |  0.8    Ca    9.9      2024 13:24    TPro  7.7  /  Alb  4.6  /  TBili  0.3  /  DBili  x   /  AST  16  /  ALT  40  /  AlkPhos  70  04-24              Urinalysis Basic - ( 2024 13:24 )    Color: Red / Appearance: Turbid / S.009 / pH: x  Gluc: 97 mg/dL / Ketone: Negative mg/dL  / Bili: Small / Urobili: 0.2 mg/dL   Blood: x / Protein: 100 mg/dL / Nitrite: Negative   Leuk Esterase: Small / RBC: >1900 /HPF / WBC 9 /HPF   Sq Epi: x / Non Sq Epi: 6 /HPF / Bacteria: Occasional /HPF            Lactate Trend            CAPILLARY BLOOD GLUCOSE

## 2024-04-25 NOTE — H&P ADULT - NSHPPHYSICALEXAM_GEN_ALL_CORE
GEN: NAD, Resting comfortably in bed, cooperative  PULM: Clear to auscultation bilaterally, No wheezing, rales, rhonchi  CVS: Regular rate and rhythm, S1-S2, no murmurs, heaves, thrills  ABD: Soft, epigastric tenderness superficial palpation, non-distended, no guarding, BS +, no organomegaly   EXT: No edema/cyanosis, extremities warm/dry, peripheral pulses palpable   NEURO: A&Ox3, no focal deficits, follows commands, answers appropriately

## 2024-04-25 NOTE — H&P ADULT - ATTENDING COMMENTS
43y F pmh endometriosis, PCOS, uterine fibroids s/p myomectomy, hyperthyroidism, acute pancreatitis pw abdominal pain.         #Nausea, Vomiting, Diarrhea, Inability to tolerate PO x 2 weeks   #Peptic ulcer disease   - sp EGD 6/23 w/ GERD and prior ulcer at GE junction   - elevated Lipase, however could be elevated due to vomiting   - CT -ve for pancreatic inflammation   - RUQ -ve for stones   - c/w IVF  - c/w pain control   - start IV PPI BID   - c/w sucralfate 1g tid   - GI eval     #Hydrosalpinx  #Endometriosis  #PCOS  - acute on chronic pelvic pain   - OBGYN consulted - tvus showing hydrosalpinx   - doxy 100mg bid, flagyl 500mg bid x 7 day   - op ongyn f/u     #Hyperthyroidism   - cw methimazole 5mg qd and atenolol 25mg bid        - GI Prophylaxis: Lovenox      Pending: tolerates diet, GI eval

## 2024-04-25 NOTE — PATIENT PROFILE ADULT - FALL HARM RISK - UNIVERSAL INTERVENTIONS
Bed in lowest position, wheels locked, appropriate side rails in place/Call bell, personal items and telephone in reach/Instruct patient to call for assistance before getting out of bed or chair/Non-slip footwear when patient is out of bed/[place] to call system/Physically safe environment - no spills, clutter or unnecessary equipment/Purposeful Proactive Rounding/Room/bathroom lighting operational, light cord in reach

## 2024-04-25 NOTE — CONSULT NOTE ADULT - ATTENDING COMMENTS
44 y/o P1 who presents for epigastric abdominal pain, with incidental hydrosalpinx. Pain unlikely due to gyn origin due to location. No gyn intervention. Dispo per ED.
Mild pancreatitis. Ideopathic. Agree with LR, warrants ideopathic pancreatitis work up on DC. Advance diet as tolerated

## 2024-04-25 NOTE — H&P ADULT - HISTORY OF PRESENT ILLNESS
43y F pmh endometriosis, PCOS, uterine fibroids s/p myomectomy, pancreatitis pw abdominal pain. Patient states she thought she had gastroenteritis ~1-2 weeks ago because she had episodic nausea, vomiting and diarrhea. Patient has chronic pelvic pain 2/2 endometriosis. Prior to coming to the hospital today she had epigastric pain radiating to the back which is consistent w/ her prior episodes of pancreatitis. Patient came to hospital today for a gastric emptying study but could not tolerated the test because she started vomiting and came to the ED. Patient reports she was experience both epigastric pain and pelvic pain. She believes she was having both and episode of pancreatitis and endometriosis pain so she came for an evaluation. patient reports prolonged menstrual cycle. ROS -ve for fever, cp, sob, dizziness, lightheadedness, confusion, rash, constipation, hematuria, dysuria, melena/hematochezia.      Vitals in the ED  T  HR  BP  RR  SpO2  On     Significant Labs  wbc 7k hgb 13.4 plt 395  Na 141 K 4.8 BUN/Cr  8/0.8  Lipase 551, pregnancy -ve  UA +ve LE, RBC, bacteria, epithelial cells     TVUS  Tubular fluid-containing left adnexal structure without Doppler flow,   which may represent hydrosalpinx.  RUQ US - unremarkable    Patient received rocephin, NS bolus , ketorolac  in the ED. Admitted to medicine for management.

## 2024-04-25 NOTE — CONSULT NOTE ADULT - ASSESSMENT
43y F pmh endometriosis, PCOS, uterine fibroids s/p myomectomy, pancreatitis pw abdominal pain. Patient states she thought she had gastroenteritis ~1-2 weeks ago because she had episodic nausea, vomiting and diarrhea. Patient has chronic pelvic pain 2/2 endometriosis. Prior to coming to the hospital today she had epigastric pain radiating to the back which is consistent w/ her prior episodes of pancreatitis. Patient came to hospital today for a gastric emptying study but could not tolerated the test because she started vomiting and came to the ED. Patient reports she was experience both epigastric pain and pelvic pain. She believes she was having both and episode of pancreatitis and endometriosis pain so she came for an evaluation. patient reports prolonged menstrual cycle. GI was consulted for abdominal pain.     #) Abdominal pain DD: Acute pancreatitis (lipase 528, epigastric pain (2/3 criteria) vs PUD vs functional   -CT abd with unremarkable pancreas  -RUQ sono unremarkable   -Gastric emptying study normal   -Of note pt has never had imaging findings of pancreatitis despite numerous hospitalizations for pancreatitis in the past   -EGD in 2/2023 showed GEJ ulcer but last EGD in 6/2023 showed healed GE junction ulcer    -Couldn't obtain detailed history and unable to perform physical examination. While talking to patient and asking more questions about her history of pancreatitis patient and  got upset and accused me of not having bedside manners. I apologized if I made them feel that way (eventhough I didn't think I behaved inappropriately in any manner). They want me to leave the room and I left the room.     Plan:  -c/w LR @150/hour for possible pancreatitis   -Low fat diet as tolerated   -Nausea and Pain control  -Send IgG4  -PPI once a day   -Avoid NSAID    43y F pmh endometriosis, PCOS, uterine fibroids s/p myomectomy, pancreatitis pw abdominal pain. Patient states she thought she had gastroenteritis ~1-2 weeks ago because she had episodic nausea, vomiting and diarrhea. Patient has chronic pelvic pain 2/2 endometriosis. Prior to coming to the hospital today she had epigastric pain radiating to the back which is consistent w/ her prior episodes of pancreatitis. Patient came to hospital today for a gastric emptying study but could not tolerated the test because she started vomiting and came to the ED. Patient reports she was experience both epigastric pain and pelvic pain. She believes she was having both and episode of pancreatitis and endometriosis pain so she came for an evaluation. patient reports prolonged menstrual cycle. GI was consulted for abdominal pain.     #) Abdominal pain DD: Acute pancreatitis (lipase 528, epigastric pain (2/3 criteria) vs PUD vs functional   -CT abd with unremarkable pancreas  -RUQ sono unremarkable   -Gastric emptying study normal   -Of note pt has never had imaging findings of pancreatitis despite numerous hospitalizations for pancreatitis in the past   -EGD in 2/2023 showed GEJ ulcer but last EGD in 6/2023 showed healed GE junction ulcer    -Couldn't obtain detailed history and unable to perform physical examination. While talking to patient and asking more questions about her history of pancreatitis patient and her relative got upset and accused me of not having bedside manners. I apologized if I made them feel that way (eventhough I didn't think I behaved inappropriately in any manner). They want me to leave the room and I left the room.     Plan:  -c/w LR @150/hour for possible pancreatitis   -Low fat diet as tolerated   -Nausea and Pain control  -Send IgG4  -PPI once a day   -Avoid NSAID    43y F pmh endometriosis, PCOS, uterine fibroids s/p myomectomy, pancreatitis pw abdominal pain. Patient states she thought she had gastroenteritis ~1-2 weeks ago because she had episodic nausea, vomiting and diarrhea. Patient has chronic pelvic pain 2/2 endometriosis. Prior to coming to the hospital today she had epigastric pain radiating to the back which is consistent w/ her prior episodes of pancreatitis. Patient came to hospital today for a gastric emptying study but could not tolerated the test because she started vomiting and came to the ED. Patient reports she was experience both epigastric pain and pelvic pain. She believes she was having both and episode of pancreatitis and endometriosis pain so she came for an evaluation. patient reports prolonged menstrual cycle. GI was consulted for abdominal pain.     #) Abdominal pain DD: Acute pancreatitis (lipase 528, epigastric pain (2/3 criteria) vs PUD vs functional   -CT abd with unremarkable pancreas  -RUQ sono unremarkable   -Gastric emptying study normal   -Of note pt has never had imaging findings of pancreatitis despite numerous hospitalizations for pancreatitis in the past   -EGD in 2/2023 showed GEJ ulcer but last EGD in 6/2023 showed healed GE junction ulcer    -Couldn't obtain detailed history and unable to perform physical examination. While talking to patient and asking more questions about her history of pancreatitis patient and her family member got upset and accused me of not having bedside manners. I apologized if I made them feel that way (eventhough I didn't think I behaved inappropriately in any manner). They want me to leave the room and I left the room.     Plan:  -c/w LR @150/hour for possible pancreatitis   -Low fat diet as tolerated   -Nausea and Pain control  -Send IgG4  -PPI once a day   -Avoid NSAID

## 2024-04-25 NOTE — CONSULT NOTE ADULT - SUBJECTIVE AND OBJECTIVE BOX
Gastroenterology Consultation:    Patient is a 43y old  Female who presents with a chief complaint of Pancreatitis (2024 06:38)      Admitted on: 24  HPI:  43y F pmh endometriosis, PCOS, uterine fibroids s/p myomectomy, pancreatitis pw abdominal pain. Patient states she thought she had gastroenteritis ~1-2 weeks ago because she had episodic nausea, vomiting and diarrhea. Patient has chronic pelvic pain 2/2 endometriosis. Prior to coming to the hospital today she had epigastric pain radiating to the back which is consistent w/ her prior episodes of pancreatitis. Patient came to hospital today for a gastric emptying study but could not tolerated the test because she started vomiting and came to the ED. Patient reports she was experience both epigastric pain and pelvic pain. She believes she was having both and episode of pancreatitis and endometriosis pain so she came for an evaluation. patient reports prolonged menstrual cycle. ROS -ve for fever, cp, sob, dizziness, lightheadedness, confusion, rash, constipation, hematuria, dysuria, melena/hematochezia.      Vitals in the ED  T  HR  BP  RR  SpO2  On     Significant Labs  wbc 7k hgb 13.4 plt 395  Na 141 K 4.8 BUN/Cr  8/0.8  Lipase 551, pregnancy -ve  UA +ve LE, RBC, bacteria, epithelial cells     TVUS  Tubular fluid-containing left adnexal structure without Doppler flow,   which may represent hydrosalpinx.  RUQ US - unremarkable    Patient received rocephin, NS bolus , ketorolac  in the ED. Admitted to medicine for management.     (2024 06:38)      Prior EGD:  Prior Colonoscopy:      PAST MEDICAL & SURGICAL HISTORY:  Abscess  vaginal      Fibroid, uterine      Peptic ulcer      Ovarian cyst      Pancreatitis      Delivery with history of       Fibroid  uterine surgery          FAMILY HISTORY:  FH: colon cancer (Father)        Social History:  Tobacco:  Alcohol:  Drugs:    Home Medications:  atenolol 25 mg oral tablet: 1 tab(s) orally once a day (2023 11:08)  methIMAzole 5 mg oral tablet: 1 tab(s) orally once a day (2023 11:08)    MEDICATIONS  (STANDING):  atenolol  Tablet 25 milliGRAM(s) Oral daily  chlorhexidine 2% Cloths 1 Application(s) Topical <User Schedule>  doxycycline monohydrate Capsule 100 milliGRAM(s) Oral every 12 hours  enoxaparin Injectable 40 milliGRAM(s) SubCutaneous every 24 hours  lactated ringers. 1000 milliLiter(s) (150 mL/Hr) IV Continuous <Continuous>  methimazole 5 milliGRAM(s) Oral daily  metroNIDAZOLE    Tablet 500 milliGRAM(s) Oral every 12 hours  pantoprazole  Injectable 40 milliGRAM(s) IV Push every 12 hours  sucralfate 1 Gram(s) Oral four times a day    MEDICATIONS  (PRN):  acetaminophen     Tablet .. 650 milliGRAM(s) Oral every 6 hours PRN Temp greater or equal to 38C (100.4F), Mild Pain (1 - 3)  morphine  - Injectable 2 milliGRAM(s) IV Push every 4 hours PRN Severe Pain (7 - 10)  ondansetron Injectable 4 milliGRAM(s) IV Push every 8 hours PRN Nausea and/or Vomiting      Allergies  shellfish (Anaphylaxis)  IV CONTRAST (Anaphylaxis)  iodine containing compounds (Unknown)      Review of Systems:   Constitutional:  No Fever, No Chills  ENT/Mouth:  No Hearing Changes,  No Difficulty Swallowing  Eyes:  No Eye Pain, No Vision Changes  Cardiovascular:  No Chest Pain, No Palpitations  Respiratory:  No Cough, No Dyspnea  Gastrointestinal:  As described in HPI  Musculoskeletal:  No Joint Swelling, No Back Pain  Skin:  No Skin Lesions, No Jaundice  Neuro:  No Syncope, No Dizziness  Heme/Lymph:  No Bruising, No Bleeding.          Physical Examination:  T(C): 36.2 (24 @ 12:34), Max: 36.8 (24 @ 05:31)  HR: 56 (24 @ 11:58) (56 - 77)  BP: 118/70 (24 @ 11:58) (113/57 - 118/70)  RR: 18 (24 @ 05:31) (18 - 18)  SpO2: 96% (24 @ 00:45) (96% - 96%)        Constitutional: No acute distress.  Eyes:. Conjunctivae are clear, Sclera is non-icteric.  Ears Nose and Throat: The external ears are normal appearing,  Oral mucosa is pink and moist.  Respiratory:  No signs of respiratory distress. Lung sounds are clear bilaterally.  Cardiovascular:  S1 S2, Regular rate and rhythm.  GI: Abdomen is soft, symmetric, and non-tender without distention. There are no visible lesions or scars. Bowel sounds are present and normoactive in all four quadrants. No masses, hepatomegaly, or splenomegaly are noted.   Neuro: No Tremor, No involuntary movements  Skin: No rashes, No Jaundice.          Data:                        12.7   9.43  )-----------( 371      ( 2024 04:30 )             38.1     Hgb Trend:  12.7  24 @ 04:30  13.4  24 @ 13:24          136  |  102  |  8<L>  ----------------------------<  101<H>  4.0   |  22  |  0.7    Ca    9.3      2024 04:30  Mg     2.0         TPro  6.8  /  Alb  4.2  /  TBili  0.2  /  DBili  x   /  AST  26  /  ALT  53<H>  /  AlkPhos  62      Liver panel trend:  TBili 0.2   /   AST 26   /   ALT 53   /   AlkP 62   /   Tptn 6.8   /   Alb 4.2    /   DBili --        TBili 0.3   /   AST 16   /   ALT 40   /   AlkP 70   /   Tptn 7.7   /   Alb 4.6    /   DBili --                Urinalysis with Rflx Culture (collected 2024 13:24)          Radiology:  CT Abdomen and Pelvis w/ IV Cont:   ACC: [id number] EXAM:  CT ABDOMEN AND PELVIS IC   ORDERED BY: TIERRA LEMUS     *** ADDENDUM # 1 ***    The appendix is not identified however no inflammatory changes are noted   in the region of the cecum. The findings were discussed with Dr. Weir.    --- End of Report ---    *** END OF ADDENDUM # 1 ***      PROCEDURE DATE:  2024          INTERPRETATION:  CLINICAL HISTORY / REASON FOR EXAM: Mid and lower   abdominal pain. Elevated lipase..   WBC 7.17  PMHx of recurrent ovarian   cysts (follows with Dr. Phillip at [facility]), uterine fibroids s/p UAE plus   myomectomy 2022, pancreatitis, endometriosis, PCOS presents with   complaints of pelvic pain and vaginal bleeding.    TECHNIQUE: Contiguous axial CT images were obtained from the lower chest   to the pubic symphysis following the administration of 100 mL Omnipaque   350 intravenous contrast. Oral contrast was not administered. Reformatted   images in the coronal and sagittal planes were acquired.    COMPARISON CT: CT abdomen and pelvis from 2022    OTHER STUDIES USED FOR CORRELATION: None.      FINDINGS:    LOWER CHEST: Subsegmental atelectasis..    HEPATOBILIARY: Unremarkable.    SPLEEN: Unremarkable.    PANCREAS: Unremarkable. No evidence of pancreatitis or peripancreatic   inflammatory change.    ADRENAL GLANDS: Unremarkable.    KIDNEYS: Metric renal enhancement. No hydronephrosis..    ABDOMINOPELVIC NODES: Unremarkable.    PELVIC ORGANS: Posterior perivesicular fat stranding (). Post   myomectomy.    PERITONEUM/MESENTERY/BOWEL: No bowel obstruction, ascites or   intraperitoneal free air.    BONES/SOFT TISSUES: No acute osseous abnormality. Degenerative osseous   changes. Postsurgical changes in the lower anterior abdominal wall    VASCULAR: Aorta is normal in caliber.      IMPRESSION:  Posterior perivesicular fat stranding (), which can be seen in   cystitis. Recommend clinical correlation.    No evidence of pancreatitis or peripancreatic inflammatory change.    --- End of Report ---    ***Please see the addendum at the top of this report. It may contain   additional important information or changes.****      VIDA PATEL MD; Resident Radiologist  This document has been electronically signed.  RIGO TAPIA MD; Attending Interventional Radiologist  This document has been electronically signed. 2024  6:21PM  1st Addendum: RIGO TAPIA MD; Attending Interventional Radiologist  The first addendum was electronically signed on: 2024  9:29PM. (24 @ 16:00)    US Abdomen Upper Quadrant Right:   ACC: [id number] EXAM:  US ABDOMEN RT UPR QUADRANT   ORDERED BY: SANCHO LAUREANO     PROCEDURE DATE:  2024          INTERPRETATION:  CLINICAL INFORMATION: Right upper quadrant pain    COMPARISON: 2022    TECHNIQUE: Sonography of the right upper quadrant.    FINDINGS:  Liver: Within normal limits.  Bile ducts: Normal caliber. Common bile duct measures 5 mm.  Gallbladder: Within normal limits.  Pancreas: Visualized portions are within normal limits.  Right kidney: 9.4 cm. No hydronephrosis.  Ascites: None.  IVC: Visualized portions are within normal limits.    IMPRESSION:  Normal right upper quadrant abdominal ultrasound.        --- End of Report ---            CRYSTAL TRACY MD; Attending Radiologist  This document hasbeen electronically signed. 2024  6:57PM (24 @ 17:52)     Gastroenterology Consultation:    Patient is a 43y old  Female who presents with a chief complaint of abdominal pain(2024 06:38)      Admitted on: 24  HPI:  43y F pmh endometriosis, PCOS, uterine fibroids s/p myomectomy, pancreatitis pw abdominal pain. Patient states she thought she had gastroenteritis ~1-2 weeks ago because she had episodic nausea, vomiting and diarrhea. Patient has chronic pelvic pain 2/2 endometriosis. Prior to coming to the hospital today she had epigastric pain radiating to the back which is consistent w/ her prior episodes of pancreatitis. Patient came to hospital today for a gastric emptying study but could not tolerated the test because she started vomiting and came to the ED. Patient reports she was experience both epigastric pain and pelvic pain. She believes she was having both and episode of pancreatitis and endometriosis pain so she came for an evaluation. patient reports prolonged menstrual cycle. GI was consulted for abdominal pain.       Prior EGD: < from: EGD (23 @ 11:30) >    Impressions:    Normal mucosa in the whole esophagus.    Erythema in the stomach compatible with non-erosive gastritis.    Normal mucosa in the whole examined duodenum.    Esophageal hiatal hernia.    < end of copied text >    Prior Colonoscopy:  < from: Colonoscopy (19 @ 11:45) >    Impressions:    Polyp (5 mm) in the rectum. (Biopsy).    Otherwise normal colon and terminal ileum. Biopsies taken of Ascending,  transverse, sigmoid colon to rule out microscopic colitis. (Biopsy).     < end of copied text >      PAST MEDICAL & SURGICAL HISTORY:  Abscess  vaginal      Fibroid, uterine      Peptic ulcer      Ovarian cyst      Pancreatitis      Delivery with history of       Fibroid  uterine surgery          FAMILY HISTORY:  FH: colon cancer (Father)        Social History:  Tobacco: N  Alcohol: occasional   Drugs: N    Home Medications:  atenolol 25 mg oral tablet: 1 tab(s) orally once a day (2023 11:08)  methIMAzole 5 mg oral tablet: 1 tab(s) orally once a day (2023 11:08)    MEDICATIONS  (STANDING):  atenolol  Tablet 25 milliGRAM(s) Oral daily  chlorhexidine 2% Cloths 1 Application(s) Topical <User Schedule>  doxycycline monohydrate Capsule 100 milliGRAM(s) Oral every 12 hours  enoxaparin Injectable 40 milliGRAM(s) SubCutaneous every 24 hours  lactated ringers. 1000 milliLiter(s) (150 mL/Hr) IV Continuous <Continuous>  methimazole 5 milliGRAM(s) Oral daily  metroNIDAZOLE    Tablet 500 milliGRAM(s) Oral every 12 hours  pantoprazole  Injectable 40 milliGRAM(s) IV Push every 12 hours  sucralfate 1 Gram(s) Oral four times a day    MEDICATIONS  (PRN):  acetaminophen     Tablet .. 650 milliGRAM(s) Oral every 6 hours PRN Temp greater or equal to 38C (100.4F), Mild Pain (1 - 3)  morphine  - Injectable 2 milliGRAM(s) IV Push every 4 hours PRN Severe Pain (7 - 10)  ondansetron Injectable 4 milliGRAM(s) IV Push every 8 hours PRN Nausea and/or Vomiting      Allergies  shellfish (Anaphylaxis)  IV CONTRAST (Anaphylaxis)  iodine containing compounds (Unknown)      Review of Systems:   unable to obtain full ROS          Physical Examination:  T(C): 36.2 (24 @ 12:34), Max: 36.8 (24 @ 05:31)  HR: 56 (24 @ 11:58) (56 - 77)  BP: 118/70 (24 @ 11:58) (113/57 - 118/70)  RR: 18 (24 @ 05:31) (18 - 18)  SpO2: 96% (24 @ 00:45) (96% - 96%)      Unable to perform the physical exam           Data:                        12.7   9.43  )-----------( 371      ( 2024 04:30 )             38.1     Hgb Trend:  12.7  24 @ 04:30  13.4  24 @ 13:24          136  |  102  |  8<L>  ----------------------------<  101<H>  4.0   |  22  |  0.7    Ca    9.3      2024 04:30  Mg     2.0         TPro  6.8  /  Alb  4.2  /  TBili  0.2  /  DBili  x   /  AST  26  /  ALT  53<H>  /  AlkPhos  62      Liver panel trend:  TBili 0.2   /   AST 26   /   ALT 53   /   AlkP 62   /   Tptn 6.8   /   Alb 4.2    /   DBili --        TBili 0.3   /   AST 16   /   ALT 40   /   AlkP 70   /   Tptn 7.7   /   Alb 4.6    /   DBili --                Urinalysis with Rflx Culture (collected 2024 13:24)          Radiology:  CT Abdomen and Pelvis w/ IV Cont:   ACC: [id number] EXAM:  CT ABDOMEN AND PELVIS IC   ORDERED BY: TIERRA LEMUS     *** ADDENDUM # 1 ***    The appendix is not identified however no inflammatory changes are noted   in the region of the cecum. The findings were discussed with Dr. Weir.    --- End of Report ---    *** END OF ADDENDUM # 1 ***      PROCEDURE DATE:  2024          INTERPRETATION:  CLINICAL HISTORY / REASON FOR EXAM: Mid and lower   abdominal pain. Elevated lipase..   WBC 7.17  PMHx of recurrent ovarian   cysts (follows with Dr. Phillip at [facility]), uterine fibroids s/p UAE plus   myomectomy 2022, pancreatitis, endometriosis, PCOS presents with   complaints of pelvic pain and vaginal bleeding.    TECHNIQUE: Contiguous axial CT images were obtained from the lower chest   to the pubic symphysis following the administration of 100 mL Omnipaque   350 intravenous contrast. Oral contrast was not administered. Reformatted   images in the coronal and sagittal planes were acquired.    COMPARISON CT: CT abdomen and pelvis from 2022    OTHER STUDIES USED FOR CORRELATION: None.      FINDINGS:    LOWER CHEST: Subsegmental atelectasis..    HEPATOBILIARY: Unremarkable.    SPLEEN: Unremarkable.    PANCREAS: Unremarkable. No evidence of pancreatitis or peripancreatic   inflammatory change.    ADRENAL GLANDS: Unremarkable.    KIDNEYS: Metric renal enhancement. No hydronephrosis..    ABDOMINOPELVIC NODES: Unremarkable.    PELVIC ORGANS: Posterior perivesicular fat stranding (). Post   myomectomy.    PERITONEUM/MESENTERY/BOWEL: No bowel obstruction, ascites or   intraperitoneal free air.    BONES/SOFT TISSUES: No acute osseous abnormality. Degenerative osseous   changes. Postsurgical changes in the lower anterior abdominal wall    VASCULAR: Aorta is normal in caliber.      IMPRESSION:  Posterior perivesicular fat stranding (), which can be seen in   cystitis. Recommend clinical correlation.    No evidence of pancreatitis or peripancreatic inflammatory change.    --- End of Report ---    ***Please see the addendum at the top of this report. It may contain   additional important information or changes.****      VIDA PATEL MD; Resident Radiologist  This document has been electronically signed.  RIGO TAPIA MD; Attending Interventional Radiologist  This document has been electronically signed. 2024  6:21PM  1st Addendum: RIGO TAPIA MD; Attending Interventional Radiologist  The first addendum was electronically signed on: 2024  9:29PM. (24 @ 16:00)    US Abdomen Upper Quadrant Right:   ACC: [id number] EXAM:  US ABDOMEN RT UPR QUADRANT   ORDERED BY: SANCHO LAUREANO     PROCEDURE DATE:  2024          INTERPRETATION:  CLINICAL INFORMATION: Right upper quadrant pain    COMPARISON: 2022    TECHNIQUE: Sonography of the right upper quadrant.    FINDINGS:  Liver: Within normal limits.  Bile ducts: Normal caliber. Common bile duct measures 5 mm.  Gallbladder: Within normal limits.  Pancreas: Visualized portions are within normal limits.  Right kidney: 9.4 cm. No hydronephrosis.  Ascites: None.  IVC: Visualized portions are within normal limits.    IMPRESSION:  Normal right upper quadrant abdominal ultrasound.        --- End of Report ---            CRYSTAL TRACY MD; Attending Radiologist  This document hasbeen electronically signed. 2024  6:57PM (24 @ 17:52)

## 2024-04-25 NOTE — H&P ADULT - ASSESSMENT
43y F pmh endometriosis, PCOS, uterine fibroids s/p myomectomy, hyperthyroidism, pancreatitis pw abdominal pain admitted for acute pancreatitis.     #Acute pancreatitis   #Peptic ulcer disease   - elevated Lipase, characteristic epigastric pain, CT -ve for pancreatic inflammation - cw prior hospitalizations of pancreatitis   - RUQ -ve for stones, pending TG level, only occasional alcohol use   - start LR 150cc/hr, pain control morphine 2mg q4hr, CLD - advance as tolerated   - sp EGD 6/23 w/ GERD and prior ulcer at GE junction   - c/w PPI 40 qd, sucralfate 1g tid   - op gi fu     #Hydrosalpinx  #Endometriosis  #PCOS  - acute on chronic pelvic pain   - OBGYN consulted - tvus showing hydrosalpinx   - cw pain control   - doxy 100mg bid, flagyl 500mg bid x 7 day   - op ongyn f/u     #Hyperthyroidism   - cw methimazole and atenealol 25mg bid    # To follow up  - symptom improvement     # Misc  - DVT Prophylaxis: none  - GI Prophylaxis: lovenox  - Diet: Diet, Clear Liquid    - Activity: Activity - Ambulate as Tolerated    - Code Status: Full     Yonathan Meraz  PGY2, Internal Medicine   [facility]   43y F pmh endometriosis, PCOS, uterine fibroids s/p myomectomy, hyperthyroidism, pancreatitis pw abdominal pain admitted for acute pancreatitis.     #Acute pancreatitis   #Peptic ulcer disease   - elevated Lipase, characteristic epigastric pain, CT -ve for pancreatic inflammation - cw prior hospitalizations of pancreatitis   - RUQ -ve for stones, pending TG level, only occasional alcohol use   - start LR 150cc/hr, pain control morphine 2mg q4hr, CLD - advance as tolerated   - sp EGD 6/23 w/ GERD and prior ulcer at GE junction   - c/w PPI 40 qd, sucralfate 1g tid   - op gi fu     #Hydrosalpinx  #Endometriosis  #PCOS  - acute on chronic pelvic pain   - OBGYN consulted - tvus showing hydrosalpinx   - cw pain control   - doxy 100mg bid, flagyl 500mg bid x 7 day   - op ongyn f/u     #Hyperthyroidism   - cw methimazole 5mg qd and atenealol 25mg bid    # To follow up  - symptom improvement     # Misc  - DVT Prophylaxis: none  - GI Prophylaxis: lovenox  - Diet: Diet, Clear Liquid    - Activity: Activity - Ambulate as Tolerated    - Code Status: Full     Yonathan Meraz  PGY2, Internal Medicine   [facility]   43y F pmh endometriosis, PCOS, uterine fibroids s/p myomectomy, hyperthyroidism, pancreatitis pw abdominal pain admitted for acute pancreatitis.     #Acute pancreatitis   #Peptic ulcer disease   - elevated Lipase, characteristic epigastric pain, CT -ve for pancreatic inflammation - cw prior hospitalizations of pancreatitis   - RUQ -ve for stones, pending TG level, only occasional alcohol use   - start LR 150cc/hr, pain control morphine 2mg q4hr, CLD - advance as tolerated   - sp EGD 6/23 w/ GERD and prior ulcer at GE junction   - c/w PPI 40 qd, sucralfate 1g tid   - op gi fu     #Hydrosalpinx  #Endometriosis  #PCOS  - acute on chronic pelvic pain   - OBGYN consulted - tvus showing hydrosalpinx   - cw pain control   - doxy 100mg bid, flagyl 500mg bid x 7 day   - op ongyn f/u     #Hyperthyroidism   - cw methimazole 5mg qd and atenealol 25mg bid    # To follow up  - symptom improvement     # Misc  - DVT Prophylaxis: none  - GI Prophylaxis: lovenox  - Diet: Diet, Clear Liquid    - Activity: Activity - Ambulate as Tolerated    - Code Status: Full

## 2024-04-26 LAB
ALBUMIN SERPL ELPH-MCNC: 3.9 G/DL — SIGNIFICANT CHANGE UP (ref 3.5–5.2)
ALP SERPL-CCNC: 55 U/L — SIGNIFICANT CHANGE UP (ref 30–115)
ALT FLD-CCNC: 54 U/L — HIGH (ref 0–41)
ANION GAP SERPL CALC-SCNC: 9 MMOL/L — SIGNIFICANT CHANGE UP (ref 7–14)
AST SERPL-CCNC: 23 U/L — SIGNIFICANT CHANGE UP (ref 0–41)
BASOPHILS # BLD AUTO: 0.03 K/UL — SIGNIFICANT CHANGE UP (ref 0–0.2)
BASOPHILS NFR BLD AUTO: 0.4 % — SIGNIFICANT CHANGE UP (ref 0–1)
BILIRUB SERPL-MCNC: 0.3 MG/DL — SIGNIFICANT CHANGE UP (ref 0.2–1.2)
BUN SERPL-MCNC: 7 MG/DL — LOW (ref 10–20)
CALCIUM SERPL-MCNC: 9.1 MG/DL — SIGNIFICANT CHANGE UP (ref 8.4–10.5)
CHLORIDE SERPL-SCNC: 103 MMOL/L — SIGNIFICANT CHANGE UP (ref 98–110)
CO2 SERPL-SCNC: 28 MMOL/L — SIGNIFICANT CHANGE UP (ref 17–32)
CREAT SERPL-MCNC: 0.8 MG/DL — SIGNIFICANT CHANGE UP (ref 0.7–1.5)
EGFR: 95 ML/MIN/1.73M2 — SIGNIFICANT CHANGE UP
EOSINOPHIL # BLD AUTO: 0.13 K/UL — SIGNIFICANT CHANGE UP (ref 0–0.7)
EOSINOPHIL NFR BLD AUTO: 1.9 % — SIGNIFICANT CHANGE UP (ref 0–8)
GLUCOSE SERPL-MCNC: 97 MG/DL — SIGNIFICANT CHANGE UP (ref 70–99)
HCT VFR BLD CALC: 35.2 % — LOW (ref 37–47)
HGB BLD-MCNC: 11.7 G/DL — LOW (ref 12–16)
IGG4 SER-MCNC: 4.7 MG/DL — SIGNIFICANT CHANGE UP (ref 1–123)
IMM GRANULOCYTES NFR BLD AUTO: 0 % — LOW (ref 0.1–0.3)
LYMPHOCYTES # BLD AUTO: 3.91 K/UL — HIGH (ref 1.2–3.4)
LYMPHOCYTES # BLD AUTO: 57.2 % — HIGH (ref 20.5–51.1)
MAGNESIUM SERPL-MCNC: 2.3 MG/DL — SIGNIFICANT CHANGE UP (ref 1.8–2.4)
MCHC RBC-ENTMCNC: 28.7 PG — SIGNIFICANT CHANGE UP (ref 27–31)
MCHC RBC-ENTMCNC: 33.2 G/DL — SIGNIFICANT CHANGE UP (ref 32–37)
MCV RBC AUTO: 86.5 FL — SIGNIFICANT CHANGE UP (ref 81–99)
MONOCYTES # BLD AUTO: 0.5 K/UL — SIGNIFICANT CHANGE UP (ref 0.1–0.6)
MONOCYTES NFR BLD AUTO: 7.3 % — SIGNIFICANT CHANGE UP (ref 1.7–9.3)
NEUTROPHILS # BLD AUTO: 2.27 K/UL — SIGNIFICANT CHANGE UP (ref 1.4–6.5)
NEUTROPHILS NFR BLD AUTO: 33.2 % — LOW (ref 42.2–75.2)
NRBC # BLD: 0 /100 WBCS — SIGNIFICANT CHANGE UP (ref 0–0)
PLATELET # BLD AUTO: 342 K/UL — SIGNIFICANT CHANGE UP (ref 130–400)
PMV BLD: 9.4 FL — SIGNIFICANT CHANGE UP (ref 7.4–10.4)
POTASSIUM SERPL-MCNC: 3.9 MMOL/L — SIGNIFICANT CHANGE UP (ref 3.5–5)
POTASSIUM SERPL-SCNC: 3.9 MMOL/L — SIGNIFICANT CHANGE UP (ref 3.5–5)
PROT SERPL-MCNC: 6.2 G/DL — SIGNIFICANT CHANGE UP (ref 6–8)
RBC # BLD: 4.07 M/UL — LOW (ref 4.2–5.4)
RBC # FLD: 13.1 % — SIGNIFICANT CHANGE UP (ref 11.5–14.5)
SODIUM SERPL-SCNC: 140 MMOL/L — SIGNIFICANT CHANGE UP (ref 135–146)
WBC # BLD: 6.84 K/UL — SIGNIFICANT CHANGE UP (ref 4.8–10.8)
WBC # FLD AUTO: 6.84 K/UL — SIGNIFICANT CHANGE UP (ref 4.8–10.8)

## 2024-04-26 PROCEDURE — 99232 SBSQ HOSP IP/OBS MODERATE 35: CPT

## 2024-04-26 RX ADMIN — Medication 1 GRAM(S): at 17:15

## 2024-04-26 RX ADMIN — MORPHINE SULFATE 2 MILLIGRAM(S): 50 CAPSULE, EXTENDED RELEASE ORAL at 00:00

## 2024-04-26 RX ADMIN — MORPHINE SULFATE 2 MILLIGRAM(S): 50 CAPSULE, EXTENDED RELEASE ORAL at 22:19

## 2024-04-26 RX ADMIN — Medication 500 MILLIGRAM(S): at 05:52

## 2024-04-26 RX ADMIN — Medication 650 MILLIGRAM(S): at 02:00

## 2024-04-26 RX ADMIN — MORPHINE SULFATE 2 MILLIGRAM(S): 50 CAPSULE, EXTENDED RELEASE ORAL at 18:20

## 2024-04-26 RX ADMIN — Medication 100 MILLIGRAM(S): at 17:15

## 2024-04-26 RX ADMIN — MORPHINE SULFATE 2 MILLIGRAM(S): 50 CAPSULE, EXTENDED RELEASE ORAL at 13:02

## 2024-04-26 RX ADMIN — Medication 1 GRAM(S): at 12:01

## 2024-04-26 RX ADMIN — ATENOLOL 25 MILLIGRAM(S): 25 TABLET ORAL at 05:52

## 2024-04-26 RX ADMIN — CHLORHEXIDINE GLUCONATE 1 APPLICATION(S): 213 SOLUTION TOPICAL at 05:56

## 2024-04-26 RX ADMIN — MORPHINE SULFATE 2 MILLIGRAM(S): 50 CAPSULE, EXTENDED RELEASE ORAL at 12:01

## 2024-04-26 RX ADMIN — Medication 1 GRAM(S): at 05:52

## 2024-04-26 RX ADMIN — ONDANSETRON 4 MILLIGRAM(S): 8 TABLET, FILM COATED ORAL at 22:19

## 2024-04-26 RX ADMIN — Medication 650 MILLIGRAM(S): at 01:08

## 2024-04-26 RX ADMIN — Medication 100 MILLIGRAM(S): at 05:52

## 2024-04-26 RX ADMIN — MORPHINE SULFATE 2 MILLIGRAM(S): 50 CAPSULE, EXTENDED RELEASE ORAL at 22:34

## 2024-04-26 RX ADMIN — Medication 1 GRAM(S): at 00:04

## 2024-04-26 RX ADMIN — MORPHINE SULFATE 2 MILLIGRAM(S): 50 CAPSULE, EXTENDED RELEASE ORAL at 07:11

## 2024-04-26 RX ADMIN — Medication 500 MILLIGRAM(S): at 17:15

## 2024-04-26 RX ADMIN — PANTOPRAZOLE SODIUM 40 MILLIGRAM(S): 20 TABLET, DELAYED RELEASE ORAL at 05:56

## 2024-04-26 RX ADMIN — MORPHINE SULFATE 2 MILLIGRAM(S): 50 CAPSULE, EXTENDED RELEASE ORAL at 17:20

## 2024-04-26 RX ADMIN — ENOXAPARIN SODIUM 40 MILLIGRAM(S): 100 INJECTION SUBCUTANEOUS at 12:01

## 2024-04-26 RX ADMIN — MORPHINE SULFATE 2 MILLIGRAM(S): 50 CAPSULE, EXTENDED RELEASE ORAL at 05:53

## 2024-04-26 NOTE — PROGRESS NOTE ADULT - ASSESSMENT
Ms. Ford is a 43-year-old female with a PMH of endometriosis, PCOS, uterine fibroids s/p myomectomy, PUD, and pancreatitis who was admitted for abdominal pain 2/2 suspected pancreatitis/PUD.    Her HPI started 2 weeks ago when she started to have episodic nausea, vomiting and diarrhea. She initially thought she had gastroenteritis. However, she developed epigastric pain radiating to the back which is consistent with her prior episode of pancreatitis in      Ms. Ford is a 43-year-old female with a PMH of endometriosis, PCOS, uterine fibroids s/p myomectomy, PUD, and pancreatitis who was admitted for abdominal pain 2/2 suspected pancreatitis/PUD.    Her HPI started 2 weeks ago when she started to have episodic nausea, vomiting and diarrhea. She initially thought she had gastroenteritis. However, she developed epigastric pain radiating to the back which is consistent with her prior episode of pancreatitis in 12/2022. She came on the day of admission for a scheduled gastric emptying study which was negative, but then she presented to the ED for intolerable abdominal pain and nausea.    In the ED she was vitally stable, labs were significant for a lipase of 551, UA +ve.  TVUS was performed that showed a hydrosalpinx which was assessed by Ob/Gyn who noted that it is not the cause for her pain, and started her on a course of doxycycline and metronidazole.    CT A/P did not show signs of pancreatitis (although she also did not have radiological signs of pancreatitis during her first episode in 12/2022). RUQ US was unremarkable.    Currently she remains symptomatic and not tolerating PO intake. She is on LR @ 150 cc/hr for possible pancreatitis, with nausea and pain control. Pending further GI evaluation.    Abdominal Pain: Acute Pancreatitis vs. PUD vs. Functional Pathology                   Ms. Ford is a 43-year-old female with a PMH of endometriosis, PCOS, uterine fibroids s/p myomectomy, PUD, and pancreatitis who was admitted for abdominal pain 2/2 suspected pancreatitis/PUD.    Her HPI started 2 weeks ago when she started to have episodic nausea, vomiting and diarrhea. She initially thought she had gastroenteritis. However, she developed epigastric pain radiating to the back which is consistent with her prior episode of pancreatitis in 12/2022. She came on the day of admission for a scheduled gastric emptying study which was negative, but then she presented to the ED for intolerable abdominal pain and nausea.    In the ED she was vitally stable, labs were significant for a lipase of 551, UA +ve.  TVUS was performed that showed a hydrosalpinx which was assessed by Ob/Gyn who noted that it is not the cause for her pain, and started her on a course of doxycycline and metronidazole.    CT A/P did not show signs of pancreatitis (although she also did not have radiological signs of pancreatitis during her first episode in 12/2022). RUQ US was unremarkable.    Currently she remains symptomatic and not tolerating PO intake. She is on LR @ 150 cc/hr for possible pancreatitis, with nausea and pain control. Pending further GI evaluation.    Abdominal Pain: Acute Pancreatitis vs. PUD vs. Functional Pathology  -epigastric pain radiating to the back a/w N/V  -CT A/P negative  -RUQ US negative  -gastric emptying study negative  -s/p PPI IV bid  -remains symptomatic not tolerating PO intake    Plan:  ·	f/u GI  ·	f/u IgG4  ·	advance diet as tolerated  ·	Carafate 1 g qid  ·	LR @ 150 cc/hr  ·	morphine 2 mg q4h prn  ·	Zofran 4 mg IV prn    Hydrosalpinx  Endometriosis  PCOS  -acute on chronic pelvic pain   -OBGYN consulted - TVUS showing hydrosalpinx, pain not gynecological in origin     Plan:  ·	c/w pain control   ·	doxy 100mg bid, flagyl 500mg bid x 7 day   ·	OP obgyn f/u     Hyperthyroidism   -c/w methimazole 5mg qd and atenealol 25mg bid    DVT prophylaxis: enoxaparin  GI prophylaxis: PPI  Diet: full liquid, advance as tolerated  Code status: full  Activity: AAT

## 2024-04-27 LAB
ALBUMIN SERPL ELPH-MCNC: 4.4 G/DL — SIGNIFICANT CHANGE UP (ref 3.5–5.2)
ALP SERPL-CCNC: 62 U/L — SIGNIFICANT CHANGE UP (ref 30–115)
ALT FLD-CCNC: 51 U/L — HIGH (ref 0–41)
ANION GAP SERPL CALC-SCNC: 11 MMOL/L — SIGNIFICANT CHANGE UP (ref 7–14)
AST SERPL-CCNC: 20 U/L — SIGNIFICANT CHANGE UP (ref 0–41)
BASOPHILS # BLD AUTO: 0.03 K/UL — SIGNIFICANT CHANGE UP (ref 0–0.2)
BASOPHILS NFR BLD AUTO: 0.4 % — SIGNIFICANT CHANGE UP (ref 0–1)
BILIRUB SERPL-MCNC: 0.3 MG/DL — SIGNIFICANT CHANGE UP (ref 0.2–1.2)
BUN SERPL-MCNC: 5 MG/DL — LOW (ref 10–20)
CALCIUM SERPL-MCNC: 9.6 MG/DL — SIGNIFICANT CHANGE UP (ref 8.4–10.5)
CHLORIDE SERPL-SCNC: 98 MMOL/L — SIGNIFICANT CHANGE UP (ref 98–110)
CO2 SERPL-SCNC: 28 MMOL/L — SIGNIFICANT CHANGE UP (ref 17–32)
CREAT SERPL-MCNC: 0.9 MG/DL — SIGNIFICANT CHANGE UP (ref 0.7–1.5)
EGFR: 82 ML/MIN/1.73M2 — SIGNIFICANT CHANGE UP
EOSINOPHIL # BLD AUTO: 0.21 K/UL — SIGNIFICANT CHANGE UP (ref 0–0.7)
EOSINOPHIL NFR BLD AUTO: 2.8 % — SIGNIFICANT CHANGE UP (ref 0–8)
GLUCOSE SERPL-MCNC: 112 MG/DL — HIGH (ref 70–99)
HCT VFR BLD CALC: 41.7 % — SIGNIFICANT CHANGE UP (ref 37–47)
HGB BLD-MCNC: 13.5 G/DL — SIGNIFICANT CHANGE UP (ref 12–16)
IMM GRANULOCYTES NFR BLD AUTO: 0.1 % — SIGNIFICANT CHANGE UP (ref 0.1–0.3)
LYMPHOCYTES # BLD AUTO: 3.09 K/UL — SIGNIFICANT CHANGE UP (ref 1.2–3.4)
LYMPHOCYTES # BLD AUTO: 40.9 % — SIGNIFICANT CHANGE UP (ref 20.5–51.1)
MAGNESIUM SERPL-MCNC: 2.4 MG/DL — SIGNIFICANT CHANGE UP (ref 1.8–2.4)
MCHC RBC-ENTMCNC: 28.7 PG — SIGNIFICANT CHANGE UP (ref 27–31)
MCHC RBC-ENTMCNC: 32.4 G/DL — SIGNIFICANT CHANGE UP (ref 32–37)
MCV RBC AUTO: 88.5 FL — SIGNIFICANT CHANGE UP (ref 81–99)
MONOCYTES # BLD AUTO: 0.63 K/UL — HIGH (ref 0.1–0.6)
MONOCYTES NFR BLD AUTO: 8.3 % — SIGNIFICANT CHANGE UP (ref 1.7–9.3)
NEUTROPHILS # BLD AUTO: 3.59 K/UL — SIGNIFICANT CHANGE UP (ref 1.4–6.5)
NEUTROPHILS NFR BLD AUTO: 47.5 % — SIGNIFICANT CHANGE UP (ref 42.2–75.2)
NRBC # BLD: 0 /100 WBCS — SIGNIFICANT CHANGE UP (ref 0–0)
PLATELET # BLD AUTO: 361 K/UL — SIGNIFICANT CHANGE UP (ref 130–400)
PMV BLD: 9.5 FL — SIGNIFICANT CHANGE UP (ref 7.4–10.4)
POTASSIUM SERPL-MCNC: 3.9 MMOL/L — SIGNIFICANT CHANGE UP (ref 3.5–5)
POTASSIUM SERPL-SCNC: 3.9 MMOL/L — SIGNIFICANT CHANGE UP (ref 3.5–5)
PROT SERPL-MCNC: 7.2 G/DL — SIGNIFICANT CHANGE UP (ref 6–8)
RBC # BLD: 4.71 M/UL — SIGNIFICANT CHANGE UP (ref 4.2–5.4)
RBC # FLD: 13.1 % — SIGNIFICANT CHANGE UP (ref 11.5–14.5)
SODIUM SERPL-SCNC: 137 MMOL/L — SIGNIFICANT CHANGE UP (ref 135–146)
WBC # BLD: 7.56 K/UL — SIGNIFICANT CHANGE UP (ref 4.8–10.8)
WBC # FLD AUTO: 7.56 K/UL — SIGNIFICANT CHANGE UP (ref 4.8–10.8)

## 2024-04-27 PROCEDURE — 99232 SBSQ HOSP IP/OBS MODERATE 35: CPT

## 2024-04-27 RX ADMIN — Medication 100 MILLIGRAM(S): at 06:14

## 2024-04-27 RX ADMIN — Medication 100 MILLIGRAM(S): at 17:06

## 2024-04-27 RX ADMIN — ENOXAPARIN SODIUM 40 MILLIGRAM(S): 100 INJECTION SUBCUTANEOUS at 11:23

## 2024-04-27 RX ADMIN — MORPHINE SULFATE 2 MILLIGRAM(S): 50 CAPSULE, EXTENDED RELEASE ORAL at 23:30

## 2024-04-27 RX ADMIN — MORPHINE SULFATE 2 MILLIGRAM(S): 50 CAPSULE, EXTENDED RELEASE ORAL at 13:20

## 2024-04-27 RX ADMIN — Medication 1 GRAM(S): at 11:23

## 2024-04-27 RX ADMIN — MORPHINE SULFATE 2 MILLIGRAM(S): 50 CAPSULE, EXTENDED RELEASE ORAL at 06:14

## 2024-04-27 RX ADMIN — MORPHINE SULFATE 2 MILLIGRAM(S): 50 CAPSULE, EXTENDED RELEASE ORAL at 18:24

## 2024-04-27 RX ADMIN — PANTOPRAZOLE SODIUM 40 MILLIGRAM(S): 20 TABLET, DELAYED RELEASE ORAL at 06:14

## 2024-04-27 RX ADMIN — ATENOLOL 25 MILLIGRAM(S): 25 TABLET ORAL at 06:13

## 2024-04-27 RX ADMIN — Medication 500 MILLIGRAM(S): at 06:13

## 2024-04-27 RX ADMIN — ONDANSETRON 4 MILLIGRAM(S): 8 TABLET, FILM COATED ORAL at 14:30

## 2024-04-27 RX ADMIN — CHLORHEXIDINE GLUCONATE 1 APPLICATION(S): 213 SOLUTION TOPICAL at 06:23

## 2024-04-27 RX ADMIN — MORPHINE SULFATE 2 MILLIGRAM(S): 50 CAPSULE, EXTENDED RELEASE ORAL at 12:55

## 2024-04-27 RX ADMIN — Medication 1 GRAM(S): at 17:06

## 2024-04-27 RX ADMIN — Medication 1 GRAM(S): at 00:33

## 2024-04-27 RX ADMIN — MORPHINE SULFATE 2 MILLIGRAM(S): 50 CAPSULE, EXTENDED RELEASE ORAL at 22:30

## 2024-04-27 RX ADMIN — Medication 1 GRAM(S): at 06:13

## 2024-04-27 RX ADMIN — ONDANSETRON 4 MILLIGRAM(S): 8 TABLET, FILM COATED ORAL at 06:21

## 2024-04-27 RX ADMIN — MORPHINE SULFATE 2 MILLIGRAM(S): 50 CAPSULE, EXTENDED RELEASE ORAL at 19:30

## 2024-04-27 RX ADMIN — MORPHINE SULFATE 2 MILLIGRAM(S): 50 CAPSULE, EXTENDED RELEASE ORAL at 06:29

## 2024-04-27 RX ADMIN — Medication 500 MILLIGRAM(S): at 17:06

## 2024-04-27 NOTE — PROGRESS NOTE ADULT - ASSESSMENT
Ms. Ford is a 43-year-old female with a PMH of endometriosis, PCOS, uterine fibroids s/p myomectomy, PUD, and pancreatitis who was admitted for abdominal pain 2/2 suspected pancreatitis/PUD.    Her HPI started 2 weeks ago when she started to have episodic nausea, vomiting and diarrhea. She initially thought she had gastroenteritis. However, she developed epigastric pain radiating to the back which is consistent with her prior episode of pancreatitis in 12/2022. She came on the day of admission for a scheduled gastric emptying study which was negative, but then she presented to the ED for intolerable abdominal pain and nausea.    In the ED she was vitally stable, labs were significant for a lipase of 551, UA +ve.  TVUS was performed that showed a hydrosalpinx which was assessed by Ob/Gyn who noted that it is not the cause for her pain, and started her on a course of doxycycline and metronidazole.    CT A/P did not show signs of pancreatitis (although she also did not have radiological signs of pancreatitis during her first episode in 12/2022). RUQ US was unremarkable.    Currently she remains symptomatic and not tolerating PO intake. She is on LR @ 150 cc/hr for possible pancreatitis, with nausea and pain control. Pending further GI evaluation.    Abdominal Pain: Acute Pancreatitis vs. PUD vs. Functional Pathology  -epigastric pain radiating to the back a/w N/V  -CT A/P negative  -RUQ US negative  -gastric emptying study negative  -s/p PPI IV bid  -remains symptomatic not tolerating PO intake    Plan:  ·	f/u GI  ·	f/u IgG4  ·	advance diet as tolerated  ·	Carafate 1 g qid  ·	LR @ 150 cc/hr  ·	morphine 2 mg q4h prn  ·	Zofran 4 mg IV prn    Hydrosalpinx  Endometriosis  PCOS  -acute on chronic pelvic pain   -OBGYN consulted - TVUS showing hydrosalpinx, pain not gynecological in origin     Plan:  ·	c/w pain control   ·	doxy 100mg bid, flagyl 500mg bid x 7 day   ·	OP obgyn f/u     Hyperthyroidism   -c/w methimazole 5mg qd and atenealol 25mg bid    DVT prophylaxis: enoxaparin  GI prophylaxis: PPI  Diet: full liquid, advance as tolerated  Code status: full  Activity: AAT                       Ms. Ford is a 43-year-old female with a PMH of endometriosis, PCOS, uterine fibroids s/p myomectomy, PUD, and pancreatitis who was admitted for abdominal pain 2/2 suspected pancreatitis/PUD.    Her HPI started 2 weeks ago when she started to have episodic nausea, vomiting and diarrhea. She initially thought she had gastroenteritis. However, she developed epigastric pain radiating to the back which is consistent with her prior episode of pancreatitis in 12/2022. She came on the day of admission for a scheduled gastric emptying study which was negative, but then she presented to the ED for intolerable abdominal pain and nausea.    In the ED she was vitally stable, labs were significant for a lipase of 551, UA +ve.  TVUS was performed that showed a hydrosalpinx which was assessed by Ob/Gyn who noted that it is not the cause for her pain, and started her on a course of doxycycline and metronidazole.    CT A/P did not show signs of pancreatitis (although she also did not have radiological signs of pancreatitis during her first episode in 12/2022). RUQ US was unremarkable.    Currently she remains symptomatic and not tolerating PO intake. She is on LR @ 150 cc/hr for possible pancreatitis, with nausea and pain control. Pending further GI evaluation.    Abdominal Pain: Acute Pancreatitis vs. PUD vs. Functional Pathology  -epigastric pain radiating to the back a/w N/V  -CT A/P negative  -RUQ US negative  -gastric emptying study negative  -s/p PPI IV bid  -remains symptomatic not tolerating PO intake  -IgG4 wnl    Plan:  ·	f/u GI  ·	advance diet as tolerated  ·	Carafate 1 g qid  ·	morphine 2 mg q4h prn  ·	Zofran 4 mg IV prn    Hydrosalpinx  Endometriosis  PCOS  -acute on chronic pelvic pain   -OBGYN consulted - TVUS showing hydrosalpinx, pain not gynecological in origin     Plan:  ·	c/w pain control   ·	doxy 100mg bid, flagyl 500mg bid x 7 days  ·	OP obgyn f/u     Hyperthyroidism   -c/w methimazole 5mg qd and atenealol 25mg bid    DVT prophylaxis: enoxaparin  GI prophylaxis: PPI  Diet: full liquid, advance as tolerated  Code status: full  Activity: AAT

## 2024-04-28 LAB
ALBUMIN SERPL ELPH-MCNC: 4 G/DL — SIGNIFICANT CHANGE UP (ref 3.5–5.2)
ALP SERPL-CCNC: 56 U/L — SIGNIFICANT CHANGE UP (ref 30–115)
ALT FLD-CCNC: 39 U/L — SIGNIFICANT CHANGE UP (ref 0–41)
ANION GAP SERPL CALC-SCNC: 11 MMOL/L — SIGNIFICANT CHANGE UP (ref 7–14)
AST SERPL-CCNC: 21 U/L — SIGNIFICANT CHANGE UP (ref 0–41)
BASOPHILS # BLD AUTO: 0.02 K/UL — SIGNIFICANT CHANGE UP (ref 0–0.2)
BASOPHILS NFR BLD AUTO: 0.3 % — SIGNIFICANT CHANGE UP (ref 0–1)
BILIRUB SERPL-MCNC: 0.4 MG/DL — SIGNIFICANT CHANGE UP (ref 0.2–1.2)
BUN SERPL-MCNC: 4 MG/DL — LOW (ref 10–20)
CALCIUM SERPL-MCNC: 9.2 MG/DL — SIGNIFICANT CHANGE UP (ref 8.4–10.5)
CHLORIDE SERPL-SCNC: 102 MMOL/L — SIGNIFICANT CHANGE UP (ref 98–110)
CO2 SERPL-SCNC: 27 MMOL/L — SIGNIFICANT CHANGE UP (ref 17–32)
CREAT SERPL-MCNC: 0.8 MG/DL — SIGNIFICANT CHANGE UP (ref 0.7–1.5)
EGFR: 95 ML/MIN/1.73M2 — SIGNIFICANT CHANGE UP
EOSINOPHIL # BLD AUTO: 0.38 K/UL — SIGNIFICANT CHANGE UP (ref 0–0.7)
EOSINOPHIL NFR BLD AUTO: 6.3 % — SIGNIFICANT CHANGE UP (ref 0–8)
GLUCOSE SERPL-MCNC: 119 MG/DL — HIGH (ref 70–99)
HCT VFR BLD CALC: 38.7 % — SIGNIFICANT CHANGE UP (ref 37–47)
HGB BLD-MCNC: 12.6 G/DL — SIGNIFICANT CHANGE UP (ref 12–16)
IMM GRANULOCYTES NFR BLD AUTO: 0.2 % — SIGNIFICANT CHANGE UP (ref 0.1–0.3)
LYMPHOCYTES # BLD AUTO: 2.43 K/UL — SIGNIFICANT CHANGE UP (ref 1.2–3.4)
LYMPHOCYTES # BLD AUTO: 40.1 % — SIGNIFICANT CHANGE UP (ref 20.5–51.1)
MAGNESIUM SERPL-MCNC: 2.2 MG/DL — SIGNIFICANT CHANGE UP (ref 1.8–2.4)
MCHC RBC-ENTMCNC: 28.4 PG — SIGNIFICANT CHANGE UP (ref 27–31)
MCHC RBC-ENTMCNC: 32.6 G/DL — SIGNIFICANT CHANGE UP (ref 32–37)
MCV RBC AUTO: 87.2 FL — SIGNIFICANT CHANGE UP (ref 81–99)
MONOCYTES # BLD AUTO: 0.53 K/UL — SIGNIFICANT CHANGE UP (ref 0.1–0.6)
MONOCYTES NFR BLD AUTO: 8.7 % — SIGNIFICANT CHANGE UP (ref 1.7–9.3)
NEUTROPHILS # BLD AUTO: 2.69 K/UL — SIGNIFICANT CHANGE UP (ref 1.4–6.5)
NEUTROPHILS NFR BLD AUTO: 44.4 % — SIGNIFICANT CHANGE UP (ref 42.2–75.2)
NRBC # BLD: 0 /100 WBCS — SIGNIFICANT CHANGE UP (ref 0–0)
PLATELET # BLD AUTO: 291 K/UL — SIGNIFICANT CHANGE UP (ref 130–400)
PMV BLD: 9.5 FL — SIGNIFICANT CHANGE UP (ref 7.4–10.4)
POTASSIUM SERPL-MCNC: 3.8 MMOL/L — SIGNIFICANT CHANGE UP (ref 3.5–5)
POTASSIUM SERPL-SCNC: 3.8 MMOL/L — SIGNIFICANT CHANGE UP (ref 3.5–5)
PROT SERPL-MCNC: 6.5 G/DL — SIGNIFICANT CHANGE UP (ref 6–8)
RBC # BLD: 4.44 M/UL — SIGNIFICANT CHANGE UP (ref 4.2–5.4)
RBC # FLD: 13 % — SIGNIFICANT CHANGE UP (ref 11.5–14.5)
SODIUM SERPL-SCNC: 140 MMOL/L — SIGNIFICANT CHANGE UP (ref 135–146)
WBC # BLD: 6.06 K/UL — SIGNIFICANT CHANGE UP (ref 4.8–10.8)
WBC # FLD AUTO: 6.06 K/UL — SIGNIFICANT CHANGE UP (ref 4.8–10.8)

## 2024-04-28 PROCEDURE — 99232 SBSQ HOSP IP/OBS MODERATE 35: CPT

## 2024-04-28 RX ORDER — SODIUM CHLORIDE 9 MG/ML
1000 INJECTION INTRAMUSCULAR; INTRAVENOUS; SUBCUTANEOUS
Refills: 0 | Status: DISCONTINUED | OUTPATIENT
Start: 2024-04-28 | End: 2024-04-29

## 2024-04-28 RX ADMIN — ATENOLOL 25 MILLIGRAM(S): 25 TABLET ORAL at 05:33

## 2024-04-28 RX ADMIN — ENOXAPARIN SODIUM 40 MILLIGRAM(S): 100 INJECTION SUBCUTANEOUS at 11:05

## 2024-04-28 RX ADMIN — Medication 1 GRAM(S): at 11:04

## 2024-04-28 RX ADMIN — MORPHINE SULFATE 2 MILLIGRAM(S): 50 CAPSULE, EXTENDED RELEASE ORAL at 20:19

## 2024-04-28 RX ADMIN — Medication 100 MILLIGRAM(S): at 17:07

## 2024-04-28 RX ADMIN — Medication 500 MILLIGRAM(S): at 17:07

## 2024-04-28 RX ADMIN — Medication 1 GRAM(S): at 00:46

## 2024-04-28 RX ADMIN — MORPHINE SULFATE 2 MILLIGRAM(S): 50 CAPSULE, EXTENDED RELEASE ORAL at 15:19

## 2024-04-28 RX ADMIN — MORPHINE SULFATE 2 MILLIGRAM(S): 50 CAPSULE, EXTENDED RELEASE ORAL at 22:06

## 2024-04-28 RX ADMIN — Medication 500 MILLIGRAM(S): at 05:34

## 2024-04-28 RX ADMIN — Medication 1 GRAM(S): at 17:08

## 2024-04-28 RX ADMIN — PANTOPRAZOLE SODIUM 40 MILLIGRAM(S): 20 TABLET, DELAYED RELEASE ORAL at 05:35

## 2024-04-28 RX ADMIN — MORPHINE SULFATE 2 MILLIGRAM(S): 50 CAPSULE, EXTENDED RELEASE ORAL at 07:41

## 2024-04-28 RX ADMIN — CHLORHEXIDINE GLUCONATE 1 APPLICATION(S): 213 SOLUTION TOPICAL at 05:33

## 2024-04-28 RX ADMIN — Medication 100 MILLIGRAM(S): at 05:34

## 2024-04-28 RX ADMIN — MORPHINE SULFATE 2 MILLIGRAM(S): 50 CAPSULE, EXTENDED RELEASE ORAL at 08:20

## 2024-04-28 RX ADMIN — MORPHINE SULFATE 2 MILLIGRAM(S): 50 CAPSULE, EXTENDED RELEASE ORAL at 15:30

## 2024-04-28 RX ADMIN — Medication 1 GRAM(S): at 05:35

## 2024-04-28 NOTE — PROGRESS NOTE ADULT - ASSESSMENT
43y F pmh endometriosis, PCOS, uterine fibroids s/p myomectomy, hyperthyroidism, acute pancreatitis pw abdominal pain.         #Nausea, Vomiting, Diarrhea, Inability to tolerate PO x 2 weeks   #Peptic ulcer disease vs acute pancreatitis   - sp EGD 6/23 w/ GERD and prior ulcer at GE junction   - elevated Lipase, however could be elevated due to vomiting   - CT -ve for pancreatic inflammation   - RUQ -ve for stones   - c/w pain control   - c/w IV PPI BID   - c/w sucralfate 1g tid   - GI eval noted -  plan for EGD tomorrow - NPO after MN    #Hydrosalpinx  #Endometriosis  #PCOS  - acute on chronic pelvic pain   - OBGYN consulted - tvus showing hydrosalpinx   - doxy 100mg bid, flagyl 500mg bid x 7 day   - op ongyn f/u     #Hyperthyroidism   - cw methimazole 5mg qd and atenolol 25mg bid        - GI Prophylaxis: Lovenox      Pending: EGD in AM, tolerates diet

## 2024-04-29 ENCOUNTER — TRANSCRIPTION ENCOUNTER (OUTPATIENT)
Age: 44
End: 2024-04-29

## 2024-04-29 ENCOUNTER — RESULT REVIEW (OUTPATIENT)
Age: 44
End: 2024-04-29

## 2024-04-29 VITALS
SYSTOLIC BLOOD PRESSURE: 126 MMHG | HEART RATE: 57 BPM | RESPIRATION RATE: 18 BRPM | TEMPERATURE: 98 F | DIASTOLIC BLOOD PRESSURE: 72 MMHG

## 2024-04-29 LAB
ANION GAP SERPL CALC-SCNC: 9 MMOL/L — SIGNIFICANT CHANGE UP (ref 7–14)
APTT BLD: 34 SEC — SIGNIFICANT CHANGE UP (ref 27–39.2)
BASOPHILS # BLD AUTO: 0.02 K/UL — SIGNIFICANT CHANGE UP (ref 0–0.2)
BASOPHILS NFR BLD AUTO: 0.4 % — SIGNIFICANT CHANGE UP (ref 0–1)
BUN SERPL-MCNC: 4 MG/DL — LOW (ref 10–20)
CALCIUM SERPL-MCNC: 9 MG/DL — SIGNIFICANT CHANGE UP (ref 8.4–10.5)
CHLORIDE SERPL-SCNC: 103 MMOL/L — SIGNIFICANT CHANGE UP (ref 98–110)
CO2 SERPL-SCNC: 27 MMOL/L — SIGNIFICANT CHANGE UP (ref 17–32)
CREAT SERPL-MCNC: 0.8 MG/DL — SIGNIFICANT CHANGE UP (ref 0.7–1.5)
EGFR: 95 ML/MIN/1.73M2 — SIGNIFICANT CHANGE UP
EOSINOPHIL # BLD AUTO: 0.32 K/UL — SIGNIFICANT CHANGE UP (ref 0–0.7)
EOSINOPHIL NFR BLD AUTO: 5.8 % — SIGNIFICANT CHANGE UP (ref 0–8)
GLUCOSE SERPL-MCNC: 97 MG/DL — SIGNIFICANT CHANGE UP (ref 70–99)
HCT VFR BLD CALC: 38.4 % — SIGNIFICANT CHANGE UP (ref 37–47)
HGB BLD-MCNC: 12.5 G/DL — SIGNIFICANT CHANGE UP (ref 12–16)
IMM GRANULOCYTES NFR BLD AUTO: 0.2 % — SIGNIFICANT CHANGE UP (ref 0.1–0.3)
INR BLD: 1.2 RATIO — SIGNIFICANT CHANGE UP (ref 0.65–1.3)
LYMPHOCYTES # BLD AUTO: 2.3 K/UL — SIGNIFICANT CHANGE UP (ref 1.2–3.4)
LYMPHOCYTES # BLD AUTO: 42 % — SIGNIFICANT CHANGE UP (ref 20.5–51.1)
MAGNESIUM SERPL-MCNC: 2.2 MG/DL — SIGNIFICANT CHANGE UP (ref 1.8–2.4)
MCHC RBC-ENTMCNC: 28.3 PG — SIGNIFICANT CHANGE UP (ref 27–31)
MCHC RBC-ENTMCNC: 32.6 G/DL — SIGNIFICANT CHANGE UP (ref 32–37)
MCV RBC AUTO: 87.1 FL — SIGNIFICANT CHANGE UP (ref 81–99)
MONOCYTES # BLD AUTO: 0.57 K/UL — SIGNIFICANT CHANGE UP (ref 0.1–0.6)
MONOCYTES NFR BLD AUTO: 10.4 % — HIGH (ref 1.7–9.3)
NEUTROPHILS # BLD AUTO: 2.26 K/UL — SIGNIFICANT CHANGE UP (ref 1.4–6.5)
NEUTROPHILS NFR BLD AUTO: 41.2 % — LOW (ref 42.2–75.2)
NRBC # BLD: 0 /100 WBCS — SIGNIFICANT CHANGE UP (ref 0–0)
PLATELET # BLD AUTO: 307 K/UL — SIGNIFICANT CHANGE UP (ref 130–400)
PMV BLD: 9.1 FL — SIGNIFICANT CHANGE UP (ref 7.4–10.4)
POTASSIUM SERPL-MCNC: 4 MMOL/L — SIGNIFICANT CHANGE UP (ref 3.5–5)
POTASSIUM SERPL-SCNC: 4 MMOL/L — SIGNIFICANT CHANGE UP (ref 3.5–5)
PROTHROM AB SERPL-ACNC: 13.7 SEC — HIGH (ref 9.95–12.87)
RBC # BLD: 4.41 M/UL — SIGNIFICANT CHANGE UP (ref 4.2–5.4)
RBC # FLD: 13.1 % — SIGNIFICANT CHANGE UP (ref 11.5–14.5)
SODIUM SERPL-SCNC: 139 MMOL/L — SIGNIFICANT CHANGE UP (ref 135–146)
WBC # BLD: 5.48 K/UL — SIGNIFICANT CHANGE UP (ref 4.8–10.8)
WBC # FLD AUTO: 5.48 K/UL — SIGNIFICANT CHANGE UP (ref 4.8–10.8)

## 2024-04-29 PROCEDURE — 88312 SPECIAL STAINS GROUP 1: CPT | Mod: 26

## 2024-04-29 PROCEDURE — 43239 EGD BIOPSY SINGLE/MULTIPLE: CPT

## 2024-04-29 PROCEDURE — 88342 IMHCHEM/IMCYTCHM 1ST ANTB: CPT | Mod: 26

## 2024-04-29 PROCEDURE — 88305 TISSUE EXAM BY PATHOLOGIST: CPT | Mod: 26

## 2024-04-29 PROCEDURE — 99238 HOSP IP/OBS DSCHRG MGMT 30/<: CPT

## 2024-04-29 RX ORDER — PANTOPRAZOLE SODIUM 20 MG/1
1 TABLET, DELAYED RELEASE ORAL
Qty: 30 | Refills: 0
Start: 2024-04-29 | End: 2024-05-28

## 2024-04-29 RX ORDER — METRONIDAZOLE 500 MG
1 TABLET ORAL
Qty: 2 | Refills: 0
Start: 2024-04-29 | End: 2024-04-29

## 2024-04-29 RX ORDER — TRAMADOL HYDROCHLORIDE 50 MG/1
50 TABLET ORAL
Refills: 0 | Status: DISCONTINUED | OUTPATIENT
Start: 2024-04-29 | End: 2024-04-29

## 2024-04-29 RX ADMIN — CHLORHEXIDINE GLUCONATE 1 APPLICATION(S): 213 SOLUTION TOPICAL at 05:02

## 2024-04-29 RX ADMIN — Medication 500 MILLIGRAM(S): at 05:03

## 2024-04-29 RX ADMIN — ATENOLOL 25 MILLIGRAM(S): 25 TABLET ORAL at 05:04

## 2024-04-29 RX ADMIN — Medication 1 GRAM(S): at 00:09

## 2024-04-29 RX ADMIN — Medication 1 GRAM(S): at 17:11

## 2024-04-29 RX ADMIN — Medication 500 MILLIGRAM(S): at 17:12

## 2024-04-29 RX ADMIN — Medication 1 GRAM(S): at 05:04

## 2024-04-29 RX ADMIN — SODIUM CHLORIDE 100 MILLILITER(S): 9 INJECTION INTRAMUSCULAR; INTRAVENOUS; SUBCUTANEOUS at 00:10

## 2024-04-29 RX ADMIN — Medication 100 MILLIGRAM(S): at 17:12

## 2024-04-29 RX ADMIN — Medication 100 MILLIGRAM(S): at 05:03

## 2024-04-29 RX ADMIN — MORPHINE SULFATE 2 MILLIGRAM(S): 50 CAPSULE, EXTENDED RELEASE ORAL at 02:46

## 2024-04-29 RX ADMIN — PANTOPRAZOLE SODIUM 40 MILLIGRAM(S): 20 TABLET, DELAYED RELEASE ORAL at 05:03

## 2024-04-29 RX ADMIN — MORPHINE SULFATE 2 MILLIGRAM(S): 50 CAPSULE, EXTENDED RELEASE ORAL at 01:53

## 2024-04-29 RX ADMIN — TRAMADOL HYDROCHLORIDE 50 MILLIGRAM(S): 50 TABLET ORAL at 14:12

## 2024-04-29 NOTE — DISCHARGE NOTE PROVIDER - CARE PROVIDER_API CALL
Bay Joe  [address] [address]  Phone: [phone]  Fax: [phone]  Follow Up Time: 1 week

## 2024-04-29 NOTE — DISCHARGE NOTE PROVIDER - NSDCCPCAREPLAN_GEN_ALL_CORE_FT
PRINCIPAL DISCHARGE DIAGNOSIS  Diagnosis: Pancreatitis  Assessment and Plan of Treatment: Patient was admitted for suspected pancreatitis due to epigastric pain raidiating to the back along with nausea and vomiting. In the ED, lipase was elevated at 551 and urine analysis was positive for leukocyte esterase, red blood cells, bacteria and epithelial cells. Rocephin, fluids and ketorolac was given in the ED. A transvaginal ultrasound was done which showed fluid within the left adnexa suspicious for hydrosalpinx. OBGYN followed up and ruled out pregnancy. Doxycycline and Flagyl were administered and patient is to follow up outpatient with OBGYN. A Right Upper Quadrant Ultrasound was unremarkable along with the CT angiogram being unremarkable except for perivascular fat stranding.      SECONDARY DISCHARGE DIAGNOSES  Diagnosis: Acute UTI  Assessment and Plan of Treatment:     Diagnosis: Hydrosalpinx  Assessment and Plan of Treatment:      PRINCIPAL DISCHARGE DIAGNOSIS  Diagnosis: Abdominal pain  Assessment and Plan of Treatment: Patient was admitted for suspected pancreatitis due to epigastric pain raidiating to the back along with nausea and vomiting. In the ED, lipase was elevated at 551 and urine analysis was positive for leukocyte esterase, red blood cells, bacteria and epithelial cells. Rocephin, fluids and ketorolac was given in the ED. A transvaginal ultrasound was done which showed fluid within the left adnexa suspicious for hydrosalpinx. OBGYN followed up and ruled out pregnancy. Doxycycline and Flagyl were administered and patient is to follow up outpatient with OBGYN. A Right Upper Quadrant Ultrasound was unremarkable along with the CT angiogram being unremarkable except for perivascular fat stranding. You had an EGD done which showed some inflammation of your stomach and intestines. Please take your medications as prescribed and you may take medications to manage your pain such as tylenol.     PRINCIPAL DISCHARGE DIAGNOSIS  Diagnosis: Abdominal pain  Assessment and Plan of Treatment: You were admitted for suspected pancreatitis due to epigastric pain raidiating to the back along with nausea and vomiting as well as elevated lipase. You received antibiotics, fluids and ketorolac in the ED. A transvaginal ultrasound was done which showed fluid within the left adnexa suspicious for hydrosalpinx. Please follow up with OBGYN outpatient. You were given Doxycycline and Flagyl which are antibiotics to cover for any infection, please take these oral antibiotics un 4/30. An abdominal ultrasound was unremarkable along with the CT angiogram being unremarkable. You had an EGD done which showed some inflammation of your stomach and intestines. Please take your medications as prescribed and you may take medications to manage your pain such as tylenol.

## 2024-04-29 NOTE — PROGRESS NOTE ADULT - ASSESSMENT
43y F pmh endometriosis, PCOS, uterine fibroids s/p myomectomy, hyperthyroidism, acute pancreatitis pw abdominal pain.         #Nausea, Vomiting, Diarrhea, Inability to tolerate PO x 2 weeks   #Peptic ulcer disease vs acute pancreatitis   - sp EGD 6/23 w/ GERD and prior ulcer at GE junction   - elevated Lipase, however could be elevated due to vomiting   - CT -ve for pancreatic inflammation   - RUQ -ve for stones   - c/w pain control -> d/c morphine and switch to tramadol   - c/w IV PPI BID   - c/w sucralfate 1g tid   - GI eval noted -  EGD 4/29 in AM    #Hydrosalpinx  #Endometriosis  #PCOS  - acute on chronic pelvic pain   - OBGYN consulted - tvus showing hydrosalpinx   - doxy 100mg bid, flagyl 500mg bid x 7 day   - op ongyn f/u     #Hyperthyroidism   - cw methimazole 5mg qd and atenolol 25mg bid        - GI Prophylaxis: Lovenox 43y F pmh endometriosis, PCOS, uterine fibroids s/p myomectomy, hyperthyroidism, acute pancreatitis pw abdominal pain.       #Nausea, Vomiting, Diarrhea, Inability to tolerate PO x 2 weeks   #Peptic ulcer disease vs acute pancreatitis   - sp EGD 6/23 w/ GERD and prior ulcer at GE junction   - elevated Lipase, however could be elevated due to vomiting   - CT -ve for pancreatic inflammation   - RUQ -ve for stones   - c/w pain control -> d/c morphine and switch to tramadol   - c/w IV PPI BID   - c/w sucralfate 1g tid   - GI eval noted - EGD 4/29 in AM    #Hydrosalpinx  #Endometriosis  #PCOS  - acute on chronic pelvic pain   - OBGYN consulted - tvus showing hydrosalpinx   - doxy 100mg bid, flagyl 500mg bid x 7 day   - op obgyn f/u     #Hyperthyroidism   - c/w methimazole 5mg qd and atenolol 25mg bid        - GI ppx: Lovenox

## 2024-04-29 NOTE — DISCHARGE NOTE NURSING/CASE MANAGEMENT/SOCIAL WORK - NSDCPEFALRISK_GEN_ALL_CORE
For information on Fall & Injury Prevention, visit: [email or web address] OR  [email or web address] OR  https://www.cdc.gov/steadi/patient.html

## 2024-04-29 NOTE — ASU PREOP CHECKLIST - SITE MARKED BY SURGEON
Anesthesia Post-op Note    Patient: Mary Hart  Procedure(s) Performed: IR TRANSCATHETER ARTERIAL THROMBOLYSIS CESSATION W THERAPY AND REMOVAL OF CATH   Anesthesia type: MAC    Vitals Value Taken Time   Temp 37.1 °C (98.7 °F) 04/16/24 0800   Pulse 103 04/16/24 0600   Resp 15 04/16/24 0600   SpO2 99 % 04/16/24 0600   /81 04/16/24 0600         Patient Location: ICU  Post-op Vital Signs:stable  Level of Consciousness: awake and alert  Respiratory Status: spontaneous ventilation  Cardiovascular stable  Hydration: euvolemic  Pain Management: adequately controlled  Handoff: Handoff to receiving clinician was performed and questions were answered  Vomiting: none  Nausea: None  Airway Patency:patent  Post-op Assessment: no complications and patient tolerated procedure well      No notable events documented.        Patient transported on full monitors, stable and responding to questions appropriately. Report given to RN              
n/a

## 2024-04-29 NOTE — DISCHARGE NOTE PROVIDER - NSDCFUSCHEDAPPT_GEN_ALL_CORE_FT
Mich Mcdonough  [facility] PreAdmits  Scheduled Appointment: 05/06/2024    Mich Mcdonough  [facility]  [facility] Doc Off [id number]  Scheduled Appointment: 05/22/2024

## 2024-04-29 NOTE — PROGRESS NOTE ADULT - SUBJECTIVE AND OBJECTIVE BOX
24H events:    Patient is a 43y old Female who presents with a chief complaint of Pancreatitis (2024 15:39)    Primary diagnosis of Pancreatitis    This morning patient was seen and examined at bedside, resting comfortably in bed.      PAST MEDICAL & SURGICAL HISTORY  Abscess  vaginal    Fibroid, uterine    Peptic ulcer    Ovarian cyst    Pancreatitis    Delivery with history of     Fibroid  uterine surgery        ALLERGIES:  shellfish (Anaphylaxis)  IV CONTRAST (Anaphylaxis)  iodine containing compounds (Unknown)    MEDICATIONS:  STANDING MEDICATIONS  atenolol  Tablet 25 milliGRAM(s) Oral daily  chlorhexidine 2% Cloths 1 Application(s) Topical <User Schedule>  doxycycline monohydrate Capsule 100 milliGRAM(s) Oral every 12 hours  enoxaparin Injectable 40 milliGRAM(s) SubCutaneous every 24 hours  lactated ringers. 1000 milliLiter(s) IV Continuous <Continuous>  methimazole 5 milliGRAM(s) Oral daily  metroNIDAZOLE    Tablet 500 milliGRAM(s) Oral every 12 hours  pantoprazole    Tablet 40 milliGRAM(s) Oral before breakfast  sucralfate 1 Gram(s) Oral four times a day    PRN MEDICATIONS  acetaminophen     Tablet .. 650 milliGRAM(s) Oral every 6 hours PRN  morphine  - Injectable 2 milliGRAM(s) IV Push every 4 hours PRN  ondansetron Injectable 4 milliGRAM(s) IV Push every 8 hours PRN    VITALS:   T(F): 98.3  HR: 76  BP: 109/58  RR: 17  SpO2: 98%        PHYSICAL EXAM:  GENERAL: NAD, obese    HEART: S1, S2 with no murmurs heard on auscultation    LUNGS: bilaterally clear to auscultation with no added sounds    ABDOMEN: soft, lax, tender to palpation in all quadrants. BS+    EXTREMITIES: peripheral pulses palpable, no pitting edema    NERVOUS SYSTEM:  AOx3, non-focal    SKIN: no rashes or lesions noted      LABS:                        11.7   6.84  )-----------( 342      ( 2024 08:30 )             35.2     -    140  |  103  |  7<L>  ----------------------------<  97  3.9   |  28  |  0.8    Ca    9.1      2024 08:30  Mg     2.3         TPro  6.2  /  Alb  3.9  /  TBili  0.3  /  DBili  x   /  AST  23  /  ALT  54<H>  /  AlkPhos  55  04-26      Urinalysis Basic - ( 2024 08:30 )    Color: x / Appearance: x / SG: x / pH: x  Gluc: 97 mg/dL / Ketone: x  / Bili: x / Urobili: x   Blood: x / Protein: x / Nitrite: x   Leuk Esterase: x / RBC: x / WBC x   Sq Epi: x / Non Sq Epi: x / Bacteria: x            Urinalysis with Rflx Culture (collected 2024 13:24)                
     Pt seen and examined at bedside.  Not tolerating liquid intake. Continues to have nausea and abd pain.     VITAL SIGNS (Last 24 hrs):  T(C): 37.6 (04-28-24 @ 05:03), Max: 37.6 (04-28-24 @ 05:03)  HR: 57 (04-28-24 @ 05:03) (57 - 81)  BP: 109/67 (04-28-24 @ 05:03) (109/67 - 129/89)  RR: 18 (04-28-24 @ 05:03) (18 - 18)  SpO2: --  Wt(kg): --  Daily     Daily     I&O's Summary      PHYSICAL EXAM:  GENERAL: NAD, obese   HEAD:  Atraumatic, Normocephalic  EYES:  conjunctiva and sclera clear  NECK: Supple, No JVD  CHEST/LUNG: Clear to auscultation bilaterally; No wheeze  HEART: Regular rate and rhythm; No murmurs, rubs, or gallops  ABDOMEN: Soft, epigastric tenderness, Nondistended; Bowel sounds present  EXTREMITIES:  2+ Peripheral Pulses, No clubbing, cyanosis, or edema  PSYCH: AAOx3  NEUROLOGY: non-focal  SKIN: No rashes or lesions    Labs Reviewed  Spoke to patient in regards to abnormal labs.    CBC Full  -  ( 28 Apr 2024 06:39 )  WBC Count : 6.06 K/uL  Hemoglobin : 12.6 g/dL  Hematocrit : 38.7 %  Platelet Count - Automated : 291 K/uL  Mean Cell Volume : 87.2 fL  Mean Cell Hemoglobin : 28.4 pg  Mean Cell Hemoglobin Concentration : 32.6 g/dL  Auto Neutrophil # : 2.69 K/uL  Auto Lymphocyte # : 2.43 K/uL  Auto Monocyte # : 0.53 K/uL  Auto Eosinophil # : 0.38 K/uL  Auto Basophil # : 0.02 K/uL  Auto Neutrophil % : 44.4 %  Auto Lymphocyte % : 40.1 %  Auto Monocyte % : 8.7 %  Auto Eosinophil % : 6.3 %  Auto Basophil % : 0.3 %    BMP:    04-28 @ 06:39    Blood Urea Nitrogen - 4  Calcium - 9.2  Carbond Dioxide - 27  Chloride - 102  Creatinine - 0.8  Glucose - 119  Potassium - 3.8  Sodium - 140      Hemoglobin A1c -     Urine Culture:            MEDICATIONS  (STANDING):  atenolol  Tablet 25 milliGRAM(s) Oral daily  chlorhexidine 2% Cloths 1 Application(s) Topical <User Schedule>  doxycycline monohydrate Capsule 100 milliGRAM(s) Oral every 12 hours  enoxaparin Injectable 40 milliGRAM(s) SubCutaneous every 24 hours  methimazole 5 milliGRAM(s) Oral daily  metroNIDAZOLE    Tablet 500 milliGRAM(s) Oral every 12 hours  pantoprazole    Tablet 40 milliGRAM(s) Oral before breakfast  sodium chloride 0.9%. 1000 milliLiter(s) (100 mL/Hr) IV Continuous <Continuous>  sucralfate 1 Gram(s) Oral four times a day    MEDICATIONS  (PRN):  acetaminophen     Tablet .. 650 milliGRAM(s) Oral every 6 hours PRN Temp greater or equal to 38C (100.4F), Mild Pain (1 - 3)  morphine  - Injectable 2 milliGRAM(s) IV Push every 4 hours PRN Severe Pain (7 - 10)  ondansetron Injectable 4 milliGRAM(s) IV Push every 8 hours PRN Nausea and/or Vomiting  
24H events:    Patient is a 43y old Female who presents with a chief complaint of Pancreatitis (2024 13:48)    Primary diagnosis of Pancreatitis    Today is hospital day 4d. This morning patient was seen and examined at bedside, resting comfortably in bed.    No acute or major events overnight. Hemodynamically stable, NPO, voiding appropriately with appropriate bowel movements.       PAST MEDICAL & SURGICAL HISTORY  Abscess  vaginal    Fibroid, uterine    Peptic ulcer    Ovarian cyst    Pancreatitis    Delivery with history of     Fibroid  uterine surgery      SOCIAL HISTORY:  Social History:      ALLERGIES:  shellfish (Anaphylaxis)  IV CONTRAST (Anaphylaxis)  iodine containing compounds (Unknown)    MEDICATIONS:  STANDING MEDICATIONS  atenolol  Tablet 25 milliGRAM(s) Oral daily  chlorhexidine 2% Cloths 1 Application(s) Topical <User Schedule>  doxycycline monohydrate Capsule 100 milliGRAM(s) Oral every 12 hours  enoxaparin Injectable 40 milliGRAM(s) SubCutaneous every 24 hours  methimazole 5 milliGRAM(s) Oral daily  metroNIDAZOLE    Tablet 500 milliGRAM(s) Oral every 12 hours  pantoprazole    Tablet 40 milliGRAM(s) Oral before breakfast  sodium chloride 0.9%. 1000 milliLiter(s) IV Continuous <Continuous>  sucralfate 1 Gram(s) Oral four times a day    PRN MEDICATIONS  acetaminophen     Tablet .. 650 milliGRAM(s) Oral every 6 hours PRN  ondansetron Injectable 4 milliGRAM(s) IV Push every 8 hours PRN  traMADol 50 milliGRAM(s) Oral two times a day PRN      ROS:  General: Denies fever, chills, nightsweats  HEENT: Denies eye pain changes in vision, ear pain, sore throat  CV: Denies chest pain, palpitations  Pulm: Denies cough, wheezing, SOB  GI: Endorses nausea and abd tenderness, Denies V/D  : Denes dysuria  MSK: Denies arthralgia, myalgia  Skin: Denies rashes, lesions  Neuro: Denies paresthesias, weakness    VITALS:   T(F): 97.3  HR: 52  BP: 116/59  RR: 18  SpO2: 96%    PHYSICAL EXAM:  GENERAL: NAD, lying in bed comfortably, lethargic, cooperative  HEAD: Normocephalic  HEART: Regular rate and rhythm, normal S1/S2, no murmurs, heaves, thrills  LUNGS: No acute respiratory distress, clear b/l breath sounds, no wheezing, rales, rhonchi  ABDOMEN: diffusely tender, mild distension, soft, + BS  EXTREMITIES: no rashes, extremities warm/dry, no cyanosis, no edema, ulcerations or ecchymosis  NERVOUS SYSTEM:  A&Ox3, follows commands, answers questions appropriately   SKIN: No rashes or lesions         LABS:                        12.5   5.48  )-----------( 307      ( 2024 09:31 )             38.4         140  |  102  |  4<L>  ----------------------------<  119<H>  3.8   |  27  |  0.8    Ca    9.2      2024 06:39  Mg     2.2         TPro  6.5  /  Alb  4.0  /  TBili  0.4  /  DBili  x   /  AST  21  /  ALT  39  /  AlkPhos  56      PT/INR - ( 2024 09:31 )   PT: 13.70 sec;   INR: 1.20 ratio           Urinalysis Basic - ( 2024 06:39 )    Color: x / Appearance: x / SG: x / pH: x  Gluc: 119 mg/dL / Ketone: x  / Bili: x / Urobili: x   Blood: x / Protein: x / Nitrite: x   Leuk Esterase: x / RBC: x / WBC x   Sq Epi: x / Non Sq Epi: x / Bacteria: x          RADIOLOGY:    RADIOLOGY    US PELVIS 2024  IMPRESSION:  Tubular fluid-containing left adnexal structure without Doppler flow, which may represent hydrosalpinx    US ABDOMEN 2024  IMPRESSION:  Normal right upper quadrant abdominal ultrasound.    CT ABDOMEN 2024  IMPRESSION:  Posterior perivesicular fat stranding (), which can be seen in cystitis. Recommend clinical correlation.  No evidence of pancreatitis or peripancreatic inflammatory change.  
24H events:    Patient is a 43y old Female who presents with a chief complaint of Pancreatitis (2024 15:39)    Primary diagnosis of Pancreatitis    This morning patient was seen and examined at bedside, resting comfortably in bed.      PAST MEDICAL & SURGICAL HISTORY  Abscess  vaginal    Fibroid, uterine    Peptic ulcer    Ovarian cyst    Pancreatitis    Delivery with history of     Fibroid  uterine surgery        ALLERGIES:  shellfish (Anaphylaxis)  IV CONTRAST (Anaphylaxis)  iodine containing compounds (Unknown)    MEDICATIONS:  STANDING MEDICATIONS  atenolol  Tablet 25 milliGRAM(s) Oral daily  chlorhexidine 2% Cloths 1 Application(s) Topical <User Schedule>  doxycycline monohydrate Capsule 100 milliGRAM(s) Oral every 12 hours  enoxaparin Injectable 40 milliGRAM(s) SubCutaneous every 24 hours  lactated ringers. 1000 milliLiter(s) IV Continuous <Continuous>  methimazole 5 milliGRAM(s) Oral daily  metroNIDAZOLE    Tablet 500 milliGRAM(s) Oral every 12 hours  pantoprazole    Tablet 40 milliGRAM(s) Oral before breakfast  sucralfate 1 Gram(s) Oral four times a day    PRN MEDICATIONS  acetaminophen     Tablet .. 650 milliGRAM(s) Oral every 6 hours PRN  morphine  - Injectable 2 milliGRAM(s) IV Push every 4 hours PRN  ondansetron Injectable 4 milliGRAM(s) IV Push every 8 hours PRN    VITALS:   T(F): 98.3  HR: 76  BP: 109/58  RR: 17  SpO2: 98%        PHYSICAL EXAM:  GENERAL: NAD, obese    HEART: S1, S2 with no murmurs heard on auscultation    LUNGS: bilaterally clear to auscultation with no added sounds    ABDOMEN: soft, lax, tender to palpation in all quadrants. BS+    EXTREMITIES: peripheral pulses palpable, no pitting edema    NERVOUS SYSTEM:  AOx3, non-focal    SKIN: no rashes or lesions noted      LABS:                        11.7   6.84  )-----------( 342      ( 2024 08:30 )             35.2     -    140  |  103  |  7<L>  ----------------------------<  97  3.9   |  28  |  0.8    Ca    9.1      2024 08:30  Mg     2.3         TPro  6.2  /  Alb  3.9  /  TBili  0.3  /  DBili  x   /  AST  23  /  ALT  54<H>  /  AlkPhos  55  04-26      Urinalysis Basic - ( 2024 08:30 )    Color: x / Appearance: x / SG: x / pH: x  Gluc: 97 mg/dL / Ketone: x  / Bili: x / Urobili: x   Blood: x / Protein: x / Nitrite: x   Leuk Esterase: x / RBC: x / WBC x   Sq Epi: x / Non Sq Epi: x / Bacteria: x            Urinalysis with Rflx Culture (collected 2024 13:24)

## 2024-04-29 NOTE — PROGRESS NOTE ADULT - ATTENDING COMMENTS
43y F pmh endometriosis, PCOS, uterine fibroids s/p myomectomy, hyperthyroidism, acute pancreatitis pw abdominal pain.         #Nausea, Vomiting, Diarrhea, Inability to tolerate PO x 2 weeks   #Peptic ulcer disease vs acute pancreatitis   - sp EGD 6/23 w/ GERD and prior ulcer at GE junction   - elevated Lipase, however could be elevated due to vomiting   - CT -ve for pancreatic inflammation   - RUQ -ve for stones   - c/w IVF  - c/w pain control   - c/w IV PPI BID   - c/w sucralfate 1g tid   - GI eval noted - possible EGD Monday if no improvement     #Hydrosalpinx  #Endometriosis  #PCOS  - acute on chronic pelvic pain   - OBGYN consulted - tvus showing hydrosalpinx   - doxy 100mg bid, flagyl 500mg bid x 7 day   - op ongyn f/u     #Hyperthyroidism   - cw methimazole 5mg qd and atenolol 25mg bid        - GI Prophylaxis: Lovenox      Pending: tolerates diet
43y F pmh endometriosis, PCOS, uterine fibroids s/p myomectomy, hyperthyroidism, acute pancreatitis pw abdominal pain.         #Nausea, Vomiting, Diarrhea, Inability to tolerate PO x 2 weeks   #Peptic ulcer disease vs acute pancreatitis   - sp EGD 6/23 w/ GERD and prior ulcer at GE junction   - elevated Lipase, however could be elevated due to vomiting   - CT -ve for pancreatic inflammation   - RUQ -ve for stones   - c/w pain control   - c/w IV PPI BID   - c/w sucralfate 1g tid   - GI eval noted -  plan for EGD today     #Hydrosalpinx  #Endometriosis  #PCOS  - acute on chronic pelvic pain   - OBGYN consulted - tvus showing hydrosalpinx   - doxy 100mg bid, flagyl 500mg bid x 7 day   - op ongyn f/u     #Hyperthyroidism   - cw methimazole 5mg qd and atenolol 25mg bid       dvt ppx    GI Prophylaxis: Lovenox      Pending: EGD today
43y F pmh endometriosis, PCOS, uterine fibroids s/p myomectomy, hyperthyroidism, acute pancreatitis pw abdominal pain.         #Nausea, Vomiting, Diarrhea, Inability to tolerate PO x 2 weeks   #Peptic ulcer disease vs acute pancreatitis   - sp EGD 6/23 w/ GERD and prior ulcer at GE junction   - elevated Lipase, however could be elevated due to vomiting   - CT -ve for pancreatic inflammation   - RUQ -ve for stones   - c/w IVF  - c/w pain control   - c/w IV PPI BID   - c/w sucralfate 1g tid   - GI eval noted - possible EGD Monday if no improvement     #Hydrosalpinx  #Endometriosis  #PCOS  - acute on chronic pelvic pain   - OBGYN consulted - tvus showing hydrosalpinx   - doxy 100mg bid, flagyl 500mg bid x 7 day   - op ongyn f/u     #Hyperthyroidism   - cw methimazole 5mg qd and atenolol 25mg bid        - GI Prophylaxis: Lovenox      Pending: tolerates diet

## 2024-04-29 NOTE — DISCHARGE NOTE PROVIDER - HOSPITAL COURSE
43y F pmh endometriosis, PCOS, uterine fibroids s/p myomectomy, hyperthyroidism, acute pancreatitis p/w abdominal pain.     #Nausea, Vomiting, Diarrhea, Inability to tolerate PO x 2 weeks   #Peptic ulcer disease vs acute pancreatitis   - sp EGD 6/23 w/ GERD and prior ulcer at GE junction   - elevated Lipase, however could be elevated due to vomiting   - CT -ve for pancreatic inflammation   - RUQ -ve for stones   - c/w pain control   - c/w IV PPI BID   - c/w sucralfate 1g tid   - GI eval - s/p EGD on 4/29 which showed***    #Hydrosalpinx  #Endometriosis  #PCOS  - acute on chronic pelvic pain   - OBGYN consulted - tvus showing hydrosalpinx   - doxy 100mg bid, flagyl 500mg bid x 7 day (4/30 end date)  - op obgyn f/u       #Hyperthyroidism   - continued methimazole 5mg qd and atenolol 25mg bid 43y F pmh endometriosis, PCOS, uterine fibroids s/p myomectomy, hyperthyroidism, acute pancreatitis p/w abdominal pain.     #Nausea, Vomiting, Diarrhea, Inability to tolerate PO x 2 weeks   #Peptic ulcer disease vs acute pancreatitis   - sp EGD 6/23 w/ GERD and prior ulcer at GE junction   - elevated Lipase, however could be elevated due to vomiting   - CT -ve for pancreatic inflammation   - RUQ -ve for stones   - c/w pain control   - c/w IV PPI BID   - c/w sucralfate 1g tid   - GI eval - s/p EGD on 4/29 which showed gfrade a esophagitis, erosive gastritis and duodenitis    #Hydrosalpinx  #Endometriosis  #PCOS  - acute on chronic pelvic pain   - OBGYN consulted - tvus showing hydrosalpinx   - doxy 100mg bid, flagyl 500mg bid x 7 day (4/30 end date)  - op obgyn f/u       #Hyperthyroidism   - continued methimazole 5mg qd and atenolol 25mg bid    Patient is now medically stable for discharge.

## 2024-04-29 NOTE — DISCHARGE NOTE PROVIDER - NSDCMRMEDTOKEN_GEN_ALL_CORE_FT
Component      Latest Ref Rng & Units 9/19/2019   FASTING STATUS      hrs 12   CHOLESTEROL      <200 mg/dL 169   CALCULATED LDL      <130 mg/dL 108   HDL      >39 mg/dL 46   TRIGLYCERIDE      <150 mg/dL 76   CALCULATED NON HDL      mg/dL 123   CHOL/HDL      <4.5 3.7   Fasting Status      hrs 12   Glucose      65 - 99 mg/dL 92     Per Blanche, Lipid panel and glucose are normal. PSA is not back yet. Left a message letting pt know this. I also let pt know that his biometric form is completed and signed. It was placed up at   ready for  per his request.   atenolol 25 mg oral tablet: 1 tab(s) orally once a day  Carafate 1 g oral tablet: 1 tab(s) orally 4 times a day   methIMAzole 5 mg oral tablet: 1 tab(s) orally once a day   atenolol 25 mg oral tablet: 1 tab(s) orally once a day  Carafate 1 g oral tablet: 1 tab(s) orally 4 times a day   doxycycline monohydrate 50 mg oral capsule: 2 cap(s) orally every 12 hours  methIMAzole 5 mg oral tablet: 1 tab(s) orally once a day  metroNIDAZOLE 500 mg oral tablet: 1 tab(s) orally every 12 hours  pantoprazole 40 mg oral delayed release tablet: 1 tab(s) orally once a day (before a meal)

## 2024-04-29 NOTE — DISCHARGE NOTE NURSING/CASE MANAGEMENT/SOCIAL WORK - PATIENT PORTAL LINK FT
You can access the FollowMyHealth Patient Portal offered by [facility] by registering at the following website: [email or web address]. By joining [employer]’s FollowMyHealth portal, you will also be able to view your health information using other applications (apps) compatible with our system.

## 2024-04-29 NOTE — DISCHARGE NOTE PROVIDER - INSTRUCTIONS
Please start your diet with liquid foods and if you tolerate that, advance your diet to soft foods then you may eat solid foods.

## 2024-05-03 LAB — SURGICAL PATHOLOGY STUDY: SIGNIFICANT CHANGE UP

## 2024-05-10 NOTE — CDI QUERY NOTE - NSCDIOTHERTXTBX_GEN_ALL_CORE_HH
Clinical documentation and/or evidence of the patient’s presentation, evaluation, and medical management, as evidenced below, may support a diagnosis that is not documented in the medical record.  In order to ensure accurate coding and accuracy of the clinical record, the documentation in this patient’s medical record requires additional clarification.      If you think the supporting documentation and/or clinical evidence supports a more specific diagnosis, please include more specific documentation of a diagnosis associated with these findings in your progress note and/or discharge summary.    Please clarify if obese can be further specified as:    • Morbid obesity   • Other (specify)      Supporting documentation and/or clinical evidence:     4/24 GYN Consult Note: …Height (cm): 152.4; Weight (kg): 94.3; BMI (kg/m2): 40.6…    4/28 Internal Medicine Progress Note: …GENERAL: NAD, obese…

## 2024-05-22 ENCOUNTER — APPOINTMENT (OUTPATIENT)
Dept: GASTROENTEROLOGY | Facility: CLINIC | Age: 44
End: 2024-05-22

## 2024-07-15 NOTE — ED PROVIDER NOTE - MDM PATIENT STATEMENT FOR ADDL TREATMENT
The patient is a 88y Female complaining of shortness of breath. Patient with one or more new problems requiring additional work-up/treatment.

## 2024-09-04 NOTE — ED ADULT TRIAGE NOTE - INTERNATIONAL TRAVEL
Glad to hear improving.     Follow up with general surgery regarding the blood in the stool.        No

## 2024-12-07 NOTE — ED PROVIDER NOTE - ATTENDING APP SHARED VISIT CONTRIBUTION OF CARE
Nasal Procedures 44 yo F, hx of recurrent ovarian cysts following with Dr. Phillip at [facility], fibroids sp [place] and then myomectomy 12/22, pancreatitis   pt here for eval of multiple complaints. pt has had lower abd pain since sunday w/ brown vaginal discharge c/w previous ovarian cyst rupture. pt also c/o upper abd pain which she being worked up for by GI w/ gastric emptying study today and plan for EGD within the next couple weeks. pt states she developed a fever to 102F the past day.  no syncope/trauma. pain moderate.  no dysuria/frequency. no cough.    vss  gen- NAD, aaox3  card-rrr  lungs-ctab, no wheezing or rhonchi  abd-soft, mid/epigstric/ruq, lower/llq/rlq tenderness, +guarding, no cvat   neuro- full str/sensation, cn ii-xii grossly intact, normal coordination

## 2024-12-14 NOTE — PRE-ANESTHESIA EVALUATION ADULT - NSANTHASARD_GEN_ALL_CORE
@0130 s/p  removal of GRUPO. Bleeding well controlled. Running QBL 604ml. S/p mtz catheter removal.    VSS. Fundus at midline, firm and well contracted. Light lochia rubra w/o odor.  Reports 0/10 pain at this time. POC reviewed with pt. Questions and concerns addressed. Transferred safely via wheelchair to [facility] room 654. Pt safety maintained.     Problem: Adult Inpatient Plan of Care  Goal: Plan of Care Review  Outcome: Progressing  Goal: Patient-Specific Goal (Individualized)  Outcome: Progressing  Goal: Absence of Hospital-Acquired Illness or Injury  Outcome: Progressing  Goal: Optimal Comfort and Wellbeing  Outcome: Progressing  Goal: Readiness for Transition of Care  Outcome: Progressing     Problem:  Fall Injury Risk  Goal: Absence of Fall, Infant Drop and Related Injury  Outcome: Progressing     Problem: Infection  Goal: Absence of Infection Signs and Symptoms  Outcome: Progressing     Problem: Anesthesia/Analgesia, Neuraxial  Goal: Effective Urinary Elimination  Outcome: Progressing     Problem: Pain Acute  Goal: Optimal Pain Control and Function  Outcome: Progressing     Problem: Breastfeeding  Goal: Effective Breastfeeding  Outcome: Progressing      3

## 2025-01-15 ENCOUNTER — APPOINTMENT (OUTPATIENT)
Dept: ORTHOPEDIC SURGERY | Facility: CLINIC | Age: 45
End: 2025-01-15
Payer: MEDICAID

## 2025-01-15 DIAGNOSIS — S93.492A SPRAIN OF OTHER LIGAMENT OF LEFT ANKLE, INITIAL ENCOUNTER: ICD-10-CM

## 2025-01-15 PROCEDURE — 99204 OFFICE O/P NEW MOD 45 MIN: CPT

## 2025-01-15 PROCEDURE — 73610 X-RAY EXAM OF ANKLE: CPT | Mod: LT

## 2025-01-15 PROCEDURE — 73630 X-RAY EXAM OF FOOT: CPT | Mod: LT

## 2025-02-12 ENCOUNTER — APPOINTMENT (OUTPATIENT)
Dept: ORTHOPEDIC SURGERY | Facility: CLINIC | Age: 45
End: 2025-02-12

## 2025-05-07 ENCOUNTER — APPOINTMENT (OUTPATIENT)
Dept: PULMONOLOGY | Facility: CLINIC | Age: 45
End: 2025-05-07